# Patient Record
Sex: MALE | Race: WHITE | NOT HISPANIC OR LATINO | Employment: OTHER | URBAN - METROPOLITAN AREA
[De-identification: names, ages, dates, MRNs, and addresses within clinical notes are randomized per-mention and may not be internally consistent; named-entity substitution may affect disease eponyms.]

---

## 2017-04-18 ENCOUNTER — ALLSCRIPTS OFFICE VISIT (OUTPATIENT)
Dept: OTHER | Facility: OTHER | Age: 77
End: 2017-04-18

## 2017-04-18 DIAGNOSIS — R05.9 COUGH: ICD-10-CM

## 2018-01-10 NOTE — PROGRESS NOTES
Assessment    1  Cough (786 2) (R05)   2  COPD exacerbation (491 21) (J44 1)    Plan  Asthma    · Advair Diskus 250-50 MCG/DOSE Inhalation Aerosol Powder Breath Activated; 1  PUFF TWICE A DAY   Rx By: Cristina Soto; Dispense: 30 Days ; #:1 Aerosol Powder Breath Activated; Refill: 5; For: Asthma; BRANDON = N; Verified Transmission to [address] ([address]; Last Updated By: System, SureScripts; 4/18/2017 11:31:28 AM  COPD exacerbation    · PredniSONE 20 MG Oral Tablet; 2 tabs daily x 5 days then 1 tab daily x 5 days   Rx By: Cristina Soto; Dispense: 10 Days ; #:15 Tablet; Refill: 0; For: COPD exacerbation; BRANDON = N; Verified Transmission to [address] ([address]; Last Updated By: System, SureScripts; 4/18/2017 11:31:18 AM  Cough    · * XR CHEST PA & LATERAL; Status:Active; Requested for:18Apr2017;    Perform:[facility]; Due:18Apr2018; Ordered; For:Cough; Ordered By:Chelsy Solis;    Discussion/Summary  Discussion Summary:   Ruth Leahy has moderate COPD with exacerbation  He has course wheezing and reports improvement in cough  Apparently he went to PCP and was prescribed Z-Pratik and tapering dose of prednisone  He still has wheezing; bilaterally  I did advise him to use Advair 250/50 1 puff BID He is agreeable to use prednisone 40mg daily x 5 days and 20 mg x 5 days  He is going to think about the chest Xray I ordered for him  He will let us know if he improves  He did recently complete Z-Pratik  Cough is improved but he has wheezing and prednisone taper is advised  F/u in 3-4 months PFT will be done at that time  Counseling Documentation With Imm: The patient was counseled regarding  Goals and Barriers: The patient has the current Goals: Ruth Leahy will consider using Advair 250/50 1 puff BID as prescribed  Patient's Capacity to Self-Care: Patient is able to Self-Care  Active Problems    1  Allergic rhinitis (477 9) (J30 9)   2  Asthma (493 90) (J45 909)   3  Bronchitis (490) (J40)   4  Chronic obstructive pulmonary disease (496) (J44 9)   5  Difficulty breathing (786 09) (R06 89)    Chief Complaint  Chief Complaint Free Text Note Form: Pt presents today for cold  pt states that the cold started about a month ago  Pt states that he has cough which is dry  Pt states that he went to his PCP to get antibiotics which have helped some  History of Present Illness  HPI: Mr Carla Weeks is here today for follow up  He has moderate COPD  FEV1 1 29L or 45% of predicted  He has been on Advair 250/50 1 puff daily  He had chest Xray on 1/15/16 and no active disease was seen  He has element of asthma and uses Singular on rare occasion  Mr Carla Weeks does not have nebulizer but uses emergency inhaler rarely  He complains of cough with little sputum productive  He went to PCP Dr Tere Randle and was prescribed and completed Z-Pratik yesterday  He was given oral prednisone and he completed this  Review of Systems  Complete-Male Pulm:   The patient presents with complaints of sudden onset of intermittent episodes of mild cough, described as dry  His symptoms are caused by no known event  Symptoms are made worse by activity  Symptoms are improving  Surgical History    1  History of Cataract Surgery   2  History of Foot Surgery   3  History of Hand Excision Of Tendon Cyst   4  History of Hemorrhoidectomy  Surgical History Reviewed: The surgical history was reviewed and updated today  Family History  Father    1  Family history of Asthma (V17 5)  Sister    2  Family history of Chronic Obstructive Pulmonary Disease  Family History Reviewed: The family history was reviewed and updated today  Social History    · Chewing Nicotine-containing Substances   · 2-3 TIMES A DAY   · Former smoker (V15 82) (Z87 891)   · QUIT SMOKING 10 YRS AGO USE TO SMOKE 1 PACK A DAY FOR 20 YRS  HW NOW      CHEWS TOBACCO 2-3 TIMES A DAY  Social History Reviewed: The social history was reviewed and updated today        Current Meds   1  Advair Diskus 250-50 MCG/DOSE Inhalation Aerosol Powder Breath Activated; 1 PUFF   TWICE A DAY; Therapy: [id number] to (Evaluate:17Oct2016)  Requested for: 17Oct2016; Last   Rx:14Apr2015 Ordered   2  Advair Diskus 250-50 MCG/DOSE Inhalation Aerosol Powder Breath Activated; INHALE 1   PUFF TWICE DAILY; Therapy: 33ZHC0651 to (Evaluate:06Sot1211)  Requested for: 25TYP5064; Last   Rx:15Jun2016 Ordered   3  Aspirin 81 MG TABS; Therapy: (Recorded:38Uht4991) to Recorded   4  Breo Ellipta 100-25 MCG/INH Inhalation Aerosol Powder Breath Activated; USE 1   INHALATION ONCE DAILY; Therapy: 80ZIC3939 to (Evaluate:02Nov2016); Last Rx:15Jun2016 Ordered   5  Montelukast Sodium 10 MG Oral Tablet; TAKE 1 TABLET DAILY  Requested for:   44BKI4657; Last Rx:05Mar2014 Ordered   6  Proventil  (90 Base) MCG/ACT Inhalation Aerosol Solution; INHALE 2 PUFFS   EVERY 4-6 HOURS AS NEEDED  Requested for: 59RXT9231; Last Rx:05Mar2014   Ordered   7  Rhinocort 32 MCG/ACT AERO; Therapy: (Recorded:18Feb2014) to Recorded  Medication List Reviewed: The medication list was reviewed and updated today  Allergies    1  No Known Drug Allergies    Vitals  Vital Signs    Recorded: 18Apr2017 11:04AM   Temperature 99 F, Oral   Heart Rate 83   Pulse Quality Normal   Respiration Quality Normal   Respiration 12   Systolic 931, LUE, Sitting   Diastolic 84, LUE, Sitting   Height 5 ft 9 in   Weight 166 lb    BMI Calculated 24 51   BSA Calculated 1 91   O2 Saturation 96, RA     Physical Exam    Constitutional   General appearance: No acute distress, well appearing and well nourished  Ears, Nose, Mouth, and Throat   Nasal mucosa, septum, and turbinates: Normal without edema or erythema  Lips, teeth, and gums: Normal, good dentition  Oropharynx: Normal with no erythema, edema, exudate or lesions  Neck   Neck: Supple, symmetric, trachea midline, no masses      Jugular veins: Normal     Pulmonary   Chest: Normal     Respiratory effort: No increased work of breathing or signs of respiratory distress  Auscultation of lungs: Abnormal   wheezing over both midlung fields, wheezing over both bases and course wheezing  Cardiovascular   Auscultation of heart: Normal rate and rhythm, normal S1 and S2, no murmurs  Examination of extremities for edema and/or varicosities: Normal     Abdomen   Abdomen: Soft, non-tender  Lymphatic   Palpation of lymph nodes in neck: No lymphadenopathy  Musculoskeletal   Gait and station: Normal     Digits and nails: Normal without clubbing or cyanosis  Neurologic   Mental Status: Normal  Not confused, no evidence of dementia, good comprehension, good concentration  Skin   Skin and subcutaneous tissue: Limited exam shows no rash  Psychiatric   Orientation to person, place and time: Normal     Mood and affect: Normal        Signatures   Electronically signed by : AUGUSTO Cedeno;  Apr 18 2017 11:46AM EST                       (Author)

## 2018-01-12 VITALS
SYSTOLIC BLOOD PRESSURE: 124 MMHG | OXYGEN SATURATION: 96 % | HEIGHT: 69 IN | DIASTOLIC BLOOD PRESSURE: 84 MMHG | BODY MASS INDEX: 24.59 KG/M2 | WEIGHT: 166 LBS | RESPIRATION RATE: 12 BRPM | TEMPERATURE: 99 F | HEART RATE: 83 BPM

## 2018-01-13 NOTE — PROGRESS NOTES
Assessment    1  Chronic obstructive pulmonary disease (496) (J44 9)   2  Difficulty breathing (786 09) (R06 89)    Plan  Chronic obstructive pulmonary disease    · Advair Diskus 250-50 MCG/DOSE Inhalation Aerosol Powder Breath Activated;  INHALE 1 PUFF TWICE DAILY   Rx By: Jesse Manifold; Dispense: 30 Days ; #:1 Aerosol Powder Breath Activated; Refill: 5; For: Chronic obstructive pulmonary disease; BRANDON = N; Verified Transmission to [facility]; Last Updated By: System, SureScripts; 6/15/2016 10:38:11 AM   · Paige Barthel Ellipta 100-25 MCG/INH Inhalation Aerosol Powder Breath Activated; USE 1  INHALATION ONCE DAILY   Rx By: Jesse Manifold; Dispense: 28 Days ; #:1 Aerosol Powder Breath Activated; Refill: 4; For: Chronic obstructive pulmonary disease; BRANDON = N; Print Rx  Chronic obstructive pulmonary disease, Difficulty breathing    · * XR CHEST PA & LATERAL; Status:Active; Requested DUL:[id number];    Perform:[facility]; Order Comments:cc: Dr Lily Juarez; [facility]:[id number];CBBOISF; For:Chronic obstructive pulmonary disease, Difficulty breathing; Ordered By:Amber Solis; Results/Data  PFT Results v2:     Spirometry: Forced vital capacity: 2 73L and 68% Predicted Values  Forced expiratory volume in one second: 1 29L and 45% Predicted Value  FEV1/FVC ratio is 47  Post Bronchodilator Spirometry:   Lung Volumes:   DLCO:    PFT Interpretation:   moderate airway obstruction  Discussion/Summary  Discussion Summary:   Joy Lucero has moderate-severe COPD  He had PFT's done FEV1 is   29L or 45% of predicted  Joy Lucero is now using Advair 250/50 1 puff daily  He is going to try Breo 100 mcg 1 puff daily  Joy Lucero is agreeable to chest Xray  Last one was 2003 and was normal  Follow up in 1 year  Joy Lucero has elevated PSA (10)  He follows with Dr Kenny Del Toro  Active Problems    1  Allergic rhinitis (477 9) (J30 9)   2  Asthma (493 90) (J45 909)   3  Bronchitis (490) (J40)   4   Chronic obstructive pulmonary disease (496) (J44 9)   5  Difficulty breathing (786 09) (R06 89)    Chief Complaint  Chief Complaint Free Text Note Form: Pt presents today for routine check up  Pt states that he has some back pains which occur every morning  Pt states that he has some SOB when he exerts himself  History of Present Illness  HPI: Noreen Owen is a 67year old male with history of severe COPD and dyspnea on exertion  He is very active and walks up steps daily  He is using Advair 250/50 1 puff daily  He uses Singular about three times a year  He has never used his rescue inhaler  Noreen Owen is a former smoker; he smoked for 15-20 years and quit many years ago  Previous PFT revealed FEV1 is 0 96 or 38% of predicted  Review of Systems  Complete-Male Pulm:   Constitutional: No fever or chills, feels well, no tiredness, no recent weight gain or weight loss  Eyes: no complaints of vision problems  ENT: no rhinitis, no PND, no epistaxis  Cardiovascular: no palpitations, no chest pain  Respiratory: shortness of breath during exertion, but as noted in HPI    The patient presents with complaints of gradual onset of occasional episodes of moderate wheezing  His symptoms are caused by no known event  Symptoms are improved by resting  Symptoms are unchanged  Gastrointestinal: no complaints of esophageal reflux, no abdominal pain  Genitourinary: no urinary retention  Musculoskeletal: no arthralgias, no joint swelling, no myalgias  Integumentary: no rash, no lesions  Neurological: no headache, no fainting, no weakness  Psychiatric: no anxiety, no depression  Hematologic/Lymphatic: no complaints of swollen glands  ROS Reviewed:   ROS reviewed  Surgical History    1  History of Cataract Surgery   2  History of Foot Surgery   3  History of Hand Excision Of Tendon Cyst   4  History of Hemorrhoidectomy  Surgical History Reviewed: The surgical history was reviewed and updated today  Family History  Father    1  Family history of Asthma (V17 5)  Sister    2  Family history of Chronic Obstructive Pulmonary Disease  Family History Reviewed: The family history was reviewed and updated today  Social History    · Chewing Nicotine-containing Substances   · 2-3 TIMES A DAY   · Former smoker (V15 82) (Z87 891)   · QUIT SMOKING 10 YRS AGO USE TO SMOKE 1 PACK A DAY FOR 20 YRS  HW NOW      CHEWS TOBACCO 2-3 TIMES A DAY  Social History Reviewed: The social history was reviewed and updated today  The social history was reviewed and is unchanged  Current Meds   1  Advair Diskus 250-50 MCG/DOSE Inhalation Aerosol Powder Breath Activated; 1 PUFF   TWICE A DAY; Therapy: 99YZZ9023 to (Evaluate:11Oct2015)  Requested for: 28Diw0913; Last   Rx:14Apr2015 Ordered   2  Aspirin 81 MG TABS; Therapy: (Recorded:66Jfw2823) to Recorded   3  Montelukast Sodium 10 MG Oral Tablet; TAKE 1 TABLET DAILY  Requested for:   56SFC9593; Last Rx:05Mar2014 Ordered   4  Proventil  (90 Base) MCG/ACT Inhalation Aerosol Solution; INHALE 2 PUFFS   EVERY 4-6 HOURS AS NEEDED  Requested for: 73PQD3479; Last Rx:05Mar2014   Ordered   5  Rhinocort 32 MCG/ACT AERO; Therapy: (Recorded:81Vie0969) to Recorded  Medication List Reviewed: The medication list was reviewed and updated today  Allergies    1  No Known Drug Allergies    Vitals  Vital Signs [Data Includes: Current Encounter]    Recorded: 34HVA7931 10:17AM   Temperature 98 F, Oral   Heart Rate 76   Pulse Quality Normal   Respiration 12   Respiration Quality Normal   Systolic 764, LUE, Sitting   Diastolic 86, LUE, Sitting   Height 5 ft 9 in   Weight 171 lb    BMI Calculated 25 25   BSA Calculated 1 93   O2 Saturation 99, RA     Physical Exam    Constitutional   General appearance: No acute distress, well appearing and well nourished  Eyes   Examination of pupil and irises: Anicteric, pupils reactive     Ears, Nose, Mouth, and Throat   External inspection of ears and nose: Normal     Nasal mucosa, septum, and turbinates: Normal without edema or erythema  Lips, teeth, and gums: Normal, good dentition  Oropharynx: Normal with no erythema, edema, exudate or lesions  Mallampati Classification: 3  Neck   Neck: Supple, symmetric, trachea midline, no masses  Jugular veins: Normal     Pulmonary   Chest: Normal     Respiratory effort: No increased work of breathing or signs of respiratory distress  Auscultation of lungs: Abnormal   diminished breath sounds over both midlung fields and diminished breath sounds over both bases  Cardiovascular   Auscultation of heart: Normal rate and rhythm, normal S1 and S2, no murmurs  Examination of extremities for edema and/or varicosities: Normal     Abdomen   Abdomen: Soft, non-tender  Lymphatic   Palpation of lymph nodes in neck: No lymphadenopathy  Musculoskeletal   Gait and station: Normal     Digits and nails: Normal without clubbing or cyanosis  Neurologic   Mental Status: Normal  Not confused, no evidence of dementia, good comprehension, good concentration  Motor Exam: Gross motor function normal     Skin   Skin and subcutaneous tissue: Limited exam shows no rash      Psychiatric   Orientation to person, place and time: Normal     Mood and affect: Normal        Signatures   Electronically signed by : AUGUSTO Cedeno; Jose 15 2016 10:54AM EST                       (Author)

## 2020-04-12 ENCOUNTER — HOSPITAL ENCOUNTER (EMERGENCY)
Facility: HOSPITAL | Age: 80
Discharge: HOME | End: 2020-04-12
Attending: EMERGENCY MEDICINE | Admitting: EMERGENCY MEDICINE
Payer: MEDICARE

## 2020-04-12 ENCOUNTER — APPOINTMENT (EMERGENCY)
Dept: RADIOLOGY | Facility: HOSPITAL | Age: 80
End: 2020-04-12
Payer: MEDICARE

## 2020-04-12 VITALS
SYSTOLIC BLOOD PRESSURE: 146 MMHG | WEIGHT: 175.27 LBS | BODY MASS INDEX: 25.88 KG/M2 | RESPIRATION RATE: 20 BRPM | DIASTOLIC BLOOD PRESSURE: 86 MMHG | TEMPERATURE: 97.5 F | OXYGEN SATURATION: 97 % | HEART RATE: 92 BPM

## 2020-04-12 DIAGNOSIS — R33.9 URINARY RETENTION: Primary | ICD-10-CM

## 2020-04-12 LAB
ALBUMIN SERPL BCP-MCNC: 3.9 G/DL (ref 3.5–5)
ALP SERPL-CCNC: 72 U/L (ref 46–116)
ALT SERPL W P-5'-P-CCNC: 26 U/L (ref 12–78)
ANION GAP SERPL CALCULATED.3IONS-SCNC: 10 MMOL/L (ref 4–13)
APTT PPP: 24 SECONDS (ref 23–37)
AST SERPL W P-5'-P-CCNC: 23 U/L (ref 5–45)
BACTERIA UR QL AUTO: ABNORMAL /HPF
BASOPHILS # BLD AUTO: 0.03 THOUSANDS/ΜL (ref 0–0.1)
BASOPHILS NFR BLD AUTO: 0 % (ref 0–1)
BILIRUB SERPL-MCNC: 0.7 MG/DL (ref 0.2–1)
BILIRUB UR QL STRIP: NEGATIVE
BUN SERPL-MCNC: 19 MG/DL (ref 5–25)
CALCIUM SERPL-MCNC: 9 MG/DL (ref 8.3–10.1)
CHLORIDE SERPL-SCNC: 103 MMOL/L (ref 100–108)
CLARITY UR: CLEAR
CO2 SERPL-SCNC: 27 MMOL/L (ref 21–32)
COLOR UR: ABNORMAL
CREAT SERPL-MCNC: 1.14 MG/DL (ref 0.6–1.3)
EOSINOPHIL # BLD AUTO: 0 THOUSAND/ΜL (ref 0–0.61)
EOSINOPHIL NFR BLD AUTO: 0 % (ref 0–6)
ERYTHROCYTE [DISTWIDTH] IN BLOOD BY AUTOMATED COUNT: 13 % (ref 11.6–15.1)
GFR SERPL CREATININE-BSD FRML MDRD: 60 ML/MIN/1.73SQ M
GLUCOSE SERPL-MCNC: 139 MG/DL (ref 65–140)
GLUCOSE UR STRIP-MCNC: NEGATIVE MG/DL
HCT VFR BLD AUTO: 45.9 % (ref 36.5–49.3)
HGB BLD-MCNC: 15 G/DL (ref 12–17)
HGB UR QL STRIP.AUTO: ABNORMAL
IMM GRANULOCYTES # BLD AUTO: 0.05 THOUSAND/UL (ref 0–0.2)
IMM GRANULOCYTES NFR BLD AUTO: 0 % (ref 0–2)
INR PPP: 0.89 (ref 0.84–1.19)
KETONES UR STRIP-MCNC: NEGATIVE MG/DL
LEUKOCYTE ESTERASE UR QL STRIP: NEGATIVE
LYMPHOCYTES # BLD AUTO: 0.5 THOUSANDS/ΜL (ref 0.6–4.47)
LYMPHOCYTES NFR BLD AUTO: 5 % (ref 14–44)
MCH RBC QN AUTO: 30.2 PG (ref 26.8–34.3)
MCHC RBC AUTO-ENTMCNC: 32.7 G/DL (ref 31.4–37.4)
MCV RBC AUTO: 92 FL (ref 82–98)
MONOCYTES # BLD AUTO: 0.84 THOUSAND/ΜL (ref 0.17–1.22)
MONOCYTES NFR BLD AUTO: 8 % (ref 4–12)
NEUTROPHILS # BLD AUTO: 9.77 THOUSANDS/ΜL (ref 1.85–7.62)
NEUTS SEG NFR BLD AUTO: 87 % (ref 43–75)
NITRITE UR QL STRIP: NEGATIVE
NON-SQ EPI CELLS URNS QL MICRO: ABNORMAL /HPF
NRBC BLD AUTO-RTO: 0 /100 WBCS
PH UR STRIP.AUTO: 6 [PH]
PLATELET # BLD AUTO: 208 THOUSANDS/UL (ref 149–390)
PMV BLD AUTO: 10.1 FL (ref 8.9–12.7)
POTASSIUM SERPL-SCNC: 4 MMOL/L (ref 3.5–5.3)
PROT SERPL-MCNC: 6.9 G/DL (ref 6.4–8.2)
PROT UR STRIP-MCNC: NEGATIVE MG/DL
PROTHROMBIN TIME: 12.4 SECONDS (ref 11.6–14.5)
RBC # BLD AUTO: 4.97 MILLION/UL (ref 3.88–5.62)
RBC #/AREA URNS AUTO: ABNORMAL /HPF
SODIUM SERPL-SCNC: 140 MMOL/L (ref 136–145)
SP GR UR STRIP.AUTO: 1.01 (ref 1–1.03)
UROBILINOGEN UR QL STRIP.AUTO: 0.2 E.U./DL
WBC # BLD AUTO: 11.19 THOUSAND/UL (ref 4.31–10.16)
WBC #/AREA URNS AUTO: ABNORMAL /HPF

## 2020-04-12 PROCEDURE — 80053 COMPREHEN METABOLIC PANEL: CPT | Performed by: EMERGENCY MEDICINE

## 2020-04-12 PROCEDURE — 81001 URINALYSIS AUTO W/SCOPE: CPT | Performed by: EMERGENCY MEDICINE

## 2020-04-12 PROCEDURE — 85610 PROTHROMBIN TIME: CPT | Performed by: EMERGENCY MEDICINE

## 2020-04-12 PROCEDURE — 85730 THROMBOPLASTIN TIME PARTIAL: CPT | Performed by: EMERGENCY MEDICINE

## 2020-04-12 PROCEDURE — 74177 CT ABD & PELVIS W/CONTRAST: CPT

## 2020-04-12 PROCEDURE — 96360 HYDRATION IV INFUSION INIT: CPT

## 2020-04-12 PROCEDURE — 36415 COLL VENOUS BLD VENIPUNCTURE: CPT | Performed by: EMERGENCY MEDICINE

## 2020-04-12 PROCEDURE — 99284 EMERGENCY DEPT VISIT MOD MDM: CPT

## 2020-04-12 PROCEDURE — 99282 EMERGENCY DEPT VISIT SF MDM: CPT | Performed by: EMERGENCY MEDICINE

## 2020-04-12 PROCEDURE — 85025 COMPLETE CBC W/AUTO DIFF WBC: CPT | Performed by: EMERGENCY MEDICINE

## 2020-04-12 RX ORDER — FLUTICASONE FUROATE AND VILANTEROL 100; 25 UG/1; UG/1
POWDER RESPIRATORY (INHALATION) DAILY
COMMUNITY

## 2020-04-12 RX ORDER — FLUTICASONE PROPIONATE 50 MCG
SPRAY, SUSPENSION (ML) NASAL EVERY 12 HOURS
COMMUNITY
End: 2020-04-12

## 2020-04-12 RX ADMIN — SODIUM CHLORIDE 1000 ML: 0.9 INJECTION, SOLUTION INTRAVENOUS at 14:24

## 2020-04-12 RX ADMIN — IOHEXOL 100 ML: 350 INJECTION, SOLUTION INTRAVENOUS at 15:48

## 2020-04-13 ENCOUNTER — HOSPITAL ENCOUNTER (OUTPATIENT)
Facility: HOSPITAL | Age: 80
Setting detail: OBSERVATION
Discharge: HOME | End: 2020-04-14
Attending: EMERGENCY MEDICINE | Admitting: UROLOGY
Payer: MEDICARE

## 2020-04-13 DIAGNOSIS — R31.9 HEMATURIA, UNSPECIFIED TYPE: Primary | ICD-10-CM

## 2020-04-13 LAB
ABO GROUP BLD: NORMAL
ALBUMIN SERPL BCP-MCNC: 3.3 G/DL (ref 3.5–5)
ALP SERPL-CCNC: 60 U/L (ref 46–116)
ALT SERPL W P-5'-P-CCNC: 22 U/L (ref 12–78)
ANION GAP SERPL CALCULATED.3IONS-SCNC: 6 MMOL/L (ref 4–13)
APTT PPP: 26 SECONDS (ref 23–37)
AST SERPL W P-5'-P-CCNC: 24 U/L (ref 5–45)
BASOPHILS # BLD AUTO: 0.04 THOUSANDS/ΜL (ref 0–0.1)
BASOPHILS NFR BLD AUTO: 1 % (ref 0–1)
BILIRUB SERPL-MCNC: 0.7 MG/DL (ref 0.2–1)
BLD GP AB SCN SERPL QL: NEGATIVE
BUN SERPL-MCNC: 19 MG/DL (ref 5–25)
CALCIUM SERPL-MCNC: 8.2 MG/DL (ref 8.3–10.1)
CHLORIDE SERPL-SCNC: 106 MMOL/L (ref 100–108)
CO2 SERPL-SCNC: 30 MMOL/L (ref 21–32)
CREAT SERPL-MCNC: 1.08 MG/DL (ref 0.6–1.3)
EOSINOPHIL # BLD AUTO: 0.15 THOUSAND/ΜL (ref 0–0.61)
EOSINOPHIL NFR BLD AUTO: 2 % (ref 0–6)
ERYTHROCYTE [DISTWIDTH] IN BLOOD BY AUTOMATED COUNT: 13.2 % (ref 11.6–15.1)
GFR SERPL CREATININE-BSD FRML MDRD: 64 ML/MIN/1.73SQ M
GLUCOSE SERPL-MCNC: 123 MG/DL (ref 65–140)
HCT VFR BLD AUTO: 39.8 % (ref 36.5–49.3)
HGB BLD-MCNC: 13 G/DL (ref 12–17)
IMM GRANULOCYTES # BLD AUTO: 0.02 THOUSAND/UL (ref 0–0.2)
IMM GRANULOCYTES NFR BLD AUTO: 0 % (ref 0–2)
INR PPP: 0.98 (ref 0.84–1.19)
LYMPHOCYTES # BLD AUTO: 1.2 THOUSANDS/ΜL (ref 0.6–4.47)
LYMPHOCYTES NFR BLD AUTO: 14 % (ref 14–44)
MCH RBC QN AUTO: 30.9 PG (ref 26.8–34.3)
MCHC RBC AUTO-ENTMCNC: 32.7 G/DL (ref 31.4–37.4)
MCV RBC AUTO: 95 FL (ref 82–98)
MONOCYTES # BLD AUTO: 1.11 THOUSAND/ΜL (ref 0.17–1.22)
MONOCYTES NFR BLD AUTO: 13 % (ref 4–12)
NEUTROPHILS # BLD AUTO: 6.12 THOUSANDS/ΜL (ref 1.85–7.62)
NEUTS SEG NFR BLD AUTO: 70 % (ref 43–75)
NRBC BLD AUTO-RTO: 0 /100 WBCS
PLATELET # BLD AUTO: 193 THOUSANDS/UL (ref 149–390)
PMV BLD AUTO: 10.1 FL (ref 8.9–12.7)
POTASSIUM SERPL-SCNC: 3.9 MMOL/L (ref 3.5–5.3)
PROT SERPL-MCNC: 6.1 G/DL (ref 6.4–8.2)
PROTHROMBIN TIME: 13.3 SECONDS (ref 11.6–14.5)
RBC # BLD AUTO: 4.21 MILLION/UL (ref 3.88–5.62)
RH BLD: POSITIVE
SODIUM SERPL-SCNC: 142 MMOL/L (ref 136–145)
SPECIMEN EXPIRATION DATE: NORMAL
WBC # BLD AUTO: 8.64 THOUSAND/UL (ref 4.31–10.16)

## 2020-04-13 PROCEDURE — 85025 COMPLETE CBC W/AUTO DIFF WBC: CPT | Performed by: EMERGENCY MEDICINE

## 2020-04-13 PROCEDURE — 86901 BLOOD TYPING SEROLOGIC RH(D): CPT | Performed by: EMERGENCY MEDICINE

## 2020-04-13 PROCEDURE — 99284 EMERGENCY DEPT VISIT MOD MDM: CPT

## 2020-04-13 PROCEDURE — 85730 THROMBOPLASTIN TIME PARTIAL: CPT | Performed by: EMERGENCY MEDICINE

## 2020-04-13 PROCEDURE — 86900 BLOOD TYPING SEROLOGIC ABO: CPT | Performed by: EMERGENCY MEDICINE

## 2020-04-13 PROCEDURE — 80053 COMPREHEN METABOLIC PANEL: CPT | Performed by: EMERGENCY MEDICINE

## 2020-04-13 PROCEDURE — 36415 COLL VENOUS BLD VENIPUNCTURE: CPT | Performed by: EMERGENCY MEDICINE

## 2020-04-13 PROCEDURE — 85610 PROTHROMBIN TIME: CPT | Performed by: EMERGENCY MEDICINE

## 2020-04-13 PROCEDURE — 86850 RBC ANTIBODY SCREEN: CPT | Performed by: EMERGENCY MEDICINE

## 2020-04-13 PROCEDURE — 99285 EMERGENCY DEPT VISIT HI MDM: CPT | Performed by: EMERGENCY MEDICINE

## 2020-04-13 RX ORDER — DOCUSATE SODIUM 100 MG/1
100 CAPSULE, LIQUID FILLED ORAL 2 TIMES DAILY
Status: DISCONTINUED | OUTPATIENT
Start: 2020-04-13 | End: 2020-04-14 | Stop reason: HOSPADM

## 2020-04-13 RX ORDER — MAGNESIUM HYDROXIDE/ALUMINUM HYDROXICE/SIMETHICONE 120; 1200; 1200 MG/30ML; MG/30ML; MG/30ML
30 SUSPENSION ORAL EVERY 6 HOURS PRN
Status: DISCONTINUED | OUTPATIENT
Start: 2020-04-13 | End: 2020-04-14 | Stop reason: HOSPADM

## 2020-04-13 RX ORDER — ONDANSETRON 2 MG/ML
4 INJECTION INTRAMUSCULAR; INTRAVENOUS EVERY 6 HOURS PRN
Status: DISCONTINUED | OUTPATIENT
Start: 2020-04-13 | End: 2020-04-14 | Stop reason: HOSPADM

## 2020-04-13 RX ORDER — ACETAMINOPHEN 325 MG/1
650 TABLET ORAL EVERY 6 HOURS PRN
Status: DISCONTINUED | OUTPATIENT
Start: 2020-04-13 | End: 2020-04-14 | Stop reason: HOSPADM

## 2020-04-13 RX ORDER — DEXTROSE, SODIUM CHLORIDE, AND POTASSIUM CHLORIDE 5; .45; .15 G/100ML; G/100ML; G/100ML
125 INJECTION INTRAVENOUS CONTINUOUS
Status: DISCONTINUED | OUTPATIENT
Start: 2020-04-13 | End: 2020-04-14 | Stop reason: HOSPADM

## 2020-04-14 VITALS
BODY MASS INDEX: 25.83 KG/M2 | HEART RATE: 75 BPM | DIASTOLIC BLOOD PRESSURE: 71 MMHG | SYSTOLIC BLOOD PRESSURE: 121 MMHG | HEIGHT: 69 IN | TEMPERATURE: 97.9 F | RESPIRATION RATE: 18 BRPM | WEIGHT: 174.4 LBS | OXYGEN SATURATION: 98 %

## 2020-04-14 PROBLEM — N13.8 BPH (BENIGN PROSTATIC HYPERTROPHY) WITH URINARY OBSTRUCTION: Status: ACTIVE | Noted: 2020-04-14

## 2020-04-14 PROBLEM — J44.1 ACUTE EXACERBATION OF CHRONIC OBSTRUCTIVE AIRWAYS DISEASE (HCC): Status: ACTIVE | Noted: 2020-04-14

## 2020-04-14 PROBLEM — N40.1 BPH (BENIGN PROSTATIC HYPERTROPHY) WITH URINARY OBSTRUCTION: Status: ACTIVE | Noted: 2020-04-14

## 2020-04-14 PROBLEM — R31.0 HEMATURIA, GROSS: Status: ACTIVE | Noted: 2020-04-14

## 2020-04-14 LAB
ABO GROUP BLD: NORMAL
ANION GAP SERPL CALCULATED.3IONS-SCNC: 4 MMOL/L (ref 4–13)
BUN SERPL-MCNC: 14 MG/DL (ref 5–25)
CALCIUM SERPL-MCNC: 8.4 MG/DL (ref 8.3–10.1)
CHLORIDE SERPL-SCNC: 106 MMOL/L (ref 100–108)
CO2 SERPL-SCNC: 30 MMOL/L (ref 21–32)
CREAT SERPL-MCNC: 0.94 MG/DL (ref 0.6–1.3)
ERYTHROCYTE [DISTWIDTH] IN BLOOD BY AUTOMATED COUNT: 13.1 % (ref 11.6–15.1)
GFR SERPL CREATININE-BSD FRML MDRD: 76 ML/MIN/1.73SQ M
GLUCOSE P FAST SERPL-MCNC: 134 MG/DL (ref 65–99)
GLUCOSE SERPL-MCNC: 134 MG/DL (ref 65–140)
HCT VFR BLD AUTO: 39 % (ref 36.5–49.3)
HGB BLD-MCNC: 12.3 G/DL (ref 12–17)
MCH RBC QN AUTO: 30.2 PG (ref 26.8–34.3)
MCHC RBC AUTO-ENTMCNC: 31.5 G/DL (ref 31.4–37.4)
MCV RBC AUTO: 96 FL (ref 82–98)
PLATELET # BLD AUTO: 166 THOUSANDS/UL (ref 149–390)
PMV BLD AUTO: 9.9 FL (ref 8.9–12.7)
POTASSIUM SERPL-SCNC: 3.7 MMOL/L (ref 3.5–5.3)
RBC # BLD AUTO: 4.07 MILLION/UL (ref 3.88–5.62)
RH BLD: POSITIVE
SODIUM SERPL-SCNC: 140 MMOL/L (ref 136–145)
WBC # BLD AUTO: 7.18 THOUSAND/UL (ref 4.31–10.16)

## 2020-04-14 PROCEDURE — 80048 BASIC METABOLIC PNL TOTAL CA: CPT | Performed by: UROLOGY

## 2020-04-14 PROCEDURE — 85027 COMPLETE CBC AUTOMATED: CPT | Performed by: UROLOGY

## 2020-04-14 PROCEDURE — 93005 ELECTROCARDIOGRAM TRACING: CPT

## 2020-04-14 RX ADMIN — DOCUSATE SODIUM 100 MG: 100 CAPSULE, LIQUID FILLED ORAL at 12:24

## 2020-04-14 RX ADMIN — DOCUSATE SODIUM 100 MG: 100 CAPSULE, LIQUID FILLED ORAL at 00:31

## 2020-04-14 RX ADMIN — DEXTROSE, SODIUM CHLORIDE, AND POTASSIUM CHLORIDE 125 ML/HR: 5; .45; .15 INJECTION INTRAVENOUS at 00:29

## 2020-04-14 RX ADMIN — DEXTROSE, SODIUM CHLORIDE, AND POTASSIUM CHLORIDE 125 ML/HR: 5; .45; .15 INJECTION INTRAVENOUS at 12:24

## 2020-04-15 ENCOUNTER — ANESTHESIA EVENT (OUTPATIENT)
Dept: PERIOP | Facility: HOSPITAL | Age: 80
End: 2020-04-15
Payer: MEDICARE

## 2020-04-15 PROCEDURE — 1123F ACP DISCUSS/DSCN MKR DOCD: CPT | Performed by: PATHOLOGY

## 2020-04-16 ENCOUNTER — HOSPITAL ENCOUNTER (OUTPATIENT)
Facility: HOSPITAL | Age: 80
Setting detail: OUTPATIENT SURGERY
Discharge: HOME | End: 2020-04-17
Attending: UROLOGY | Admitting: UROLOGY
Payer: MEDICARE

## 2020-04-16 ENCOUNTER — ANESTHESIA (OUTPATIENT)
Dept: PERIOP | Facility: HOSPITAL | Age: 80
End: 2020-04-16
Payer: MEDICARE

## 2020-04-16 DIAGNOSIS — N40.1 BENIGN PROSTATIC HYPERPLASIA WITH LOWER URINARY TRACT SYMPTOMS: ICD-10-CM

## 2020-04-16 DIAGNOSIS — R31.0 GROSS HEMATURIA: ICD-10-CM

## 2020-04-16 LAB
ATRIAL RATE: 77 BPM
P AXIS: 80 DEGREES
PR INTERVAL: 160 MS
QRS AXIS: 54 DEGREES
QRSD INTERVAL: 112 MS
QT INTERVAL: 410 MS
QTC INTERVAL: 463 MS
T WAVE AXIS: 62 DEGREES
VENTRICULAR RATE: 77 BPM

## 2020-04-16 PROCEDURE — 88305 TISSUE EXAM BY PATHOLOGIST: CPT | Performed by: PATHOLOGY

## 2020-04-16 PROCEDURE — 93010 ELECTROCARDIOGRAM REPORT: CPT | Performed by: INTERNAL MEDICINE

## 2020-04-16 RX ORDER — ATROPA BELLADONNA AND OPIUM 16.2; 3 MG/1; MG/1
30 SUPPOSITORY RECTAL EVERY 8 HOURS PRN
Status: DISCONTINUED | OUTPATIENT
Start: 2020-04-16 | End: 2020-04-17 | Stop reason: HOSPADM

## 2020-04-16 RX ORDER — LIDOCAINE HYDROCHLORIDE 10 MG/ML
INJECTION, SOLUTION EPIDURAL; INFILTRATION; INTRACAUDAL; PERINEURAL AS NEEDED
Status: DISCONTINUED | OUTPATIENT
Start: 2020-04-16 | End: 2020-04-16 | Stop reason: SURG

## 2020-04-16 RX ORDER — PROPOFOL 10 MG/ML
INJECTION, EMULSION INTRAVENOUS AS NEEDED
Status: DISCONTINUED | OUTPATIENT
Start: 2020-04-16 | End: 2020-04-16 | Stop reason: SURG

## 2020-04-16 RX ORDER — TAMSULOSIN HYDROCHLORIDE 0.4 MG/1
0.4 CAPSULE ORAL
Status: DISCONTINUED | OUTPATIENT
Start: 2020-04-16 | End: 2020-04-17 | Stop reason: HOSPADM

## 2020-04-16 RX ORDER — CEFAZOLIN SODIUM 2 G/50ML
2000 SOLUTION INTRAVENOUS ONCE
Status: COMPLETED | OUTPATIENT
Start: 2020-04-16 | End: 2020-04-16

## 2020-04-16 RX ORDER — FENTANYL CITRATE/PF 50 MCG/ML
25 SYRINGE (ML) INJECTION
Status: DISCONTINUED | OUTPATIENT
Start: 2020-04-16 | End: 2020-04-16 | Stop reason: HOSPADM

## 2020-04-16 RX ORDER — SODIUM CHLORIDE 9 MG/ML
125 INJECTION, SOLUTION INTRAVENOUS CONTINUOUS
Status: DISCONTINUED | OUTPATIENT
Start: 2020-04-16 | End: 2020-04-17 | Stop reason: HOSPADM

## 2020-04-16 RX ORDER — ONDANSETRON 2 MG/ML
4 INJECTION INTRAMUSCULAR; INTRAVENOUS EVERY 6 HOURS PRN
Status: DISCONTINUED | OUTPATIENT
Start: 2020-04-16 | End: 2020-04-17 | Stop reason: HOSPADM

## 2020-04-16 RX ORDER — DOCUSATE SODIUM 100 MG/1
100 CAPSULE, LIQUID FILLED ORAL 2 TIMES DAILY
Status: DISCONTINUED | OUTPATIENT
Start: 2020-04-16 | End: 2020-04-17 | Stop reason: HOSPADM

## 2020-04-16 RX ORDER — ACETAMINOPHEN 325 MG/1
650 TABLET ORAL EVERY 6 HOURS PRN
Status: DISCONTINUED | OUTPATIENT
Start: 2020-04-16 | End: 2020-04-17 | Stop reason: HOSPADM

## 2020-04-16 RX ORDER — FENTANYL CITRATE 50 UG/ML
INJECTION, SOLUTION INTRAMUSCULAR; INTRAVENOUS AS NEEDED
Status: DISCONTINUED | OUTPATIENT
Start: 2020-04-16 | End: 2020-04-16 | Stop reason: SURG

## 2020-04-16 RX ORDER — ONDANSETRON 2 MG/ML
4 INJECTION INTRAMUSCULAR; INTRAVENOUS ONCE AS NEEDED
Status: DISCONTINUED | OUTPATIENT
Start: 2020-04-16 | End: 2020-04-16 | Stop reason: HOSPADM

## 2020-04-16 RX ORDER — DEXAMETHASONE SODIUM PHOSPHATE 4 MG/ML
INJECTION, SOLUTION INTRA-ARTICULAR; INTRALESIONAL; INTRAMUSCULAR; INTRAVENOUS; SOFT TISSUE AS NEEDED
Status: DISCONTINUED | OUTPATIENT
Start: 2020-04-16 | End: 2020-04-16 | Stop reason: SURG

## 2020-04-16 RX ORDER — GLYCINE 1.5 G/100ML
SOLUTION IRRIGATION AS NEEDED
Status: DISCONTINUED | OUTPATIENT
Start: 2020-04-16 | End: 2020-04-16 | Stop reason: HOSPADM

## 2020-04-16 RX ORDER — SODIUM CHLORIDE 9 MG/ML
INJECTION, SOLUTION INTRAVENOUS CONTINUOUS PRN
Status: DISCONTINUED | OUTPATIENT
Start: 2020-04-16 | End: 2020-04-16 | Stop reason: SURG

## 2020-04-16 RX ORDER — FLUTICASONE FUROATE AND VILANTEROL 100; 25 UG/1; UG/1
1 POWDER RESPIRATORY (INHALATION) DAILY
Status: DISCONTINUED | OUTPATIENT
Start: 2020-04-16 | End: 2020-04-17 | Stop reason: HOSPADM

## 2020-04-16 RX ORDER — SUCCINYLCHOLINE/SOD CL,ISO/PF 100 MG/5ML
SYRINGE (ML) INTRAVENOUS AS NEEDED
Status: DISCONTINUED | OUTPATIENT
Start: 2020-04-16 | End: 2020-04-16 | Stop reason: SURG

## 2020-04-16 RX ORDER — ONDANSETRON 2 MG/ML
INJECTION INTRAMUSCULAR; INTRAVENOUS AS NEEDED
Status: DISCONTINUED | OUTPATIENT
Start: 2020-04-16 | End: 2020-04-16 | Stop reason: SURG

## 2020-04-16 RX ORDER — LEVOFLOXACIN 500 MG/1
500 TABLET, FILM COATED ORAL DAILY
Status: COMPLETED | OUTPATIENT
Start: 2020-04-16 | End: 2020-04-16

## 2020-04-16 RX ORDER — EPHEDRINE SULFATE 50 MG/ML
INJECTION INTRAVENOUS AS NEEDED
Status: DISCONTINUED | OUTPATIENT
Start: 2020-04-16 | End: 2020-04-16 | Stop reason: SURG

## 2020-04-16 RX ADMIN — DOCUSATE SODIUM 100 MG: 100 CAPSULE, LIQUID FILLED ORAL at 12:37

## 2020-04-16 RX ADMIN — FENTANYL CITRATE 25 MCG: 50 INJECTION, SOLUTION INTRAMUSCULAR; INTRAVENOUS at 08:30

## 2020-04-16 RX ADMIN — FENTANYL CITRATE 50 MCG: 50 INJECTION, SOLUTION INTRAMUSCULAR; INTRAVENOUS at 08:28

## 2020-04-16 RX ADMIN — TAMSULOSIN HYDROCHLORIDE 0.4 MG: 0.4 CAPSULE ORAL at 21:29

## 2020-04-16 RX ADMIN — FLUTICASONE FUROATE AND VILANTEROL TRIFENATATE 1 PUFF: 100; 25 POWDER RESPIRATORY (INHALATION) at 21:29

## 2020-04-16 RX ADMIN — EPHEDRINE SULFATE 5 MG: 50 INJECTION, SOLUTION INTRAVENOUS at 09:35

## 2020-04-16 RX ADMIN — Medication 100 MG: at 08:12

## 2020-04-16 RX ADMIN — FENTANYL CITRATE 25 MCG: 50 INJECTION, SOLUTION INTRAMUSCULAR; INTRAVENOUS at 10:46

## 2020-04-16 RX ADMIN — PROPOFOL 150 MG: 10 INJECTION, EMULSION INTRAVENOUS at 08:11

## 2020-04-16 RX ADMIN — EPHEDRINE SULFATE 5 MG: 50 INJECTION, SOLUTION INTRAVENOUS at 08:56

## 2020-04-16 RX ADMIN — CEFAZOLIN SODIUM 2000 MG: 2 SOLUTION INTRAVENOUS at 08:07

## 2020-04-16 RX ADMIN — ONDANSETRON 4 MG: 2 INJECTION INTRAMUSCULAR; INTRAVENOUS at 08:19

## 2020-04-16 RX ADMIN — DOCUSATE SODIUM 100 MG: 100 CAPSULE, LIQUID FILLED ORAL at 17:29

## 2020-04-16 RX ADMIN — DEXAMETHASONE SODIUM PHOSPHATE 4 MG: 4 INJECTION, SOLUTION INTRA-ARTICULAR; INTRALESIONAL; INTRAMUSCULAR; INTRAVENOUS; SOFT TISSUE at 08:19

## 2020-04-16 RX ADMIN — FENTANYL CITRATE 25 MCG: 50 INJECTION, SOLUTION INTRAMUSCULAR; INTRAVENOUS at 09:21

## 2020-04-16 RX ADMIN — SODIUM CHLORIDE: 0.9 INJECTION, SOLUTION INTRAVENOUS at 09:36

## 2020-04-16 RX ADMIN — LIDOCAINE HYDROCHLORIDE 50 MG: 10 INJECTION, SOLUTION EPIDURAL; INFILTRATION; INTRACAUDAL; PERINEURAL at 08:11

## 2020-04-16 RX ADMIN — FENTANYL CITRATE 25 MCG: 50 INJECTION, SOLUTION INTRAMUSCULAR; INTRAVENOUS at 09:10

## 2020-04-16 RX ADMIN — EPHEDRINE SULFATE 10 MG: 50 INJECTION, SOLUTION INTRAVENOUS at 08:59

## 2020-04-16 RX ADMIN — PHENYLEPHRINE HYDROCHLORIDE 100 MCG: 10 INJECTION INTRAVENOUS at 09:26

## 2020-04-16 RX ADMIN — FENTANYL CITRATE 25 MCG: 50 INJECTION, SOLUTION INTRAMUSCULAR; INTRAVENOUS at 08:53

## 2020-04-16 RX ADMIN — FENTANYL CITRATE 25 MCG: 50 INJECTION, SOLUTION INTRAMUSCULAR; INTRAVENOUS at 10:30

## 2020-04-16 RX ADMIN — FENTANYL CITRATE 25 MCG: 50 INJECTION, SOLUTION INTRAMUSCULAR; INTRAVENOUS at 10:11

## 2020-04-16 RX ADMIN — LEVOFLOXACIN 500 MG: 500 TABLET, FILM COATED ORAL at 14:54

## 2020-04-16 RX ADMIN — FENTANYL CITRATE 50 MCG: 50 INJECTION, SOLUTION INTRAMUSCULAR; INTRAVENOUS at 08:11

## 2020-04-16 RX ADMIN — SODIUM CHLORIDE 125 ML/HR: 0.9 INJECTION, SOLUTION INTRAVENOUS at 12:00

## 2020-04-16 RX ADMIN — SODIUM CHLORIDE: 0.9 INJECTION, SOLUTION INTRAVENOUS at 08:07

## 2020-04-16 RX ADMIN — SODIUM CHLORIDE 125 ML/HR: 0.9 INJECTION, SOLUTION INTRAVENOUS at 19:26

## 2020-04-16 RX ADMIN — EPHEDRINE SULFATE 10 MG: 50 INJECTION, SOLUTION INTRAVENOUS at 08:23

## 2020-04-16 RX ADMIN — PHENYLEPHRINE HYDROCHLORIDE 50 MCG: 10 INJECTION INTRAVENOUS at 09:01

## 2020-04-17 VITALS
TEMPERATURE: 98.5 F | HEART RATE: 91 BPM | RESPIRATION RATE: 17 BRPM | OXYGEN SATURATION: 98 % | DIASTOLIC BLOOD PRESSURE: 61 MMHG | SYSTOLIC BLOOD PRESSURE: 102 MMHG

## 2020-04-17 LAB
ANION GAP SERPL CALCULATED.3IONS-SCNC: 8 MMOL/L (ref 4–13)
BUN SERPL-MCNC: 10 MG/DL (ref 5–25)
CALCIUM SERPL-MCNC: 6.3 MG/DL (ref 8.3–10.1)
CHLORIDE SERPL-SCNC: 111 MMOL/L (ref 100–108)
CO2 SERPL-SCNC: 23 MMOL/L (ref 21–32)
CREAT SERPL-MCNC: 0.7 MG/DL (ref 0.6–1.3)
ERYTHROCYTE [DISTWIDTH] IN BLOOD BY AUTOMATED COUNT: 12.9 % (ref 11.6–15.1)
GFR SERPL CREATININE-BSD FRML MDRD: 89 ML/MIN/1.73SQ M
GLUCOSE SERPL-MCNC: 85 MG/DL (ref 65–140)
HCT VFR BLD AUTO: 32.3 % (ref 36.5–49.3)
HGB BLD-MCNC: 10.3 G/DL (ref 12–17)
MCH RBC QN AUTO: 30.8 PG (ref 26.8–34.3)
MCHC RBC AUTO-ENTMCNC: 31.9 G/DL (ref 31.4–37.4)
MCV RBC AUTO: 97 FL (ref 82–98)
PLATELET # BLD AUTO: 169 THOUSANDS/UL (ref 149–390)
PMV BLD AUTO: 10 FL (ref 8.9–12.7)
POTASSIUM SERPL-SCNC: 2.8 MMOL/L (ref 3.5–5.3)
RBC # BLD AUTO: 3.34 MILLION/UL (ref 3.88–5.62)
SODIUM SERPL-SCNC: 142 MMOL/L (ref 136–145)
WBC # BLD AUTO: 7.72 THOUSAND/UL (ref 4.31–10.16)

## 2020-04-17 PROCEDURE — 80048 BASIC METABOLIC PNL TOTAL CA: CPT | Performed by: UROLOGY

## 2020-04-17 PROCEDURE — 85027 COMPLETE CBC AUTOMATED: CPT | Performed by: UROLOGY

## 2020-04-17 RX ORDER — LEVOFLOXACIN 500 MG/1
500 TABLET, FILM COATED ORAL EVERY 24 HOURS
Status: DISCONTINUED | OUTPATIENT
Start: 2020-04-17 | End: 2020-04-17 | Stop reason: HOSPADM

## 2020-04-17 RX ADMIN — LEVOFLOXACIN 500 MG: 500 TABLET, FILM COATED ORAL at 14:51

## 2020-04-17 RX ADMIN — DOCUSATE SODIUM 100 MG: 100 CAPSULE, LIQUID FILLED ORAL at 09:03

## 2020-04-17 RX ADMIN — SODIUM CHLORIDE 125 ML/HR: 0.9 INJECTION, SOLUTION INTRAVENOUS at 03:12

## 2020-04-17 RX ADMIN — SODIUM CHLORIDE 125 ML/HR: 0.9 INJECTION, SOLUTION INTRAVENOUS at 10:41

## 2021-04-13 DIAGNOSIS — U07.1 COVID-19: ICD-10-CM

## 2021-04-13 PROCEDURE — U0003 INFECTIOUS AGENT DETECTION BY NUCLEIC ACID (DNA OR RNA); SEVERE ACUTE RESPIRATORY SYNDROME CORONAVIRUS 2 (SARS-COV-2) (CORONAVIRUS DISEASE [COVID-19]), AMPLIFIED PROBE TECHNIQUE, MAKING USE OF HIGH THROUGHPUT TECHNOLOGIES AS DESCRIBED BY CMS-2020-01-R: HCPCS | Performed by: INTERNAL MEDICINE

## 2021-04-13 PROCEDURE — U0005 INFEC AGEN DETEC AMPLI PROBE: HCPCS | Performed by: INTERNAL MEDICINE

## 2021-04-14 LAB — SARS-COV-2 RNA RESP QL NAA+PROBE: NEGATIVE

## 2022-11-08 ENCOUNTER — OFFICE VISIT (OUTPATIENT)
Dept: INTERNAL MEDICINE CLINIC | Facility: CLINIC | Age: 82
End: 2022-11-08

## 2022-11-08 VITALS
HEIGHT: 69 IN | DIASTOLIC BLOOD PRESSURE: 78 MMHG | WEIGHT: 150 LBS | HEART RATE: 76 BPM | TEMPERATURE: 98 F | BODY MASS INDEX: 22.22 KG/M2 | OXYGEN SATURATION: 98 % | RESPIRATION RATE: 16 BRPM | SYSTOLIC BLOOD PRESSURE: 128 MMHG

## 2022-11-08 DIAGNOSIS — J44.1 ACUTE EXACERBATION OF CHRONIC OBSTRUCTIVE AIRWAYS DISEASE (HCC): Primary | ICD-10-CM

## 2022-11-08 DIAGNOSIS — J20.9 ACUTE INFECTIVE TRACHEOBRONCHITIS: ICD-10-CM

## 2022-11-08 DIAGNOSIS — R31.0 HEMATURIA, GROSS: ICD-10-CM

## 2022-11-08 DIAGNOSIS — R33.8 BENIGN PROSTATIC HYPERPLASIA WITH URINARY RETENTION: ICD-10-CM

## 2022-11-08 DIAGNOSIS — M15.9 PRIMARY OSTEOARTHRITIS INVOLVING MULTIPLE JOINTS: ICD-10-CM

## 2022-11-08 DIAGNOSIS — N40.1 BENIGN PROSTATIC HYPERPLASIA WITH URINARY RETENTION: ICD-10-CM

## 2022-11-08 PROBLEM — M15.0 PRIMARY OSTEOARTHRITIS INVOLVING MULTIPLE JOINTS: Status: ACTIVE | Noted: 2022-11-08

## 2022-11-08 PROBLEM — M54.16 LUMBAR RADICULOPATHY: Status: ACTIVE | Noted: 2022-11-08

## 2022-11-08 RX ORDER — FLUTICASONE FUROATE, UMECLIDINIUM BROMIDE AND VILANTEROL TRIFENATATE 100; 62.5; 25 UG/1; UG/1; UG/1
1 POWDER RESPIRATORY (INHALATION) DAILY
COMMUNITY

## 2022-11-08 RX ORDER — AZITHROMYCIN 250 MG/1
TABLET, FILM COATED ORAL
Qty: 6 TABLET | Refills: 0 | Status: SHIPPED | OUTPATIENT
Start: 2022-11-08 | End: 2022-11-13

## 2022-11-08 NOTE — PATIENT INSTRUCTIONS
Acute Cough   WHAT YOU NEED TO KNOW:   An acute cough can last up to 3 weeks  Common causes of an acute cough include a cold, allergies, or a lung infection  DISCHARGE INSTRUCTIONS:   Return to the emergency department if:   You have trouble breathing or feel short of breath  You cough up blood, or you see blood in your mucus  You faint or feel weak or dizzy  You have chest pain when you cough or take a deep breath  You have new wheezing  Contact your healthcare provider if:   You have a fever  Your cough lasts longer than 4 weeks  Your symptoms do not improve with treatment  You have questions or concerns about your condition or care  Medicines:   Medicines  may be needed to stop the cough, decrease swelling in your airways, or help open your airways  Medicine may also be given to help you cough up mucus  Ask your healthcare provider what over-the-counter medicines you can take  If you have an infection caused by bacteria, you may need antibiotics  Take your medicine as directed  Contact your healthcare provider if you think your medicine is not helping or if you have side effects  Tell him or her if you are allergic to any medicine  Keep a list of the medicines, vitamins, and herbs you take  Include the amounts, and when and why you take them  Bring the list or the pill bottles to follow-up visits  Carry your medicine list with you in case of an emergency  Manage your symptoms:   Do not smoke and stay away from others who smoke  Nicotine and other chemicals in cigarettes and cigars can cause lung damage and make your cough worse  Ask your healthcare provider for information if you currently smoke and need help to quit  E-cigarettes or smokeless tobacco still contain nicotine  Talk to your healthcare provider before you use these products  Drink extra liquids as directed  Liquids will help thin and loosen mucus so you can cough it up   Liquids will also help prevent dehydration  Examples of good liquids to drink include water, fruit juice, and broth  Do not drink liquids that contain caffeine  Caffeine can increase your risk for dehydration  Ask your healthcare provider how much liquid to drink each day  Rest as directed  Do not do activities that make your cough worse, such as exercise  Use a humidifier or vaporizer  Use a cool mist humidifier or a vaporizer to increase air moisture in your home  This may make it easier for you to breathe and help decrease your cough  Eat 2 to 5 mL of honey 2 times each day  Honey can help thin mucus and decrease your cough  Use cough drops or lozenges  These can help decrease throat irritation and your cough  Follow up with your healthcare provider as directed:  Write down your questions so you remember to ask them during your visits  © Copyright [employer] 2022 Information is for End User's use only and may not be sold, redistributed or otherwise used for commercial purposes  All illustrations and images included in CareNotes® are the copyrighted property of A D A M [employer] Inc  or [address] [place], [place] [address]faraz Fuentes   The above information is an  only  It is not intended as medical advice for individual conditions or treatments  Talk to your doctor, nurse or pharmacist before following any medical regimen to see if it is safe and effective for you

## 2022-11-08 NOTE — ASSESSMENT & PLAN NOTE
Coughing congestion sinus congestion yellowish sputum treated with Zithromax Robitussin expectorant inhaler and if needed we will give a Medrol Dosepak

## 2022-11-08 NOTE — ASSESSMENT & PLAN NOTE
Some symptoms of coughing with yellowish sputum using trilogy and Proventil as needed patient reluctant to take steroid pulse ox normal 94 on room air

## 2022-11-08 NOTE — PROGRESS NOTES
Dr Lazar Officer Office Visit Note  22     Gabriela Clement 79 y o  male MRN: [id number]  : 1940    Assessment:     1  Acute exacerbation of chronic obstructive airways disease (HCC)  Assessment & Plan:  Some symptoms of coughing with yellowish sputum using trilogy and Proventil as needed patient reluctant to take steroid pulse ox normal 94 on room air      2  Acute infective tracheobronchitis  Assessment & Plan:  Coughing congestion sinus congestion yellowish sputum treated with Zithromax Robitussin expectorant inhaler and if needed we will give a Medrol Dosepak    Orders:  -     azithromycin (Zithromax) 250 mg tablet; Take 2 tablets (500 mg total) by mouth daily for 1 day, THEN 1 tablet (250 mg total) daily for 4 days  3  Hematuria, gross  Assessment & Plan:  Resolved no further workup needed      4  Benign prostatic hyperplasia with urinary retention  Assessment & Plan:  Status post TURP no symptoms for now follow up with the Urology      5  Primary osteoarthritis involving multiple joints  Assessment & Plan:  Control to take ibuprofen as needed minimal pain for now          Discussion Summary and Plan: Today's care plan and medications were reviewed with patient in detail and all their questions answered to their satisfaction  Chief Complaint   Patient presents with   • Cough     Has a cold         Subjective:  Patient came in coughing a minimal difficulty breathing using Trelegy denies any fever chills complains of some sinus congestion no chest pain tested negative for COVID and no follow-up for the chronic condition COPD lumbar radiculopathy no other new symptoms or no other new medical problems     PHQ-2/9 Depression Screening    Little interest or pleasure in doing things: 0 - not at all  Feeling down, depressed, or hopeless: 0 - not at all  PHQ-2 Score: 0  PHQ-2 Interpretation: Negative depression screen          The following portions of the patient's history were reviewed and updated as appropriate: allergies, current medications, past family history, past medical history, past social history, past surgical history and problem list     Review of Systems   Constitutional: Negative for activity change, appetite change, chills, diaphoresis, fatigue, fever and unexpected weight change  HENT: Positive for congestion, rhinorrhea, sinus pressure, sinus pain and sore throat  Negative for dental problem, drooling, ear discharge, ear pain, facial swelling, hearing loss, mouth sores, nosebleeds, postnasal drip, sneezing, tinnitus, trouble swallowing and voice change  Eyes: Negative for photophobia, pain, discharge, redness, itching and visual disturbance  Respiratory: Positive for cough  Negative for apnea, choking, chest tightness, shortness of breath, wheezing and stridor  Cardiovascular: Negative for chest pain, palpitations and leg swelling  Gastrointestinal: Negative for abdominal distention, abdominal pain, anal bleeding, blood in stool, constipation, diarrhea, nausea, rectal pain and vomiting  Endocrine: Negative for cold intolerance, heat intolerance, polydipsia, polyphagia and polyuria  Genitourinary: Negative for decreased urine volume, difficulty urinating, dysuria, enuresis, flank pain, frequency, genital sores, hematuria and urgency  Musculoskeletal: Negative for arthralgias, back pain, gait problem, joint swelling, myalgias, neck pain and neck stiffness  Skin: Negative for color change, pallor, rash and wound  Allergic/Immunologic: Negative  Negative for environmental allergies, food allergies and immunocompromised state  Neurological: Negative for dizziness, tremors, seizures, syncope, facial asymmetry, speech difficulty, weakness, light-headedness, numbness and headaches  Psychiatric/Behavioral: Negative for agitation, behavioral problems, confusion, decreased concentration, dysphoric mood, hallucinations, self-injury, sleep disturbance and suicidal ideas   The patient is not nervous/anxious and is not hyperactive  Historical Information   Patient Active Problem List   Diagnosis   • Benign prostatic hyperplasia with urinary retention   • Hematuria, gross   • Asthma   • Acute exacerbation of chronic obstructive airways disease (HCC)   • Primary osteoarthritis involving multiple joints   • Lumbar radiculopathy   • Acute infective tracheobronchitis     Past Medical History:   Diagnosis Date   • Arthritis    • Asthma    • Back pain    • BPH (benign prostatic hypertrophy) with urinary obstruction    • COPD (chronic obstructive pulmonary disease) (HCC)    • Dizziness    • Sinusitis      Past Surgical History:   Procedure Laterality Date   • CATARACT EXTRACTION Left    • EAR SURGERY      to wart   • EPIDIDYMAL CYST EXCISION Right 05/04/2000   • HEMORROIDECTOMY     • CT TRANSURETHRAL ELEC-SURG PROSTATECTOM N/A 04/16/2020    Procedure: CYSTOSCOPY, TRANSURETHRAL RESECTION OF PROSTATE (TURP);   Surgeon: Paul Shaffer MD;  Location: [facility];  Service: Urology   • PROSTATE BIOPSY Bilateral 12/11/2003    BPH with mild acute and chronic inflammation   • PROSTATE BIOPSY Bilateral 06/21/2005    BPH with marked artropy and chronic inflammation     Social History     Substance and Sexual Activity   Alcohol Use Not Currently     Social History     Substance and Sexual Activity   Drug Use Never     Social History     Tobacco Use   Smoking Status Former Smoker   Smokeless Tobacco Current User   • Types: Chew     Family History   Problem Relation Age of Onset   • Asthma Father      Health Maintenance Due   Topic   • Medicare Annual Wellness Visit (AWV)    • Depression Screening    • Pneumococcal Vaccine: 65+ Years (2 - PCV)   • COVID-19 Vaccine (3 - Booster for [employer] series)   • Influenza Vaccine (1)      Meds/Allergies       Current Outpatient Medications:   •  azithromycin (Zithromax) 250 mg tablet, Take 2 tablets (500 mg total) by mouth daily for 1 day, THEN 1 tablet (250 mg total) daily for 4 days  , Disp: 6 tablet, Rfl: 0  •  fluticasone-umeclidinium-vilanterol (Trelegy Ellipta) 100-62 5-25 mcg/actuation inhaler, Inhale 1 puff daily Rinse mouth after use , Disp: , Rfl:   •  fluticasone-vilanterol (BREO ELLIPTA) 100-25 mcg/inh inhaler, Daily (Patient not taking: Reported on 11/8/2022), Disp: , Rfl:       Objective:    Vitals:   /78   Pulse 76   Temp 98 °F (36 7 °C)   Resp 16   Ht 5' 9" (1 753 m)   Wt 68 kg (150 lb)   SpO2 98%   BMI 22 15 kg/m²   Body mass index is 22 15 kg/m²  Vitals:    11/08/22 1126   Weight: 68 kg (150 lb)       Physical Exam  Constitutional:       General: He is not in acute distress  Appearance: He is well-developed  He is not ill-appearing, toxic-appearing or diaphoretic  HENT:      Head: Normocephalic and atraumatic  Right Ear: External ear normal       Left Ear: External ear normal       Nose: Nose normal       Mouth/Throat:      Pharynx: No oropharyngeal exudate  Eyes:      General: Lids are normal  Lids are everted, no foreign bodies appreciated  No scleral icterus  Right eye: No discharge  Left eye: No discharge  Conjunctiva/sclera: Conjunctivae normal       Pupils: Pupils are equal, round, and reactive to light  Neck:      Thyroid: No thyromegaly  Vascular: Normal carotid pulses  No carotid bruit, hepatojugular reflux or JVD  Trachea: No tracheal tenderness or tracheal deviation  Cardiovascular:      Rate and Rhythm: Normal rate and regular rhythm  Pulses: Normal pulses  Heart sounds: Normal heart sounds  No murmur heard  No friction rub  No gallop  Pulmonary:      Effort: Pulmonary effort is normal  No respiratory distress  Breath sounds: No stridor  Rhonchi and rales present  No wheezing  Chest:      Chest wall: No tenderness  Abdominal:      General: Bowel sounds are normal  There is no distension  Palpations: Abdomen is soft  There is no mass  Tenderness:  There is no abdominal tenderness  There is no guarding or rebound  Musculoskeletal:         General: No tenderness or deformity  Normal range of motion  Cervical back: Normal range of motion and neck supple  No edema, erythema or rigidity  No spinous process tenderness or muscular tenderness  Normal range of motion  Lymphadenopathy:      Head:      Right side of head: No submental, submandibular, tonsillar, preauricular or posterior auricular adenopathy  Left side of head: No submental, submandibular, tonsillar, preauricular, posterior auricular or occipital adenopathy  Cervical: No cervical adenopathy  Right cervical: No superficial, deep or posterior cervical adenopathy  Left cervical: No superficial, deep or posterior cervical adenopathy  Upper Body:      Right upper body: No pectoral adenopathy  Left upper body: No pectoral adenopathy  Skin:     General: Skin is warm and dry  Coloration: Skin is not pale  Findings: No erythema or rash  Neurological:      Mental Status: He is alert and oriented to person, place, and time  Cranial Nerves: No cranial nerve deficit  Sensory: No sensory deficit  Motor: No tremor, abnormal muscle tone or seizure activity  Coordination: Coordination normal       Gait: Gait normal       Deep Tendon Reflexes: Reflexes are normal and symmetric  Reflexes normal    Psychiatric:         Behavior: Behavior normal          Thought Content: Thought content normal          Judgment: Judgment normal          Lab Review   No visits with results within 2 Month(s) from this visit  Latest known visit with results is:   Orders Only on 04/13/2021   Component Date Value Ref Range Status   • SARS-CoV-2 04/13/2021 Negative  Negative Final         Patient Instructions   Acute Cough   WHAT YOU NEED TO KNOW:   An acute cough can last up to 3 weeks  Common causes of an acute cough include a cold, allergies, or a lung infection    DISCHARGE INSTRUCTIONS:   Return to the emergency department if:   · You have trouble breathing or feel short of breath  · You cough up blood, or you see blood in your mucus  · You faint or feel weak or dizzy  · You have chest pain when you cough or take a deep breath  · You have new wheezing  Contact your healthcare provider if:   · You have a fever  · Your cough lasts longer than 4 weeks  · Your symptoms do not improve with treatment  · You have questions or concerns about your condition or care  Medicines:   · Medicines  may be needed to stop the cough, decrease swelling in your airways, or help open your airways  Medicine may also be given to help you cough up mucus  Ask your healthcare provider what over-the-counter medicines you can take  If you have an infection caused by bacteria, you may need antibiotics  · Take your medicine as directed  Contact your healthcare provider if you think your medicine is not helping or if you have side effects  Tell him or her if you are allergic to any medicine  Keep a list of the medicines, vitamins, and herbs you take  Include the amounts, and when and why you take them  Bring the list or the pill bottles to follow-up visits  Carry your medicine list with you in case of an emergency  Manage your symptoms:   · Do not smoke and stay away from others who smoke  Nicotine and other chemicals in cigarettes and cigars can cause lung damage and make your cough worse  Ask your healthcare provider for information if you currently smoke and need help to quit  E-cigarettes or smokeless tobacco still contain nicotine  Talk to your healthcare provider before you use these products  · Drink extra liquids as directed  Liquids will help thin and loosen mucus so you can cough it up  Liquids will also help prevent dehydration  Examples of good liquids to drink include water, fruit juice, and broth  Do not drink liquids that contain caffeine   Caffeine can increase your risk for dehydration  Ask your healthcare provider how much liquid to drink each day  · Rest as directed  Do not do activities that make your cough worse, such as exercise  · Use a humidifier or vaporizer  Use a cool mist humidifier or a vaporizer to increase air moisture in your home  This may make it easier for you to breathe and help decrease your cough  · Eat 2 to 5 mL of honey 2 times each day  Honey can help thin mucus and decrease your cough  · Use cough drops or lozenges  These can help decrease throat irritation and your cough  Follow up with your healthcare provider as directed:  Write down your questions so you remember to ask them during your visits  © Copyright [employer] 2022 Information is for End User's use only and may not be sold, redistributed or otherwise used for commercial purposes  All illustrations and images included in CareNotes® are the copyrighted property of A D A M , Inc  or [facility] Howard Fuentes   The above information is an  only  It is not intended as medical advice for individual conditions or treatments  Talk to your doctor, nurse or pharmacist before following any medical regimen to see if it is safe and effective for you  Bette Olvera        "This note has been constructed using a voice recognition system  Therefore there may be syntax, spelling, and/or grammatical errors   Please call if you have any questions  "

## 2022-12-27 ENCOUNTER — CATARACT CONSULTATION (OUTPATIENT)
Dept: URBAN - METROPOLITAN AREA CLINIC 6 | Facility: CLINIC | Age: 82
End: 2022-12-27

## 2022-12-27 DIAGNOSIS — H40.1131: ICD-10-CM

## 2022-12-27 DIAGNOSIS — H25.812: ICD-10-CM

## 2022-12-27 DIAGNOSIS — Z96.1: ICD-10-CM

## 2022-12-27 PROCEDURE — 92004 COMPRE OPH EXAM NEW PT 1/>: CPT

## 2022-12-27 ASSESSMENT — TONOMETRY
OD_IOP_MMHG: 14
OS_IOP_MMHG: 16

## 2022-12-27 ASSESSMENT — VISUAL ACUITY
OD_CC: J1
OS_SC: 20/100
OD_SC: 20/25-1
OS_PH: 20/70
OS_CC: J1

## 2022-12-30 ENCOUNTER — RA CDI HCC (OUTPATIENT)
Dept: OTHER | Facility: HOSPITAL | Age: 82
End: 2022-12-30

## 2023-01-07 PROBLEM — J20.9 ACUTE INFECTIVE TRACHEOBRONCHITIS: Status: RESOLVED | Noted: 2022-11-08 | Resolved: 2023-01-07

## 2023-01-10 ENCOUNTER — OFFICE VISIT (OUTPATIENT)
Dept: INTERNAL MEDICINE CLINIC | Facility: CLINIC | Age: 83
End: 2023-01-10

## 2023-01-10 VITALS
SYSTOLIC BLOOD PRESSURE: 132 MMHG | RESPIRATION RATE: 17 BRPM | WEIGHT: 150 LBS | TEMPERATURE: 97.8 F | HEIGHT: 69 IN | HEART RATE: 74 BPM | DIASTOLIC BLOOD PRESSURE: 80 MMHG | OXYGEN SATURATION: 97 % | BODY MASS INDEX: 22.22 KG/M2

## 2023-01-10 DIAGNOSIS — N40.1 BENIGN PROSTATIC HYPERPLASIA WITH URINARY RETENTION: ICD-10-CM

## 2023-01-10 DIAGNOSIS — M15.9 PRIMARY OSTEOARTHRITIS INVOLVING MULTIPLE JOINTS: ICD-10-CM

## 2023-01-10 DIAGNOSIS — N43.3 HYDROCELE, LEFT: ICD-10-CM

## 2023-01-10 DIAGNOSIS — M54.16 LUMBAR RADICULOPATHY: ICD-10-CM

## 2023-01-10 DIAGNOSIS — R33.8 BENIGN PROSTATIC HYPERPLASIA WITH URINARY RETENTION: ICD-10-CM

## 2023-01-10 DIAGNOSIS — J44.1 ACUTE EXACERBATION OF CHRONIC OBSTRUCTIVE AIRWAYS DISEASE (HCC): ICD-10-CM

## 2023-01-10 DIAGNOSIS — Z01.818 PRE-OP EXAMINATION: Primary | ICD-10-CM

## 2023-01-10 NOTE — ASSESSMENT & PLAN NOTE
Patient is here for Pre Op Clearance:    Date of Surgery: February 16, 2023  Name of Surgeon:[facility]  Indication: Cataract left eye  [place] of proposed Surgery: 4 cataract left eye  Type of Anestheis;MAC/LOCALanesthesia    Recovery anticipation and care:    Pertinent Hx:  Cardiac: No history of Acute MI, CHF or arrythmia in last 6 months  Renal: Renal function stable, CKD level as per BMP  Anasethesia hx: No hx previous anaesthesia related problem  Endo: not pertinent except documented  Hemato: no pertinent blood disorder, hx of blood transfuciton  Dental: No obvious mouth infection    Pre Op Labs; not needed at this time  Patient medically cleared for surgery  Patient has no history of heart disease

## 2023-01-10 NOTE — ASSESSMENT & PLAN NOTE
Patient was evaluated by neurologist 2 years ago for an outpatient surgery the same size very minimal discomfort recommended to be urologist to be seen by urologist patient will think about let us know

## 2023-01-10 NOTE — PROGRESS NOTES
Assessment and Plan:   PATIENT MEDICALLY CLEARED FOR SURGERY  Problem List Items Addressed This Visit        Respiratory    Acute exacerbation of chronic obstructive airways disease ([address] )     Now resolved  continue Trelegy            Nervous and Auditory    Lumbar radiculopathy - Primary     Continue over-the-counter Tylenol symptoms controlled            Musculoskeletal and Integument    Primary osteoarthritis involving multiple joints     To use over-the-counter Tylenol or ibuprofen as needed            Genitourinary    Benign prostatic hyperplasia with urinary retention     Symptoms controlled for now on no meds patient patient was taking Flomax more than 3 years ago and stopped taking claims has no symptoms from a         Hydrocele, left     Patient was evaluated by neurologist 2 years ago for an outpatient surgery the same size very minimal discomfort recommended to be urologist to be seen by urologist patient will think about let us know            Other    Pre-op examination     Patient is here for Pre Op Clearance:    Date of Surgery: February 16, 2023  Name of Surgeon:[facility]  Indication: Cataract left eye  Tharon Pinks of proposed Surgery: 4 cataract left eye  Type of Anestheis;MAC/LOCALanesthesia    Recovery anticipation and care:    Pertinent Hx:  Cardiac: No history of Acute MI, CHF or arrythmia in last 6 months  Renal: Renal function stable, CKD level as per BMP  Anasethesia hx: No hx previous anaesthesia related problem  Endo: not pertinent except documented  Hemato: no pertinent blood disorder, hx of blood transfuciton  Dental: No obvious mouth infection    Pre Op Labs; Patient has no history of heart disease             Preventive health issues were discussed with patient, and age appropriate screening tests were ordered as noted in patient's After Visit Summary    Personalized health advice and appropriate referrals for health education or preventive services given if needed, as noted in patient's After Visit Summary  History of Present Illness:     Patient presents for a Medicare Wellness Visit    Patient came in for follow-up chronic medical condition especially COPD was advised to use Trelegy every day for now using every other day seen by ophthalmologist awaiting cataract surgery left came in for medical clearance has no history of any heart disease in my opinion no need for any EKG some intermittent difficulty breathing with mild to moderate exertion due to COPD denies any chest pain no other associated symptoms  Also came in for annual wellness was done for details of the counseling screening follow-up recommendations see attached     Patient Care Team:  Justina Dunn MD as PCP - General (Internal Medicine)  Stefanie Reyna DO     Review of Systems:     Review of Systems   Constitutional: Positive for fatigue  Negative for activity change, appetite change, chills, diaphoresis, fever and unexpected weight change  HENT: Negative for congestion, dental problem, drooling, ear discharge, ear pain, facial swelling, hearing loss, mouth sores, nosebleeds, postnasal drip, rhinorrhea, sinus pressure, sneezing, sore throat, tinnitus, trouble swallowing and voice change  Eyes: Negative for photophobia, pain, discharge, redness, itching and visual disturbance  Respiratory: Positive for cough and shortness of breath  Negative for apnea, choking, chest tightness, wheezing and stridor  Cardiovascular: Negative for chest pain, palpitations and leg swelling  Gastrointestinal: Negative for abdominal distention, abdominal pain, anal bleeding, blood in stool, constipation, diarrhea, nausea, rectal pain and vomiting  Endocrine: Negative for cold intolerance, heat intolerance, polydipsia, polyphagia and polyuria  Genitourinary: Negative for decreased urine volume, difficulty urinating, dysuria, enuresis, flank pain, frequency, genital sores, hematuria and urgency  Musculoskeletal: Negative for arthralgias, back pain, gait problem, joint swelling, myalgias, neck pain and neck stiffness  Skin: Negative for color change, pallor, rash and wound  Allergic/Immunologic: Negative  Negative for environmental allergies, food allergies and immunocompromised state  Neurological: Negative for dizziness, tremors, seizures, syncope, facial asymmetry, speech difficulty, weakness, light-headedness, numbness and headaches  Psychiatric/Behavioral: Negative for agitation, behavioral problems, confusion, decreased concentration, dysphoric mood, hallucinations, self-injury, sleep disturbance and suicidal ideas  The patient is not nervous/anxious and is not hyperactive  Problem List:     Patient Active Problem List   Diagnosis   • Benign prostatic hyperplasia with urinary retention   • Hematuria, gross   • Asthma   • Acute exacerbation of chronic obstructive airways disease (HCC)   • Primary osteoarthritis involving multiple joints   • Lumbar radiculopathy   • Pre-op examination   • Hydrocele, left      Past Medical and Surgical History:     Past Medical History:   Diagnosis Date   • Arthritis    • Asthma    • Back pain    • BPH (benign prostatic hypertrophy) with urinary obstruction    • COPD (chronic obstructive pulmonary disease) (HCC)    • Dizziness    • Sinusitis      Past Surgical History:   Procedure Laterality Date   • CATARACT EXTRACTION Left    • EAR SURGERY      to wart   • EPIDIDYMAL CYST EXCISION Right 05/04/2000   • HEMORROIDECTOMY     • MO TRURL ELECTROSURG RESCJ PROSTATE BLEED COMPLETE N/A 04/16/2020    Procedure: CYSTOSCOPY, TRANSURETHRAL RESECTION OF PROSTATE (TURP);   Surgeon: Horacio Calderon MD;  Location: [facility];  Service: Urology   • PROSTATE BIOPSY Bilateral 12/11/2003    BPH with mild acute and chronic inflammation   • PROSTATE BIOPSY Bilateral 06/21/2005    BPH with marked artropy and chronic inflammation      Family History:     Family History Problem Relation Age of Onset   • Asthma Father       Social History:     Social History     Socioeconomic History   • Marital status: Unknown     Spouse name: None   • Number of children: None   • Years of education: None   • Highest education level: None   Occupational History   • None   Tobacco Use   • Smoking status: Former   • Smokeless tobacco: Current     Types: Chew   Vaping Use   • Vaping Use: Never used   Substance and Sexual Activity   • Alcohol use: Not Currently   • Drug use: Never   • Sexual activity: Not Currently   Other Topics Concern   • None   Social History Narrative   • None     Social Determinants of Health     Financial Resource Strain: Unknown   • Difficulty of Paying Living Expenses: Patient refused   Food Insecurity: Not on file   Transportation Needs: No Transportation Needs   • Lack of Transportation (Medical): No   • Lack of Transportation (Non-Medical): No   Physical Activity: Not on file   Stress: Not on file   Social Connections: Not on file   Intimate Partner Violence: Not on file   Housing Stability: Not on file      Medications and Allergies:     Current Outpatient Medications   Medication Sig Dispense Refill   • fluticasone-umeclidinium-vilanterol (Trelegy Ellipta) 100-62 5-25 mcg/actuation inhaler Inhale 1 puff daily Rinse mouth after use  No current facility-administered medications for this visit  Allergies   Allergen Reactions   • Shellfish-Derived Products - Food Allergy      Listed by PCP patient denies      Immunizations:     Immunization History   Administered Date(s) Administered   • Pneumococcal Polysaccharide PPV23 01/01/2003      Health Maintenance: There are no preventive care reminders to display for this patient        Topic Date Due   • Pneumococcal Vaccine: 65+ Years (2 - PCV) 01/01/2004   • COVID-19 Vaccine (3 - Booster for Cyn series) 12/31/2021   • Influenza Vaccine (1) 09/01/2022      Medicare Screening Tests and Risk Assessments: Paola Natarajan is here for his Subsequent Wellness visit  Health Risk Assessment:   Patient rates overall health as good  Patient feels that their physical health rating is slightly worse  Patient is satisfied with their life  Eyesight was rated as slightly worse  Hearing was rated as same  Patient feels that their emotional and mental health rating is same  Patients states they are sometimes angry  Patient states they are never, rarely unusually tired/fatigued  Pain experienced in the last 7 days has been some  Patient's pain rating has been 5/10  Patient states that he has experienced no weight loss or gain in last 6 months  Fall Risk Screening: In the past year, patient has experienced: no history of falling in past year      Home Safety:  Patient does not have trouble with stairs inside or outside of their home  Patient has working smoke alarms and has working carbon monoxide detector  Home safety hazards include: none  Nutrition:   Current diet is Regular  Medications:   Patient is currently taking over-the-counter supplements  OTC medications include: see medication list  Patient is able to manage medications  Activities of Daily Living (ADLs)/Instrumental Activities of Daily Living (IADLs):   Walk and transfer into and out of bed and chair?: Yes  Dress and groom yourself?: Yes    Bathe or shower yourself?: Yes    Feed yourself? Yes  Do your laundry/housekeeping?: Yes  Manage your money, pay your bills and track your expenses?: Yes  Make your own meals?: Yes    Do your own shopping?: Yes    Previous Hospitalizations:   Any hospitalizations or ED visits within the last 12 months?: No      Advance Care Planning:   Living will: Yes    Durable POA for healthcare:  Yes    Advanced directive: Yes    Advanced directive counseling given: Yes    Five wishes given: Yes    Patient declined ACP directive: Yes    End of Life Decisions reviewed with patient: Yes    Provider agrees with end of life decisions: Yes      Cognitive Screening:   Provider or family/friend/caregiver concerned regarding cognition?: No    PREVENTIVE SCREENINGS      Cardiovascular Screening:      Due for: Lipid Panel      Diabetes Screening:       Due for: Blood Glucose      Colorectal Cancer Screening:     General: Patient Declines      Prostate Cancer Screening:    General: Screening Not Indicated      Abdominal Aortic Aneurysm (AAA) Screening:    Risk factors include: tobacco use        General: Patient Declines      Lung Cancer Screening:     General: Screening Not Indicated      Hepatitis C Screening:    General: Patient Declines    Screening, Brief Intervention, and Referral to Treatment (SBIRT)    Screening      AUDIT-C Screenin) How often did you have a drink containing alcohol in the past year? never  2) How many drinks did you have on a typical day when you were drinking in the past year? 0  3) How often did you have 6 or more drinks on one occasion in the past year? never    AUDIT-C Score: 0  Interpretation: Score 0-3 (male): Negative screen for alcohol misuse    Single Item Drug Screening:  How often have you used an illegal drug (including marijuana) or a prescription medication for non-medical reasons in the past year? never    Single Item Drug Screen Score: 0  Interpretation: Negative screen for possible drug use disorder    No results found  Physical Exam:     /80   Pulse 74   Temp 97 8 °F (36 6 °C)   Resp 17   Ht 5' 9" (1 753 m)   Wt 68 kg (150 lb)   SpO2 97%   BMI 22 15 kg/m²     Physical Exam  Vitals and nursing note reviewed  Constitutional:       General: He is not in acute distress  Appearance: He is well-developed  HENT:      Head: Normocephalic and atraumatic  Eyes:      Conjunctiva/sclera: Conjunctivae normal    Cardiovascular:      Rate and Rhythm: Normal rate and regular rhythm  Heart sounds: No murmur heard    Pulmonary:      Effort: Pulmonary effort is normal  No respiratory distress  Breath sounds: Normal breath sounds  Comments: Decreased air entry bilateral  Abdominal:      Palpations: Abdomen is soft  Tenderness: There is no abdominal tenderness  Musculoskeletal:         General: No swelling  Cervical back: Neck supple  Skin:     General: Skin is warm and dry  Capillary Refill: Capillary refill takes less than 2 seconds  Neurological:      Mental Status: He is alert        Gait: Gait abnormal    Psychiatric:         Mood and Affect: Mood normal           Sina Valles MD

## 2023-01-10 NOTE — ASSESSMENT & PLAN NOTE
Symptoms controlled for now on no meds patient patient was taking Flomax more than 3 years ago and stopped taking claims has no symptoms from a

## 2023-01-10 NOTE — PATIENT INSTRUCTIONS
Medicare Preventive Visit Patient Instructions  Thank you for completing your Welcome to Medicare Visit or Medicare Annual Wellness Visit today  Your next wellness visit will be due in one year (1/11/2024)  The screening/preventive services that you may require over the next 5-10 years are detailed below  Some tests may not apply to you based off risk factors and/or age  Screening tests ordered at today's visit but not completed yet may show as past due  Also, please note that scanned in results may not display below  Preventive Screenings:  Service Recommendations Previous Testing/Comments   Colorectal Cancer Screening  · Colonoscopy    · Fecal Occult Blood Test (FOBT)/Fecal Immunochemical Test (FIT)  · Fecal DNA/Cologuard Test  · Flexible Sigmoidoscopy Age: 39-70 years old   Colonoscopy: every 10 years (May be performed more frequently if at higher risk)  OR  FOBT/FIT: every 1 year  OR  Cologuard: every 3 years  OR  Sigmoidoscopy: every 5 years  Screening may be recommended earlier than age 39 if at higher risk for colorectal cancer  Also, an individualized decision between you and your healthcare provider will decide whether screening between the ages of 74-80 would be appropriate   Colonoscopy: Not on file  FOBT/FIT: Not on file  Cologuard: Not on file  Sigmoidoscopy: Not on file          Prostate Cancer Screening Individualized decision between patient and health care provider in men between ages of 53-78   Medicare will cover every 12 months beginning on the day after your 50th birthday PSA: No results in last 5 years           Hepatitis C Screening Once for adults born between [facility]  More frequently in patients at high risk for Hepatitis C Hep C Antibody: Not on file        Diabetes Screening 1-2 times per year if you're at risk for diabetes or have pre-diabetes Fasting glucose: 134 mg/dL (4/14/2020)  A1C: No results in last 5 years (No results in last 5 years)      Cholesterol Screening Once every 5 years if you don't have a lipid disorder  May order more often based on risk factors  Lipid panel: Not on file         Other Preventive Screenings Covered by Medicare:  1  Abdominal Aortic Aneurysm (AAA) Screening: covered once if your at risk  You're considered to be at risk if you have a family history of AAA or a male between the age of 73-68 who smoking at least 100 cigarettes in your lifetime  2  Lung Cancer Screening: covers low dose CT scan once per year if you meet all of the following conditions: (1) Age 50-69; (2) No signs or symptoms of lung cancer; (3) Current smoker or have quit smoking within the last 15 years; (4) You have a tobacco smoking history of at least 20 pack years (packs per day x number of years you smoked); (5) You get a written order from a healthcare provider  3  Glaucoma Screening: covered annually if you're considered high risk: (1) You have diabetes OR (2) Family history of glaucoma OR (3)  aged 48 and older OR (3)  American aged 72 and older  3  Osteoporosis Screening: covered every 2 years if you meet one of the following conditions: (1) Have a vertebral abnormality; (2) On glucocorticoid therapy for more than 3 months; (3) Have primary hyperparathyroidism; (4) On osteoporosis medications and need to assess response to drug therapy  5  HIV Screening: covered annually if you're between the age of 12-76  Also covered annually if you are younger than 13 and older than 72 with risk factors for HIV infection  For pregnant patients, it is covered up to 3 times per pregnancy      Immunizations:  Immunization Recommendations   Influenza Vaccine Annual influenza vaccination during flu season is recommended for all persons aged >= 6 months who do not have contraindications   Pneumococcal Vaccine   * Pneumococcal conjugate vaccine = PCV13 (Prevnar 13), PCV15 (Vaxneuvance), PCV20 (Prevnar 20)  * Pneumococcal polysaccharide vaccine = PPSV23 (Pneumovax) Adults 19-64 years old: 1-3 doses may be recommended based on certain risk factors  Adults 72 years old: 1-2 doses may be recommended based off what pneumonia vaccine you previously received   Hepatitis B Vaccine 3 dose series if at intermediate or high risk (ex: diabetes, end stage renal disease, liver disease)   Tetanus (Td) Vaccine - COST NOT COVERED BY MEDICARE PART B Following completion of primary series, a booster dose should be given every 10 years to maintain immunity against tetanus  Td may also be given as tetanus wound prophylaxis  Tdap Vaccine - COST NOT COVERED BY MEDICARE PART B Recommended at least once for all adults  For pregnant patients, recommended with each pregnancy  Shingles Vaccine (Shingrix) - COST NOT COVERED BY MEDICARE PART B  2 shot series recommended in those aged 48 and above     Health Maintenance Due:  There are no preventive care reminders to display for this patient  Immunizations Due:      Topic Date Due   • Hepatitis B Vaccine (1 of 3 - 3-dose series) Never done   • Pneumococcal Vaccine: 65+ Years (2 - PCV) 01/01/2004   • COVID-19 Vaccine (3 - Booster for Cyn series) 12/31/2021   • Influenza Vaccine (1) 09/01/2022     Advance Directives   What are advance directives? Advance directives are legal documents that state your wishes and plans for medical care  These plans are made ahead of time in case you lose your ability to make decisions for yourself  Advance directives can apply to any medical decision, such as the treatments you want, and if you want to donate organs  What are the types of advance directives? There are many types of advance directives, and each state has rules about how to use them  You may choose a combination of any of the following:  · Living will: This is a written record of the treatment you want  You can also choose which treatments you do not want, which to limit, and which to stop at a certain time   This includes surgery, medicine, IV fluid, and tube feedings  · Durable power of  for healthcare [facility]): This is a written record that states who you want to make healthcare choices for you when you are unable to make them for yourself  This person, called a proxy, is usually a family member or a friend  You may choose more than 1 proxy  · Do not resuscitate (DNR) order:  A DNR order is used in case your heart stops beating or you stop breathing  It is a request not to have certain forms of treatment, such as CPR  A DNR order may be included in other types of advance directives  · Medical directive: This covers the care that you want if you are in a coma, near death, or unable to make decisions for yourself  You can list the treatments you want for each condition  Treatment may include pain medicine, surgery, blood transfusions, dialysis, IV or tube feedings, and a ventilator (breathing machine)  · Values history: This document has questions about your views, beliefs, and how you feel and think about life  This information can help others choose the care that you would choose  Why are advance directives important? An advance directive helps you control your care  Although spoken wishes may be used, it is better to have your wishes written down  Spoken wishes can be misunderstood, or not followed  Treatments may be given even if you do not want them  An advance directive may make it easier for your family to make difficult choices about your care  How to Quit Using Smokeless Tobacco   Why it is important to stop using smokeless tobacco:  Smokeless tobacco comes in many forms  Examples include chew, snuff, dip, dissolvable tobacco, and snus  All smokeless tobacco products contain nicotine and may contain as much nicotine as 3 cigarettes  You may be physically dependent on nicotine  You may also be emotionally addicted to it   The cravings can be strong, but it is important to quit using smokeless tobacco  You will improve your health and decrease your cancer, stroke, and heart attack risk  Mouth sores and tooth problems will also improve when you quit  You can benefit from quitting no matter how long you have used smokeless tobacco    Prepare to stop using smokeless tobacco:  Nicotine is a highly addictive drug  Withdrawal symptoms can happen when you stop and make it hard to quit  The following can help keep you on track:  · Set a quit date  · Tell friends, family, and coworkers that you plan to quit  · Remove all smokeless tobacco products from your home, car, and workplace  Manage weight gain after you quit:  Nicotine can affect your metabolism  You may gain a few pounds after you quit  The following can help you control your weight:  · Eat healthy foods  · Drink water before, during, and between meals  · Exercise as directed  © Copyright [employer] 2018 Information is for End User's use only and may not be sold, redistributed or otherwise used for commercial purposes   All illustrations and images included in CareNotes® are the copyrighted property of A D A M , [employer]  or [address] [place] [address] [employer]

## 2023-01-24 ENCOUNTER — PRE-OP CATARACT MEASUREMENTS (OUTPATIENT)
Dept: URBAN - METROPOLITAN AREA CLINIC 6 | Facility: CLINIC | Age: 83
End: 2023-01-24

## 2023-01-24 DIAGNOSIS — H25.812: ICD-10-CM

## 2023-01-24 DIAGNOSIS — H40.1131: ICD-10-CM

## 2023-01-24 DIAGNOSIS — Z96.1: ICD-10-CM

## 2023-01-24 DIAGNOSIS — H25.89: ICD-10-CM

## 2023-01-24 PROCEDURE — 92133 CPTRZD OPH DX IMG PST SGM ON: CPT

## 2023-01-24 PROCEDURE — 92014 COMPRE OPH EXAM EST PT 1/>: CPT

## 2023-01-24 PROCEDURE — 92136 OPHTHALMIC BIOMETRY: CPT

## 2023-01-24 ASSESSMENT — TONOMETRY
OD_IOP_MMHG: 16
OS_IOP_MMHG: 16

## 2023-01-24 ASSESSMENT — VISUAL ACUITY
OS_SC: 20/150-2
OD_SC: 20/25-2

## 2023-01-24 ASSESSMENT — KERATOMETRY
OS_AXISANGLE_DEGREES: 009
OD_K2POWER_DIOPTERS: 41.50
OS_K1POWER_DIOPTERS: 41.75
OD_AXISANGLE2_DEGREES: 34
OD_AXISANGLE_DEGREES: 124
OS_K2POWER_DIOPTERS: 42.00
OS_AXISANGLE2_DEGREES: 99
OD_K1POWER_DIOPTERS: 41.25

## 2023-01-30 DIAGNOSIS — J44.1 ACUTE EXACERBATION OF CHRONIC OBSTRUCTIVE AIRWAYS DISEASE (HCC): Primary | ICD-10-CM

## 2023-01-30 RX ORDER — FLUTICASONE FUROATE, UMECLIDINIUM BROMIDE AND VILANTEROL TRIFENATATE 100; 62.5; 25 UG/1; UG/1; UG/1
1 POWDER RESPIRATORY (INHALATION) DAILY
Qty: 60 BLISTER | Refills: 2 | Status: SHIPPED | OUTPATIENT
Start: 2023-01-30

## 2023-02-16 ENCOUNTER — SURGERY/PROCEDURE (OUTPATIENT)
Dept: URBAN - METROPOLITAN AREA SURGICAL CENTER 6 | Facility: SURGICAL CENTER | Age: 83
End: 2023-02-16

## 2023-02-16 DIAGNOSIS — H25.812: ICD-10-CM

## 2023-02-16 DIAGNOSIS — H21.81: ICD-10-CM

## 2023-02-16 DIAGNOSIS — H40.1122: ICD-10-CM

## 2023-02-16 PROCEDURE — 66989 XCPSL CTRC RMVL CPLX INSJ 1+: CPT | Mod: 54,LT

## 2023-02-16 PROCEDURE — 68841 INSJ RX ELUT IMPLT LAC CANAL: CPT

## 2023-02-17 ENCOUNTER — 1 DAY POST-OP (OUTPATIENT)
Dept: URBAN - METROPOLITAN AREA CLINIC 6 | Facility: CLINIC | Age: 83
End: 2023-02-17

## 2023-02-17 DIAGNOSIS — Z96.1: ICD-10-CM

## 2023-02-17 PROCEDURE — 99024 POSTOP FOLLOW-UP VISIT: CPT

## 2023-02-17 ASSESSMENT — VISUAL ACUITY
OS_OTHER: 1 DAY PO
OS_SC: 20/150

## 2023-02-17 ASSESSMENT — KERATOMETRY
OD_K2POWER_DIOPTERS: 41.50
OS_AXISANGLE2_DEGREES: 99
OS_K2POWER_DIOPTERS: 42.00
OS_K1POWER_DIOPTERS: 41.75
OD_AXISANGLE2_DEGREES: 34
OD_AXISANGLE_DEGREES: 124
OD_K1POWER_DIOPTERS: 41.25
OS_AXISANGLE_DEGREES: 009

## 2023-02-17 ASSESSMENT — TONOMETRY
OS_IOP_MMHG: 13
OD_IOP_MMHG: 16

## 2023-02-20 ASSESSMENT — KERATOMETRY
OS_AXISANGLE_DEGREES: 009
OD_K2POWER_DIOPTERS: 41.50
OD_AXISANGLE_DEGREES: 124
OS_K1POWER_DIOPTERS: 41.75
OD_AXISANGLE2_DEGREES: 34
OD_K1POWER_DIOPTERS: 41.25
OS_AXISANGLE2_DEGREES: 99
OS_K2POWER_DIOPTERS: 42.00

## 2023-03-20 DIAGNOSIS — Z13.1 SCREENING FOR DIABETES MELLITUS: ICD-10-CM

## 2023-03-20 DIAGNOSIS — J45.20 MILD INTERMITTENT ASTHMA WITHOUT COMPLICATION: ICD-10-CM

## 2023-03-20 DIAGNOSIS — R73.9 HYPERGLYCEMIA: ICD-10-CM

## 2023-03-20 DIAGNOSIS — R79.89 ABNORMAL LFTS: ICD-10-CM

## 2023-03-20 DIAGNOSIS — E55.9 VITAMIN D DEFICIENCY: ICD-10-CM

## 2023-03-20 DIAGNOSIS — M54.16 LUMBAR RADICULOPATHY: ICD-10-CM

## 2023-03-20 DIAGNOSIS — N40.1 BENIGN PROSTATIC HYPERPLASIA WITH URINARY RETENTION: Primary | ICD-10-CM

## 2023-03-20 DIAGNOSIS — Z13.0 SCREENING FOR DEFICIENCY ANEMIA: ICD-10-CM

## 2023-03-20 DIAGNOSIS — R33.8 BENIGN PROSTATIC HYPERPLASIA WITH URINARY RETENTION: Primary | ICD-10-CM

## 2023-03-20 DIAGNOSIS — E78.2 MIXED HYPERLIPIDEMIA: ICD-10-CM

## 2023-03-20 DIAGNOSIS — Z13.6 SCREENING FOR CARDIOVASCULAR CONDITION: ICD-10-CM

## 2023-03-21 LAB
CREAT ?TM UR-SCNC: 144.6 UMOL/L
EXT MICROALBUMIN URINE RANDOM: 94.9
HBA1C MFR BLD HPLC: 5.4 %
MICROALBUMIN/CREAT UR: 66 MG/G{CREAT}

## 2023-03-24 ENCOUNTER — OFFICE VISIT (OUTPATIENT)
Dept: INTERNAL MEDICINE CLINIC | Facility: CLINIC | Age: 83
End: 2023-03-24

## 2023-03-24 DIAGNOSIS — R33.8 BENIGN PROSTATIC HYPERPLASIA WITH URINARY RETENTION: ICD-10-CM

## 2023-03-24 DIAGNOSIS — M15.9 PRIMARY OSTEOARTHRITIS INVOLVING MULTIPLE JOINTS: ICD-10-CM

## 2023-03-24 DIAGNOSIS — J44.1 ACUTE EXACERBATION OF CHRONIC OBSTRUCTIVE AIRWAYS DISEASE (HCC): Primary | ICD-10-CM

## 2023-03-24 DIAGNOSIS — N40.1 BENIGN PROSTATIC HYPERPLASIA WITH URINARY RETENTION: ICD-10-CM

## 2023-03-24 DIAGNOSIS — I10 PRIMARY HYPERTENSION: ICD-10-CM

## 2023-03-24 DIAGNOSIS — J20.9 ACUTE INFECTIVE TRACHEOBRONCHITIS: ICD-10-CM

## 2023-03-24 DIAGNOSIS — N43.3 HYDROCELE, LEFT: ICD-10-CM

## 2023-03-24 DIAGNOSIS — J30.1 ALLERGIC RHINITIS DUE TO POLLEN, UNSPECIFIED SEASONALITY: ICD-10-CM

## 2023-03-24 RX ORDER — PREDNISOLONE ACETATE 10 MG/ML
SUSPENSION/ DROPS OPHTHALMIC
COMMUNITY
Start: 2023-02-17

## 2023-03-24 RX ORDER — METHYLPREDNISOLONE 4 MG/1
TABLET ORAL
Qty: 21 EACH | Refills: 0 | Status: SHIPPED | OUTPATIENT
Start: 2023-03-24

## 2023-03-24 RX ORDER — AZITHROMYCIN 250 MG/1
TABLET, FILM COATED ORAL
Qty: 6 TABLET | Refills: 0 | Status: SHIPPED | OUTPATIENT
Start: 2023-03-24 | End: 2023-03-29

## 2023-03-24 RX ORDER — FLUTICASONE PROPIONATE 50 MCG
1 SPRAY, SUSPENSION (ML) NASAL 2 TIMES DAILY
Qty: 15.8 ML | Refills: 3 | Status: SHIPPED | OUTPATIENT
Start: 2023-03-24

## 2023-03-24 NOTE — PROGRESS NOTES
Name: Severo Jung      : 1940      MRN: [id number]  Encounter Provider: Destin Dowling MD  Encounter Date: 3/24/2023   Encounter department: [place] [address]  Acute exacerbation of chronic obstructive airways disease (HCC)  Assessment & Plan:  Patient came in with complaining of coughing scanty expectoration along with difficulty breathing wheezing continue using Proventil as needed cardiology and added Medrol Dosepak symptomatic treatment with cough medicine and Zithromax    Orders:  -     methylPREDNISolone 4 MG tablet therapy pack; Use as directed on package    2  Hydrocele, left  Assessment & Plan:  Again recommended to be evaluated by urologist for increasing in size for hydrocele    Orders:  -     Ambulatory Referral to Urology; Future    3  Acute infective tracheobronchitis  Assessment & Plan:  Coughing yellowish sputum with wheezing difficulty breathing sinus congestion treated with Medrol Dosepak cough medicine and Zithromax    Orders:  -     azithromycin (Zithromax) 250 mg tablet; Take 2 tablets (500 mg total) by mouth daily for 1 day, THEN 1 tablet (250 mg total) daily for 4 days  -     methylPREDNISolone 4 MG tablet therapy pack; Use as directed on package    4  Allergic rhinitis due to pollen, unspecified seasonality  -     fluticasone (FLONASE) 50 mcg/act nasal spray; 1 spray into each nostril 2 (two) times a day    5  Benign prostatic hyperplasia with urinary retention  Assessment & Plan:  Patient is frequency of urination at nighttime advised to be seen by urologist stop taking Flomax      6  Primary osteoarthritis involving multiple joints  Assessment & Plan: For now symptoms controlled with over-the-counter Tylenol or ibuprofen           Subjective     Patient is here today for a follow up visit  He is complaining of postnasal drip that has been going on for the last month    And also complaining of coughing yellowish sputum difficulty breathing wheezing frequency of urination at nighttime had a gross hematuria which has resolved increasing size of the left hydrocele patient again recommended to be evaluated by urologist all the labs reviewed unremarkable renal urinalysis positive for WBC no symptoms of UTI and for the work-up treatment for exacerbate COPD referred to plan under visit diagnosis    Review of Systems   Constitutional: Negative for chills and fever  HENT: Positive for postnasal drip  Negative for ear pain and sore throat  Eyes: Negative for pain and visual disturbance  Respiratory: Positive for shortness of breath and wheezing  Negative for cough  Cardiovascular: Negative for chest pain and palpitations  Gastrointestinal: Negative for abdominal pain, constipation, diarrhea and vomiting  Endocrine: Negative for polyuria  Genitourinary: Positive for frequency, hematuria and scrotal swelling  Negative for dysuria and testicular pain  Musculoskeletal: Negative for arthralgias and back pain  Skin: Negative for color change and rash  Neurological: Negative for seizures and syncope  Psychiatric/Behavioral: Negative for agitation, confusion and hallucinations  All other systems reviewed and are negative  Past Medical History:   Diagnosis Date   • Arthritis    • Asthma    • Back pain    • BPH (benign prostatic hypertrophy) with urinary obstruction    • COPD (chronic obstructive pulmonary disease) (HCC)    • Dizziness    • Sinusitis      Past Surgical History:   Procedure Laterality Date   • CATARACT EXTRACTION Left    • EAR SURGERY      to wart   • EPIDIDYMAL CYST EXCISION Right 05/04/2000   • HEMORROIDECTOMY     • MS TRURL ELECTROSURG RESCJ PROSTATE BLEED COMPLETE N/A 04/16/2020    Procedure: CYSTOSCOPY, TRANSURETHRAL RESECTION OF PROSTATE (TURP);   Surgeon: Elizabeth Reese MD;  Location: [facility];  Service: Urology   • PROSTATE BIOPSY Bilateral 12/11/2003    BPH with mild acute and chronic inflammation   • PROSTATE BIOPSY Bilateral 06/21/2005    BPH with marked artropy and chronic inflammation     Family History   Problem Relation Age of Onset   • Asthma Father      Social History     Socioeconomic History   • Marital status: Unknown     Spouse name: None   • Number of children: None   • Years of education: None   • Highest education level: None   Occupational History   • None   Tobacco Use   • Smoking status: Former   • Smokeless tobacco: Current     Types: Chew   Vaping Use   • Vaping Use: Never used   Substance and Sexual Activity   • Alcohol use: Not Currently   • Drug use: Never   • Sexual activity: Not Currently   Other Topics Concern   • None   Social History Narrative   • None     Social Determinants of Health     Financial Resource Strain: Unknown   • Difficulty of Paying Living Expenses: Patient refused   Food Insecurity: Not on file   Transportation Needs: No Transportation Needs   • Lack of Transportation (Medical): No   • Lack of Transportation (Non-Medical): No   Physical Activity: Not on file   Stress: Not on file   Social Connections: Not on file   Intimate Partner Violence: Not on file   Housing Stability: Not on file     Current Outpatient Medications on File Prior to Visit   Medication Sig   • fluticasone-umeclidinium-vilanterol (Trelegy Ellipta) 100-62 5-25 mcg/actuation inhaler Inhale 1 puff daily Rinse mouth after use     • prednisoLONE acetate (PRED FORTE) 1 % ophthalmic suspension INSTILL 1 DROP INTO LEFT EYE SIX TIMES A DAY     Allergies   Allergen Reactions   • Shellfish-Derived Products - Food Allergy      Listed by PCP patient denies     Immunization History   Administered Date(s) Administered   • COVID-19 J&J (Cyn) vaccine 0 5 mL 05/21/2021, 11/05/2021   • Pneumococcal Polysaccharide PPV23 01/01/2003   • influenza, injectable, quadrivalent 11/27/2018, 09/10/2019, 10/18/2021       Objective     Pulse 77   Ht 5' 9" (1 753 m)   Wt 68 kg (150 lb)   SpO2 99%   BMI 22 15 kg/m²     Physical Exam  Constitutional:       General: He is not in acute distress  Appearance: He is well-developed  He is not ill-appearing, toxic-appearing or diaphoretic  HENT:      Head: Normocephalic and atraumatic  Right Ear: External ear normal       Left Ear: External ear normal       Nose: Nose normal       Mouth/Throat:      Pharynx: No oropharyngeal exudate  Eyes:      General: Lids are normal  Lids are everted, no foreign bodies appreciated  No scleral icterus  Right eye: No discharge  Left eye: No discharge  Conjunctiva/sclera: Conjunctivae normal       Pupils: Pupils are equal, round, and reactive to light  Neck:      Thyroid: No thyromegaly  Vascular: Normal carotid pulses  No carotid bruit, hepatojugular reflux or JVD  Trachea: No tracheal tenderness or tracheal deviation  Cardiovascular:      Rate and Rhythm: Normal rate and regular rhythm  Pulses: Normal pulses  Heart sounds: Normal heart sounds  No murmur heard  No friction rub  No gallop  Pulmonary:      Effort: Pulmonary effort is normal  No respiratory distress  Breath sounds: No stridor  Wheezing and rhonchi present  No rales  Chest:      Chest wall: No tenderness  Abdominal:      General: Bowel sounds are normal  There is no distension  Palpations: Abdomen is soft  There is no mass  Tenderness: There is no abdominal tenderness  There is no guarding or rebound  Musculoskeletal:         General: No tenderness or deformity  Normal range of motion  Cervical back: Normal range of motion and neck supple  No edema, erythema or rigidity  No spinous process tenderness or muscular tenderness  Normal range of motion  Lymphadenopathy:      Head:      Right side of head: No submental, submandibular, tonsillar, preauricular or posterior auricular adenopathy        Left side of head: No submental, submandibular, tonsillar, preauricular, posterior auricular or occipital adenopathy  Cervical: No cervical adenopathy  Right cervical: No superficial, deep or posterior cervical adenopathy  Left cervical: No superficial, deep or posterior cervical adenopathy  Upper Body:      Right upper body: No pectoral adenopathy  Left upper body: No pectoral adenopathy  Skin:     General: Skin is warm and dry  Coloration: Skin is not pale  Findings: No erythema or rash  Neurological:      Mental Status: He is alert and oriented to person, place, and time  Cranial Nerves: No cranial nerve deficit  Sensory: No sensory deficit  Motor: Weakness present  No tremor, abnormal muscle tone or seizure activity  Coordination: Coordination normal       Gait: Gait abnormal       Deep Tendon Reflexes: Reflexes are normal and symmetric  Reflexes normal    Psychiatric:         Behavior: Behavior normal          Thought Content:  Thought content normal          Judgment: Judgment normal        Ishmael Diaz MD

## 2023-03-24 NOTE — PATIENT INSTRUCTIONS
COPD: Breathing Exercises   AMBULATORY CARE:   Breathing exercises  may help you manage symptoms of COPD, such as shortness of breath  The exercises strengthen the muscles you use to breathe  They may also help you get air easier when you are having trouble breathing  Your healthcare provider will tell you how often to do these exercises  Deep breathing and coughing  can decrease your risk for a lung infection:  Take a deep breath and hold it for as long as you can  Let the air out and then cough strongly  Deep breaths help open your airway  You may be given an incentive spirometer to help you take deep breaths  Put the plastic piece in your mouth and take a slow, deep breath  Then let the air out and cough  Repeat these steps 10 times every hour  Pursed-lip breathing  can be helpful before you start an activity or any time you feel short of breath:  Breathe in through your nose  Use the muscles in your abdomen to help fill your lungs with air  Slowly breathe out through your mouth with your lips slightly puckered  You should make a quiet hissing sound as you breathe out  Try to take 2 times as long to breathe out as to breathe in  This helps you get rid of as much air from your lungs as possible  Repeat this exercise several times  When you are used to doing pursed-lip breathing, you can do it any time you need more air  Diaphragmatic breathing  will help strengthen the muscles you use to breathe:  Place one hand just below your ribs  Place your other hand in the middle of your chest over your breastbone  Breathe in slowly through your nose, as deeply as you can  Breathe out slowly through pursed lips  As you breathe out, tighten the muscles just below your ribs  Use your hand to gently push in and up while you tighten the muscles  Diaphragmatic breathing takes practice  Slowly increase the amount of time you spend during each practice session      © Copyright [employer] 2022 Information is for End User's use only and may not be sold, redistributed or otherwise used for commercial purposes  The above information is an  only  It is not intended as medical advice for individual conditions or treatments  Talk to your doctor, nurse or pharmacist before following any medical regimen to see if it is safe and effective for you

## 2023-03-25 VITALS
DIASTOLIC BLOOD PRESSURE: 84 MMHG | OXYGEN SATURATION: 99 % | BODY MASS INDEX: 22.22 KG/M2 | HEART RATE: 77 BPM | WEIGHT: 150 LBS | HEIGHT: 69 IN | SYSTOLIC BLOOD PRESSURE: 140 MMHG

## 2023-03-25 PROBLEM — I10 PRIMARY HYPERTENSION: Status: ACTIVE | Noted: 2023-03-25

## 2023-03-25 NOTE — ASSESSMENT & PLAN NOTE
Patient came in with complaining of coughing scanty expectoration along with difficulty breathing wheezing continue using Proventil as needed cardiology and added Medrol Dosepak symptomatic treatment with cough medicine and Zithromax

## 2023-03-25 NOTE — ASSESSMENT & PLAN NOTE
Patient's blood pressure has been borderline for at present continue low-salt diet and monitor blood pressure at home

## 2023-03-25 NOTE — ASSESSMENT & PLAN NOTE
Coughing yellowish sputum with wheezing difficulty breathing sinus congestion treated with Medrol Dosepak cough medicine and Zithromax

## 2023-03-25 NOTE — ASSESSMENT & PLAN NOTE
Episode of hematuria which has resolved again patient recommended to be evaluated by urologist urine analysis reviewed no symptoms of UTI

## 2023-05-01 DIAGNOSIS — J45.41 MODERATE PERSISTENT ASTHMA WITH ACUTE EXACERBATION: Primary | ICD-10-CM

## 2023-05-15 DIAGNOSIS — J30.1 ALLERGIC RHINITIS DUE TO POLLEN, UNSPECIFIED SEASONALITY: ICD-10-CM

## 2023-05-15 RX ORDER — FLUTICASONE PROPIONATE 50 MCG
1 SPRAY, SUSPENSION (ML) NASAL 2 TIMES DAILY
Qty: 15.8 ML | Refills: 3 | Status: SHIPPED | OUTPATIENT
Start: 2023-05-15

## 2023-05-16 ENCOUNTER — OFFICE VISIT (OUTPATIENT)
Dept: INTERNAL MEDICINE CLINIC | Facility: CLINIC | Age: 83
End: 2023-05-16

## 2023-05-16 VITALS
WEIGHT: 148 LBS | TEMPERATURE: 98.3 F | OXYGEN SATURATION: 97 % | HEART RATE: 87 BPM | HEIGHT: 69 IN | BODY MASS INDEX: 21.92 KG/M2 | DIASTOLIC BLOOD PRESSURE: 70 MMHG | SYSTOLIC BLOOD PRESSURE: 120 MMHG

## 2023-05-16 DIAGNOSIS — N43.3 HYDROCELE, LEFT: ICD-10-CM

## 2023-05-16 DIAGNOSIS — R33.8 BENIGN PROSTATIC HYPERPLASIA WITH URINARY RETENTION: ICD-10-CM

## 2023-05-16 DIAGNOSIS — J20.9 ACUTE INFECTIVE TRACHEOBRONCHITIS: ICD-10-CM

## 2023-05-16 DIAGNOSIS — J44.1 ACUTE EXACERBATION OF CHRONIC OBSTRUCTIVE AIRWAYS DISEASE (HCC): Primary | ICD-10-CM

## 2023-05-16 DIAGNOSIS — N40.1 BENIGN PROSTATIC HYPERPLASIA WITH URINARY RETENTION: ICD-10-CM

## 2023-05-16 DIAGNOSIS — R31.0 HEMATURIA, GROSS: ICD-10-CM

## 2023-05-16 DIAGNOSIS — I10 PRIMARY HYPERTENSION: ICD-10-CM

## 2023-05-16 RX ORDER — LATANOPROST 50 UG/ML
SOLUTION/ DROPS OPHTHALMIC
COMMUNITY
Start: 2023-03-31

## 2023-05-16 RX ORDER — ALBUTEROL SULFATE 90 UG/1
2 AEROSOL, METERED RESPIRATORY (INHALATION) EVERY 6 HOURS PRN
Qty: 18 G | Refills: 5 | Status: SHIPPED | OUTPATIENT
Start: 2023-05-16

## 2023-05-16 RX ORDER — PREDNISONE 20 MG/1
TABLET ORAL
Qty: 10 TABLET | Refills: 0 | Status: SHIPPED | OUTPATIENT
Start: 2023-05-16

## 2023-05-16 RX ORDER — CEFUROXIME AXETIL 500 MG/1
500 TABLET ORAL EVERY 12 HOURS SCHEDULED
Qty: 14 TABLET | Refills: 0 | Status: SHIPPED | OUTPATIENT
Start: 2023-05-16 | End: 2023-05-23

## 2023-05-16 NOTE — PATIENT INSTRUCTIONS
Go to go for the blood test, chest x-ray as well as sputum test today at your convenience  We will start antibiotic Ceftin 500 mg 1 tablet twice a day for 1 week  Continue your Trelegy inhaler 1 puff daily     We will add prednisone 20 mg 2 tablet daily for 3 days then 1 daily for 4 days  I will give you albuterol 2 puffs  every 6 hours as needed for additional breathing or wheezing    Take saline nasal spray 3 times a day, take Claritin 10 mg daily for next 10 days    Follow with Consultants as per their and our suggestion    Follow up in approximately one week or as needed earlier    Follow all instructions as advised and discussed  Take your medications as prescribed  Call the office immediately if you experience any side effects  Ask questions if you do not understand  Keep your scheduled appointment as advised or come sooner if necessary or in doubt  Best time to call for non-urgent matter or questions on weekdays is between 9am and 12 noon  See physician for any new symptoms or worsening of current symptoms  Urgent or emergent situations call 911 and report to nearest emergency room  I spent  time taking care of this patient including clinical care, conseling, collaboration, chart, lab and consultant's follow up note,images report, documentation, pre visit  review as appropriate      Patient is to get labs 1 week(s) prior to next visit if advised

## 2023-05-16 NOTE — PROGRESS NOTES
Name: Monica Clemens      : 1940      MRN: [id number]  Encounter Provider: Rachael Delacruz MD  Encounter Date: 2023   Encounter department: [address]     1  Acute exacerbation of chronic obstructive airways disease (HCC)  Assessment & Plan:  Go to go for the blood test, chest x-ray as well as sputum test today at your convenience  We will start antibiotic Ceftin 500 mg 1 tablet twice a day for 1 week  Continue your Trelegy inhaler 1 puff daily     We will add prednisone 20 mg 2 tablet daily for 3 days then 1 daily for 4 days  I will give you albuterol 2 puffs  every 6 hours as needed for additional breathing or wheezing    Take saline nasal spray 3 times a day, take Claritin 10 mg daily for next 10 days    Orders:  -     XR chest pa & lateral; Future; Expected date: 2023  -     CBC and differential; Future; Expected date: 2023  -     Comprehensive metabolic panel; Future; Expected date: 2023  -     Sputum culture and Gram stain; Future; Expected date: 2023  -     predniSONE 20 mg tablet; Two tablet daily for first 3 days then one tablet daily for next four days with food  -     cefuroxime (CEFTIN) 500 mg tablet; Take 1 tablet (500 mg total) by mouth every 12 (twelve) hours for 7 days  -     albuterol (Ventolin HFA) 90 mcg/act inhaler; Inhale 2 puffs every 6 (six) hours as needed for wheezing    2  Acute infective tracheobronchitis  Assessment & Plan:  Go to go for the blood test, chest x-ray as well as sputum test today at your convenience  We will start antibiotic Ceftin 500 mg 1 tablet twice a day for 1 week  Continue your Trelegy inhaler 1 puff daily     We will add prednisone 20 mg 2 tablet daily for 3 days then 1 daily for 4 days      I will give you albuterol 2 puffs  every 6 hours as needed for additional breathing or wheezing    Take saline nasal spray 3 times a day, take Claritin 10 mg daily for next 10 days    Orders:  -     XR chest pa & lateral; Future; Expected date: 05/16/2023  -     CBC and differential; Future; Expected date: 05/16/2023  -     Comprehensive metabolic panel; Future; Expected date: 05/16/2023  -     Sputum culture and Gram stain; Future; Expected date: 05/16/2023  -     predniSONE 20 mg tablet; Two tablet daily for first 3 days then one tablet daily for next four days with food  -     cefuroxime (CEFTIN) 500 mg tablet; Take 1 tablet (500 mg total) by mouth every 12 (twelve) hours for 7 days  -     albuterol (Ventolin HFA) 90 mcg/act inhaler; Inhale 2 puffs every 6 (six) hours as needed for wheezing    3  Primary hypertension  Assessment & Plan:  Blood pressure is well controlled  4  Benign prostatic hyperplasia with urinary retention  Assessment & Plan:  He patient will be seeing new urologist for BPH for urinary frequency coming Friday      5  Hematuria, gross  Assessment & Plan:  No hematuria patient will be seeing new urologist this Friday for BPH and hydrocele      6  Hydrocele, left  Assessment & Plan:  No hematuria patient will be seeing new urologist this Friday for BPH and hydrocele      With 1 month history of persistent/progressive tracheobronchitis exacerbation of severe asthma upper respiratory tract symptoms nasal congestions  Patient did not seek any help  Not treated with any medications per patient's  Differential diagnoses approach reviewed  All question answered to the status         Subjective     This is a call in appointment  Chief Complain: head congestion, hard to breath    First day of symptom: One month ago    First day of contact to our office:  yesterday    Exposure History: No    Travel Hx: No    HPI: Pt started with stuffy nose, runny nose, mild cough, bringing up phlegm was yellow then clear and now yellow, No blood in the phlegm, Pt does get wheezy feeling due to chest congestion, keep him awake at night    No fever ,No abdominal pain, nausea , vomitng or diarrhea,   Poor weight  Pt denied any sinus pain, ear ache, sore throat,  Patient is a known case of asthma, COPD, hypertension, lumbar radiculopathy, BPH, hematuria, history of hydrocele  Pt c/o bodyache     Denies any headache    Current medications include Flonase, trilogy      Patient was seen by primary care physician on March 24 and notes were reviewed    Pt will be seeing new urologist this Friday- Dr Deepthi Soler:    Treatment tried:    Risk Factors:      Immunization Hx for COVID vaccine: per urologist                Review of Systems   Constitutional: Negative for chills and fever  HENT: Positive for congestion, postnasal drip, rhinorrhea, sinus pressure and sneezing  Negative for dental problem, drooling, ear discharge, ear pain, facial swelling, hearing loss, mouth sores, nosebleeds, sinus pain, sore throat, tinnitus, trouble swallowing and voice change  Eyes: Negative for pain and visual disturbance  Respiratory: Positive for cough and wheezing  Negative for shortness of breath  Cardiovascular: Negative for chest pain and palpitations  Gastrointestinal: Negative for abdominal pain and vomiting  Genitourinary: Positive for frequency and scrotal swelling  Negative for dysuria, hematuria, penile pain and urgency  Musculoskeletal: Negative for arthralgias and back pain  Skin: Negative for color change and rash  Neurological: Negative for seizures, syncope and headaches  Psychiatric/Behavioral: Positive for sleep disturbance  All other systems reviewed and are negative        Past Medical History:   Diagnosis Date   • Arthritis    • Asthma    • Back pain    • BPH (benign prostatic hypertrophy) with urinary obstruction    • COPD (chronic obstructive pulmonary disease) (HCC)    • Dizziness    • Sinusitis      Past Surgical History:   Procedure Laterality Date   • CATARACT EXTRACTION Left    • EAR SURGERY      to wart   • EPIDIDYMAL CYST EXCISION Right 05/04/2000   • HEMORROIDECTOMY     • NV TRURL ELECTROSURG RESCJ PROSTATE BLEED COMPLETE N/A 04/16/2020    Procedure: CYSTOSCOPY, TRANSURETHRAL RESECTION OF PROSTATE (TURP); Surgeon: Wilner Tavera MD;  Location: [facility];  Service: Urology   • PROSTATE BIOPSY Bilateral 12/11/2003    BPH with mild acute and chronic inflammation   • PROSTATE BIOPSY Bilateral 06/21/2005    BPH with marked artropy and chronic inflammation     Family History   Problem Relation Age of Onset   • Asthma Father      Social History     Socioeconomic History   • Marital status: Unknown     Spouse name: None   • Number of children: None   • Years of education: None   • Highest education level: None   Occupational History   • None   Tobacco Use   • Smoking status: Former   • Smokeless tobacco: Current     Types: Chew   Vaping Use   • Vaping Use: Never used   Substance and Sexual Activity   • Alcohol use: Not Currently   • Drug use: Never   • Sexual activity: Not Currently   Other Topics Concern   • None   Social History Narrative   • None     Social Determinants of Health     Financial Resource Strain: Unknown   • Difficulty of Paying Living Expenses: Patient refused   Food Insecurity: Not on file   Transportation Needs: No Transportation Needs   • Lack of Transportation (Medical): No   • Lack of Transportation (Non-Medical): No   Physical Activity: Not on file   Stress: Not on file   Social Connections: Not on file   Intimate Partner Violence: Not on file   Housing Stability: Not on file     Current Outpatient Medications on File Prior to Visit   Medication Sig   • fluticasone (FLONASE) 50 mcg/act nasal spray 1 spray into each nostril 2 (two) times a day   • fluticasone-umeclidinium-vilanterol 200-62 5-25 mcg/actuation AEPB inhaler Inhale 1 puff daily Rinse mouth after use     • latanoprost (XALATAN) 0 005 % ophthalmic solution INSTILL 1 DROP INTO BOTH EYES IN THE EVENING   • prednisoLONE acetate (PRED FORTE) 1 % ophthalmic suspension "INSTILL 1 DROP INTO LEFT EYE SIX TIMES A DAY   • [DISCONTINUED] methylPREDNISolone 4 MG tablet therapy pack Use as directed on package     Allergies   Allergen Reactions   • Shellfish-Derived Products - Food Allergy      Listed by PCP patient denies     Immunization History   Administered Date(s) Administered   • COVID-19 J&J (Wizzgo) vaccine 0 5 mL 05/21/2021, 11/05/2021   • Pneumococcal Polysaccharide PPV23 01/01/2003   • influenza, injectable, quadrivalent 11/27/2018, 09/10/2019, 10/18/2021       Objective     /70   Pulse 87   Temp 98 3 °F (36 8 °C)   Ht 5' 9\" (1 753 m)   Wt 67 1 kg (148 lb)   SpO2 97%   BMI 21 86 kg/m²     Physical Exam  Vitals and nursing note reviewed  Constitutional:       Appearance: He is well-developed and normal weight  He is not diaphoretic  HENT:      Head: Normocephalic and atraumatic  Right Ear: Hearing, tympanic membrane and external ear normal       Left Ear: Hearing, tympanic membrane, ear canal and external ear normal       Nose: Congestion and rhinorrhea present  Right Turbinates: Pale  Not swollen  Left Turbinates: Pale  Not enlarged or swollen  Right Sinus: No maxillary sinus tenderness or frontal sinus tenderness  Left Sinus: No maxillary sinus tenderness or frontal sinus tenderness  Mouth/Throat:      Lips: No lesions  Mouth: No oral lesions  Dentition: No gum lesions  Tongue: No lesions  Pharynx: No posterior oropharyngeal erythema or uvula swelling  Eyes:      General: Lids are normal  Lids are everted, no foreign bodies appreciated  Conjunctiva/sclera: Conjunctivae normal       Pupils: Pupils are equal, round, and reactive to light  Neck:      Thyroid: No thyromegaly or thyroid tenderness  Vascular: Normal carotid pulses  No hepatojugular reflux or JVD  Trachea: No tracheal tenderness or tracheal deviation  Cardiovascular:      Rate and Rhythm: Normal rate and regular rhythm        " Pulses: Normal pulses  Heart sounds: Normal heart sounds  No murmur heard  Pulmonary:      Effort: Pulmonary effort is normal       Breath sounds: Decreased breath sounds, wheezing and rhonchi present  Comments: Mild wheezing is present  Few occasional coarse breath sounds are present  No debility  Somewhat diminished breath sounds  Some kyphosis is present  Patient has a large lipoma on right lower parathoracic area approximately 3 inches movable unchanged per patient long time  Abdominal:      General: Bowel sounds are normal       Palpations: Abdomen is soft  Tenderness: There is no abdominal tenderness  Hernia: No hernia is present  Musculoskeletal:         General: Normal range of motion  Cervical back: Normal range of motion and neck supple  No edema or erythema  No spinous process tenderness or muscular tenderness  Normal range of motion  Right lower leg: No edema  Left lower leg: No edema  Lymphadenopathy:      Head:      Right side of head: No submental, submandibular, tonsillar, preauricular or posterior auricular adenopathy  Left side of head: No submental, submandibular, tonsillar, preauricular, posterior auricular or occipital adenopathy  Cervical:      Right cervical: No superficial, deep or posterior cervical adenopathy  Left cervical: No superficial, deep or posterior cervical adenopathy  Upper Body:      Right upper body: No pectoral adenopathy  Left upper body: No pectoral adenopathy  Skin:     General: Skin is warm and dry  Coloration: Skin is not jaundiced  Findings: No bruising or lesion  Neurological:      Mental Status: He is alert and oriented to person, place, and time  Sensory: No sensory deficit  Motor: Weakness present  No tremor, abnormal muscle tone or seizure activity  Coordination: Coordination normal       Deep Tendon Reflexes: Reflexes are normal and symmetric     Psychiatric: Mood and Affect: Mood normal          Thought Content:  Thought content normal        Fuad Ritchie MD

## 2023-05-16 NOTE — ASSESSMENT & PLAN NOTE
Go to go for the blood test, chest x-ray as well as sputum test today at your convenience  We will start antibiotic Ceftin 500 mg 1 tablet twice a day for 1 week  Continue your Trelegy inhaler 1 puff daily     We will add prednisone 20 mg 2 tablet daily for 3 days then 1 daily for 4 days      I will give you albuterol 2 puffs  every 6 hours as needed for additional breathing or wheezing    Take saline nasal spray 3 times a day, take Claritin 10 mg daily for next 10 days

## 2023-05-17 ENCOUNTER — HOSPITAL ENCOUNTER (OUTPATIENT)
Dept: RADIOLOGY | Facility: HOSPITAL | Age: 83
Discharge: HOME | End: 2023-05-17

## 2023-05-17 DIAGNOSIS — J44.1 ACUTE EXACERBATION OF CHRONIC OBSTRUCTIVE AIRWAYS DISEASE (HCC): ICD-10-CM

## 2023-05-17 DIAGNOSIS — J20.9 ACUTE INFECTIVE TRACHEOBRONCHITIS: ICD-10-CM

## 2023-05-18 LAB
ALBUMIN SERPL-MCNC: 3.6 G/DL (ref 3.6–4.6)
ALBUMIN/GLOB SERPL: 1.7 {RATIO} (ref 1.2–2.2)
ALP SERPL-CCNC: 85 IU/L (ref 44–121)
ALT SERPL-CCNC: 31 IU/L (ref 0–44)
AST SERPL-CCNC: 27 IU/L (ref 0–40)
BASOPHILS # BLD AUTO: 0 X10E3/UL (ref 0–0.2)
BASOPHILS NFR BLD AUTO: 0 %
BILIRUB SERPL-MCNC: 0.9 MG/DL (ref 0–1.2)
BUN SERPL-MCNC: 16 MG/DL (ref 8–27)
BUN/CREAT SERPL: 21 (ref 10–24)
CALCIUM SERPL-MCNC: 8.7 MG/DL (ref 8.6–10.2)
CHLORIDE SERPL-SCNC: 100 MMOL/L (ref 96–106)
CO2 SERPL-SCNC: 23 MMOL/L (ref 20–29)
CREAT SERPL-MCNC: 0.76 MG/DL (ref 0.76–1.27)
EGFR: 89 ML/MIN/1.73
EOSINOPHIL # BLD AUTO: 0 X10E3/UL (ref 0–0.4)
EOSINOPHIL NFR BLD AUTO: 0 %
ERYTHROCYTE [DISTWIDTH] IN BLOOD BY AUTOMATED COUNT: 13.1 % (ref 11.6–15.4)
GLOBULIN SER-MCNC: 2.1 G/DL (ref 1.5–4.5)
GLUCOSE SERPL-MCNC: 138 MG/DL (ref 70–99)
HCT VFR BLD AUTO: 37.2 % (ref 37.5–51)
HGB BLD-MCNC: 12.3 G/DL (ref 13–17.7)
IMM GRANULOCYTES # BLD: 0 X10E3/UL (ref 0–0.1)
IMM GRANULOCYTES NFR BLD: 1 %
LYMPHOCYTES # BLD AUTO: 0.5 X10E3/UL (ref 0.7–3.1)
LYMPHOCYTES NFR BLD AUTO: 7 %
MCH RBC QN AUTO: 29.2 PG (ref 26.6–33)
MCHC RBC AUTO-ENTMCNC: 33.1 G/DL (ref 31.5–35.7)
MCV RBC AUTO: 88 FL (ref 79–97)
MONOCYTES # BLD AUTO: 0.4 X10E3/UL (ref 0.1–0.9)
MONOCYTES NFR BLD AUTO: 5 %
NEUTROPHILS # BLD AUTO: 6.4 X10E3/UL (ref 1.4–7)
NEUTROPHILS NFR BLD AUTO: 87 %
PLATELET # BLD AUTO: 242 X10E3/UL (ref 150–450)
POTASSIUM SERPL-SCNC: 4.3 MMOL/L (ref 3.5–5.2)
PROT SERPL-MCNC: 5.7 G/DL (ref 6–8.5)
RBC # BLD AUTO: 4.21 X10E6/UL (ref 4.14–5.8)
SODIUM SERPL-SCNC: 137 MMOL/L (ref 134–144)
WBC # BLD AUTO: 7.4 X10E3/UL (ref 3.4–10.8)

## 2023-05-22 ENCOUNTER — HOSPITAL ENCOUNTER (OUTPATIENT)
Dept: RADIOLOGY | Facility: HOSPITAL | Age: 83
Discharge: HOME | End: 2023-05-22
Attending: SPECIALIST

## 2023-05-22 ENCOUNTER — APPOINTMENT (OUTPATIENT)
Dept: LAB | Facility: HOSPITAL | Age: 83
End: 2023-05-22
Attending: SPECIALIST

## 2023-05-22 DIAGNOSIS — N50.819 TESTICULAR PAIN: ICD-10-CM

## 2023-05-22 DIAGNOSIS — R97.20 ELEVATED PROSTATE SPECIFIC ANTIGEN (PSA): ICD-10-CM

## 2023-05-22 LAB — PSA SERPL-MCNC: 6.98 NG/ML (ref 0–4)

## 2023-05-23 PROBLEM — J20.9 ACUTE INFECTIVE TRACHEOBRONCHITIS: Status: RESOLVED | Noted: 2022-11-08 | Resolved: 2023-05-23

## 2023-05-23 LAB
BACTERIA SPT CULT: ABNORMAL
GRAM STN SPT: NORMAL
Lab: ABNORMAL
Lab: ABNORMAL
Lab: NORMAL
SQUAMOUS SPT QL MICRO: NORMAL
WBC SPT QL MICRO: NORMAL

## 2023-05-30 ENCOUNTER — OFFICE VISIT (OUTPATIENT)
Dept: INTERNAL MEDICINE CLINIC | Facility: CLINIC | Age: 83
End: 2023-05-30

## 2023-05-30 VITALS
BODY MASS INDEX: 21.3 KG/M2 | OXYGEN SATURATION: 97 % | RESPIRATION RATE: 16 BRPM | HEART RATE: 80 BPM | DIASTOLIC BLOOD PRESSURE: 74 MMHG | WEIGHT: 143.8 LBS | HEIGHT: 69 IN | SYSTOLIC BLOOD PRESSURE: 134 MMHG | TEMPERATURE: 98 F

## 2023-05-30 DIAGNOSIS — N40.1 BENIGN PROSTATIC HYPERPLASIA WITH URINARY RETENTION: ICD-10-CM

## 2023-05-30 DIAGNOSIS — M54.16 LUMBAR RADICULOPATHY: ICD-10-CM

## 2023-05-30 DIAGNOSIS — I10 PRIMARY HYPERTENSION: ICD-10-CM

## 2023-05-30 DIAGNOSIS — J44.1 ACUTE EXACERBATION OF CHRONIC OBSTRUCTIVE AIRWAYS DISEASE (HCC): Primary | ICD-10-CM

## 2023-05-30 DIAGNOSIS — R33.8 BENIGN PROSTATIC HYPERPLASIA WITH URINARY RETENTION: ICD-10-CM

## 2023-05-30 DIAGNOSIS — N43.3 HYDROCELE, LEFT: ICD-10-CM

## 2023-05-30 PROBLEM — N43.0 ENCYSTED HYDROCELE: Status: ACTIVE | Noted: 2023-05-30

## 2023-05-30 NOTE — ASSESSMENT & PLAN NOTE
Ultrasound of scrotum discussed shows complex epididymal cyst hydrocele seen by urology awaiting hydrocelectomy

## 2023-05-30 NOTE — ASSESSMENT & PLAN NOTE
Resolved treated with prednisone tapering doses chest x-ray unremarkable no pneumonia continue albuterol as needed and Trelegy or Advair encouraged to be seen by pulm and

## 2023-05-30 NOTE — PROGRESS NOTES
Dr Vargas Held Office Visit Note  23     Liz Ra 79 y o  male MRN: [id number]  : 1940    Assessment:     1  Acute exacerbation of chronic obstructive airways disease (HCC)  Assessment & Plan:  Resolved treated with prednisone tapering doses chest x-ray unremarkable no pneumonia continue albuterol as needed and Trelegy or Advair encouraged to be seen by pulm and      2  Primary hypertension  Assessment & Plan:  Continue low-salt diet blood pressure control continue monitoring      3  Lumbar radiculopathy  Assessment & Plan:  Continue over-the-counter Tylenol as needed symptoms controlled      4  Hydrocele, left  Assessment & Plan:  Ultrasound of scrotum discussed shows complex epididymal cyst hydrocele seen by urology awaiting hydrocelectomy      5  Benign prostatic hyperplasia with urinary retention  Assessment & Plan:  Seen by urology continue follow-up with the urology            Discussion Summary and Plan: Today's care plan and medications were reviewed with patient in detail and all their questions answered to their satisfaction  Chief Complaint   Patient presents with   • Follow-up     Still having sinus problems  Can't use the nasel spray  Keeps him up at night  Started drooling  Had lab work done  Subjective:  Came in for follow-up chronic medical condition especially follow-up with acute exam COPD improved and resolved treated with tapering dose of prednisone Proventil Trelegy chest x-ray reviewed unremarkable also had ultrasound scrotum done which shows hydrocele and complex epidermal cyst      The following portions of the patient's history were reviewed and updated as appropriate: allergies, current medications, past family history, past medical history, past social history, past surgical history and problem list     Review of Systems   Constitutional: Negative for chills and fever  HENT: Positive for postnasal drip  Negative for ear pain and sore throat      Eyes: Negative for pain and visual disturbance  Respiratory: Positive for shortness of breath  Negative for cough  Cardiovascular: Negative for chest pain and palpitations  Gastrointestinal: Negative for abdominal pain, constipation, diarrhea and vomiting  Endocrine: Negative for polyuria  Genitourinary: Positive for frequency, hematuria and scrotal swelling  Negative for dysuria and testicular pain  Musculoskeletal: Positive for arthralgias and back pain  Skin: Negative for color change and rash  Neurological: Negative for seizures and syncope  Psychiatric/Behavioral: Negative for agitation, confusion and hallucinations  All other systems reviewed and are negative  Historical Information   Patient Active Problem List   Diagnosis   • Benign prostatic hyperplasia with urinary retention   • Hematuria, gross   • Asthma   • Acute exacerbation of chronic obstructive airways disease (HCC)   • Primary osteoarthritis involving multiple joints   • Lumbar radiculopathy   • Pre-op examination   • Hydrocele, left   • Primary hypertension   • Encysted hydrocele     Past Medical History:   Diagnosis Date   • Arthritis    • Asthma    • Back pain    • BPH (benign prostatic hypertrophy) with urinary obstruction    • COPD (chronic obstructive pulmonary disease) (HCC)    • Dizziness    • Sinusitis      Past Surgical History:   Procedure Laterality Date   • CATARACT EXTRACTION Left    • EAR SURGERY      to wart   • EPIDIDYMAL CYST EXCISION Right 05/04/2000   • HEMORROIDECTOMY     • MS TRURL ELECTROSURG RESCJ PROSTATE BLEED COMPLETE N/A 04/16/2020    Procedure: CYSTOSCOPY, TRANSURETHRAL RESECTION OF PROSTATE (TURP);   Surgeon: Litzy Guidry MD;  Location: [facility];  Service: Urology   • PROSTATE BIOPSY Bilateral 12/11/2003    BPH with mild acute and chronic inflammation   • PROSTATE BIOPSY Bilateral 06/21/2005    BPH with marked artropy and chronic inflammation     Social History     Substance and Sexual Activity "  Alcohol Use Not Currently     Social History     Substance and Sexual Activity   Drug Use Never     Social History     Tobacco Use   Smoking Status Former   Smokeless Tobacco Current   • Types: Chew     Family History   Problem Relation Age of Onset   • Asthma Father      Health Maintenance Due   Topic   • Pneumococcal Vaccine: 65+ Years (2 - PCV)   • COVID-19 Vaccine (3 - Booster for [employer] series)      Meds/Allergies       Current Outpatient Medications:   •  albuterol (Ventolin HFA) 90 mcg/act inhaler, Inhale 2 puffs every 6 (six) hours as needed for wheezing, Disp: 18 g, Rfl: 5  •  fluticasone (FLONASE) 50 mcg/act nasal spray, 1 spray into each nostril 2 (two) times a day, Disp: 15 8 mL, Rfl: 3  •  fluticasone-umeclidinium-vilanterol 200-62 5-25 mcg/actuation AEPB inhaler, Inhale 1 puff daily Rinse mouth after use , Disp: 60 blister, Rfl: 5  •  latanoprost (XALATAN) 0 005 % ophthalmic solution, INSTILL 1 DROP INTO BOTH EYES IN THE EVENING, Disp: , Rfl:   •  prednisoLONE acetate (PRED FORTE) 1 % ophthalmic suspension, INSTILL 1 DROP INTO LEFT EYE SIX TIMES A DAY, Disp: , Rfl:   •  predniSONE 20 mg tablet, Two tablet daily for first 3 days then one tablet daily for next four days with food (Patient not taking: Reported on 5/30/2023), Disp: 10 tablet, Rfl: 0      Objective:    Vitals:   /74   Pulse 80   Temp 98 °F (36 7 °C)   Resp 16   Ht 5' 9\" (1 753 m)   Wt 65 2 kg (143 lb 12 8 oz)   SpO2 97%   BMI 21 24 kg/m²   Body mass index is 21 24 kg/m²  Vitals:    05/30/23 1358   Weight: 65 2 kg (143 lb 12 8 oz)       Physical Exam  Constitutional:       General: He is not in acute distress  Appearance: He is well-developed  He is not ill-appearing, toxic-appearing or diaphoretic  HENT:      Head: Normocephalic and atraumatic  Right Ear: External ear normal       Left Ear: External ear normal       Nose: Nose normal       Mouth/Throat:      Pharynx: No oropharyngeal exudate     Eyes:      " General: Lids are normal  Lids are everted, no foreign bodies appreciated  No scleral icterus  Right eye: No discharge  Left eye: No discharge  Conjunctiva/sclera: Conjunctivae normal       Pupils: Pupils are equal, round, and reactive to light  Neck:      Thyroid: No thyromegaly  Vascular: Normal carotid pulses  No carotid bruit, hepatojugular reflux or JVD  Trachea: No tracheal tenderness or tracheal deviation  Cardiovascular:      Rate and Rhythm: Normal rate and regular rhythm  Pulses: Normal pulses  Heart sounds: Normal heart sounds  No murmur heard  No friction rub  No gallop  Pulmonary:      Effort: Pulmonary effort is normal  No respiratory distress  Breath sounds: Normal breath sounds  No stridor  No wheezing or rales  Comments: Decreased air entry both  Chest:      Chest wall: No tenderness  Abdominal:      General: Bowel sounds are normal  There is no distension  Palpations: Abdomen is soft  There is no mass  Tenderness: There is no abdominal tenderness  There is no guarding or rebound  Musculoskeletal:         General: No tenderness or deformity  Normal range of motion  Cervical back: Normal range of motion and neck supple  No edema, erythema or rigidity  No spinous process tenderness or muscular tenderness  Normal range of motion  Lymphadenopathy:      Head:      Right side of head: No submental, submandibular, tonsillar, preauricular or posterior auricular adenopathy  Left side of head: No submental, submandibular, tonsillar, preauricular, posterior auricular or occipital adenopathy  Cervical: No cervical adenopathy  Right cervical: No superficial, deep or posterior cervical adenopathy  Left cervical: No superficial, deep or posterior cervical adenopathy  Upper Body:      Right upper body: No pectoral adenopathy  Left upper body: No pectoral adenopathy     Skin:     General: Skin is warm and dry       Coloration: Skin is not pale  Findings: No erythema or rash  Neurological:      Mental Status: He is alert and oriented to person, place, and time  Cranial Nerves: No cranial nerve deficit  Sensory: No sensory deficit  Motor: No tremor, abnormal muscle tone or seizure activity  Coordination: Coordination normal       Gait: Gait normal       Deep Tendon Reflexes: Reflexes are normal and symmetric  Reflexes normal    Psychiatric:         Behavior: Behavior normal          Thought Content: Thought content normal          Judgment: Judgment normal          Lab Review   Appointment on 05/22/2023   Component Date Value Ref Range Status   • PSA 05/22/2023 6 98 (H)  0 00 - 4 00 ng/mL Final    [employer] Access chemiluminescent immunoassay  Confirm baseline values for patients being serially monitored      Orders Only on 05/17/2023   Component Date Value Ref Range Status   • White Blood Cell Count 05/17/2023 7 4  3 4 - 10 8 x10E3/uL Final   • Red Blood Cell Count 05/17/2023 4 21  4 14 - 5 80 x10E6/uL Final   • Hemoglobin 05/17/2023 12 3 (L)  13 0 - 17 7 g/dL Final   • HCT 05/17/2023 37 2 (L)  37 5 - 51 0 % Final   • MCV 05/17/2023 88  79 - 97 fL Final   • MCH 05/17/2023 29 2  26 6 - 33 0 pg Final   • MCHC 05/17/2023 33 1  31 5 - 35 7 g/dL Final   • RDW 05/17/2023 13 1  11 6 - 15 4 % Final   • Platelet Count 30/94/9326 242  150 - 450 x10E3/uL Final   • Neutrophils 05/17/2023 87  Not Estab  % Final   • Lymphocytes 05/17/2023 7  Not Estab  % Final   • Monocytes 05/17/2023 5  Not Estab  % Final   • Eosinophils 05/17/2023 0  Not Estab  % Final   • Basophils PCT 05/17/2023 0  Not Estab  % Final   • Neutrophils (Absolute) 05/17/2023 6 4  1 4 - 7 0 x10E3/uL Final   • Lymphocytes (Absolute) 05/17/2023 0 5 (L)  0 7 - 3 1 x10E3/uL Final   • Monocytes (Absolute) 05/17/2023 0 4  0 1 - 0 9 x10E3/uL Final   • Eosinophils (Absolute) 05/17/2023 0 0  0 0 - 0 4 x10E3/uL Final   • Basophils ABS 05/17/2023 0 0  0 0 - 0 2 x10E3/uL Final   • Immature Granulocytes 05/17/2023 1  Not Estab  % Final   • Immature Granulocytes (Absolute) 05/17/2023 0 0  0 0 - 0 1 x10E3/uL Final    Comment: A hand-written panel/profile was received from your office  In  accordance with the [facility] Ambiguous Test Code Policy dated July 4806, we have assigned CBC with Differential/Platelet, Test Code  #[id number] to this request  If this is not the testing you wished to  receive on this specimen, please contact the [facility] Department to clarify the test order  We  appreciate your business  • Glucose, Random 05/17/2023 138 (H)  70 - 99 mg/dL Final   • BUN 05/17/2023 16  8 - 27 mg/dL Final   • Creatinine 05/17/2023 0 76  0 76 - 1 27 mg/dL Final   • eGFR 05/17/2023 89  >59 mL/min/1 73 Final   • SL AMB BUN/CREATININE RATIO 05/17/2023 21  10 - 24 Final   • Sodium 05/17/2023 137  134 - 144 mmol/L Final   • Potassium 05/17/2023 4 3  3 5 - 5 2 mmol/L Final   • Chloride 05/17/2023 100  96 - 106 mmol/L Final   • CO2 05/17/2023 23  20 - 29 mmol/L Final   • CALCIUM 05/17/2023 8 7  8 6 - 10 2 mg/dL Final   • Protein, Total 05/17/2023 5 7 (L)  6 0 - 8 5 g/dL Final   • Albumin 05/17/2023 3 6  3 6 - 4 6 g/dL Final   • Globulin, Total 05/17/2023 2 1  1 5 - 4 5 g/dL Final   • Albumin/Globulin Ratio 05/17/2023 1 7  1 2 - 2 2 Final   • TOTAL BILIRUBIN 05/17/2023 0 9  0 0 - 1 2 mg/dL Final   • Alk Phos Isoenzymes 05/17/2023 85  44 - 121 IU/L Final   • AST 05/17/2023 27  0 - 40 IU/L Final   • ALT 05/17/2023 31  0 - 44 IU/L Final   Orders Only on 05/16/2023   Component Date Value Ref Range Status   • White Blood Cells 05/16/2023 Few   Final   • EPITHELIAL CELLS SQUAMOUS IN SPUTUM 05/16/2023 Moderate   Final   • Result 1 05/16/2023 Comment   Final    Moderate number of gram positive rods  • Result 2 05/16/2023 Comment   Final    Small amount of yeast seen     • Result 3 05/16/2023 Comment   Final    Few gram negative diplococci  • Result 4 05/16/2023 Comment   Final    Comment: Few gram positive cocci in pairs  Moderate Gram Negative Rods     • Gram Stain Evaluation 05/16/2023 Comment   Final    Comment: This specimen is of good quality and is acceptable for routine  bacterial culture  • Lower Respiratory Culture 05/16/2023 Final report (A)   Final   • Result 1 05/16/2023 Candida albicans (A)   Final    Moderate growth   • Result 2 05/16/2023 Comment   Final    Comment: Routine respiratory rani  Heavy growth           Patient Instructions   COPD: Breathing Exercises   AMBULATORY CARE:   Breathing exercises  may help you manage symptoms of COPD, such as shortness of breath  The exercises strengthen the muscles you use to breathe  They may also help you get air easier when you are having trouble breathing  Your healthcare provider will tell you how often to do these exercises  Deep breathing and coughing  can decrease your risk for a lung infection:  Take a deep breath and hold it for as long as you can  Let the air out and then cough strongly  Deep breaths help open your airway  You may be given an incentive spirometer to help you take deep breaths  Put the plastic piece in your mouth and take a slow, deep breath  Then let the air out and cough  Repeat these steps 10 times every hour  Pursed-lip breathing  can be helpful before you start an activity or any time you feel short of breath:  Breathe in through your nose  Use the muscles in your abdomen to help fill your lungs with air  Slowly breathe out through your mouth with your lips slightly puckered  You should make a quiet hissing sound as you breathe out  Try to take 2 times as long to breathe out as to breathe in  This helps you get rid of as much air from your lungs as possible  Repeat this exercise several times  When you are used to doing pursed-lip breathing, you can do it any time you need more air         Diaphragmatic breathing  will help "strengthen the muscles you use to breathe:  Place one hand just below your ribs  Place your other hand in the middle of your chest over your breastbone  Breathe in slowly through your nose, as deeply as you can  Breathe out slowly through pursed lips  As you breathe out, tighten the muscles just below your ribs  Use your hand to gently push in and up while you tighten the muscles  Diaphragmatic breathing takes practice  Slowly increase the amount of time you spend during each practice session  © Copyright [employer] 2022 Information is for End User's use only and may not be sold, redistributed or otherwise used for commercial purposes  The above information is an  only  It is not intended as medical advice for individual conditions or treatments  Talk to your doctor, nurse or pharmacist before following any medical regimen to see if it is safe and effective for you  Sylvia Read MD        \"This note has been constructed using a voice recognition system  Therefore there may be syntax, spelling, and/or grammatical errors  Please call if you have any questions   \"  "

## 2023-06-26 ENCOUNTER — OFFICE VISIT (OUTPATIENT)
Dept: INTERNAL MEDICINE CLINIC | Facility: CLINIC | Age: 83
End: 2023-06-26
Payer: MEDICARE

## 2023-06-26 VITALS
SYSTOLIC BLOOD PRESSURE: 120 MMHG | DIASTOLIC BLOOD PRESSURE: 70 MMHG | WEIGHT: 139.4 LBS | OXYGEN SATURATION: 97 % | RESPIRATION RATE: 15 BRPM | TEMPERATURE: 98 F | HEIGHT: 69 IN | HEART RATE: 61 BPM | BODY MASS INDEX: 20.65 KG/M2

## 2023-06-26 DIAGNOSIS — E87.6 HYPOKALEMIA: ICD-10-CM

## 2023-06-26 DIAGNOSIS — D64.9 ANEMIA, UNSPECIFIED TYPE: ICD-10-CM

## 2023-06-26 DIAGNOSIS — E83.52 HYPERCALCEMIA: ICD-10-CM

## 2023-06-26 DIAGNOSIS — I10 PRIMARY HYPERTENSION: ICD-10-CM

## 2023-06-26 DIAGNOSIS — E03.8 OTHER SPECIFIED HYPOTHYROIDISM: ICD-10-CM

## 2023-06-26 DIAGNOSIS — R79.89 ABNORMAL LFTS: ICD-10-CM

## 2023-06-26 DIAGNOSIS — J41.0 SIMPLE CHRONIC BRONCHITIS (HCC): Primary | ICD-10-CM

## 2023-06-26 DIAGNOSIS — F41.9 ANXIETY AND DEPRESSION: ICD-10-CM

## 2023-06-26 DIAGNOSIS — F32.A ANXIETY AND DEPRESSION: ICD-10-CM

## 2023-06-26 PROBLEM — J42 CHRONIC BRONCHITIS (HCC): Status: ACTIVE | Noted: 2023-06-26

## 2023-06-26 PROBLEM — D64.89 OTHER SPECIFIED ANEMIAS: Status: ACTIVE | Noted: 2023-06-26

## 2023-06-26 PROCEDURE — 99213 OFFICE O/P EST LOW 20 MIN: CPT | Performed by: INTERNAL MEDICINE

## 2023-06-26 RX ORDER — MIRTAZAPINE 15 MG/1
15 TABLET, FILM COATED ORAL
Qty: 30 TABLET | Refills: 2 | Status: SHIPPED | OUTPATIENT
Start: 2023-06-26

## 2023-06-26 NOTE — ASSESSMENT & PLAN NOTE
Instructed about high potassium diet finished with supplemental KCl 20 mEq once a day for 2 weeks we will repeat potassium and magnesium

## 2023-06-26 NOTE — PROGRESS NOTES
Dr Gutierrez Home Office Visit Note  23     Sergio Fall 79 y o  male MRN: [id number]  : 1940    Assessment:     1  Simple chronic bronchitis (HCC)  Assessment & Plan:  Continue Proventil as needed and trilogy to be seen by pulmonary no wheezing overall unchanged      2  Primary hypertension  Assessment & Plan:  Continue low-salt diet blood pressure controlled    Orders:  -     T4, free; Future; Expected date: 2023  -     TSH, 3rd generation; Future    3  Hypercalcemia  Assessment & Plan:  Check phosphorus PTH level intake repeat calcium and a CMP asymptomatic last calcium was low about 3 months ago history of hypocalcemia    Orders:  -     Vitamin D 25 hydroxy; Future  -     PTH, intact; Future  -     Phosphorus; Future  -     Comprehensive metabolic panel; Future    4  Anemia, unspecified type  Assessment & Plan:  Repeat CBC complains of being tired fatigue    Orders:  -     CBC and differential; Future    5  Other specified hypothyroidism  Assessment & Plan:  Check T4 TSH depending on the results start treatment    Orders:  -     Ambulatory Referral to Pulmonology; Future    6  Abnormal LFTs  -     Comprehensive metabolic panel; Future  -     Magnesium; Future    7  Hypokalemia  Assessment & Plan:  Instructed about high potassium diet finished with supplemental KCl 20 mEq once a day for 2 weeks we will repeat potassium and magnesium    Orders:  -     Comprehensive metabolic panel; Future    8  Anxiety and depression  -     mirtazapine (REMERON) 15 mg tablet; Take 1 tablet (15 mg total) by mouth daily at bedtime          Discussion Summary and Plan: Today's care plan and medications were reviewed with patient in detail and all their questions answered to their satisfaction  Chief Complaint   Patient presents with   • Follow-up     Does not sleep good at night taking melatonin does not help  Had lab work done        Subjective:  Came in follow-up chronic medical condition was seen by urology for hydrocele awaiting surgery after 3 months the COPD overall unchanged awaiting to be seen by pulmonary patient claims losing weight lost 3 pounds since last visit and not unable to sleep at night melatonin not work was given prescription for Remeron 15 at bedtime explained the side effects at length      The following portions of the patient's history were reviewed and updated as appropriate: allergies, current medications, past family history, past medical history, past social history, past surgical history and problem list     Review of Systems   Constitutional: Positive for fatigue  Negative for chills and fever  HENT: Positive for postnasal drip  Negative for ear pain and sore throat  Eyes: Negative for pain and visual disturbance  Respiratory: Positive for shortness of breath and wheezing  Negative for cough  Cardiovascular: Negative for chest pain and palpitations  Gastrointestinal: Negative for abdominal pain, constipation, diarrhea and vomiting  Endocrine: Negative for polyuria  Genitourinary: Positive for frequency, hematuria and scrotal swelling  Negative for dysuria and testicular pain  Musculoskeletal: Negative for arthralgias and back pain  Skin: Negative for color change and rash  Neurological: Negative for seizures and syncope  Psychiatric/Behavioral: Negative for agitation, confusion and hallucinations  All other systems reviewed and are negative          Historical Information   Patient Active Problem List   Diagnosis   • Benign prostatic hyperplasia with urinary retention   • Hematuria, gross   • Asthma   • Acute exacerbation of chronic obstructive airways disease (HCC)   • Primary osteoarthritis involving multiple joints   • Lumbar radiculopathy   • Pre-op examination   • Hydrocele, left   • Primary hypertension   • Encysted hydrocele   • Hypercalcemia   • Other specified hypothyroidism   • Anemia, unspecified   • Chronic bronchitis (HCC)   • Hypokalemia     Past Medical History:   Diagnosis Date   • Arthritis    • Asthma    • Back pain    • BPH (benign prostatic hypertrophy) with urinary obstruction    • COPD (chronic obstructive pulmonary disease) (HCC)    • Dizziness    • Sinusitis      Past Surgical History:   Procedure Laterality Date   • CATARACT EXTRACTION Left    • EAR SURGERY      to wart   • EPIDIDYMAL CYST EXCISION Right 05/04/2000   • HEMORROIDECTOMY     • MA TRURL ELECTROSURG RESCJ PROSTATE BLEED COMPLETE N/A 04/16/2020    Procedure: CYSTOSCOPY, TRANSURETHRAL RESECTION OF PROSTATE (TURP);   Surgeon: Farrukh Catherine MD;  Location: [facility];  Service: Urology   • PROSTATE BIOPSY Bilateral 12/11/2003    BPH with mild acute and chronic inflammation   • PROSTATE BIOPSY Bilateral 06/21/2005    BPH with marked artropy and chronic inflammation     Social History     Substance and Sexual Activity   Alcohol Use Not Currently     Social History     Substance and Sexual Activity   Drug Use Never     Social History     Tobacco Use   Smoking Status Former   Smokeless Tobacco Current   • Types: Chew     Family History   Problem Relation Age of Onset   • Asthma Father      Health Maintenance Due   Topic   • Pneumococcal Vaccine: 65+ Years (2 - PCV)   • COVID-19 Vaccine (3 - Booster for Fanchimp series)   • Influenza Vaccine (Season Ended)      Meds/Allergies       Current Outpatient Medications:   •  fluticasone-umeclidinium-vilanterol 200-62 5-25 mcg/actuation AEPB inhaler, Inhale 1 puff daily Rinse mouth after use , Disp: 60 blister, Rfl: 5  •  latanoprost (XALATAN) 0 005 % ophthalmic solution, INSTILL 1 DROP INTO BOTH EYES IN THE EVENING, Disp: , Rfl:   •  mirtazapine (REMERON) 15 mg tablet, Take 1 tablet (15 mg total) by mouth daily at bedtime, Disp: 30 tablet, Rfl: 2  •  prednisoLONE acetate (PRED FORTE) 1 % ophthalmic suspension, INSTILL 1 DROP INTO LEFT EYE SIX TIMES A DAY, Disp: , Rfl:   •  albuterol (Ventolin HFA) 90 mcg/act inhaler, Inhale 2 puffs every 6 (six) "hours as needed for wheezing (Patient not taking: Reported on 6/26/2023), Disp: 18 g, Rfl: 5  •  fluticasone (FLONASE) 50 mcg/act nasal spray, 1 spray into each nostril 2 (two) times a day (Patient not taking: Reported on 6/26/2023), Disp: 15 8 mL, Rfl: 3  •  predniSONE 20 mg tablet, Two tablet daily for first 3 days then one tablet daily for next four days with food (Patient not taking: Reported on 5/30/2023), Disp: 10 tablet, Rfl: 0      Objective:    Vitals:   /70   Pulse 61   Temp 98 °F (36 7 °C)   Resp 15   Ht 5' 9\" (1 753 m)   Wt 63 2 kg (139 lb 6 4 oz)   SpO2 97%   BMI 20 59 kg/m²   Body mass index is 20 59 kg/m²  Vitals:    06/26/23 0921   Weight: 63 2 kg (139 lb 6 4 oz)       Physical Exam  Vitals and nursing note reviewed  Constitutional:       Appearance: He is well-developed and normal weight  He is not diaphoretic  HENT:      Head: Normocephalic and atraumatic  Right Ear: Hearing, tympanic membrane and external ear normal       Left Ear: Hearing, tympanic membrane, ear canal and external ear normal       Nose:      Right Turbinates: Pale  Not swollen  Left Turbinates: Pale  Not enlarged or swollen  Right Sinus: No maxillary sinus tenderness or frontal sinus tenderness  Left Sinus: No maxillary sinus tenderness or frontal sinus tenderness  Mouth/Throat:      Lips: No lesions  Mouth: No oral lesions  Dentition: No gum lesions  Tongue: No lesions  Pharynx: No posterior oropharyngeal erythema or uvula swelling  Eyes:      General: Lids are normal  Lids are everted, no foreign bodies appreciated  Conjunctiva/sclera: Conjunctivae normal       Pupils: Pupils are equal, round, and reactive to light  Neck:      Thyroid: No thyromegaly or thyroid tenderness  Vascular: Normal carotid pulses  No hepatojugular reflux or JVD  Trachea: No tracheal tenderness or tracheal deviation     Cardiovascular:      Rate and Rhythm: Normal rate and " regular rhythm  Pulses: Normal pulses  Heart sounds: Normal heart sounds  No murmur heard  Pulmonary:      Effort: Pulmonary effort is normal       Breath sounds: Decreased breath sounds present  Comments: Mild wheezing is present  Few occasional coarse breath sounds are present  No debility  Somewhat diminished breath sounds  Some kyphosis is present  Patient has a large lipoma on right lower parathoracic area approximately 3 inches movable unchanged per patient long time  Abdominal:      General: Bowel sounds are normal       Palpations: Abdomen is soft  Tenderness: There is no abdominal tenderness  Hernia: No hernia is present  Musculoskeletal:         General: Normal range of motion  Cervical back: Normal range of motion and neck supple  No edema or erythema  No spinous process tenderness or muscular tenderness  Normal range of motion  Right lower leg: No edema  Left lower leg: No edema  Lymphadenopathy:      Head:      Right side of head: No submental, submandibular, tonsillar, preauricular or posterior auricular adenopathy  Left side of head: No submental, submandibular, tonsillar, preauricular, posterior auricular or occipital adenopathy  Cervical:      Right cervical: No superficial, deep or posterior cervical adenopathy  Left cervical: No superficial, deep or posterior cervical adenopathy  Upper Body:      Right upper body: No pectoral adenopathy  Left upper body: No pectoral adenopathy  Skin:     General: Skin is warm and dry  Coloration: Skin is not jaundiced  Findings: No bruising or lesion  Neurological:      Mental Status: He is alert and oriented to person, place, and time  Sensory: No sensory deficit  Motor: Weakness present  No tremor, abnormal muscle tone or seizure activity  Coordination: Coordination normal       Deep Tendon Reflexes: Reflexes are normal and symmetric     Psychiatric: Mood and Affect: Mood normal          Thought Content: Thought content normal          Lab Review   Appointment on 05/22/2023   Component Date Value Ref Range Status   • PSA 05/22/2023 6 98 (H)  0 00 - 4 00 ng/mL Final    [employer] Access chemiluminescent immunoassay  Confirm baseline values for patients being serially monitored  Orders Only on 05/17/2023   Component Date Value Ref Range Status   • White Blood Cell Count 05/17/2023 7 4  3 4 - 10 8 x10E3/uL Final   • Red Blood Cell Count 05/17/2023 4 21  4 14 - 5 80 x10E6/uL Final   • Hemoglobin 05/17/2023 12 3 (L)  13 0 - 17 7 g/dL Final   • HCT 05/17/2023 37 2 (L)  37 5 - 51 0 % Final   • MCV 05/17/2023 88  79 - 97 fL Final   • MCH 05/17/2023 29 2  26 6 - 33 0 pg Final   • MCHC 05/17/2023 33 1  31 5 - 35 7 g/dL Final   • RDW 05/17/2023 13 1  11 6 - 15 4 % Final   • Platelet Count 72/21/9921 242  150 - 450 x10E3/uL Final   • Neutrophils 05/17/2023 87  Not Estab  % Final   • Lymphocytes 05/17/2023 7  Not Estab  % Final   • Monocytes 05/17/2023 5  Not Estab  % Final   • Eosinophils 05/17/2023 0  Not Estab  % Final   • Basophils PCT 05/17/2023 0  Not Estab  % Final   • Neutrophils (Absolute) 05/17/2023 6 4  1 4 - 7 0 x10E3/uL Final   • Lymphocytes (Absolute) 05/17/2023 0 5 (L)  0 7 - 3 1 x10E3/uL Final   • Monocytes (Absolute) 05/17/2023 0 4  0 1 - 0 9 x10E3/uL Final   • Eosinophils (Absolute) 05/17/2023 0 0  0 0 - 0 4 x10E3/uL Final   • Basophils ABS 05/17/2023 0 0  0 0 - 0 2 x10E3/uL Final   • Immature Granulocytes 05/17/2023 1  Not Estab  % Final   • Immature Granulocytes (Absolute) 05/17/2023 0 0  0 0 - 0 1 x10E3/uL Final    Comment: A hand-written panel/profile was received from your office   In  accordance with the [facility] Ambiguous Test Code Policy dated July 2148, we have assigned CBC with Differential/Platelet, Test Code  #635225 to this request  If this is not the testing you wished to  receive on this specimen, please contact the [facility] Client Inquiry/  Technical Services Department to clarify the test order  We  appreciate your business  • Glucose, Random 05/17/2023 138 (H)  70 - 99 mg/dL Final   • BUN 05/17/2023 16  8 - 27 mg/dL Final   • Creatinine 05/17/2023 0 76  0 76 - 1 27 mg/dL Final   • eGFR 05/17/2023 89  >59 mL/min/1 73 Final   • SL AMB BUN/CREATININE RATIO 05/17/2023 21  10 - 24 Final   • Sodium 05/17/2023 137  134 - 144 mmol/L Final   • Potassium 05/17/2023 4 3  3 5 - 5 2 mmol/L Final   • Chloride 05/17/2023 100  96 - 106 mmol/L Final   • CO2 05/17/2023 23  20 - 29 mmol/L Final   • CALCIUM 05/17/2023 8 7  8 6 - 10 2 mg/dL Final   • Protein, Total 05/17/2023 5 7 (L)  6 0 - 8 5 g/dL Final   • Albumin 05/17/2023 3 6  3 6 - 4 6 g/dL Final   • Globulin, Total 05/17/2023 2 1  1 5 - 4 5 g/dL Final   • Albumin/Globulin Ratio 05/17/2023 1 7  1 2 - 2 2 Final   • TOTAL BILIRUBIN 05/17/2023 0 9  0 0 - 1 2 mg/dL Final   • Alk Phos Isoenzymes 05/17/2023 85  44 - 121 IU/L Final   • AST 05/17/2023 27  0 - 40 IU/L Final   • ALT 05/17/2023 31  0 - 44 IU/L Final   Orders Only on 05/16/2023   Component Date Value Ref Range Status   • White Blood Cells 05/16/2023 Few   Final   • EPITHELIAL CELLS SQUAMOUS IN SPUTUM 05/16/2023 Moderate   Final   • Result 1 05/16/2023 Comment   Final    Moderate number of gram positive rods  • Result 2 05/16/2023 Comment   Final    Small amount of yeast seen  • Result 3 05/16/2023 Comment   Final    Few gram negative diplococci  • Result 4 05/16/2023 Comment   Final    Comment: Few gram positive cocci in pairs  Moderate Gram Negative Rods     • Gram Stain Evaluation 05/16/2023 Comment   Final    Comment: This specimen is of good quality and is acceptable for routine  bacterial culture       • Lower Respiratory Culture 05/16/2023 Final report (A)   Final   • Result 1 05/16/2023 Candida albicans (A)   Final    Moderate growth   • Result 2 05/16/2023 Comment   Final    Comment: Routine respiratory rani  Heavy growth Patient Instructions   COPD: Breathing Exercises   AMBULATORY CARE:   Breathing exercises  may help you manage symptoms of COPD, such as shortness of breath  The exercises strengthen the muscles you use to breathe  They may also help you get air easier when you are having trouble breathing  Your healthcare provider will tell you how often to do these exercises  Deep breathing and coughing  can decrease your risk for a lung infection:  Take a deep breath and hold it for as long as you can  Let the air out and then cough strongly  Deep breaths help open your airway  You may be given an incentive spirometer to help you take deep breaths  Put the plastic piece in your mouth and take a slow, deep breath  Then let the air out and cough  Repeat these steps 10 times every hour  Pursed-lip breathing  can be helpful before you start an activity or any time you feel short of breath:  Breathe in through your nose  Use the muscles in your abdomen to help fill your lungs with air  Slowly breathe out through your mouth with your lips slightly puckered  You should make a quiet hissing sound as you breathe out  Try to take 2 times as long to breathe out as to breathe in  This helps you get rid of as much air from your lungs as possible  Repeat this exercise several times  When you are used to doing pursed-lip breathing, you can do it any time you need more air  Diaphragmatic breathing  will help strengthen the muscles you use to breathe:  Place one hand just below your ribs  Place your other hand in the middle of your chest over your breastbone  Breathe in slowly through your nose, as deeply as you can  Breathe out slowly through pursed lips  As you breathe out, tighten the muscles just below your ribs  Use your hand to gently push in and up while you tighten the muscles  Diaphragmatic breathing takes practice  Slowly increase the amount of time you spend during each practice session      © "Copyright [employer]ative 2022 Information is for End User's use only and may not be sold, redistributed or otherwise used for commercial purposes  The above information is an  only  It is not intended as medical advice for individual conditions or treatments  Talk to your doctor, nurse or pharmacist before following any medical regimen to see if it is safe and effective for you  Albert Garza MD        \"This note has been constructed using a voice recognition system  Therefore there may be syntax, spelling, and/or grammatical errors  Please call if you have any questions   \"  "

## 2023-06-26 NOTE — ASSESSMENT & PLAN NOTE
Check phosphorus PTH level intake repeat calcium and a CMP asymptomatic last calcium was low about 3 months ago history of hypocalcemia

## 2023-06-27 LAB
25(OH)D3+25(OH)D2 SERPL-MCNC: 63 NG/ML (ref 30–100)
ALBUMIN SERPL-MCNC: 4.3 G/DL (ref 3.6–4.6)
ALBUMIN/GLOB SERPL: 2.9 {RATIO} (ref 1.2–2.2)
ALP SERPL-CCNC: 76 IU/L (ref 44–121)
ALT SERPL-CCNC: 14 IU/L (ref 0–44)
AST SERPL-CCNC: 17 IU/L (ref 0–40)
BASOPHILS # BLD AUTO: 0.1 X10E3/UL (ref 0–0.2)
BASOPHILS NFR BLD AUTO: 1 %
BILIRUB SERPL-MCNC: 0.6 MG/DL (ref 0–1.2)
BUN SERPL-MCNC: 20 MG/DL (ref 8–27)
BUN/CREAT SERPL: 24 (ref 10–24)
CALCIUM SERPL-MCNC: 9.5 MG/DL (ref 8.6–10.2)
CHLORIDE SERPL-SCNC: 101 MMOL/L (ref 96–106)
CO2 SERPL-SCNC: 26 MMOL/L (ref 20–29)
CREAT SERPL-MCNC: 0.85 MG/DL (ref 0.76–1.27)
EGFR: 86 ML/MIN/1.73
EOSINOPHIL # BLD AUTO: 0.2 X10E3/UL (ref 0–0.4)
EOSINOPHIL NFR BLD AUTO: 2 %
ERYTHROCYTE [DISTWIDTH] IN BLOOD BY AUTOMATED COUNT: 14 % (ref 11.6–15.4)
GLOBULIN SER-MCNC: 1.5 G/DL (ref 1.5–4.5)
GLUCOSE SERPL-MCNC: 93 MG/DL (ref 70–99)
HCT VFR BLD AUTO: 39.6 % (ref 37.5–51)
HGB BLD-MCNC: 13.1 G/DL (ref 13–17.7)
IMM GRANULOCYTES # BLD: 0 X10E3/UL (ref 0–0.1)
IMM GRANULOCYTES NFR BLD: 0 %
LYMPHOCYTES # BLD AUTO: 2 X10E3/UL (ref 0.7–3.1)
LYMPHOCYTES NFR BLD AUTO: 30 %
MAGNESIUM SERPL-MCNC: 2.1 MG/DL (ref 1.6–2.3)
MCH RBC QN AUTO: 29.1 PG (ref 26.6–33)
MCHC RBC AUTO-ENTMCNC: 33.1 G/DL (ref 31.5–35.7)
MCV RBC AUTO: 88 FL (ref 79–97)
MONOCYTES # BLD AUTO: 0.8 X10E3/UL (ref 0.1–0.9)
MONOCYTES NFR BLD AUTO: 12 %
NEUTROPHILS # BLD AUTO: 3.7 X10E3/UL (ref 1.4–7)
NEUTROPHILS NFR BLD AUTO: 55 %
PHOSPHATE SERPL-MCNC: 4 MG/DL (ref 2.8–4.1)
PLATELET # BLD AUTO: 205 X10E3/UL (ref 150–450)
POTASSIUM SERPL-SCNC: 4.5 MMOL/L (ref 3.5–5.2)
PROT SERPL-MCNC: 5.8 G/DL (ref 6–8.5)
PTH-INTACT SERPL-MCNC: 23 PG/ML (ref 15–65)
RBC # BLD AUTO: 4.5 X10E6/UL (ref 4.14–5.8)
SODIUM SERPL-SCNC: 140 MMOL/L (ref 134–144)
T4 FREE SERPL-MCNC: 1.43 NG/DL (ref 0.82–1.77)
TSH SERPL DL<=0.005 MIU/L-ACNC: 2.43 UIU/ML (ref 0.45–4.5)
WBC # BLD AUTO: 6.7 X10E3/UL (ref 3.4–10.8)

## 2023-07-05 ENCOUNTER — TELEPHONE (OUTPATIENT)
Dept: INTERNAL MEDICINE CLINIC | Facility: CLINIC | Age: 83
End: 2023-07-05

## 2023-07-05 NOTE — TELEPHONE ENCOUNTER
Called patient to let him know his lab work is good and that it is okay to take the remeron per Dr. Bajwa Morning. He has a voice mail box that has not been set up yet. Could not leave message.

## 2023-07-05 NOTE — TELEPHONE ENCOUNTER
Ponce Harrison came in asking about his blood test and thyroid. He also said he read that patients with cataract should not take mirtazepine, so he is afraid to take it and can not sleep without medication.

## 2023-09-06 DIAGNOSIS — Z01.818 PREOP TESTING: Primary | ICD-10-CM

## 2023-09-06 RX ORDER — LATANOPROST 50 UG/ML
1 SOLUTION/ DROPS OPHTHALMIC
COMMUNITY

## 2023-09-06 NOTE — PRE-PROCEDURE INSTRUCTIONS
Pre-Surgery Instructions:   Medication Instructions   • albuterol (Ventolin HFA) 90 mcg/act inhaler Uses PRN- OK to take day of surgery   • Artificial Tear Solution (TEARS NATURALE OP) Take day of surgery. • fluticasone (FLONASE) 50 mcg/act nasal spray Take day of surgery. • fluticasone-umeclidinium-vilanterol 294-53.2-69 mcg/actuation AEPB inhaler Take day of surgery. • latanoprost (XALATAN) 0.005 % ophthalmic solution Take night before surgery   • mirtazapine (REMERON) 15 mg tablet Hold day of surgery. Medication instructions for day surgery reviewed. Please use only a sip of water to take your instructed medications. Avoid all over the counter vitamins, supplements and NSAIDS for one week prior to surgery per anesthesia guidelines. Tylenol is ok to take as needed. You will receive a call one business day prior to surgery with an arrival time and hospital directions. If your surgery is scheduled on a Monday, the hospital will be calling you on the Friday prior to your surgery. If you have not heard from anyone by 8pm, please call the hospital supervisor through the hospital  at [phone]. Madeline Augustine [phone]). Do not eat or drink anything after midnight the night before your surgery, including candy, mints, lifesavers, or chewing gum. Do not drink alcohol 24hrs before your surgery. Try not to smoke at least 24hrs before your surgery. Follow the pre surgery showering instructions as listed in the [facility] Surgical Experience Booklet” or otherwise provided by your surgeon's office. Do not shave the surgical area 24 hours before surgery. Do not apply any lotions, creams, including makeup, cologne, deodorant, or perfumes after showering on the day of your surgery. No contact lenses, eye make-up, or artificial eyelashes. Remove nail polish, including gel polish, and any artificial, gel, or acrylic nails if possible. Remove all jewelry including rings and body piercing jewelry. Wear causal clothing that is easy to take on and off. Consider your type of surgery. Keep any valuables, jewelry, piercings at home. Please bring any specially ordered equipment (sling, braces) if indicated. Arrange for a responsible person to drive you to and from the hospital on the day of your surgery. Visitor Guidelines discussed. Call the surgeon's office with any new illnesses, exposures, or additional questions prior to surgery. Please reference your [facility] Surgical Experience Booklet” for additional information to prepare for your upcoming surgery.

## 2023-09-07 ENCOUNTER — APPOINTMENT (OUTPATIENT)
Dept: LAB | Facility: HOSPITAL | Age: 83
End: 2023-09-07
Payer: MEDICARE

## 2023-09-07 DIAGNOSIS — Z01.818 PREOP TESTING: ICD-10-CM

## 2023-09-07 DIAGNOSIS — N43.2 OTHER HYDROCELE: ICD-10-CM

## 2023-09-07 LAB
ANION GAP SERPL CALCULATED.3IONS-SCNC: 7 MMOL/L
BASOPHILS # BLD AUTO: 0.07 THOUSANDS/ÂΜL (ref 0–0.1)
BASOPHILS NFR BLD AUTO: 1 % (ref 0–1)
BUN SERPL-MCNC: 16 MG/DL (ref 5–25)
CALCIUM SERPL-MCNC: 8.9 MG/DL (ref 8.4–10.2)
CHLORIDE SERPL-SCNC: 103 MMOL/L (ref 96–108)
CO2 SERPL-SCNC: 28 MMOL/L (ref 21–32)
CREAT SERPL-MCNC: 0.67 MG/DL (ref 0.6–1.3)
EOSINOPHIL # BLD AUTO: 0.22 THOUSAND/ÂΜL (ref 0–0.61)
EOSINOPHIL NFR BLD AUTO: 3 % (ref 0–6)
ERYTHROCYTE [DISTWIDTH] IN BLOOD BY AUTOMATED COUNT: 13.5 % (ref 11.6–15.1)
GLUCOSE SERPL-MCNC: 80 MG/DL (ref 65–140)
HCT VFR BLD AUTO: 37.1 % (ref 36.5–46.1)
HGB BLD-MCNC: 11.7 G/DL (ref 12–15.4)
IMM GRANULOCYTES # BLD AUTO: 0.07 THOUSAND/UL (ref 0–0.2)
IMM GRANULOCYTES NFR BLD AUTO: 1 % (ref 0–2)
LYMPHOCYTES # BLD AUTO: 1.07 THOUSANDS/ÂΜL (ref 0.6–4.47)
LYMPHOCYTES NFR BLD AUTO: 12 % (ref 14–44)
MCH RBC QN AUTO: 28.9 PG (ref 26.8–34.3)
MCHC RBC AUTO-ENTMCNC: 31.5 G/DL (ref 31.4–37.4)
MCV RBC AUTO: 92 FL (ref 82–98)
MONOCYTES # BLD AUTO: 1.14 THOUSAND/ÂΜL (ref 0.17–1.22)
MONOCYTES NFR BLD AUTO: 13 % (ref 4–12)
NEUTROPHILS # BLD AUTO: 6.34 THOUSANDS/ÂΜL (ref 1.85–7.62)
NEUTS SEG NFR BLD AUTO: 70 % (ref 43–75)
NRBC BLD AUTO-RTO: 0 /100 WBCS
PLATELET # BLD AUTO: 309 THOUSANDS/UL (ref 149–390)
PMV BLD AUTO: 10.3 FL (ref 8.9–12.7)
POTASSIUM SERPL-SCNC: 4.4 MMOL/L (ref 3.5–5.3)
RBC # BLD AUTO: 4.05 MILLION/UL (ref 3.88–5.12)
SODIUM SERPL-SCNC: 138 MMOL/L (ref 135–147)
WBC # BLD AUTO: 8.91 THOUSAND/UL (ref 4.31–10.16)

## 2023-09-07 PROCEDURE — 36415 COLL VENOUS BLD VENIPUNCTURE: CPT

## 2023-09-07 PROCEDURE — 85025 COMPLETE CBC W/AUTO DIFF WBC: CPT

## 2023-09-07 PROCEDURE — 93005 ELECTROCARDIOGRAM TRACING: CPT

## 2023-09-07 PROCEDURE — 80048 BASIC METABOLIC PNL TOTAL CA: CPT

## 2023-09-08 LAB
ATRIAL RATE: 83 BPM
P AXIS: 69 DEGREES
PR INTERVAL: 146 MS
QRS AXIS: -37 DEGREES
QRSD INTERVAL: 126 MS
QT INTERVAL: 410 MS
QTC INTERVAL: 481 MS
T WAVE AXIS: 108 DEGREES
VENTRICULAR RATE: 83 BPM

## 2023-09-08 PROCEDURE — 93010 ELECTROCARDIOGRAM REPORT: CPT | Performed by: INTERNAL MEDICINE

## 2023-09-12 ENCOUNTER — CONSULT (OUTPATIENT)
Dept: PULMONOLOGY | Facility: MEDICAL CENTER | Age: 83
End: 2023-09-12
Payer: MEDICARE

## 2023-09-12 ENCOUNTER — ANESTHESIA EVENT (OUTPATIENT)
Dept: PERIOP | Facility: HOSPITAL | Age: 83
End: 2023-09-12
Payer: MEDICARE

## 2023-09-12 VITALS
TEMPERATURE: 98.4 F | DIASTOLIC BLOOD PRESSURE: 70 MMHG | BODY MASS INDEX: 21.18 KG/M2 | SYSTOLIC BLOOD PRESSURE: 120 MMHG | RESPIRATION RATE: 12 BRPM | HEART RATE: 75 BPM | HEIGHT: 69 IN | WEIGHT: 143 LBS | OXYGEN SATURATION: 98 %

## 2023-09-12 DIAGNOSIS — J43.2 CENTRILOBULAR EMPHYSEMA (HCC): Primary | ICD-10-CM

## 2023-09-12 DIAGNOSIS — R06.02 SHORTNESS OF BREATH: ICD-10-CM

## 2023-09-12 PROCEDURE — 94010 BREATHING CAPACITY TEST: CPT

## 2023-09-12 PROCEDURE — 99203 OFFICE O/P NEW LOW 30 MIN: CPT | Performed by: INTERNAL MEDICINE

## 2023-09-12 RX ORDER — NITROFURANTOIN 25; 75 MG/1; MG/1
100 CAPSULE ORAL 2 TIMES DAILY WITH MEALS
Status: ON HOLD | COMMUNITY
Start: 2023-07-11 | End: 2023-09-14

## 2023-09-12 RX ORDER — ALBUTEROL SULFATE 2.5 MG/3ML
2.5 SOLUTION RESPIRATORY (INHALATION) 4 TIMES DAILY PRN
Qty: 360 ML | Refills: 5 | Status: SHIPPED | OUTPATIENT
Start: 2023-09-12

## 2023-09-12 RX ORDER — CEPHALEXIN 500 MG/1
500 CAPSULE ORAL 2 TIMES DAILY WITH MEALS
Status: ON HOLD | COMMUNITY
Start: 2023-07-05 | End: 2023-09-14

## 2023-09-12 RX ORDER — SODIUM CHLORIDE, SODIUM LACTATE, POTASSIUM CHLORIDE, CALCIUM CHLORIDE 600; 310; 30; 20 MG/100ML; MG/100ML; MG/100ML; MG/100ML
100 INJECTION, SOLUTION INTRAVENOUS CONTINUOUS
Status: CANCELLED | OUTPATIENT
Start: 2023-09-12

## 2023-09-12 NOTE — PATIENT INSTRUCTIONS
Can use 1 vial of albuterol and nebulizer 4 times a day as needed for wheezing or shortness of breath    Continue Trelegy 200 mcg 1 puff daily    Backline  [phone]    I ordered nebulizer from [address] [place] [address].  They will drop this off to your home

## 2023-09-12 NOTE — PROGRESS NOTES
Assessment/Plan        Problem List Items Addressed This Visit        Respiratory    Centrilobular emphysema ([address]) - Primary     Gilberto does have COPD with asthma component. He will continue with Trelegy 200 micro-ohm 1 puff daily which she has at home. I also ordered him nebulizer and he can use 1 vial of albuterol 2.5 mg 4 times a day as needed. I ordered nebulizer through [address] today showed FEV1 to be moderately decreased at 1.62 L or 63% of predicted         Relevant Medications    albuterol (2.5 mg/3 mL) 0.083 % nebulizer solution    fluticasone-umeclidinium-vilanterol (Trelegy Ellipta) 200-62.5-25 mcg/actuation AEPB inhaler    Other Relevant Orders    POCT spirometry (Completed)    Nebulizer       Other    Shortness of breath     She also has have some intermittent shortness of breath with activity. Does have COPD with asthma. Room air O2 saturation today was 98%              Establish Care, does have COPD and asthma. Has some shortness of breath at times with exertion      HPI     Lavell Pierce is 79years old presents for evaluation of his COPD and asthma. He quit smoking 1955 and smoked 1 pack of cigarettes per day for only 15 years. Does have seasonal allergic rhinitis and does get some postnasal drainage. Does get some periodic cough and shortness of breath. Does have occasional wheeze. Chest x-ray done on May 17, 2023 shows some slight cardiomegaly perhaps some mild hyperinflation. No infiltrates. I previously seen Lavell Pierce back in June 2016. At that time he had evidence of moderate to severe airflow obstruction with FEV1 of 1.29 L or 45% of predicted obstructive ratio 47%. He did use Advair 250 mcg in the past and also has tried [employer]. Also has been on Rhinocort nasal spray and montelukast.    He is using Trelegy 200 mcg 1 puff daily. He does not have a nebulizer at home.     Past Medical History:   Diagnosis Date   • Arthritis    • Asthma    • Back pain    • BPH (benign prostatic hypertrophy) with urinary obstruction    • COPD (chronic obstructive pulmonary disease) ([facility])    • Dizziness    • Glaucoma     bilat   • Insomnia    • RLS (restless legs syndrome)    • Sinusitis        Past Surgical History:   Procedure Laterality Date   • CATARACT EXTRACTION Left    • EAR SURGERY      to wart   • EPIDIDYMAL CYST EXCISION Right 05/04/2000   • HEMORROIDECTOMY     • [place] TRURL ELECTROSURG RESCJ PROSTATE BLEED COMPLETE N/A 04/16/2020    Procedure: CYSTOSCOPY, TRANSURETHRAL RESECTION OF PROSTATE (TURP); Surgeon: Sal Rico MD;  Location: [facility];  Service: Urology   • PROSTATE BIOPSY Bilateral 12/11/2003    BPH with mild acute and chronic inflammation   • PROSTATE BIOPSY Bilateral 06/21/2005    BPH with marked artropy and chronic inflammation       No current facility-administered medications for this visit.     Current Outpatient Medications:   •  fluticasone-umeclidinium-vilanterol (Trelegy Ellipta) 200-62.5-25 mcg/actuation AEPB inhaler, Inhale 1 puff daily Rinse mouth after use., Disp: 60 blister, Rfl: 7    Facility-Administered Medications Ordered in Other Visits:   •  albuterol inhalation solution 2.5 mg, 2.5 mg, Nebulization, 4x Daily PRN, Billy Spivey MD  •  aluminum-magnesium hydroxide-simethicone (MAALOX) oral suspension 30 mL, 30 mL, Oral, Q6H PRN, Billy Spivey MD  •  fluticasone (FLONASE) 50 mcg/act nasal spray 1 spray, 1 spray, Nasal, BID, Billy Spivey MD  •  Fluticasone Furoate-Vilanterol 100-25 mcg/actuation 1 puff, 1 puff, Inhalation, Daily, Mandy Revankar, DO, 1 puff at 09/14/23 2044  •  lactated ringers bolus 1,000 mL, 1,000 mL, Intravenous, Once PRN **AND** lactated ringers bolus 1,000 mL, 1,000 mL, Intravenous, Once PRN, Billy Spivey MD  •  latanoprost (XALATAN) 0.005 % ophthalmic solution 1 drop, 1 drop, Both Eyes, Daily, Mandy Revankar, DO, 1 drop at 09/14/23 2043  •  mirtazapine (REMERON) tablet 15 mg, 15 mg, Oral, HS, Billy Spivey MD  • ondansetron (ZOFRAN) injection 4 mg, 4 mg, Intravenous, Q6H PRN, Viviana Hoffman MD  •  sodium chloride 0.9 % bolus 1,000 mL, 1,000 mL, Intravenous, Once PRN **AND** sodium chloride 0.9 % bolus 1,000 mL, 1,000 mL, Intravenous, Once PRN, Viviana Hoffman MD  •  umeclidinium 62.5 mcg/actuation inhaler AEPB 1 puff, 1 puff, Inhalation, Daily, Mandy Revankar, DO, 1 puff at 23    Allergies   Allergen Reactions   • Shellfish-Derived Products - Food Allergy Other (See Comments)     Listed by PCP patient denies   • Iodine - Food Allergy Other (See Comments)       Social History     Tobacco Use   • Smoking status: Former     Packs/day: 1.00     Years: 15.00     Total pack years: 15.00     Types: Cigarettes     Quit date: 5     Years since quittin.7     Passive exposure: Never   • Smokeless tobacco: Current     Types: Chew   • Tobacco comments:     Chews cigars   Substance Use Topics   • Alcohol use: Not Currently         Family History   Problem Relation Age of Onset   • Asthma Father        Review of Systems   Constitutional: Negative for chills, fever and unexpected weight change. HENT: Negative for congestion, rhinorrhea and sore throat. Eyes: Negative for discharge and redness. Respiratory:        Does get cough at times and sometimes will have wheezing. Does have some mild shortness of breath with activity. Cardiovascular: Negative for chest pain, palpitations and leg swelling. Gastrointestinal: Negative for abdominal distention, abdominal pain and nausea. Endocrine: Negative for polydipsia and polyphagia. Genitourinary: Negative for dysuria. Musculoskeletal: Negative for joint swelling and myalgias. Skin: Negative for rash. Neurological: Negative for light-headedness. Psychiatric/Behavioral: Negative for confusion.            Vitals:    23 1348   BP: 120/70   Pulse: 75   Resp: 12   Temp: 98.4 °F (36.9 °C)   SpO2: 98%     Height: 5' 9" (175.3 cm)  IBW (Ideal Body Weight): 70.7 kg  Body mass index is 21.12 kg/m². Weight (last 2 days)     Date/Time Weight    09/12/23 1348 64.9 (143)              Physical Exam  Vitals and nursing note reviewed. Constitutional:       General: Bin Martinez is not in acute distress. Appearance: Normal appearance. Bin Martinez is well-developed. HENT:      Head: Normocephalic and atraumatic. Right Ear: External ear normal.      Left Ear: External ear normal.      Nose: Nose normal.      Mouth/Throat:      Mouth: Mucous membranes are moist.      Pharynx: Oropharynx is clear. Eyes:      Conjunctiva/sclera: Conjunctivae normal.   Cardiovascular:      Rate and Rhythm: Normal rate and regular rhythm. Heart sounds: No murmur heard. Pulmonary:      Effort: Pulmonary effort is normal. No respiratory distress. Breath sounds: Normal breath sounds. Comments: Lung sounds are clear. No wheezes, crackles or rhonchi  Abdominal:      Palpations: Abdomen is soft. Tenderness: There is no abdominal tenderness. Musculoskeletal:         General: No swelling. Cervical back: Neck supple. Comments: No edema, cyanosis or clubbing   Skin:     General: Skin is warm and dry. Capillary Refill: Capillary refill takes less than 2 seconds. Neurological:      General: No focal deficit present. Mental Status: Bin Martinez is alert and oriented to person, place, and time. Mental status is at baseline. Psychiatric:         Mood and Affect: Mood normal.           Office Spirometry Results: done today    FVC - 2.35 L      64%  FEV1 - 1.62 L     63%  FEV1/FVC% - 69%    Moderate airflow obstruction and there is moderate restriction.   Possibly due to air trapping

## 2023-09-13 NOTE — H&P
H&P Exam - Urology       Patient: Erendira Rush   : 1940 Sex: adult   MRN: [id number]     CSN: [id number]      History of Present Illness   HPI:  Erendira Rush is a 79 y.o. adult who presents with worsening left scrotal swelling left tense hydrocele now wishing to undergo left scrotal hydrocelectomy where risk of anesthesia infection bleeding postop swelling chance of infection bleeding additionally on procedures. Patient also has voiding issues and aware the chance of postop retention may be sent home with indwelling Ledesma catheter if goes into retention        Review of Systems:   Constitutional:  Negative for activity change, fever, chills and diaphoresis. HENT: Negative for hearing loss and trouble swallowing. Eyes: Negative for itching and visual disturbance. Respiratory: Negative for chest tightness and shortness of breath. Cardiovascular: Negative for chest pain, edema. Gastrointestinal: Negative for abdominal distention, na abdominal pain, constipation, diarrhea, Nausea and vomiting. Genitourinary: Negative for decreased urine volume, difficulty urinating, dysuria, enuresis, frequency, hematuria and urgency. Musculoskeletal: Negative for gait problem and myalgias. Neurological: Negative for dizziness and headaches. Hematological: Does not bruise/bleed easily.        Historical Information   Past Medical History:   Diagnosis Date   • Arthritis    • Asthma    • Back pain    • BPH (benign prostatic hypertrophy) with urinary obstruction    • COPD (chronic obstructive pulmonary disease) ([facility])    • Dizziness    • Glaucoma     bilat   • Insomnia    • RLS (restless legs syndrome)    • Sinusitis      Past Surgical History:   Procedure Laterality Date   • CATARACT EXTRACTION Left    • EAR SURGERY      to wart   • EPIDIDYMAL CYST EXCISION Right 2000   • HEMORROIDECTOMY     • ID TRURL ELECTROSURG RESCJ PROSTATE BLEED COMPLETE N/A 2020    Procedure: CYSTOSCOPY, TRANSURETHRAL RESECTION OF PROSTATE (TURP); Surgeon: Gerardo Nolasco MD;  Location: [facility];  Service: Urology   • PROSTATE BIOPSY Bilateral 2003    BPH with mild acute and chronic inflammation   • PROSTATE BIOPSY Bilateral 2005    BPH with marked artropy and chronic inflammation     Social History   Social History     Substance and Sexual Activity   Alcohol Use Not Currently     Social History     Substance and Sexual Activity   Drug Use Never     Social History     Tobacco Use   Smoking Status Former   • Packs/day: 1.00   • Years: 15.00   • Total pack years: 15.00   • Types: Cigarettes   • Quit date:    • Years since quittin.7   • Passive exposure: Never   Smokeless Tobacco Current   • Types: Chew   Tobacco Comments    Chews cigars     Family History:   Family History   Problem Relation Age of Onset   • Asthma Father        Meds/Allergies   No medications prior to admission. Allergies   Allergen Reactions   • Shellfish-Derived Products - Food Allergy Other (See Comments)     Listed by PCP patient denies   • Iodine - Food Allergy Other (See Comments)       Objective   Vitals: Wt 65.8 kg (145 lb)   BMI 21.41 kg/m²     Physical Exam:  General Alert awake   Normocephalic atraumatic PERRLA  Lungs clear bilaterally  Cardiac normal S1 normal S2  Abdomen soft, flank pain  Left 500 cc hydrocele tense  Extremities no edema    No intake/output data recorded.     Invasive Devices     Drain  Duration           Urethral Catheter Three way 1247 days    Continuous Bladder Irrigation Three-way 1245 days    Urethral Catheter Three way 22 Fr. 1245 days                    Lab Results: CBC:   Lab Results   Component Value Date    WBC 8.91 2023    HGB 11.7 (L) 2023    HCT 37.1 2023    MCV 92 2023     2023    RBC 4.05 2023    MCH 28.9 2023    MCHC 31.5 2023    RDW 13.5 2023    MPV 10.3 2023    NRBC 0 2023     CMP:   Lab Results Component Value Date     09/07/2023     06/26/2023    CO2 28 09/07/2023    CO2 26 06/26/2023    BUN 16 09/07/2023    BUN 20 06/26/2023    CREATININE 0.67 09/07/2023    CALCIUM 8.9 09/07/2023    AST 17 06/26/2023    ALT 14 06/26/2023    ALKPHOS 60 04/13/2020    EGFR 86 06/26/2023    EGFR 89 04/17/2020     Urinalysis:   Lab Results   Component Value Date    COLORU Light Yellow 04/12/2020    CLARITYU Clear 04/12/2020    SPECGRAV 1.010 04/12/2020    PHUR 6.0 04/12/2020    LEUKOCYTESUR Negative 04/12/2020    NITRITE Negative 04/12/2020    GLUCOSEU Negative 04/12/2020    KETONESU Negative 04/12/2020    BILIRUBINUR Negative 04/12/2020    BLOODU Small (A) 04/12/2020     Urine Culture:   Lab Results   Component Value Date    URINECX >100,000 cfu/ml Escherichia coli 06/27/2016     PSA:   Lab Results   Component Value Date    PSA 6.98 (H) 05/22/2023           Assessment/ Plan:  Left hydrocelectomy      Shyam Simpson MD

## 2023-09-14 ENCOUNTER — HOSPITAL ENCOUNTER (OUTPATIENT)
Facility: HOSPITAL | Age: 83
Setting detail: OUTPATIENT SURGERY
Discharge: HOME | End: 2023-09-15
Attending: SPECIALIST | Admitting: SPECIALIST
Payer: MEDICARE

## 2023-09-14 ENCOUNTER — ANESTHESIA (OUTPATIENT)
Dept: PERIOP | Facility: HOSPITAL | Age: 83
End: 2023-09-14
Payer: MEDICARE

## 2023-09-14 DIAGNOSIS — N43.3 HYDROCELE, UNSPECIFIED HYDROCELE TYPE: ICD-10-CM

## 2023-09-14 DIAGNOSIS — I10 PRIMARY HYPERTENSION: Primary | ICD-10-CM

## 2023-09-14 DIAGNOSIS — J45.909 MILD ASTHMA WITHOUT COMPLICATION, UNSPECIFIED WHETHER PERSISTENT: ICD-10-CM

## 2023-09-14 PROBLEM — R06.02 SHORTNESS OF BREATH: Status: ACTIVE | Noted: 2023-06-26

## 2023-09-14 PROCEDURE — C1769 GUIDE WIRE: HCPCS | Performed by: SPECIALIST

## 2023-09-14 PROCEDURE — 88302 TISSUE EXAM BY PATHOLOGIST: CPT | Performed by: PATHOLOGY

## 2023-09-14 PROCEDURE — 88304 TISSUE EXAM BY PATHOLOGIST: CPT | Performed by: PATHOLOGY

## 2023-09-14 PROCEDURE — 99222 1ST HOSP IP/OBS MODERATE 55: CPT | Performed by: FAMILY MEDICINE

## 2023-09-14 RX ORDER — BUPIVACAINE HYDROCHLORIDE 2.5 MG/ML
INJECTION, SOLUTION EPIDURAL; INFILTRATION; INTRACAUDAL AS NEEDED
Status: DISCONTINUED | OUTPATIENT
Start: 2023-09-14 | End: 2023-09-14 | Stop reason: HOSPADM

## 2023-09-14 RX ORDER — MAGNESIUM HYDROXIDE 1200 MG/15ML
LIQUID ORAL AS NEEDED
Status: DISCONTINUED | OUTPATIENT
Start: 2023-09-14 | End: 2023-09-14 | Stop reason: HOSPADM

## 2023-09-14 RX ORDER — FLUTICASONE FUROATE AND VILANTEROL 100; 25 UG/1; UG/1
1 POWDER RESPIRATORY (INHALATION) DAILY
Status: DISCONTINUED | OUTPATIENT
Start: 2023-09-14 | End: 2023-09-15 | Stop reason: HOSPADM

## 2023-09-14 RX ORDER — MIRTAZAPINE 15 MG/1
15 TABLET, FILM COATED ORAL
Status: DISCONTINUED | OUTPATIENT
Start: 2023-09-14 | End: 2023-09-15 | Stop reason: HOSPADM

## 2023-09-14 RX ORDER — ALBUTEROL SULFATE 2.5 MG/3ML
2.5 SOLUTION RESPIRATORY (INHALATION) 4 TIMES DAILY PRN
Status: DISCONTINUED | OUTPATIENT
Start: 2023-09-14 | End: 2023-09-15 | Stop reason: HOSPADM

## 2023-09-14 RX ORDER — FENTANYL CITRATE 50 UG/ML
INJECTION, SOLUTION INTRAMUSCULAR; INTRAVENOUS AS NEEDED
Status: DISCONTINUED | OUTPATIENT
Start: 2023-09-14 | End: 2023-09-14

## 2023-09-14 RX ORDER — SODIUM CHLORIDE, SODIUM LACTATE, POTASSIUM CHLORIDE, CALCIUM CHLORIDE 600; 310; 30; 20 MG/100ML; MG/100ML; MG/100ML; MG/100ML
100 INJECTION, SOLUTION INTRAVENOUS CONTINUOUS
Status: CANCELLED | OUTPATIENT
Start: 2023-09-14

## 2023-09-14 RX ORDER — LATANOPROST 50 UG/ML
1 SOLUTION/ DROPS OPHTHALMIC
Status: DISCONTINUED | OUTPATIENT
Start: 2023-09-14 | End: 2023-09-14

## 2023-09-14 RX ORDER — FENTANYL CITRATE/PF 50 MCG/ML
50 SYRINGE (ML) INJECTION
Status: DISCONTINUED | OUTPATIENT
Start: 2023-09-14 | End: 2023-09-14 | Stop reason: HOSPADM

## 2023-09-14 RX ORDER — GINSENG 100 MG
CAPSULE ORAL AS NEEDED
Status: DISCONTINUED | OUTPATIENT
Start: 2023-09-14 | End: 2023-09-14 | Stop reason: HOSPADM

## 2023-09-14 RX ORDER — ONDANSETRON 2 MG/ML
INJECTION INTRAMUSCULAR; INTRAVENOUS AS NEEDED
Status: DISCONTINUED | OUTPATIENT
Start: 2023-09-14 | End: 2023-09-14

## 2023-09-14 RX ORDER — MAGNESIUM HYDROXIDE/ALUMINUM HYDROXICE/SIMETHICONE 120; 1200; 1200 MG/30ML; MG/30ML; MG/30ML
30 SUSPENSION ORAL EVERY 6 HOURS PRN
Status: DISCONTINUED | OUTPATIENT
Start: 2023-09-14 | End: 2023-09-15 | Stop reason: HOSPADM

## 2023-09-14 RX ORDER — SODIUM CHLORIDE, SODIUM LACTATE, POTASSIUM CHLORIDE, CALCIUM CHLORIDE 600; 310; 30; 20 MG/100ML; MG/100ML; MG/100ML; MG/100ML
INJECTION, SOLUTION INTRAVENOUS CONTINUOUS PRN
Status: DISCONTINUED | OUTPATIENT
Start: 2023-09-14 | End: 2023-09-14

## 2023-09-14 RX ORDER — LIDOCAINE HYDROCHLORIDE 10 MG/ML
INJECTION, SOLUTION EPIDURAL; INFILTRATION; INTRACAUDAL; PERINEURAL AS NEEDED
Status: DISCONTINUED | OUTPATIENT
Start: 2023-09-14 | End: 2023-09-14

## 2023-09-14 RX ORDER — LATANOPROST 50 UG/ML
1 SOLUTION/ DROPS OPHTHALMIC DAILY
Status: DISCONTINUED | OUTPATIENT
Start: 2023-09-14 | End: 2023-09-15 | Stop reason: HOSPADM

## 2023-09-14 RX ORDER — FLUTICASONE PROPIONATE 50 MCG
1 SPRAY, SUSPENSION (ML) NASAL 2 TIMES DAILY
Status: DISCONTINUED | OUTPATIENT
Start: 2023-09-14 | End: 2023-09-15 | Stop reason: HOSPADM

## 2023-09-14 RX ORDER — CEFAZOLIN SODIUM 1 G/50ML
2000 SOLUTION INTRAVENOUS ONCE
Status: COMPLETED | OUTPATIENT
Start: 2023-09-14 | End: 2023-09-14

## 2023-09-14 RX ORDER — ONDANSETRON 2 MG/ML
4 INJECTION INTRAMUSCULAR; INTRAVENOUS EVERY 6 HOURS PRN
Status: DISCONTINUED | OUTPATIENT
Start: 2023-09-14 | End: 2023-09-15 | Stop reason: HOSPADM

## 2023-09-14 RX ORDER — ONDANSETRON 2 MG/ML
4 INJECTION INTRAMUSCULAR; INTRAVENOUS ONCE AS NEEDED
Status: DISCONTINUED | OUTPATIENT
Start: 2023-09-14 | End: 2023-09-14 | Stop reason: HOSPADM

## 2023-09-14 RX ORDER — PROPOFOL 10 MG/ML
INJECTION, EMULSION INTRAVENOUS AS NEEDED
Status: DISCONTINUED | OUTPATIENT
Start: 2023-09-14 | End: 2023-09-14

## 2023-09-14 RX ORDER — FLUTICASONE FUROATE, UMECLIDINIUM BROMIDE AND VILANTEROL TRIFENATATE 200; 62.5; 25 UG/1; UG/1; UG/1
1 POWDER RESPIRATORY (INHALATION) DAILY
Qty: 60 BLISTER | Refills: 7
Start: 2023-09-14 | End: 2023-10-14

## 2023-09-14 RX ADMIN — SODIUM CHLORIDE, SODIUM LACTATE, POTASSIUM CHLORIDE, AND CALCIUM CHLORIDE: .6; .31; .03; .02 INJECTION, SOLUTION INTRAVENOUS at 12:56

## 2023-09-14 RX ADMIN — CEFAZOLIN SODIUM 2000 MG: 1 SOLUTION INTRAVENOUS at 11:58

## 2023-09-14 RX ADMIN — FENTANYL CITRATE 25 MCG: 50 INJECTION, SOLUTION INTRAMUSCULAR; INTRAVENOUS at 12:20

## 2023-09-14 RX ADMIN — FENTANYL CITRATE 25 MCG: 50 INJECTION, SOLUTION INTRAMUSCULAR; INTRAVENOUS at 12:03

## 2023-09-14 RX ADMIN — UMECLIDINIUM 1 PUFF: 62.5 AEROSOL, POWDER ORAL at 20:45

## 2023-09-14 RX ADMIN — SODIUM CHLORIDE, SODIUM LACTATE, POTASSIUM CHLORIDE, AND CALCIUM CHLORIDE: .6; .31; .03; .02 INJECTION, SOLUTION INTRAVENOUS at 11:19

## 2023-09-14 RX ADMIN — LIDOCAINE HYDROCHLORIDE 50 MG: 10 INJECTION, SOLUTION EPIDURAL; INFILTRATION; INTRACAUDAL at 11:53

## 2023-09-14 RX ADMIN — LATANOPROST 1 DROP: 50 SOLUTION OPHTHALMIC at 20:43

## 2023-09-14 RX ADMIN — FENTANYL CITRATE 25 MCG: 50 INJECTION, SOLUTION INTRAMUSCULAR; INTRAVENOUS at 11:53

## 2023-09-14 RX ADMIN — ONDANSETRON 4 MG: 2 INJECTION INTRAMUSCULAR; INTRAVENOUS at 12:11

## 2023-09-14 RX ADMIN — FENTANYL CITRATE 25 MCG: 50 INJECTION, SOLUTION INTRAMUSCULAR; INTRAVENOUS at 12:09

## 2023-09-14 RX ADMIN — PROPOFOL 50 MG: 10 INJECTION, EMULSION INTRAVENOUS at 12:17

## 2023-09-14 RX ADMIN — FLUTICASONE FUROATE AND VILANTEROL TRIFENATATE 1 PUFF: 100; 25 POWDER RESPIRATORY (INHALATION) at 20:44

## 2023-09-14 RX ADMIN — PROPOFOL 100 MG: 10 INJECTION, EMULSION INTRAVENOUS at 11:53

## 2023-09-14 NOTE — PERIOPERATIVE NURSING NOTE
Patient assisted  to bathroom to void. After urinating, patient begin to bleed from incision, gauze saturated with bright red blood. Patient assisted back to bed by RN , new gauze in place. Dr Chandler Cruz immediately called to bedside to evaluate patient. Gauze reinforced and ice pack in place by Dr. Dilan Johnson signs stable. Patient visibly upset , but reassured by Dr. Chandler Cruz, it is in his best interest to be admitted overnight for observation.

## 2023-09-14 NOTE — PROGRESS NOTES
Progress Note - Urology      Patient: Gato Wayne   : 1940 Sex: adult   MRN: [id number]     CSN: [id number]  Unit/Bed#: OR POOL     SUBJECTIVE:   No change to history physical  Patient continuing to note significant left orchialgia requesting left hydrocelectomy aware risk of anesthesia infection bleeding postop scrotal testicular swelling for 4 to 6 weeks home with a drain seen in the office in 1 weekp      Objective   Vitals: /62   Pulse 75   Temp 98.3 °F (36.8 °C) (Temporal)   Resp 18   Wt 64.3 kg (141 lb 12.1 oz)   SpO2 99%   BMI 20.93 kg/m²     No intake/output data recorded.       Physical Exam:   General Alert awake   Normocephalic atraumatic PERRLA  Lungs clear bilaterally  Cardiac normal S1 normal S2  Abdomen soft, flank pain  Extremities no edema      Lab Results: CBC:   Lab Results   Component Value Date    WBC 8.91 2023    HGB 11.7 (L) 2023    HCT 37.1 2023    MCV 92 2023     2023    RBC 4.05 2023    MCH 28.9 2023    MCHC 31.5 2023    RDW 13.5 2023    MPV 10.3 2023    NRBC 0 2023     CMP:   Lab Results   Component Value Date     2023     2023    CO2 28 2023    CO2 26 2023    BUN 16 2023    BUN 20 2023    CREATININE 0.67 2023    CALCIUM 8.9 2023    AST 17 2023    ALT 14 2023    ALKPHOS 60 2020    EGFR 86 2023    EGFR 89 2020     Urinalysis:   Lab Results   Component Value Date    COLORU Light Yellow 2020    CLARITYU Clear 2020    SPECGRAV 1.010 2020    PHUR 6.0 2020    LEUKOCYTESUR Negative 2020    NITRITE Negative 2020    GLUCOSEU Negative 2020    KETONESU Negative 2020    BILIRUBINUR Negative 2020    BLOODU Small (A) 2020     Urine Culture:   Lab Results   Component Value Date    URINECX >100,000 cfu/ml Escherichia coli 2016     PSA:   Lab Results Component Value Date    PSA 6.98 (H) 05/22/2023         Assessment/ Plan:  Left hydrocelectomy          Phyllis Wheeler MD

## 2023-09-14 NOTE — ANESTHESIA PREPROCEDURE EVALUATION
Procedure:  HYDROCELECTOMY (Left: Groin)    Relevant Problems   ANESTHESIA (within normal limits)      CARDIO   (+) Primary hypertension      ENDO   (+) Other specified hypothyroidism      /RENAL   (+) Benign prostatic hyperplasia with urinary retention      HEMATOLOGY   (+) Anemia, unspecified      MUSCULOSKELETAL   (+) Primary osteoarthritis involving multiple joints      PULMONARY   (+) Acute exacerbation of chronic obstructive airways disease (HCC)   (+) Asthma   (+) Centrilobular emphysema (HCC)   (+) Chronic bronchitis (HCC)        Physical Exam    Airway    Mallampati score: II  TM Distance: >3 FB  Neck ROM: full     Dental   upper dentures,     Cardiovascular  Rhythm: regular, Rate: normal,     Pulmonary  Breath sounds clear to auscultation,     Other Findings        Anesthesia Plan  ASA Score- 3     Anesthesia Type- general with ASA Monitors. Additional Monitors:   Airway Plan: LMA. Plan Factors-Exercise tolerance (METS): >4 METS. Chart reviewed. Existing labs reviewed. Patient summary reviewed. Patient is a current smoker. Patient did not smoke on day of surgery. Obstructive sleep apnea risk education given perioperatively. Induction- intravenous. Postoperative Plan- Plan for postoperative opioid use. Planned trial extubation    Informed Consent- Anesthetic plan and risks discussed with patient. I personally reviewed this patient with the CRNA. Discussed and agreed on the Anesthesia Plan with the CRNA. Nevaeh Murray

## 2023-09-14 NOTE — PERIOPERATIVE NURSING NOTE
Awaiting bed 213. Pt voided total of 1000ml clear yellow urine in urinal.  Sctotal support intact. Scrotal guaze changed.  Ice pack to scrotum

## 2023-09-14 NOTE — DISCHARGE INSTR - AVS FIRST PAGE
#1 no heavy straining or lifting above 10 pounds for 2 weeks    #2 call office fevers, chills, or worsening blood in the urine. #3 patient to ice it tonight on and off leave jockstrap on for 1 week change dressing as needed can shower on Saturday no bathtub may notice oozing and bloody drainage from incision site      Martita Blunt M.D. [address] [place] [address] office  [address]  [phone]  8:30 AM to 4:30 PM  Monday through Friday    [facility] office  [phone] [facility], [address] [facility]  [phone]  1:00 to 5:00 PM  Wednesday

## 2023-09-14 NOTE — PLAN OF CARE
Problem: PAIN - ADULT  Goal: Verbalizes/displays adequate comfort level or baseline comfort level  Description: Interventions:  - Encourage patient to monitor pain and request assistance  - Assess pain using appropriate pain scale  - Administer analgesics based on type and severity of pain and evaluate response  - Implement non-pharmacological measures as appropriate and evaluate response  - Consider cultural and social influences on pain and pain management  - Notify physician/advanced practitioner if interventions unsuccessful or patient reports new pain  Outcome: Progressing     Problem: INFECTION - ADULT  Goal: Absence or prevention of progression during hospitalization  Description: INTERVENTIONS:  - Assess and monitor for signs and symptoms of infection  - Monitor lab/diagnostic results  - Monitor all insertion sites, i.e. indwelling lines, tubes, and drains  - Monitor endotracheal if appropriate and nasal secretions for changes in amount and color  - [place] appropriate cooling/warming therapies per order  - Administer medications as ordered  - Instruct and encourage patient and family to use good hand hygiene technique  - Identify and instruct in appropriate isolation precautions for identified infection/condition  Outcome: Progressing     Problem: DISCHARGE PLANNING  Goal: Discharge to home or other facility with appropriate resources  Description: INTERVENTIONS:  - Identify barriers to discharge w/patient and caregiver  - Arrange for needed discharge resources and transportation as appropriate  - Identify discharge learning needs (meds, wound care, etc.)  - Arrange for interpretive services to assist at discharge as needed  - Refer to Case Management Department for coordinating discharge planning if the patient needs post-hospital services based on physician/advanced practitioner order or complex needs related to functional status, cognitive ability, or social support system  Outcome: Progressing Problem: GENITOURINARY - ADULT  Goal: Maintains or returns to baseline urinary function  Description: INTERVENTIONS:  - Assess urinary function  - Encourage oral fluids to ensure adequate hydration if ordered  - Administer IV fluids as ordered to ensure adequate hydration  - Administer ordered medications as needed  - Offer frequent toileting  - Follow urinary retention protocol if ordered  Outcome: Progressing  Goal: Absence of urinary retention  Description: INTERVENTIONS:  - Assess patient’s ability to void and empty bladder  - Monitor I/O  - Bladder scan as needed  - Discuss with physician/AP medications to alleviate retention as needed  - Discuss catheterization for long term situations as appropriate  Outcome: Progressing

## 2023-09-14 NOTE — PERIOPERATIVE NURSING NOTE
Received patient from PACU, awake , alert and oriented. VSS. Patient has no complaints of pain or discomfort at the time. Patient tolerating PO fluids. Patient resting , call bell within reach.

## 2023-09-14 NOTE — ANESTHESIA POSTPROCEDURE EVALUATION
Post-Op Assessment Note    CV Status:  Stable  Pain Score: 0    Pain management: adequate     Mental Status:  Sleepy and arousable   Hydration Status:  Stable   PONV Controlled:  Controlled   Airway Patency:  Patent and adequate      Post Op Vitals Reviewed: Yes      Staff: CRNA         No notable events documented.     BP      Temp      Pulse     Resp      SpO2

## 2023-09-14 NOTE — OP NOTE
OPERATIVE REPORT  PATIENT NAME: Jaci Perez    :  1940  MRN: [id number]  Pt Location: [facility] OR ROOM 04    SURGERY DATE: 2023    Surgeon(s) and Role:     * Phyllis Wheeler MD - Primary    Preop Diagnosis:  Hydrocele, unspecified hydrocele type [N43.3]    Post-Op Diagnosis Codes: * Hydrocele, unspecified hydrocele type [N43.3]  Left spermatocele  Procedure(s):  Left - HYDROCELECTOMY/left spermatocelectomy    Specimen(s):  ID Type Source Tests Collected by Time Destination   1 : Hydrocele Sac Tissue Hydrocele Sac TISSUE EXAM Phyllis Wheeler MD 2023 1219    2 : epididymal cyst Tissue Cyst TISSUE EXAM Phyllis Wheeler MD 2023 1227        Estimated Blood Loss:   Minimal    Drains:  Urethral Catheter Three way (Active)   Number of days: 3624       Urethral Catheter Three way 22 Fr. (Active)   Number of days: 1246       Continuous Bladder Irrigation Three-way (Active)   Number of days: 7811       Anesthesia Type:   General/LMA    Operative Indications:  Hydrocele, unspecified hydrocele type [N43.3]  This 79-year-old male seen in the office for worsening left scrotal swelling pain undergoing ultrasound confirming left hydrocele patient now wishes to undergo left hydrocelectomy also noted to have small spermatoceles to be removed pending anatomy evaluation    Operative Findings:  400 cc left spermatocele drained bottle capping technique hydrocele sac sent to pathology  30 cc epididymal head cyst excised removed sent to pathology    Complications:   None    Procedure and Technique: At the patient identified in the holding area left side marked consent signed placed napsylate after anesthesia was induced use of the supine position draped and prepped in sterile fashion timeout performed.   A 5 cm left scrotal incision was made down through skin and subcu identifying the blue dome cyst.  The hydrocele cyst present for through the incision site and the sac opened and drained the hydrocele sac was then sent for pathology invaginated around the epididymis. There was a 30 cc spermatocele in the epididymal head body which was then bluntly sharply dissected out drained and sent to pathology. The hydrocele sac was then sutured under the epididymis and a bottle capping technique. Using 2-0 Vicryl. The appendix testis was removed. The testicle was dropped down into its normal appropriate location quarter-inch drain placed the scrotal fascia was closed with a 2-0 Vicryl interrupted suture with drain brought out in the lateral aspect of the incision site. The skin was closed with 2-0 chromic interrupted sutures. 10 cc of Marcaine injected incision site sterile dressing scrotal support applied patient have [place] awakened taken recovery room stable addition   I was present for the entire procedure.     Patient Disposition:  PACU         SIGNATURE: Jasmine Boykin MD  DATE: September 14, 2023  TIME: 1:10 PM

## 2023-09-15 VITALS
HEART RATE: 79 BPM | OXYGEN SATURATION: 96 % | HEIGHT: 69 IN | BODY MASS INDEX: 21 KG/M2 | DIASTOLIC BLOOD PRESSURE: 61 MMHG | SYSTOLIC BLOOD PRESSURE: 105 MMHG | TEMPERATURE: 98.3 F | RESPIRATION RATE: 18 BRPM | WEIGHT: 141.76 LBS

## 2023-09-15 LAB
ANION GAP SERPL CALCULATED.3IONS-SCNC: 5 MMOL/L
BUN SERPL-MCNC: 11 MG/DL (ref 5–25)
CALCIUM SERPL-MCNC: 8.4 MG/DL (ref 8.4–10.2)
CHLORIDE SERPL-SCNC: 104 MMOL/L (ref 96–108)
CO2 SERPL-SCNC: 30 MMOL/L (ref 21–32)
CREAT SERPL-MCNC: 0.71 MG/DL (ref 0.6–1.3)
ERYTHROCYTE [DISTWIDTH] IN BLOOD BY AUTOMATED COUNT: 13.2 % (ref 11.6–15.1)
GLUCOSE P FAST SERPL-MCNC: 103 MG/DL (ref 65–99)
GLUCOSE SERPL-MCNC: 103 MG/DL (ref 65–140)
HCT VFR BLD AUTO: 35 % (ref 36.5–46.1)
HGB BLD-MCNC: 11 G/DL (ref 12–15.4)
MCH RBC QN AUTO: 28.5 PG (ref 26.8–34.3)
MCHC RBC AUTO-ENTMCNC: 31.4 G/DL (ref 31.4–37.4)
MCV RBC AUTO: 91 FL (ref 82–98)
PLATELET # BLD AUTO: 350 THOUSANDS/UL (ref 149–390)
PMV BLD AUTO: 9.4 FL (ref 8.9–12.7)
POTASSIUM SERPL-SCNC: 4.1 MMOL/L (ref 3.5–5.3)
RBC # BLD AUTO: 3.86 MILLION/UL (ref 3.88–5.12)
SODIUM SERPL-SCNC: 139 MMOL/L (ref 135–147)
WBC # BLD AUTO: 10.11 THOUSAND/UL (ref 4.31–10.16)

## 2023-09-15 PROCEDURE — 80048 BASIC METABOLIC PNL TOTAL CA: CPT | Performed by: FAMILY MEDICINE

## 2023-09-15 PROCEDURE — 85027 COMPLETE CBC AUTOMATED: CPT | Performed by: FAMILY MEDICINE

## 2023-09-15 RX ADMIN — FLUTICASONE PROPIONATE 1 SPRAY: 50 SPRAY, METERED NASAL at 08:48

## 2023-09-15 NOTE — CONSULTS
[id number] [facility]  Consult  Name: Rajinder Waters 79 y.o. adult I MRN: [id number]  Unit/Bed#: 2 [address] Date of Admission: 9/14/2023   Date of Service: 9/14/2023 I Hospital Day: 0    Inpatient consult to Internal Medicine  Consult performed by: Kj Ann DO  Consult ordered by: Ashlyn Munoz MD          Assessment/Plan   * Centrilobular emphysema [facility])  Assessment & Plan  Appears compensated. Reports using Trelegy at home  Substitute with fluticasone-vilanterol and umeclidinium while here  Albuterol as needed    Hydrocele  Assessment & Plan  Patient is status post hydrocelectomy/left spermatocelectomy by urology today  Further management per primary team  Check lab work in a.m. Primary hypertension  Assessment & Plan  As noted on PCPs notes but patient denies any prior history of hypertension  Currently blood pressures are controlled  Continue to monitor BPs    Anemia, unspecified  Assessment & Plan  Noted history. Hemoglobin on lab work from 9/7 is stable  Repeat lab work in a.m. VTE Prophylaxis:   none per primary team    Recommendations for Discharge:  · Continue home meds    Total Time Spent on Date of Encounter in care of patient: This time was spent on one or more of the following: performing physical exam; counseling and coordination of care; obtaining or reviewing history; documenting in the medical record; reviewing/ordering tests, medications or procedures; communicating with other healthcare professionals and discussing with patient's family/caregivers. Collaboration of Care: Were Recommendations Directly Discussed with Primary Treatment Team? Will do in AM    History of Present Illness:  Rajinder Waters is a 79 y.o. adult who is originally admitted to the urology service status post left hydrocelectomy/spermatocelectomy for hydrocele and spermatocele. we are consulted for medical management.   Patient states he had some bleeding postprocedure but none since then. Denies any chest pain, shortness of breath, abdominal pain. Denies any dysuria, hematuria    Review of Systems:  Review of Systems   Constitutional: Negative for appetite change, chills and fever. HENT: Negative for congestion and trouble swallowing. Eyes: Positive for visual disturbance (from recent cataract surgery and glaucoma). Negative for photophobia. Respiratory: Negative for chest tightness and shortness of breath. Cardiovascular: Negative for chest pain and leg swelling. Gastrointestinal: Negative for abdominal pain, diarrhea, nausea and vomiting. Genitourinary: Negative for dysuria, frequency and hematuria. Musculoskeletal: Positive for back pain. Gait problem: chronic. Skin: Negative for wound. Neurological: Negative for dizziness, light-headedness and headaches. Hematological: Does not bruise/bleed easily. Psychiatric/Behavioral: Negative for agitation. Past Medical and Surgical History:   Past Medical History:   Diagnosis Date   • Arthritis    • Asthma    • Back pain    • BPH (benign prostatic hypertrophy) with urinary obstruction    • COPD (chronic obstructive pulmonary disease) (HCC)    • Dizziness    • Glaucoma     bilat   • Insomnia    • RLS (restless legs syndrome)    • Sinusitis        Past Surgical History:   Procedure Laterality Date   • CATARACT EXTRACTION Left    • EAR SURGERY      to wart   • EPIDIDYMAL CYST EXCISION Right 05/04/2000   • HEMORROIDECTOMY     • AK TRURL ELECTROSURG RESCJ PROSTATE BLEED COMPLETE N/A 04/16/2020    Procedure: CYSTOSCOPY, TRANSURETHRAL RESECTION OF PROSTATE (TURP);   Surgeon: Steve Lopez MD;  Location: [facility];  Service: Urology   • PROSTATE BIOPSY Bilateral 12/11/2003    BPH with mild acute and chronic inflammation   • PROSTATE BIOPSY Bilateral 06/21/2005    BPH with marked artropy and chronic inflammation       Meds/Allergies:  PTA meds:   Prior to Admission Medications   Prescriptions Last Dose Informant Patient Reported? Taking? albuterol (2.5 mg/3 mL) 0.083 % nebulizer solution 2023  No Yes   Sig: Take 3 mL (2.5 mg total) by nebulization 4 (four) times a day as needed for wheezing or shortness of breath   fluticasone (FLONASE) 50 mcg/act nasal spray 2023 Self No Yes   Si spray into each nostril 2 (two) times a day   latanoprost (XALATAN) 0.005 % ophthalmic solution 2023 Self Yes Yes   Si drop daily at bedtime   mirtazapine (REMERON) 15 mg tablet 2023 at 2100 Self No Yes   Sig: Take 1 tablet (15 mg total) by mouth daily at bedtime      Facility-Administered Medications: None       Allergies: Allergies   Allergen Reactions   • Shellfish-Derived Products - Food Allergy Other (See Comments)     Listed by PCP patient denies   • Iodine - Food Allergy Other (See Comments)       Social History:  Marital Status: Unknown  Substance Use History:   Social History     Substance and Sexual Activity   Alcohol Use Not Currently     Social History     Tobacco Use   Smoking Status Former   • Packs/day: 1.00   • Years: 15.00   • Total pack years: 15.00   • Types: Cigarettes   • Quit date: 5   • Years since quittin.7   • Passive exposure: Never   Smokeless Tobacco Current   • Types: Chew   Tobacco Comments    Chews cigars     Social History     Substance and Sexual Activity   Drug Use Never       Family History:  Family History   Problem Relation Age of Onset   • Asthma Father        Physical Exam:   Vitals:   Blood Pressure: 92/62 (23)  Pulse: 79 (23)  Temperature: 98 °F (36.7 °C) (23)  Temp Source: Oral (23)  Respirations: 18 (23)  Height: 5' 9" (175.3 cm) (23)  Weight - Scale: 64.3 kg (141 lb 12.1 oz) (23)  SpO2: 96 % (23)    Physical Exam  Vitals reviewed. Constitutional:       General: Rajinder Waters is not in acute distress. Appearance: Rajinder Waters is not toxic-appearing.    HENT: Head: Normocephalic and atraumatic. Eyes:      General:         Right eye: No discharge. Left eye: No discharge. Cardiovascular:      Rate and Rhythm: Normal rate and regular rhythm. Pulmonary:      Effort: Pulmonary effort is normal. No respiratory distress. Breath sounds: Normal breath sounds. No wheezing or rales. Abdominal:      General: Bowel sounds are normal. There is no distension. Palpations: Abdomen is soft. Tenderness: There is no abdominal tenderness. Musculoskeletal:      Right lower leg: No edema. Left lower leg: No edema. Neurological:      Mental Status: Tessa Weston is alert and oriented to person, place, and time. Additional Data:   Lab Results:                    Lab Results   Component Value Date/Time    HGBA1C 5.4 03/21/2023 12:00 AM               Imaging: No pertinent imaging reviewed. No orders to display       EKG, Pathology, and Other Studies Reviewed on Admission:   · EKG: No EKG obtained. ** Please Note: This note may have been constructed using a voice recognition system.  **

## 2023-09-15 NOTE — PLAN OF CARE
Problem: PAIN - ADULT  Goal: Verbalizes/displays adequate comfort level or baseline comfort level  Description: Interventions:  - Encourage patient to monitor pain and request assistance  - Assess pain using appropriate pain scale  - Administer analgesics based on type and severity of pain and evaluate response  - Implement non-pharmacological measures as appropriate and evaluate response  - Consider cultural and social influences on pain and pain management  - Notify physician/advanced practitioner if interventions unsuccessful or patient reports new pain  Outcome: Progressing     Problem: INFECTION - ADULT  Goal: Absence or prevention of progression during hospitalization  Description: INTERVENTIONS:  - Assess and monitor for signs and symptoms of infection  - Monitor lab/diagnostic results  - Monitor all insertion sites, i.e. indwelling lines, tubes, and drains  - Monitor endotracheal if appropriate and nasal secretions for changes in amount and color  - [place] appropriate cooling/warming therapies per order  - Administer medications as ordered  - Instruct and encourage patient and family to use good hand hygiene technique  - Identify and instruct in appropriate isolation precautions for identified infection/condition  Outcome: Progressing     Problem: DISCHARGE PLANNING  Goal: Discharge to home or other facility with appropriate resources  Description: INTERVENTIONS:  - Identify barriers to discharge w/patient and caregiver  - Arrange for needed discharge resources and transportation as appropriate  - Identify discharge learning needs (meds, wound care, etc.)  - Arrange for interpretive services to assist at discharge as needed  - Refer to Case Management Department for coordinating discharge planning if the patient needs post-hospital services based on physician/advanced practitioner order or complex needs related to functional status, cognitive ability, or social support system  Outcome: Progressing Problem: GENITOURINARY - ADULT  Goal: Maintains or returns to baseline urinary function  Description: INTERVENTIONS:  - Assess urinary function  - Encourage oral fluids to ensure adequate hydration if ordered  - Administer IV fluids as ordered to ensure adequate hydration  - Administer ordered medications as needed  - Offer frequent toileting  - Follow urinary retention protocol if ordered  Outcome: Progressing  Goal: Absence of urinary retention  Description: INTERVENTIONS:  - Assess patient’s ability to void and empty bladder  - Monitor I/O  - Bladder scan as needed  - Discuss with physician/AP medications to alleviate retention as needed  - Discuss catheterization for long term situations as appropriate  Outcome: Progressing     Problem: RESPIRATORY - ADULT  Goal: Achieves optimal ventilation and oxygenation  Description: INTERVENTIONS:  - Assess for changes in respiratory status  - Assess for changes in mentation and behavior  - Position to facilitate oxygenation and minimize respiratory effort  - Oxygen administered by appropriate delivery if ordered  - Initiate smoking cessation education as indicated  - Encourage broncho-pulmonary hygiene including cough, deep breathe, Incentive Spirometry  - Assess the need for suctioning and aspirate as needed  - Assess and instruct to report SOB or any respiratory difficulty  - Respiratory Therapy support as indicated  Outcome: Progressing

## 2023-09-15 NOTE — ASSESSMENT & PLAN NOTE
She also has have some intermittent shortness of breath with activity. Does have COPD with asthma.   Room air O2 saturation today was 98%

## 2023-09-15 NOTE — ASSESSMENT & PLAN NOTE
As noted on PCPs notes but patient denies any prior history of hypertension  Currently blood pressures are controlled  Continue to monitor BPs

## 2023-09-15 NOTE — ASSESSMENT & PLAN NOTE
Appears compensated.   Reports using Trelegy at home  Substitute with fluticasone-vilanterol and umeclidinium while here  Albuterol as needed

## 2023-09-15 NOTE — ASSESSMENT & PLAN NOTE
Patient is status post hydrocelectomy/left spermatocelectomy by urology today  Further management per primary team  Check lab work in a.m.

## 2023-09-15 NOTE — PROGRESS NOTES
Progress Note - Urology      Patient: Stanford John   : 1940 Sex: adult   MRN: [id number]     CSN: [id number]  Unit/Bed#: [address] [place] [address]     SUBJECTIVE:   Patient seen on morning rounds  No issues doing well  To go home  Friend here to take home now      Objective   Vitals: /61 (BP Location: Right arm)   Pulse 79   Temp 98.3 °F (36.8 °C) (Oral)   Resp 18   Ht 5' 9" (1.753 m)   Wt 64.3 kg (141 lb 12.1 oz)   SpO2 96%   BMI 20.93 kg/m²     I/O last 24 hours: In: 1500 [P.O.:500;  I.V.:900; IV Piggyback:100]  Out: 1300 [Urine:1300]      Physical Exam:   General Alert awake   Normocephalic atraumatic PERRLA  Lungs clear bilaterally  Cardiac normal S1 normal S2  Abdomen soft, flank pain  Increased swelling left testicle scrotum jockstrap not on minimal oozing left drain no sign of infection  Extremities no edema      Lab Results: CBC:   Lab Results   Component Value Date    WBC 10.11 09/15/2023    HGB 11.0 (L) 09/15/2023    HCT 35.0 (L) 09/15/2023    MCV 91 09/15/2023     09/15/2023    RBC 3.86 (L) 09/15/2023    MCH 28.5 09/15/2023    MCHC 31.4 09/15/2023    RDW 13.2 09/15/2023    MPV 9.4 09/15/2023    NRBC 0 2023     CMP:   Lab Results   Component Value Date     09/15/2023     2023    CO2 30 09/15/2023    CO2 26 2023    BUN 11 09/15/2023    BUN 20 2023    CREATININE 0.71 09/15/2023    CALCIUM 8.4 09/15/2023    AST 17 2023    ALT 14 2023    ALKPHOS 60 2020    EGFR 86 2023    EGFR 89 2020     Urinalysis:   Lab Results   Component Value Date    COLORU Light Yellow 2020    CLARITYU Clear 2020    SPECGRAV 1.010 2020    PHUR 6.0 2020    LEUKOCYTESUR Negative 2020    NITRITE Negative 2020    GLUCOSEU Negative 2020    KETONESU Negative 2020    BILIRUBINUR Negative 2020    BLOODU Small (A) 2020     Urine Culture:   Lab Results   Component Value Date    URINECX >100,000 cfu/ml Escherichia coli 06/27/2016     PSA:   Lab Results   Component Value Date    PSA 6.98 (H) 05/22/2023         Assessment/ Plan:  Status post left hydrocelectomy/spermatocelectomy day #1  Minimal scant drainage left drain expected reassuring patient is fine to be discharged today  Recovered from anesthesia doing well ready for discharge to see in the office in 1 week for drain removal  Discharge home          Uriel Bates MD

## 2023-09-15 NOTE — ASSESSMENT & PLAN NOTE
Melyssa Medina does have COPD with asthma component. He will continue with Trelegy 200 micro-ohm 1 puff daily which she has at home. I also ordered him nebulizer and he can use 1 vial of albuterol 2.5 mg 4 times a day as needed.   I ordered nebulizer through [address] [place] [address]    Spirometry today showed FEV1 to be moderately decreased at 1.62 L or 63% of predicted

## 2023-09-15 NOTE — NURSING NOTE
Pt discharged to home. Discharge paperwork reviewed with patient and family. Verbalized understanding. No c/o pain or discomfort. Incision care discussed with patient, teachback method used. Patient understands. VSS/afebrile. Pt stable at time of discharge.

## 2023-09-18 PROCEDURE — 88304 TISSUE EXAM BY PATHOLOGIST: CPT | Performed by: PATHOLOGY

## 2023-09-18 PROCEDURE — 88302 TISSUE EXAM BY PATHOLOGIST: CPT | Performed by: PATHOLOGY

## 2023-09-20 LAB
DME PARACHUTE DELIVERY DATE EXPECTED: NORMAL
DME PARACHUTE DELIVERY DATE REQUESTED: NORMAL
DME PARACHUTE ITEM DESCRIPTION: NORMAL
DME PARACHUTE ORDER STATUS: NORMAL
DME PARACHUTE SUPPLIER NAME: NORMAL
DME PARACHUTE SUPPLIER PHONE: NORMAL

## 2023-09-25 ENCOUNTER — OFFICE VISIT (OUTPATIENT)
Dept: INTERNAL MEDICINE CLINIC | Facility: CLINIC | Age: 83
End: 2023-09-25
Payer: MEDICARE

## 2023-09-25 VITALS
HEIGHT: 69 IN | DIASTOLIC BLOOD PRESSURE: 74 MMHG | WEIGHT: 141 LBS | OXYGEN SATURATION: 99 % | HEART RATE: 74 BPM | BODY MASS INDEX: 20.88 KG/M2 | SYSTOLIC BLOOD PRESSURE: 130 MMHG

## 2023-09-25 DIAGNOSIS — M15.9 PRIMARY OSTEOARTHRITIS INVOLVING MULTIPLE JOINTS: ICD-10-CM

## 2023-09-25 DIAGNOSIS — J43.2 CENTRILOBULAR EMPHYSEMA (HCC): Primary | ICD-10-CM

## 2023-09-25 DIAGNOSIS — Z23 NEED FOR VACCINATION: ICD-10-CM

## 2023-09-25 DIAGNOSIS — I10 PRIMARY HYPERTENSION: ICD-10-CM

## 2023-09-25 DIAGNOSIS — F41.1 GENERALIZED ANXIETY DISORDER: ICD-10-CM

## 2023-09-25 PROCEDURE — 99213 OFFICE O/P EST LOW 20 MIN: CPT | Performed by: INTERNAL MEDICINE

## 2023-09-25 PROCEDURE — G0008 ADMIN INFLUENZA VIRUS VAC: HCPCS | Performed by: INTERNAL MEDICINE

## 2023-09-25 PROCEDURE — 90662 IIV NO PRSV INCREASED AG IM: CPT | Performed by: INTERNAL MEDICINE

## 2023-09-25 RX ORDER — ALPRAZOLAM 0.25 MG/1
0.25 TABLET ORAL
Qty: 30 TABLET | Refills: 1 | Status: SHIPPED | OUTPATIENT
Start: 2023-09-25

## 2023-09-25 NOTE — ASSESSMENT & PLAN NOTE
Continue Proventil as needed and trilogy to be seen by pulmonary no wheezing overall unchanged COPD with asthma concurrent spirometry done September 14 FEV1 to be moderately decreased at 63% of the predicted by pulmonary

## 2023-09-25 NOTE — PROGRESS NOTES
Dr. Galaviz Snowball Office Visit Note  23     Jackelin Gupta 79 y.o. adult MRN: [id number]  : 1940    Assessment:     1. Centrilobular emphysema (HCC)  Assessment & Plan:  Continue Proventil as needed and trilogy to be seen by pulmonary no wheezing overall unchanged COPD with asthma concurrent spirometry done  FEV1 to be moderately decreased at 63% of the predicted by pulmonary      2. Need for vaccination  -     FLUZONE HIGH-DOSE: influenza vaccine, high-dose, preservative-free 0.7 mL    3. Generalized anxiety disorder  -     ALPRAZolam (XANAX) 0.25 mg tablet; Take 1 tablet (0.25 mg total) by mouth daily at bedtime as needed for anxiety    4. Primary hypertension  Assessment & Plan:  Continue low-salt diet blood pressure controlled      5. Primary osteoarthritis involving multiple joints  Assessment & Plan: For now symptoms controlled with over-the-counter Tylenol continue current treatment            Discussion Summary and Plan: Today's care plan and medications were reviewed with patient in detail and all their questions answered to their satisfaction. No chief complaint on file. Subjective:  TCM Call     None      TCM Call     None   Came in for follow-up after hydrocelectomy complaining of pain in the groin followed by urology difficulty breathing seen by pulmonary on trilogy and Proventil as needed and also came in for the flu shot denies any chest pain and cannot sleep at nighttime and due to the anxiety so was prescribed Xanax 0.25 at bedtime as needed explained the side effects at length      The following portions of the patient's history were reviewed and updated as appropriate: allergies, current medications, past family history, past medical history, past social history, past surgical history and problem list.    Review of Systems   Constitutional: Positive for activity change. Negative for appetite change, chills, diaphoresis, fatigue, fever and unexpected weight change. HENT: Negative for congestion, dental problem, drooling, ear discharge, ear pain, facial swelling, hearing loss, mouth sores, nosebleeds, postnasal drip, rhinorrhea, sinus pressure, sneezing, sore throat, tinnitus, trouble swallowing and voice change. Eyes: Negative for photophobia, pain, discharge, redness, itching and visual disturbance. Respiratory: Positive for shortness of breath. Negative for apnea, cough, choking, chest tightness, wheezing and stridor. Cardiovascular: Negative for chest pain, palpitations and leg swelling. Gastrointestinal: Negative for abdominal distention, abdominal pain, anal bleeding, blood in stool, constipation, diarrhea, nausea, rectal pain and vomiting. Endocrine: Negative for cold intolerance, heat intolerance, polydipsia, polyphagia and polyuria. Genitourinary: Negative for decreased urine volume, difficulty urinating, dysuria, enuresis, flank pain, frequency, genital sores, hematuria and urgency. Musculoskeletal: Positive for arthralgias. Negative for back pain, gait problem, joint swelling, myalgias, neck pain and neck stiffness. Skin: Negative for color change, pallor, rash and wound. Allergic/Immunologic: Negative. Negative for environmental allergies, food allergies and immunocompromised state. Neurological: Negative for dizziness, tremors, seizures, syncope, facial asymmetry, speech difficulty, weakness, light-headedness, numbness and headaches. Psychiatric/Behavioral: Negative for agitation, behavioral problems, confusion, decreased concentration, dysphoric mood, hallucinations, self-injury, sleep disturbance and suicidal ideas. The patient is not nervous/anxious and is not hyperactive.           Historical Information   Patient Active Problem List   Diagnosis   • Benign prostatic hyperplasia with urinary retention   • Hematuria, gross   • Asthma   • Acute exacerbation of chronic obstructive airways disease (HCC)   • Primary osteoarthritis involving multiple joints   • Lumbar radiculopathy   • Pre-op examination   • Hydrocele   • Primary hypertension   • Encysted hydrocele   • Hypercalcemia   • Shortness of breath   • Anemia, unspecified   • Chronic bronchitis (HCC)   • Hypokalemia   • Centrilobular emphysema (HCC)     Past Medical History:   Diagnosis Date   • Arthritis    • Asthma    • Back pain    • BPH (benign prostatic hypertrophy) with urinary obstruction    • COPD (chronic obstructive pulmonary disease) (HCC)    • Dizziness    • Glaucoma     bilat   • Insomnia    • RLS (restless legs syndrome)    • Sinusitis      Past Surgical History:   Procedure Laterality Date   • CATARACT EXTRACTION Left    • EAR SURGERY      to wart   • EPIDIDYMAL CYST EXCISION Right 2000   • HEMORROIDECTOMY     • LA EXCISION HYDROCELE UNILATERAL Left 2023    Procedure: Lucy Randall;  Surgeon: Phyllis Wheeler MD;  Location: [facility];  Service: Urology   • LA TRURL ELECTROSURG RESCJ PROSTATE BLEED COMPLETE N/A 2020    Procedure: CYSTOSCOPY, TRANSURETHRAL RESECTION OF PROSTATE (TURP);   Surgeon: Price Mcdaniels MD;  Location: [facility] OR;  Service: Urology   • PROSTATE BIOPSY Bilateral 2003    BPH with mild acute and chronic inflammation   • PROSTATE BIOPSY Bilateral 2005    BPH with marked artropy and chronic inflammation     Social History     Substance and Sexual Activity   Alcohol Use Not Currently     Social History     Substance and Sexual Activity   Drug Use Never     Social History     Tobacco Use   Smoking Status Former   • Packs/day: 1.00   • Years: 15.00   • Total pack years: 15.00   • Types: Cigarettes   • Quit date:    • Years since quittin.7   • Passive exposure: Never   Smokeless Tobacco Current   • Types: Chew   Tobacco Comments    Chews cigars     Family History   Problem Relation Age of Onset   • Asthma Father      Health Maintenance Due   Topic   • Osteoporosis Screening    • Pneumococcal Vaccine: 65+ Years (2 - PCV)   • Urinary Incontinence Screening    • COVID-19 Vaccine (3 - Booster for [employer] series)   • Depression Screening       Meds/Allergies       Current Outpatient Medications:   •  albuterol (2.5 mg/3 mL) 0.083 % nebulizer solution, Take 3 mL (2.5 mg total) by nebulization 4 (four) times a day as needed for wheezing or shortness of breath, Disp: 360 mL, Rfl: 5  •  ALPRAZolam (XANAX) 0.25 mg tablet, Take 1 tablet (0.25 mg total) by mouth daily at bedtime as needed for anxiety, Disp: 30 tablet, Rfl: 1  •  fluticasone (FLONASE) 50 mcg/act nasal spray, 1 spray into each nostril 2 (two) times a day, Disp: 15.8 mL, Rfl: 3  •  fluticasone-umeclidinium-vilanterol (Trelegy Ellipta) 200-62.5-25 mcg/actuation AEPB inhaler, Inhale 1 puff daily Rinse mouth after use., Disp: 60 blister, Rfl: 7  •  latanoprost (XALATAN) 0.005 % ophthalmic solution, 1 drop daily at bedtime, Disp: , Rfl:       Objective:    Vitals:   /74   Pulse 74   Ht 5' 9" (1.753 m)   Wt 64 kg (141 lb)   SpO2 99%   BMI 20.82 kg/m²   Body mass index is 20.82 kg/m². Vitals:    09/25/23 1206   Weight: 64 kg (141 lb)       Physical Exam  Vitals and nursing note reviewed. Constitutional:       General: [place] is not in acute distress. Appearance: [place] is well-developed. [place] is not ill-appearing, toxic-appearing or diaphoretic. HENT:      Head: Normocephalic and atraumatic. Right Ear: External ear normal.      Left Ear: External ear normal.      Nose: Nose normal.      Mouth/Throat:      Pharynx: No oropharyngeal exudate. Eyes:      General: Lids are normal. Lids are everted, no foreign bodies appreciated. No scleral icterus. Right eye: No discharge. Left eye: No discharge. Conjunctiva/sclera: Conjunctivae normal.      Pupils: Pupils are equal, round, and reactive to light. Neck:      Thyroid: No thyromegaly. Vascular: Normal carotid pulses.  No carotid bruit, hepatojugular reflux or JVD. Trachea: No tracheal tenderness or tracheal deviation. Cardiovascular:      Rate and Rhythm: Normal rate and regular rhythm. Pulses: Normal pulses. Heart sounds: Normal heart sounds. No murmur heard. No friction rub. No gallop. Pulmonary:      Effort: Pulmonary effort is normal. No respiratory distress. Breath sounds: Normal breath sounds. No stridor. No wheezing or rales. Chest:      Chest wall: No tenderness. Abdominal:      General: Bowel sounds are normal. There is no distension. Palpations: Abdomen is soft. There is no mass. Tenderness: There is no abdominal tenderness. There is no guarding or rebound. Musculoskeletal:         General: No tenderness or deformity. Normal range of motion. Cervical back: Normal range of motion and neck supple. No edema, erythema or rigidity. No spinous process tenderness or muscular tenderness. Normal range of motion. Lymphadenopathy:      Head:      Right side of head: No submental, submandibular, tonsillar, preauricular or posterior auricular adenopathy. Left side of head: No submental, submandibular, tonsillar, preauricular, posterior auricular or occipital adenopathy. Cervical: No cervical adenopathy. Right cervical: No superficial, deep or posterior cervical adenopathy. Left cervical: No superficial, deep or posterior cervical adenopathy. Upper Body:      Right upper body: No pectoral adenopathy. Left upper body: No pectoral adenopathy. Skin:     General: Skin is warm and dry. Coloration: Skin is not pale. Findings: No erythema or rash. Neurological:      General: No focal deficit present. Mental Status: Yesenia Waldrop is alert and oriented to person, place, and time. Cranial Nerves: No cranial nerve deficit. Sensory: No sensory deficit. Motor: No tremor, abnormal muscle tone or seizure activity.       Coordination: Coordination normal.      Gait: Gait normal.      Deep Tendon Reflexes: Reflexes are normal and symmetric. Reflexes normal.   Psychiatric:         Behavior: Behavior normal.         Thought Content: Thought content normal.         Judgment: Judgment normal.         Lab Review   Orders Only on 09/18/2023   Component Date Value Ref Range Status   • Supplier Name 09/18/2023 Leilani Whipple   In process   • Supplier Phone Number 09/18/2023 [phone]   In process   • Order Status 09/18/2023 Ready For Delivery   In process   • Delivery Request Date 09/18/2023 09/18/2023   In process   • Supplier Name 09/18/2023 09/26/2023   In process   • Item Description 09/18/2023 Nebulizer Compressor with Mouthpiece Only   In process    Qty: 1   • Item Description 09/18/2023 Nebulizer Set, Disposable, 2 per 1 month - Use as directed and discard 2 weeks after first use   In process    Qty: 1   • Item Description 09/18/2023 Mouthpiece Only, N/A   In process    Qty: 1   Admission on 09/14/2023, Discharged on 09/15/2023   Component Date Value Ref Range Status   • Case Report 09/14/2023    Final                    Value:Surgical Pathology Report                         Case: [id number]                                   Authorizing Provider:  Billy Spivey MD         Collected:           09/14/2023 1219              Ordering Location:     [address] Received:            09/14/2023 1327                                     Operating Room                                                               Pathologist:           Iron Lr,                                                             Specimens:   A) - Hydrocele Sac                                                                                  B) - Cyst, epididymal head cyst                                                           • Final Diagnosis 09/14/2023    Final                    Value: This result contains rich text formatting which cannot be displayed here.    • Additional Information 09/14/2023    Final                    Value: This result contains rich text formatting which cannot be displayed here. • Gross Description 09/14/2023    Final                    Value: This result contains rich text formatting which cannot be displayed here. • WBC 09/15/2023 10.11  4.31 - 10.16 Thousand/uL Final   • RBC 09/15/2023 3.86 (L)  3.88 - 5.12 Million/uL Final   • Hemoglobin 09/15/2023 11.0 (L)  12.0 - 15.4 g/dL Final   • Hematocrit 09/15/2023 35.0 (L)  36.5 - 46.1 % Final   • MCV 09/15/2023 91  82 - 98 fL Final   • MCH 09/15/2023 28.5  26.8 - 34.3 pg Final   • MCHC 09/15/2023 31.4  31.4 - 37.4 g/dL Final   • RDW 09/15/2023 13.2  11.6 - 15.1 % Final   • Platelets 38/80/8199 350  149 - 390 Thousands/uL Final   • MPV 09/15/2023 9.4  8.9 - 12.7 fL Final   • Sodium 09/15/2023 139  135 - 147 mmol/L Final   • Potassium 09/15/2023 4.1  3.5 - 5.3 mmol/L Final   • Chloride 09/15/2023 104  96 - 108 mmol/L Final   • CO2 09/15/2023 30  21 - 32 mmol/L Final   • ANION GAP 09/15/2023 5  mmol/L Final   • BUN 09/15/2023 11  5 - 25 mg/dL Final   • Creatinine 09/15/2023 0.71  0.60 - 1.30 mg/dL Final    Standardized to IDMS reference method   • Glucose 09/15/2023 103  65 - 140 mg/dL Final    If the patient is fasting, the ADA then defines impaired fasting glucose as > 100 mg/dL and diabetes as > or equal to 123 mg/dL.    • Glucose, Fasting 09/15/2023 103 (H)  65 - 99 mg/dL Final   • Calcium 09/15/2023 8.4  8.4 - 10.2 mg/dL Final   Appointment on 09/07/2023   Component Date Value Ref Range Status   • Ventricular Rate 09/07/2023 83  BPM Final   • Atrial Rate 09/07/2023 83  BPM Final   • OK Interval 09/07/2023 146  ms Final   • QRSD Interval 09/07/2023 126  ms Final   • QT Interval 09/07/2023 410  ms Final   • QTC Interval 09/07/2023 481  ms Final   • P Axis 09/07/2023 69  degrees Final   • QRS Axis 09/07/2023 -37  degrees Final   • T Wave Edison 09/07/2023 108  degrees Final   Appointment on 09/07/2023 Component Date Value Ref Range Status   • WBC 09/07/2023 8.91  4.31 - 10.16 Thousand/uL Final   • RBC 09/07/2023 4.05  3.88 - 5.12 Million/uL Final   • Hemoglobin 09/07/2023 11.7 (L)  12.0 - 15.4 g/dL Final   • Hematocrit 09/07/2023 37.1  36.5 - 46.1 % Final   • MCV 09/07/2023 92  82 - 98 fL Final   • MCH 09/07/2023 28.9  26.8 - 34.3 pg Final   • MCHC 09/07/2023 31.5  31.4 - 37.4 g/dL Final   • RDW 09/07/2023 13.5  11.6 - 15.1 % Final   • MPV 09/07/2023 10.3  8.9 - 12.7 fL Final   • Platelets 93/40/2667 309  149 - 390 Thousands/uL Final   • nRBC 09/07/2023 0  /100 WBCs Final   • Neutrophils Relative 09/07/2023 70  43 - 75 % Final   • Immat GRANS % 09/07/2023 1  0 - 2 % Final   • Lymphocytes Relative 09/07/2023 12 (L)  14 - 44 % Final   • Monocytes Relative 09/07/2023 13 (H)  4 - 12 % Final   • Eosinophils Relative 09/07/2023 3  0 - 6 % Final   • Basophils Relative 09/07/2023 1  0 - 1 % Final   • Neutrophils Absolute 09/07/2023 6.34  1.85 - 7.62 Thousands/µL Final   • Immature Grans Absolute 09/07/2023 0.07  0.00 - 0.20 Thousand/uL Final   • Lymphocytes Absolute 09/07/2023 1.07  0.60 - 4.47 Thousands/µL Final   • Monocytes Absolute 09/07/2023 1.14  0.17 - 1.22 Thousand/µL Final   • Eosinophils Absolute 09/07/2023 0.22  0.00 - 0.61 Thousand/µL Final   • Basophils Absolute 09/07/2023 0.07  0.00 - 0.10 Thousands/µL Final   • Sodium 09/07/2023 138  135 - 147 mmol/L Final   • Potassium 09/07/2023 4.4  3.5 - 5.3 mmol/L Final   • Chloride 09/07/2023 103  96 - 108 mmol/L Final   • CO2 09/07/2023 28  21 - 32 mmol/L Final   • ANION GAP 09/07/2023 7  mmol/L Final   • BUN 09/07/2023 16  5 - 25 mg/dL Final   • Creatinine 09/07/2023 0.67  0.60 - 1.30 mg/dL Final    Standardized to IDMS reference method   • Glucose 09/07/2023 80  65 - 140 mg/dL Final    If the patient is fasting, the ADA then defines impaired fasting glucose as > 100 mg/dL and diabetes as > or equal to 123 mg/dL.    • Calcium 09/07/2023 8.9  8.4 - 10.2 mg/dL Final Patient Instructions   COPD: Breathing Exercises   AMBULATORY CARE:   Breathing exercises  may help you manage symptoms of COPD, such as shortness of breath. The exercises strengthen the muscles you use to breathe. They may also help you get air easier when you are having trouble breathing. Your healthcare provider will tell you how often to do these exercises. Deep breathing and coughing  can decrease your risk for a lung infection:  Take a deep breath and hold it for as long as you can. Let the air out and then cough strongly. Deep breaths help open your airway. You may be given an incentive spirometer to help you take deep breaths. Put the plastic piece in your mouth and take a slow, deep breath. Then let the air out and cough. Repeat these steps 10 times every hour. Pursed-lip breathing  can be helpful before you start an activity or any time you feel short of breath:  Breathe in through your nose. Use the muscles in your abdomen to help fill your lungs with air. Slowly breathe out through your mouth with your lips slightly puckered. You should make a quiet hissing sound as you breathe out. Try to take 2 times as long to breathe out as to breathe in. This helps you get rid of as much air from your lungs as possible. Repeat this exercise several times. When you are used to doing pursed-lip breathing, you can do it any time you need more air. Diaphragmatic breathing  will help strengthen the muscles you use to breathe:  Place one hand just below your ribs. Place your other hand in the middle of your chest over your breastbone. Breathe in slowly through your nose, as deeply as you can. Breathe out slowly through pursed lips. As you breathe out, tighten the muscles just below your ribs. Use your hand to gently push in and up while you tighten the muscles. Diaphragmatic breathing takes practice. Slowly increase the amount of time you spend during each practice session.     © Copyright [employer] 2023 Information is for End User's use only and may not be sold, redistributed or otherwise used for commercial purposes. The above information is an  only. It is not intended as medical advice for individual conditions or treatments. Talk to your doctor, nurse or pharmacist before following any medical regimen to see if it is safe and effective for you. Troy Oneill MD        "This note has been constructed using a voice recognition system. Therefore there may be syntax, spelling, and/or grammatical errors.  Please call if you have any questions. "

## 2023-09-25 NOTE — PATIENT INSTRUCTIONS
COPD: Breathing Exercises   AMBULATORY CARE:   Breathing exercises  may help you manage symptoms of COPD, such as shortness of breath. The exercises strengthen the muscles you use to breathe. They may also help you get air easier when you are having trouble breathing. Your healthcare provider will tell you how often to do these exercises. Deep breathing and coughing  can decrease your risk for a lung infection:  Take a deep breath and hold it for as long as you can. Let the air out and then cough strongly. Deep breaths help open your airway. You may be given an incentive spirometer to help you take deep breaths. Put the plastic piece in your mouth and take a slow, deep breath. Then let the air out and cough. Repeat these steps 10 times every hour. Pursed-lip breathing  can be helpful before you start an activity or any time you feel short of breath:  Breathe in through your nose. Use the muscles in your abdomen to help fill your lungs with air. Slowly breathe out through your mouth with your lips slightly puckered. You should make a quiet hissing sound as you breathe out. Try to take 2 times as long to breathe out as to breathe in. This helps you get rid of as much air from your lungs as possible. Repeat this exercise several times. When you are used to doing pursed-lip breathing, you can do it any time you need more air. Diaphragmatic breathing  will help strengthen the muscles you use to breathe:  Place one hand just below your ribs. Place your other hand in the middle of your chest over your breastbone. Breathe in slowly through your nose, as deeply as you can. Breathe out slowly through pursed lips. As you breathe out, tighten the muscles just below your ribs. Use your hand to gently push in and up while you tighten the muscles. Diaphragmatic breathing takes practice. Slowly increase the amount of time you spend during each practice session.     © Copyright [employer] 2023 Information is for End User's use only and may not be sold, redistributed or otherwise used for commercial purposes. The above information is an  only. It is not intended as medical advice for individual conditions or treatments. Talk to your doctor, nurse or pharmacist before following any medical regimen to see if it is safe and effective for you.

## 2023-10-03 LAB
DME PARACHUTE DELIVERY DATE ACTUAL: NORMAL
DME PARACHUTE DELIVERY DATE EXPECTED: NORMAL
DME PARACHUTE DELIVERY DATE REQUESTED: NORMAL
DME PARACHUTE ITEM DESCRIPTION: NORMAL
DME PARACHUTE ORDER STATUS: NORMAL
DME PARACHUTE SUPPLIER NAME: NORMAL
DME PARACHUTE SUPPLIER PHONE: NORMAL

## 2023-11-09 DIAGNOSIS — J43.2 CENTRILOBULAR EMPHYSEMA (HCC): ICD-10-CM

## 2023-11-09 RX ORDER — FLUTICASONE FUROATE, UMECLIDINIUM BROMIDE AND VILANTEROL TRIFENATATE 200; 62.5; 25 UG/1; UG/1; UG/1
1 POWDER RESPIRATORY (INHALATION) DAILY
Qty: 60 BLISTER | Refills: 8 | Status: SHIPPED | OUTPATIENT
Start: 2023-11-09 | End: 2023-12-09

## 2023-11-09 NOTE — PROGRESS NOTES
Gilberto needed prescription for his Trelegy 200 mcg inhaler.   We did give him 1 sample and sent prescription to his pharmacy [facility]

## 2023-11-28 ENCOUNTER — OFFICE VISIT (OUTPATIENT)
Dept: INTERNAL MEDICINE CLINIC | Facility: CLINIC | Age: 83
End: 2023-11-28
Payer: MEDICARE

## 2023-11-28 VITALS
HEIGHT: 69 IN | OXYGEN SATURATION: 99 % | WEIGHT: 145 LBS | SYSTOLIC BLOOD PRESSURE: 130 MMHG | BODY MASS INDEX: 21.48 KG/M2 | DIASTOLIC BLOOD PRESSURE: 78 MMHG | HEART RATE: 74 BPM

## 2023-11-28 DIAGNOSIS — I10 PRIMARY HYPERTENSION: ICD-10-CM

## 2023-11-28 DIAGNOSIS — F41.1 GENERALIZED ANXIETY DISORDER: ICD-10-CM

## 2023-11-28 DIAGNOSIS — M54.16 LUMBAR RADICULOPATHY: ICD-10-CM

## 2023-11-28 DIAGNOSIS — E83.52 HYPERCALCEMIA: ICD-10-CM

## 2023-11-28 DIAGNOSIS — J43.2 CENTRILOBULAR EMPHYSEMA (HCC): Primary | ICD-10-CM

## 2023-11-28 DIAGNOSIS — M15.9 PRIMARY OSTEOARTHRITIS INVOLVING MULTIPLE JOINTS: ICD-10-CM

## 2023-11-28 PROCEDURE — 99213 OFFICE O/P EST LOW 20 MIN: CPT | Performed by: INTERNAL MEDICINE

## 2023-11-28 RX ORDER — ALPRAZOLAM 0.25 MG/1
TABLET ORAL
Qty: 30 TABLET | Refills: 2 | Status: SHIPPED | OUTPATIENT
Start: 2023-11-28

## 2023-11-28 RX ORDER — CHLORHEXIDINE GLUCONATE 4 %
LIQUID (ML) TOPICAL
COMMUNITY

## 2023-11-28 NOTE — ASSESSMENT & PLAN NOTE
Repeat calcium normal PTH level intact phosphorus normal asymptomatic no further intervention needed

## 2023-11-28 NOTE — ASSESSMENT & PLAN NOTE
Continue Proventil as needed and trilogy to be seen by pulmonary no wheezing overall unchanged COPD with asthma concurrent spirometry done September 14 FEV1 to be moderately decreased at 63% of the predicted for now stable some dyspnea on exertion unchanged continue current regimen

## 2023-11-28 NOTE — PROGRESS NOTES
Dr. Tammy Arana Office Visit Note  23     Juliane Samuel 79 y.o. adult MRN: [id number]  : 1940    Assessment:     1. Centrilobular emphysema ([address])  Assessment & Plan:  Continue Proventil as needed and trilogy to be seen by pulmonary no wheezing overall unchanged COPD with asthma concurrent spirometry done  FEV1 to be moderately decreased at 63% of the predicted for now stable some dyspnea on exertion unchanged continue current regimen      2. Primary hypertension  Assessment & Plan:  Continue low-salt diet blood pressure controlled no need for any intervention continue monitoring      3. Primary osteoarthritis involving multiple joints    4. Lumbar radiculopathy  Assessment & Plan:  Continue over-the-counter Tylenol or ibuprofen as needed symptoms controlled      5. Hypercalcemia  Assessment & Plan:  Repeat calcium normal PTH level intact phosphorus normal asymptomatic no further intervention needed      6. Generalized anxiety disorder  -     ALPRAZolam (XANAX) 0.25 mg tablet; Take 1 pill at bedtime          Discussion Summary and Plan: Today's care plan and medications were reviewed with patient in detail and all their questions answered to their satisfaction. No chief complaint on file. Subjective:  Patient came in follow-up chronic medical condition listed under visit diagnosis followed by pulmonary urologist and ophthalmologist patient dyspnea on exertion overall unchanged denies any chest pain        The following portions of the patient's history were reviewed and updated as appropriate: allergies, current medications, past family history, past medical history, past social history, past surgical history and problem list.    Review of Systems   Constitutional:  Positive for activity change. Negative for appetite change, chills, diaphoresis, fatigue, fever and unexpected weight change.    HENT:  Negative for congestion, dental problem, drooling, ear discharge, ear pain, facial swelling, hearing loss, mouth sores, nosebleeds, postnasal drip, rhinorrhea, sinus pressure, sneezing, sore throat, tinnitus, trouble swallowing and voice change. Eyes:  Negative for photophobia, pain, discharge, redness, itching and visual disturbance. Respiratory:  Positive for shortness of breath. Negative for apnea, cough, choking, chest tightness, wheezing and stridor. Cardiovascular:  Negative for chest pain, palpitations and leg swelling. Gastrointestinal:  Negative for abdominal distention, abdominal pain, anal bleeding, blood in stool, constipation, diarrhea, nausea, rectal pain and vomiting. Endocrine: Negative for cold intolerance, heat intolerance, polydipsia, polyphagia and polyuria. Genitourinary:  Negative for decreased urine volume, difficulty urinating, dysuria, enuresis, flank pain, frequency, genital sores, hematuria and urgency. Musculoskeletal:  Negative for arthralgias, back pain, gait problem, joint swelling, myalgias, neck pain and neck stiffness. Skin:  Negative for color change, pallor, rash and wound. Allergic/Immunologic: Negative. Negative for environmental allergies, food allergies and immunocompromised state. Neurological:  Negative for dizziness, tremors, seizures, syncope, facial asymmetry, speech difficulty, weakness, light-headedness, numbness and headaches. Psychiatric/Behavioral:  Negative for agitation, behavioral problems, confusion, decreased concentration, dysphoric mood, hallucinations, self-injury, sleep disturbance and suicidal ideas. The patient is not nervous/anxious and is not hyperactive.           Historical Information   Patient Active Problem List   Diagnosis   • Benign prostatic hyperplasia with urinary retention   • Hematuria, gross   • Asthma   • Acute exacerbation of chronic obstructive airways disease (HCC)   • Primary osteoarthritis involving multiple joints   • Lumbar radiculopathy   • Pre-op examination   • Hydrocele   • Primary hypertension   • Encysted hydrocele   • Hypercalcemia   • Shortness of breath   • Anemia, unspecified   • Chronic bronchitis (HCC)   • Hypokalemia   • Centrilobular emphysema (HCC)     Past Medical History:   Diagnosis Date   • Arthritis    • Asthma    • Back pain    • BPH (benign prostatic hypertrophy) with urinary obstruction    • COPD (chronic obstructive pulmonary disease) (HCC)    • Dizziness    • Glaucoma     bilat   • Insomnia    • RLS (restless legs syndrome)    • Sinusitis      Past Surgical History:   Procedure Laterality Date   • CATARACT EXTRACTION Left    • EAR SURGERY      to wart   • EPIDIDYMAL CYST EXCISION Right 2000   • HEMORROIDECTOMY     • NH EXCISION HYDROCELE UNILATERAL Left 2023    Procedure: HYDROCELECTOMY, sPERMATOCELECTOMY;  Surgeon: Ijeoma Diaz MD;  Location: [facility];  Service: Urology   • NH TRURL ELECTROSURG RESCJ PROSTATE BLEED COMPLETE N/A 2020    Procedure: CYSTOSCOPY, TRANSURETHRAL RESECTION OF PROSTATE (TURP);   Surgeon: Rabia Solis MD;  Location: [facility];  Service: Urology   • PROSTATE BIOPSY Bilateral 2003    BPH with mild acute and chronic inflammation   • PROSTATE BIOPSY Bilateral 2005    BPH with marked artropy and chronic inflammation     Social History     Substance and Sexual Activity   Alcohol Use Not Currently     Social History     Substance and Sexual Activity   Drug Use Never     Social History     Tobacco Use   Smoking Status Former   • Packs/day: 1.00   • Years: 15.00   • Total pack years: 15.00   • Types: Cigarettes   • Quit date:    • Years since quittin.9   • Passive exposure: Never   Smokeless Tobacco Current   • Types: Chew   Tobacco Comments    Chews cigars     Family History   Problem Relation Age of Onset   • Asthma Father      Health Maintenance Due   Topic   • Osteoporosis Screening    • Pneumococcal Vaccine: 65+ Years (2 - PCV)   • Urinary Incontinence Screening    • COVID-19 Vaccine (3 - Booster for Cyn series)   • Fall Risk    • Medicare Annual Wellness Visit (AWV)       Meds/Allergies       Current Outpatient Medications:   •  albuterol (2.5 mg/3 mL) 0.083 % nebulizer solution, Take 3 mL (2.5 mg total) by nebulization 4 (four) times a day as needed for wheezing or shortness of breath, Disp: 360 mL, Rfl: 5  •  ALPRAZolam (XANAX) 0.25 mg tablet, Take 1 pill at bedtime, Disp: 30 tablet, Rfl: 2  •  fluticasone (FLONASE) 50 mcg/act nasal spray, 1 spray into each nostril 2 (two) times a day, Disp: 15.8 mL, Rfl: 3  •  fluticasone-umeclidinium-vilanterol (Trelegy Ellipta) 200-62.5-25 mcg/actuation AEPB inhaler, Inhale 1 puff daily Rinse mouth after use., Disp: 60 blister, Rfl: 8  •  Gluc-Chonn-MSM-Boswellia-Vit D (Glucosamine Chondroitin + D3) TABS, Take by mouth, Disp: , Rfl:   •  latanoprost (XALATAN) 0.005 % ophthalmic solution, 1 drop daily at bedtime, Disp: , Rfl:       Objective:    Vitals:   /78   Pulse 74   Ht 5' 9" (1.753 m)   Wt 65.8 kg (145 lb)   SpO2 99%   BMI 21.41 kg/m²   Body mass index is 21.41 kg/m². Vitals:    11/28/23 1204   Weight: 65.8 kg (145 lb)       Physical Exam  Vitals and nursing note reviewed. Constitutional:       General: Margarita Castle is not in acute distress. Appearance: Margarita Castle is well-developed. Margarita Castle is not ill-appearing, toxic-appearing or diaphoretic. HENT:      Head: Normocephalic and atraumatic. Right Ear: External ear normal.      Left Ear: External ear normal.      Nose: Nose normal.      Mouth/Throat:      Pharynx: No oropharyngeal exudate. Eyes:      General: Lids are normal. Lids are everted, no foreign bodies appreciated. No scleral icterus. Right eye: No discharge. Left eye: No discharge. Conjunctiva/sclera: Conjunctivae normal.      Pupils: Pupils are equal, round, and reactive to light. Neck:      Thyroid: No thyromegaly. Vascular: Normal carotid pulses.  No carotid bruit, hepatojugular reflux or JVD. Trachea: No tracheal tenderness or tracheal deviation. Cardiovascular:      Rate and Rhythm: Normal rate and regular rhythm. Pulses: Normal pulses. Heart sounds: Normal heart sounds. No murmur heard. No friction rub. No gallop. Pulmonary:      Effort: Pulmonary effort is normal. No respiratory distress. Breath sounds: Normal breath sounds. No stridor. No wheezing or rales. Comments: Decreased air entry both side  Chest:      Chest wall: No tenderness. Abdominal:      General: Bowel sounds are normal. There is no distension. Palpations: Abdomen is soft. There is no mass. Tenderness: There is no abdominal tenderness. There is no guarding or rebound. Musculoskeletal:         General: No tenderness or deformity. Normal range of motion. Cervical back: Normal range of motion and neck supple. No edema, erythema or rigidity. No spinous process tenderness or muscular tenderness. Normal range of motion. Lymphadenopathy:      Head:      Right side of head: No submental, submandibular, tonsillar, preauricular or posterior auricular adenopathy. Left side of head: No submental, submandibular, tonsillar, preauricular, posterior auricular or occipital adenopathy. Cervical: No cervical adenopathy. Right cervical: No superficial, deep or posterior cervical adenopathy. Left cervical: No superficial, deep or posterior cervical adenopathy. Upper Body:      Right upper body: No pectoral adenopathy. Left upper body: No pectoral adenopathy. Skin:     General: Skin is warm and dry. Coloration: Skin is not pale. Findings: No erythema or rash. Neurological:      General: No focal deficit present. Mental Status: Mele Hamper is alert and oriented to person, place, and time. Cranial Nerves: No cranial nerve deficit. Sensory: No sensory deficit.       Motor: No tremor, abnormal muscle tone or seizure activity. Coordination: Coordination normal.      Gait: Gait normal.      Deep Tendon Reflexes: Reflexes are normal and symmetric. Reflexes normal.   Psychiatric:         Behavior: Behavior normal.         Thought Content: Thought content normal.         Judgment: Judgment normal.         Lab Review   No visits with results within 2 Month(s) from this visit. Latest known visit with results is:   Orders Only on 09/18/2023   Component Date Value Ref Range Status   • Supplier Name 09/18/2023 [facility]   Final   • Supplier Phone Number 09/18/2023 [phone]   Final   • Order Status 09/18/2023 Completed   Final   • Delivery Request Date 09/18/2023 09/18/2023   Final   • Date Delivered  09/18/2023 09/26/2023   Final   • Supplier Name 09/18/2023 09/26/2023   Final   • Item Description 09/18/2023 Nebulizer Compressor with Mouthpiece Only   Final    Qty: 1   • Item Description 09/18/2023 Nebulizer Set, Disposable, 2 per 1 month - Use as directed and discard 2 weeks after first use   Final    Qty: 1   • Item Description 09/18/2023 Mouthpiece Only, N/A   Final    Qty: 1         Patient Instructions   COPD: Breathing Exercises   AMBULATORY CARE:   Breathing exercises  may help you manage symptoms of COPD, such as shortness of breath. The exercises strengthen the muscles you use to breathe. They may also help you get air easier when you are having trouble breathing. Your healthcare provider will tell you how often to do these exercises. Deep breathing and coughing  can decrease your risk for a lung infection:  Take a deep breath and hold it for as long as you can. Let the air out and then cough strongly. Deep breaths help open your airway. You may be given an incentive spirometer to help you take deep breaths. Put the plastic piece in your mouth and take a slow, deep breath. Then let the air out and cough. Repeat these steps 10 times every hour.        Pursed-lip breathing  can be helpful before you start an activity or any time you feel short of breath:  Breathe in through your nose. Use the muscles in your abdomen to help fill your lungs with air. Slowly breathe out through your mouth with your lips slightly puckered. You should make a quiet hissing sound as you breathe out. Try to take 2 times as long to breathe out as to breathe in. This helps you get rid of as much air from your lungs as possible. Repeat this exercise several times. When you are used to doing pursed-lip breathing, you can do it any time you need more air. Diaphragmatic breathing  will help strengthen the muscles you use to breathe:  Place one hand just below your ribs. Place your other hand in the middle of your chest over your breastbone. Breathe in slowly through your nose, as deeply as you can. Breathe out slowly through pursed lips. As you breathe out, tighten the muscles just below your ribs. Use your hand to gently push in and up while you tighten the muscles. Diaphragmatic breathing takes practice. Slowly increase the amount of time you spend during each practice session. © Copyright [facility] 2023 Information is for End User's use only and may not be sold, redistributed or otherwise used for commercial purposes. The above information is an  only. It is not intended as medical advice for individual conditions or treatments. Talk to your doctor, nurse or pharmacist before following any medical regimen to see if it is safe and effective for you. Byron Shi MD        "This note has been constructed using a voice recognition system. Therefore there may be syntax, spelling, and/or grammatical errors.  Please call if you have any questions. "

## 2023-11-28 NOTE — ASSESSMENT & PLAN NOTE
Continue low-salt diet blood pressure controlled no need for any intervention continue monitoring Pt discharged to home. Discharge paperwork and instructions given to pt. Iv heplock removed. Safety protocol maintained.

## 2023-12-26 ENCOUNTER — OFFICE VISIT (OUTPATIENT)
Dept: INTERNAL MEDICINE CLINIC | Facility: CLINIC | Age: 83
End: 2023-12-26
Payer: MEDICARE

## 2023-12-26 VITALS
WEIGHT: 148 LBS | BODY MASS INDEX: 21.92 KG/M2 | SYSTOLIC BLOOD PRESSURE: 130 MMHG | HEART RATE: 78 BPM | TEMPERATURE: 98.7 F | HEIGHT: 69 IN | DIASTOLIC BLOOD PRESSURE: 78 MMHG | OXYGEN SATURATION: 98 %

## 2023-12-26 DIAGNOSIS — J44.1 ACUTE EXACERBATION OF CHRONIC OBSTRUCTIVE AIRWAYS DISEASE (HCC): ICD-10-CM

## 2023-12-26 DIAGNOSIS — R33.8 BENIGN PROSTATIC HYPERPLASIA WITH URINARY RETENTION: ICD-10-CM

## 2023-12-26 DIAGNOSIS — J20.9 ACUTE INFECTIVE TRACHEOBRONCHITIS: Primary | ICD-10-CM

## 2023-12-26 DIAGNOSIS — N40.1 BENIGN PROSTATIC HYPERPLASIA WITH URINARY RETENTION: ICD-10-CM

## 2023-12-26 DIAGNOSIS — I10 PRIMARY HYPERTENSION: ICD-10-CM

## 2023-12-26 PROCEDURE — 99213 OFFICE O/P EST LOW 20 MIN: CPT | Performed by: INTERNAL MEDICINE

## 2023-12-26 RX ORDER — AZITHROMYCIN 250 MG/1
TABLET, FILM COATED ORAL DAILY
Qty: 6 TABLET | Refills: 0 | Status: SHIPPED | OUTPATIENT
Start: 2023-12-26 | End: 2023-12-31

## 2023-12-26 RX ORDER — METHYLPREDNISOLONE 4 MG/1
TABLET ORAL
Qty: 21 EACH | Refills: 0 | Status: SHIPPED | OUTPATIENT
Start: 2023-12-26

## 2023-12-26 NOTE — ASSESSMENT & PLAN NOTE
Came in complaining of coughing yellowish expectoration nasal congestion with runny nose head congestion generalized aches and pains patient claims started about a week ago with a sore throat followed by coughing about last 5 days getting worse intermittent difficulty breathing wheezing which seems to be improving with the current treatment for COPD emphysema as recommended by the pulmonary    Z-Pratik to use as directed  Medrol Dosepak to use as directed  Flonase  Trilogy  Proventil as needed and Proventil neb treatment if necessary

## 2023-12-26 NOTE — PROGRESS NOTES
Dr. Lloyd's Office Visit Note  23     Gilberto Cuelloall 83 y.o. adult MRN: [id number]  : 1940    Assessment:     1. Acute infective tracheobronchitis  Assessment & Plan:  Came in complaining of coughing yellowish expectoration nasal congestion with runny nose head congestion generalized aches and pains patient claims started about a week ago with a sore throat followed by coughing about last 5 days getting worse intermittent difficulty breathing wheezing which seems to be improving with the current treatment for COPD emphysema as recommended by the pulmonary    Z-Pratik to use as directed  Medrol Dosepak to use as directed  Flonase  Trilogy  Proventil as needed and Proventil neb treatment if necessary    Orders:  -     azithromycin (Zithromax) 250 mg tablet; Take 2 tablets (500 mg total) by mouth daily for 1 day, THEN 1 tablet (250 mg total) daily for 4 days.  -     methylPREDNISolone 4 MG tablet therapy pack; Use as directed on package    2. Acute exacerbation of chronic obstructive airways disease (HCC)  Assessment & Plan:  Came in complaining of coughing yellowish expectoration nasal congestion with runny nose head congestion generalized aches and pains patient claims started about a week ago with a sore throat followed by coughing about last 5 days getting worse intermittent difficulty breathing wheezing which seems to be improving with the current treatment for COPD emphysema as recommended by the pulmonary remittent wheezing difficulty breathing     Z-Pratik to use as directed  Medrol Dosepak to use as directed  Flonase  Trilogy  Proventil as needed and Proventil neb treatment if necessary  Awaiting to be seen by pulmonary      3. Benign prostatic hyperplasia with urinary retention  Assessment & Plan:  Patient urinary frequency at nighttime followed by urology      4. Primary hypertension  Assessment & Plan:  Blood pressure controlled continue low-salt diet blood pressure  monitoring            Discussion Summary and Plan:  Today's care plan and medications were reviewed with patient in detail and all their questions answered to their satisfaction.    No chief complaint on file.     Subjective:  Came in complaining of coughing yellowish expectoration nasal congestion with runny nose head congestion generalized aches and pains patient claims started about a week ago with a sore throat followed by coughing about last 5 days getting worse intermittent difficulty breathing wheezing which seems to be improving with the current treatment for COPD emphysema as recommended by the pulmonary        The following portions of the patient's history were reviewed and updated as appropriate: allergies, current medications, past family history, past medical history, past social history, past surgical history and problem list.    Review of Systems   Constitutional:  Positive for activity change. Negative for appetite change, chills, diaphoresis, fatigue, fever and unexpected weight change.   HENT:  Positive for congestion and rhinorrhea. Negative for dental problem, drooling, ear discharge, ear pain, facial swelling, hearing loss, mouth sores, nosebleeds, postnasal drip, sinus pressure, sneezing, sore throat, tinnitus, trouble swallowing and voice change.    Eyes:  Negative for photophobia, pain, discharge, redness, itching and visual disturbance.   Respiratory:  Positive for cough, shortness of breath and wheezing. Negative for apnea, choking, chest tightness and stridor.    Cardiovascular:  Negative for chest pain, palpitations and leg swelling.   Gastrointestinal:  Negative for abdominal distention, abdominal pain, anal bleeding, blood in stool, constipation, diarrhea, nausea, rectal pain and vomiting.   Endocrine: Negative for cold intolerance, heat intolerance, polydipsia, polyphagia and polyuria.   Genitourinary:  Negative for decreased urine volume, difficulty urinating, dysuria, enuresis,  flank pain, frequency, genital sores, hematuria and urgency.   Musculoskeletal:  Negative for arthralgias, back pain, gait problem, joint swelling, myalgias, neck pain and neck stiffness.   Skin:  Negative for color change, pallor, rash and wound.   Allergic/Immunologic: Negative.  Negative for environmental allergies, food allergies and immunocompromised state.   Neurological:  Negative for dizziness, tremors, seizures, syncope, facial asymmetry, speech difficulty, weakness, light-headedness, numbness and headaches.   Psychiatric/Behavioral:  Negative for agitation, behavioral problems, confusion, decreased concentration, dysphoric mood, hallucinations, self-injury, sleep disturbance and suicidal ideas. The patient is not nervous/anxious and is not hyperactive.          Historical Information   Patient Active Problem List   Diagnosis   • Benign prostatic hyperplasia with urinary retention   • Hematuria, gross   • Asthma   • Acute exacerbation of chronic obstructive airways disease (HCC)   • Primary osteoarthritis involving multiple joints   • Lumbar radiculopathy   • Acute infective tracheobronchitis   • Pre-op examination   • Hydrocele   • Primary hypertension   • Encysted hydrocele   • Hypercalcemia   • Shortness of breath   • Anemia, unspecified   • Chronic bronchitis (HCC)   • Hypokalemia   • Centrilobular emphysema (HCC)     Past Medical History:   Diagnosis Date   • Arthritis    • Asthma    • Back pain    • BPH (benign prostatic hypertrophy) with urinary obstruction    • COPD (chronic obstructive pulmonary disease) (HCC)    • Dizziness    • Glaucoma     bilat   • Insomnia    • RLS (restless legs syndrome)    • Sinusitis      Past Surgical History:   Procedure Laterality Date   • CATARACT EXTRACTION Left    • EAR SURGERY      to wart   • EPIDIDYMAL CYST EXCISION Right 05/04/2000   • HEMORROIDECTOMY     • KS EXCISION HYDROCELE UNILATERAL Left 9/14/2023    Procedure: HYDROCELECTOMY, sPERMATOCELECTOMY;  Surgeon:  James Blunt MD;  Location: [facility];  Service: Urology   • ME TRURL ELECTROSURG RESCJ PROSTATE BLEED COMPLETE N/A 2020    Procedure: CYSTOSCOPY, TRANSURETHRAL RESECTION OF PROSTATE (TURP);  Surgeon: Reji Meneses MD;  Location: [facility];  Service: Urology   • PROSTATE BIOPSY Bilateral 2003    BPH with mild acute and chronic inflammation   • PROSTATE BIOPSY Bilateral 2005    BPH with marked artropy and chronic inflammation     Social History     Substance and Sexual Activity   Alcohol Use Not Currently     Social History     Substance and Sexual Activity   Drug Use Never     Social History     Tobacco Use   Smoking Status Former   • Current packs/day: 0.00   • Average packs/day: 1 pack/day for 15.0 years (15.0 ttl pk-yrs)   • Types: Cigarettes   • Start date:    • Quit date:    • Years since quittin.0   • Passive exposure: Never   Smokeless Tobacco Current   • Types: Chew   Tobacco Comments    Chews cigars     Family History   Problem Relation Age of Onset   • Asthma Father      Health Maintenance Due   Topic   • Osteoporosis Screening    • Pneumococcal Vaccine: 65+ Years (2 - PCV)   • COVID-19 Vaccine (3 - 2023-24 season)   • Fall Risk    • Medicare Annual Wellness Visit (AWV)       Meds/Allergies       Current Outpatient Medications:   •  azithromycin (Zithromax) 250 mg tablet, Take 2 tablets (500 mg total) by mouth daily for 1 day, THEN 1 tablet (250 mg total) daily for 4 days., Disp: 6 tablet, Rfl: 0  •  fluticasone (FLONASE) 50 mcg/act nasal spray, 1 spray into each nostril 2 (two) times a day, Disp: 15.8 mL, Rfl: 3  •  fluticasone-umeclidinium-vilanterol (Trelegy Ellipta) 200-62.5-25 mcg/actuation AEPB inhaler, Inhale 1 puff daily Rinse mouth after use., Disp: 60 blister, Rfl: 8  •  Gluc-Chonn-MSM-Boswellia-Vit D (Glucosamine Chondroitin + D3) TABS, Take by mouth, Disp: , Rfl:   •  latanoprost (XALATAN) 0.005 % ophthalmic solution, 1 drop daily at bedtime, Disp: ,  "Rfl:   •  methylPREDNISolone 4 MG tablet therapy pack, Use as directed on package, Disp: 21 each, Rfl: 0  •  albuterol (2.5 mg/3 mL) 0.083 % nebulizer solution, Take 3 mL (2.5 mg total) by nebulization 4 (four) times a day as needed for wheezing or shortness of breath (Patient not taking: Reported on 12/26/2023), Disp: 360 mL, Rfl: 5  •  ALPRAZolam (XANAX) 0.25 mg tablet, Take 1 pill at bedtime (Patient not taking: Reported on 12/26/2023), Disp: 30 tablet, Rfl: 2      Objective:    Vitals:   /78   Pulse 78   Temp 98.7 °F (37.1 °C)   Ht 5' 9\" (1.753 m)   Wt 67.1 kg (148 lb)   SpO2 98%   BMI 21.86 kg/m²   Body mass index is 21.86 kg/m².  Vitals:    12/26/23 1540   Weight: 67.1 kg (148 lb)       Physical Exam  Vitals reviewed.   Constitutional:       General: Gilberto is not in acute distress.     Appearance: Gilberto is well-developed. Gilberto is not ill-appearing, toxic-appearing or diaphoretic.   HENT:      Head: Normocephalic and atraumatic.      Right Ear: External ear normal.      Left Ear: External ear normal.      Nose: Nose normal.      Mouth/Throat:      Pharynx: No oropharyngeal exudate.   Eyes:      General: Lids are normal. Lids are everted, no foreign bodies appreciated. No scleral icterus.        Right eye: No discharge.         Left eye: No discharge.      Conjunctiva/sclera: Conjunctivae normal.      Pupils: Pupils are equal, round, and reactive to light.   Neck:      Thyroid: No thyromegaly.      Vascular: Normal carotid pulses. No carotid bruit, hepatojugular reflux or JVD.      Trachea: No tracheal tenderness or tracheal deviation.   Cardiovascular:      Rate and Rhythm: Normal rate and regular rhythm.      Pulses: Normal pulses.      Heart sounds: Murmur heard.      No friction rub. No gallop.   Pulmonary:      Effort: Pulmonary effort is normal. No respiratory distress.      Breath sounds: No stridor. Wheezing and rhonchi present. No rales.   Chest:      Chest wall: No tenderness. "   Abdominal:      General: Bowel sounds are normal. There is no distension.      Palpations: Abdomen is soft. There is no mass.      Tenderness: There is no abdominal tenderness. There is no guarding or rebound.   Musculoskeletal:         General: No tenderness or deformity. Normal range of motion.      Cervical back: Normal range of motion and neck supple. No edema, erythema or rigidity. No spinous process tenderness or muscular tenderness. Normal range of motion.   Lymphadenopathy:      Head:      Right side of head: No submental, submandibular, tonsillar, preauricular or posterior auricular adenopathy.      Left side of head: No submental, submandibular, tonsillar, preauricular, posterior auricular or occipital adenopathy.      Cervical: No cervical adenopathy.      Right cervical: No superficial, deep or posterior cervical adenopathy.     Left cervical: No superficial, deep or posterior cervical adenopathy.      Upper Body:      Right upper body: No pectoral adenopathy.      Left upper body: No pectoral adenopathy.   Skin:     General: Skin is warm and dry.      Coloration: Skin is not pale.      Findings: No erythema or rash.   Neurological:      General: No focal deficit present.      Mental Status: Gilberto is alert and oriented to person, place, and time.      Cranial Nerves: No cranial nerve deficit.      Sensory: No sensory deficit.      Motor: No tremor, abnormal muscle tone or seizure activity.      Coordination: Coordination normal.      Gait: Gait normal.      Deep Tendon Reflexes: Reflexes are normal and symmetric. Reflexes normal.   Psychiatric:         Behavior: Behavior normal.         Thought Content: Thought content normal.         Judgment: Judgment normal.         Lab Review   No visits with results within 2 Month(s) from this visit.   Latest known visit with results is:   Orders Only on 09/18/2023   Component Date Value Ref Range Status   • Supplier Name 09/18/2023 [facility]   Final   • Supplier  Phone Number 09/18/2023 [phone]   Final   • Order Status 09/18/2023 Completed   Final   • Delivery Request Date 09/18/2023 09/18/2023   Final   • Date Delivered  09/18/2023 09/26/2023   Final   • Supplier Name 09/18/2023 09/26/2023   Final   • Item Description 09/18/2023 Nebulizer Compressor with Mouthpiece Only   Final    Qty: 1   • Item Description 09/18/2023 Nebulizer Set, Disposable, 2 per 1 month - Use as directed and discard 2 weeks after first use   Final    Qty: 1   • Item Description 09/18/2023 Mouthpiece Only, N/A   Final    Qty: 1         Patient Instructions   Acute Bronchitis   WHAT YOU NEED TO KNOW:   Acute bronchitis is swelling and irritation in your lungs. It is usually caused by a virus and most often happens in the winter. Bronchitis may also be caused by bacteria or by a chemical irritant, such as smoke.  DISCHARGE INSTRUCTIONS:   Return to the emergency department if:   You cough up blood.    Your lips or fingernails turn blue.    You feel like you are not getting enough air when you breathe.    Call your doctor if:   Your symptoms do not go away or get worse, even after treatment.    Your cough does not get better within 4 weeks.    You have questions or concerns about your condition or care.    Medicines:  You may need any of the following:  Cough suppressants  decrease your urge to cough.    Decongestants  help loosen mucus in your lungs and make it easier to cough up. This can help you breathe easier.    Inhalers  may be given. Your healthcare provider may give you one or more inhalers to help you breathe easier and cough less. An inhaler gives you medicine to open your airways. Ask your healthcare provider to show you how to use your inhaler correctly.         Antiviral medicine  treats infections caused by a virus.    Antibiotics  may be given if your bronchitis is caused by bacteria or if you have lung condition.    Acetaminophen  decreases pain and fever. It is available without a  doctor's order. Ask how much to take and how often to take it. Follow directions. Read the labels of all other medicines you are using to see if they also contain acetaminophen, or ask your doctor or pharmacist. Acetaminophen can cause liver damage if not taken correctly.    NSAIDs  help decrease swelling and pain or fever. This medicine is available with or without a doctor's order. NSAIDs can cause stomach bleeding or kidney problems in certain people. If you take blood thinner medicine, always ask your healthcare provider if NSAIDs are safe for you. Always read the medicine label and follow directions.    Self-care:   Drink liquids as directed.  You may need to drink more liquids than usual to stay hydrated. Ask how much liquid to drink each day and which liquids are best for you.    Use a cool mist humidifier.  This increases air moisture in your home. This may make it easier for you to breathe and help decrease your cough.     Get more rest.  Rest helps your body to heal. Slowly start to do more each day. Rest when you feel it is needed.    Prevent acute bronchitis:       Ask about vaccines you may need.  Get a flu vaccine each year as soon as recommended, usually in September or October. Ask your healthcare provider if you should also get a pneumonia or COVID-19 vaccine. Your healthcare provider can tell you if you should also get other vaccines, and when to get them.    Prevent the spread of germs.  You can decrease your risk for acute bronchitis and other illnesses by doing the following:     Wash your hands often with soap and water. Carry germ-killing hand lotion or gel with you. You can use the lotion or gel to clean your hands when soap and water are not available.         Do not touch your eyes, nose, or mouth unless you have washed your hands first.    Always cover your mouth when you cough to prevent the spread of germs. It is best to cough into a tissue or your shirt sleeve instead of into your hand.  "Ask those around you to cover their mouths when they cough.    Try to avoid people who have a cold or the flu. If you are sick, stay away from others as much as possible.    Avoid irritants in the air.  Avoid chemicals, fumes, and dust. Wear a face mask if you must work around dust or fumes. Stay inside on days when air pollution levels are high. If you have allergies, stay inside when pollen counts are high. Do not use aerosol products, such as spray-on deodorant, bug spray, and hair spray.    Do not smoke or be around others who are smoking.  Nicotine and other chemicals in cigarettes and cigars can cause lung damage. Ask your healthcare provider for information if you currently smoke and need help to quit. E-cigarettes or smokeless tobacco still contain nicotine. Talk to your healthcare provider before you use these products.  Follow up with your doctor as directed:  Write down questions you have so you will remember to ask them during your follow-up visits.  © Copyright [employer] 2023 Information is for End User's use only and may not be sold, redistributed or otherwise used for commercial purposes.  The above information is an  only. It is not intended as medical advice for individual conditions or treatments. Talk to your doctor, nurse or pharmacist before following any medical regimen to see if it is safe and effective for you.       Dima Lloyd MD        \"This note has been constructed using a voice recognition system.Therefore there may be syntax, spelling, and/or grammatical errors. Please call if you have any questions. \"  "

## 2023-12-26 NOTE — ASSESSMENT & PLAN NOTE
Came in complaining of coughing yellowish expectoration nasal congestion with runny nose head congestion generalized aches and pains patient claims started about a week ago with a sore throat followed by coughing about last 5 days getting worse intermittent difficulty breathing wheezing which seems to be improving with the current treatment for COPD emphysema as recommended by the pulmonary remittent wheezing difficulty breathing     Z-Pratik to use as directed  Medrol Dosepak to use as directed  Flonase  Trilogy  Proventil as needed and Proventil neb treatment if necessary  Awaiting to be seen by pulmonary

## 2023-12-26 NOTE — PATIENT INSTRUCTIONS
Acute Bronchitis   WHAT YOU NEED TO KNOW:   Acute bronchitis is swelling and irritation in your lungs. It is usually caused by a virus and most often happens in the winter. Bronchitis may also be caused by bacteria or by a chemical irritant, such as smoke.  DISCHARGE INSTRUCTIONS:   Return to the emergency department if:   You cough up blood.    Your lips or fingernails turn blue.    You feel like you are not getting enough air when you breathe.    Call your doctor if:   Your symptoms do not go away or get worse, even after treatment.    Your cough does not get better within 4 weeks.    You have questions or concerns about your condition or care.    Medicines:  You may need any of the following:  Cough suppressants  decrease your urge to cough.    Decongestants  help loosen mucus in your lungs and make it easier to cough up. This can help you breathe easier.    Inhalers  may be given. Your healthcare provider may give you one or more inhalers to help you breathe easier and cough less. An inhaler gives you medicine to open your airways. Ask your healthcare provider to show you how to use your inhaler correctly.         Antiviral medicine  treats infections caused by a virus.    Antibiotics  may be given if your bronchitis is caused by bacteria or if you have lung condition.    Acetaminophen  decreases pain and fever. It is available without a doctor's order. Ask how much to take and how often to take it. Follow directions. Read the labels of all other medicines you are using to see if they also contain acetaminophen, or ask your doctor or pharmacist. Acetaminophen can cause liver damage if not taken correctly.    NSAIDs  help decrease swelling and pain or fever. This medicine is available with or without a doctor's order. NSAIDs can cause stomach bleeding or kidney problems in certain people. If you take blood thinner medicine, always ask your healthcare provider if NSAIDs are safe for you. Always read the medicine  label and follow directions.    Self-care:   Drink liquids as directed.  You may need to drink more liquids than usual to stay hydrated. Ask how much liquid to drink each day and which liquids are best for you.    Use a cool mist humidifier.  This increases air moisture in your home. This may make it easier for you to breathe and help decrease your cough.     Get more rest.  Rest helps your body to heal. Slowly start to do more each day. Rest when you feel it is needed.    Prevent acute bronchitis:       Ask about vaccines you may need.  Get a flu vaccine each year as soon as recommended, usually in September or October. Ask your healthcare provider if you should also get a pneumonia or COVID-19 vaccine. Your healthcare provider can tell you if you should also get other vaccines, and when to get them.    Prevent the spread of germs.  You can decrease your risk for acute bronchitis and other illnesses by doing the following:     Wash your hands often with soap and water. Carry germ-killing hand lotion or gel with you. You can use the lotion or gel to clean your hands when soap and water are not available.         Do not touch your eyes, nose, or mouth unless you have washed your hands first.    Always cover your mouth when you cough to prevent the spread of germs. It is best to cough into a tissue or your shirt sleeve instead of into your hand. Ask those around you to cover their mouths when they cough.    Try to avoid people who have a cold or the flu. If you are sick, stay away from others as much as possible.    Avoid irritants in the air.  Avoid chemicals, fumes, and dust. Wear a face mask if you must work around dust or fumes. Stay inside on days when air pollution levels are high. If you have allergies, stay inside when pollen counts are high. Do not use aerosol products, such as spray-on deodorant, bug spray, and hair spray.    Do not smoke or be around others who are smoking.  Nicotine and other chemicals in  cigarettes and cigars can cause lung damage. Ask your healthcare provider for information if you currently smoke and need help to quit. E-cigarettes or smokeless tobacco still contain nicotine. Talk to your healthcare provider before you use these products.  Follow up with your doctor as directed:  Write down questions you have so you will remember to ask them during your follow-up visits.  © Copyright [employer] 2023 Information is for End User's use only and may not be sold, redistributed or otherwise used for commercial purposes.  The above information is an  only. It is not intended as medical advice for individual conditions or treatments. Talk to your doctor, nurse or pharmacist before following any medical regimen to see if it is safe and effective for you.

## 2023-12-27 ENCOUNTER — APPOINTMENT (OUTPATIENT)
Dept: LAB | Facility: HOSPITAL | Age: 83
End: 2023-12-27
Attending: SPECIALIST
Payer: MEDICARE

## 2023-12-27 DIAGNOSIS — R97.20 ELEVATED PROSTATE SPECIFIC ANTIGEN (PSA): ICD-10-CM

## 2023-12-27 PROCEDURE — 36415 COLL VENOUS BLD VENIPUNCTURE: CPT

## 2023-12-29 LAB
Lab: NORMAL
MISCELLANEOUS LAB TEST RESULT: NORMAL

## 2024-01-22 DIAGNOSIS — J43.2 CENTRILOBULAR EMPHYSEMA (HCC): Primary | ICD-10-CM

## 2024-01-22 DIAGNOSIS — J41.0 SIMPLE CHRONIC BRONCHITIS (HCC): ICD-10-CM

## 2024-01-22 RX ORDER — BUDESONIDE AND FORMOTEROL FUMARATE DIHYDRATE 160; 4.5 UG/1; UG/1
2 AEROSOL RESPIRATORY (INHALATION) 2 TIMES DAILY
Qty: 10.2 G | Refills: 5 | Status: SHIPPED | OUTPATIENT
Start: 2024-01-22

## 2024-01-23 ENCOUNTER — TELEPHONE (OUTPATIENT)
Age: 84
End: 2024-01-23

## 2024-01-23 NOTE — TELEPHONE ENCOUNTER
Patient's sister is calling because their brother Gilberto has just lost his medical coverage wit his job and was wondering if we have any samples for the patient until they get new insurance. Please call Lucy [phone]

## 2024-02-16 ENCOUNTER — HOSPITAL ENCOUNTER (INPATIENT)
Facility: HOSPITAL | Age: 84
LOS: 6 days | Discharge: NURSING, REHAB OR LONG-TERM CARE | DRG: 286 | End: 2024-02-22
Attending: EMERGENCY MEDICINE | Admitting: INTERNAL MEDICINE
Payer: MEDICARE

## 2024-02-16 ENCOUNTER — APPOINTMENT (EMERGENCY)
Dept: RADIOLOGY | Facility: HOSPITAL | Age: 84
DRG: 286 | End: 2024-02-16
Payer: MEDICARE

## 2024-02-16 DIAGNOSIS — J41.0 SIMPLE CHRONIC BRONCHITIS (HCC): ICD-10-CM

## 2024-02-16 DIAGNOSIS — F17.200 NICOTINE DEPENDENCE: ICD-10-CM

## 2024-02-16 DIAGNOSIS — R55 SYNCOPE AND COLLAPSE: ICD-10-CM

## 2024-02-16 DIAGNOSIS — R55 SYNCOPE: Primary | ICD-10-CM

## 2024-02-16 DIAGNOSIS — J43.2 CENTRILOBULAR EMPHYSEMA (HCC): ICD-10-CM

## 2024-02-16 DIAGNOSIS — S22.20XA STERNAL FRACTURE: ICD-10-CM

## 2024-02-16 DIAGNOSIS — K59.00 CONSTIPATION: ICD-10-CM

## 2024-02-16 DIAGNOSIS — J44.9 COPD (CHRONIC OBSTRUCTIVE PULMONARY DISEASE) (HCC): ICD-10-CM

## 2024-02-16 DIAGNOSIS — S22.49XA MULTIPLE RIB FRACTURES: ICD-10-CM

## 2024-02-16 DIAGNOSIS — K21.9 GASTROESOPHAGEAL REFLUX DISEASE, UNSPECIFIED WHETHER ESOPHAGITIS PRESENT: ICD-10-CM

## 2024-02-16 DIAGNOSIS — N40.1 BENIGN PROSTATIC HYPERPLASIA WITH URINARY RETENTION: ICD-10-CM

## 2024-02-16 DIAGNOSIS — N40.0 BPH (BENIGN PROSTATIC HYPERPLASIA): ICD-10-CM

## 2024-02-16 DIAGNOSIS — I50.41 ACUTE COMBINED SYSTOLIC (CONGESTIVE) AND DIASTOLIC (CONGESTIVE) HEART FAILURE (HCC): ICD-10-CM

## 2024-02-16 DIAGNOSIS — S22.43XA CLOSED FRACTURE OF MULTIPLE RIBS OF BOTH SIDES, INITIAL ENCOUNTER: ICD-10-CM

## 2024-02-16 DIAGNOSIS — G47.00 INSOMNIA: ICD-10-CM

## 2024-02-16 DIAGNOSIS — I25.10 CORONARY ARTERY DISEASE INVOLVING NATIVE CORONARY ARTERY OF NATIVE HEART WITHOUT ANGINA PECTORIS: ICD-10-CM

## 2024-02-16 DIAGNOSIS — I21.4 NSTEMI (NON-ST ELEVATED MYOCARDIAL INFARCTION) (HCC): ICD-10-CM

## 2024-02-16 DIAGNOSIS — I35.0 CRITICAL AORTIC VALVE STENOSIS: ICD-10-CM

## 2024-02-16 DIAGNOSIS — R79.89 ELEVATED TROPONIN: ICD-10-CM

## 2024-02-16 DIAGNOSIS — R33.8 BENIGN PROSTATIC HYPERPLASIA WITH URINARY RETENTION: ICD-10-CM

## 2024-02-16 DIAGNOSIS — I42.9 CARDIOMYOPATHY, UNSPECIFIED TYPE (HCC): ICD-10-CM

## 2024-02-16 LAB
2HR DELTA HS TROPONIN: 140 NG/L
ALBUMIN SERPL BCP-MCNC: 3.6 G/DL (ref 3.5–5)
ALP SERPL-CCNC: 59 U/L (ref 34–104)
ALT SERPL W P-5'-P-CCNC: 78 U/L (ref 7–52)
ANION GAP SERPL CALCULATED.3IONS-SCNC: 11 MMOL/L
AST SERPL W P-5'-P-CCNC: 77 U/L (ref 5–45)
BASOPHILS # BLD AUTO: 0.06 THOUSANDS/ÂΜL (ref 0–0.1)
BASOPHILS NFR BLD AUTO: 1 % (ref 0–1)
BILIRUB SERPL-MCNC: 0.76 MG/DL (ref 0.2–1)
BUN SERPL-MCNC: 23 MG/DL (ref 5–25)
CALCIUM SERPL-MCNC: 8.7 MG/DL (ref 8.4–10.2)
CARDIAC TROPONIN I PNL SERPL HS: 112 NG/L
CARDIAC TROPONIN I PNL SERPL HS: 252 NG/L
CHLORIDE SERPL-SCNC: 105 MMOL/L (ref 96–108)
CO2 SERPL-SCNC: 23 MMOL/L (ref 21–32)
CREAT SERPL-MCNC: 1.19 MG/DL (ref 0.6–1.3)
EOSINOPHIL # BLD AUTO: 0.03 THOUSAND/ÂΜL (ref 0–0.61)
EOSINOPHIL NFR BLD AUTO: 0 % (ref 0–6)
ERYTHROCYTE [DISTWIDTH] IN BLOOD BY AUTOMATED COUNT: 14.9 % (ref 11.6–15.1)
FLUAV RNA RESP QL NAA+PROBE: NEGATIVE
FLUBV RNA RESP QL NAA+PROBE: NEGATIVE
GLUCOSE SERPL-MCNC: 204 MG/DL (ref 65–140)
GLUCOSE SERPL-MCNC: 242 MG/DL (ref 65–140)
HCT VFR BLD AUTO: 37.6 % (ref 36.5–46.1)
HGB BLD-MCNC: 12.2 G/DL (ref 12–15.4)
IMM GRANULOCYTES # BLD AUTO: 0.11 THOUSAND/UL (ref 0–0.2)
IMM GRANULOCYTES NFR BLD AUTO: 1 % (ref 0–2)
LYMPHOCYTES # BLD AUTO: 0.86 THOUSANDS/ÂΜL (ref 0.6–4.47)
LYMPHOCYTES NFR BLD AUTO: 9 % (ref 14–44)
MCH RBC QN AUTO: 29.8 PG (ref 26.8–34.3)
MCHC RBC AUTO-ENTMCNC: 32.4 G/DL (ref 31.4–37.4)
MCV RBC AUTO: 92 FL (ref 82–98)
MONOCYTES # BLD AUTO: 0.58 THOUSAND/ÂΜL (ref 0.17–1.22)
MONOCYTES NFR BLD AUTO: 6 % (ref 4–12)
NEUTROPHILS # BLD AUTO: 7.73 THOUSANDS/ÂΜL (ref 1.85–7.62)
NEUTS SEG NFR BLD AUTO: 83 % (ref 43–75)
NRBC BLD AUTO-RTO: 0 /100 WBCS
PLATELET # BLD AUTO: 208 THOUSANDS/UL (ref 149–390)
PMV BLD AUTO: 10.7 FL (ref 8.9–12.7)
POTASSIUM SERPL-SCNC: 3.9 MMOL/L (ref 3.5–5.3)
PROT SERPL-MCNC: 5.8 G/DL (ref 6.4–8.4)
RBC # BLD AUTO: 4.1 MILLION/UL (ref 3.88–5.12)
RSV RNA RESP QL NAA+PROBE: NEGATIVE
SARS-COV-2 RNA RESP QL NAA+PROBE: NEGATIVE
SODIUM SERPL-SCNC: 139 MMOL/L (ref 135–147)
WBC # BLD AUTO: 9.37 THOUSAND/UL (ref 4.31–10.16)

## 2024-02-16 PROCEDURE — 71250 CT THORAX DX C-: CPT

## 2024-02-16 PROCEDURE — 93005 ELECTROCARDIOGRAM TRACING: CPT

## 2024-02-16 PROCEDURE — 96374 THER/PROPH/DIAG INJ IV PUSH: CPT

## 2024-02-16 PROCEDURE — 80053 COMPREHEN METABOLIC PANEL: CPT | Performed by: EMERGENCY MEDICINE

## 2024-02-16 PROCEDURE — 85025 COMPLETE CBC W/AUTO DIFF WBC: CPT | Performed by: EMERGENCY MEDICINE

## 2024-02-16 PROCEDURE — 0241U HB NFCT DS VIR RESP RNA 4 TRGT: CPT | Performed by: EMERGENCY MEDICINE

## 2024-02-16 PROCEDURE — 36415 COLL VENOUS BLD VENIPUNCTURE: CPT | Performed by: EMERGENCY MEDICINE

## 2024-02-16 PROCEDURE — 84484 ASSAY OF TROPONIN QUANT: CPT | Performed by: EMERGENCY MEDICINE

## 2024-02-16 PROCEDURE — 82948 REAGENT STRIP/BLOOD GLUCOSE: CPT

## 2024-02-16 PROCEDURE — 96361 HYDRATE IV INFUSION ADD-ON: CPT

## 2024-02-16 PROCEDURE — 96376 TX/PRO/DX INJ SAME DRUG ADON: CPT

## 2024-02-16 PROCEDURE — 70450 CT HEAD/BRAIN W/O DYE: CPT

## 2024-02-16 PROCEDURE — 99285 EMERGENCY DEPT VISIT HI MDM: CPT

## 2024-02-16 PROCEDURE — 83735 ASSAY OF MAGNESIUM: CPT | Performed by: NURSE PRACTITIONER

## 2024-02-16 PROCEDURE — 99285 EMERGENCY DEPT VISIT HI MDM: CPT | Performed by: EMERGENCY MEDICINE

## 2024-02-16 PROCEDURE — 83036 HEMOGLOBIN GLYCOSYLATED A1C: CPT | Performed by: NURSE PRACTITIONER

## 2024-02-16 RX ORDER — HYDROMORPHONE HCL/PF 1 MG/ML
0.5 SYRINGE (ML) INJECTION ONCE
Status: COMPLETED | OUTPATIENT
Start: 2024-02-16 | End: 2024-02-16

## 2024-02-16 RX ADMIN — SODIUM CHLORIDE 1000 ML: 0.9 INJECTION, SOLUTION INTRAVENOUS at 21:44

## 2024-02-16 RX ADMIN — HYDROMORPHONE HYDROCHLORIDE 0.5 MG: 1 INJECTION, SOLUTION INTRAMUSCULAR; INTRAVENOUS; SUBCUTANEOUS at 19:41

## 2024-02-16 RX ADMIN — HYDROMORPHONE HYDROCHLORIDE 0.5 MG: 1 INJECTION, SOLUTION INTRAMUSCULAR; INTRAVENOUS; SUBCUTANEOUS at 21:44

## 2024-02-16 NOTE — Clinical Note
The site was marked. Prepped: groin and right radial. Prepped with: ChloraPrep. The site was clipped. The patient was draped.

## 2024-02-16 NOTE — Clinical Note
Procedure completed by Dr Bergman. Pt tolerated without issues, VSS. Education provided to pt prior to and throughout procedure, questions answered as offered. TR band to R radial access site, 11cc in at 1255. Transported back to [facility] by cath lab staff, bedside report given.

## 2024-02-17 ENCOUNTER — APPOINTMENT (INPATIENT)
Dept: NON INVASIVE DIAGNOSTICS | Facility: HOSPITAL | Age: 84
DRG: 286 | End: 2024-02-17
Payer: MEDICARE

## 2024-02-17 PROBLEM — D64.9 ANEMIA: Status: ACTIVE | Noted: 2024-02-17

## 2024-02-17 PROBLEM — J44.9 COPD (CHRONIC OBSTRUCTIVE PULMONARY DISEASE) (HCC): Status: ACTIVE | Noted: 2020-04-14

## 2024-02-17 PROBLEM — R79.89 ELEVATED LFTS: Status: ACTIVE | Noted: 2024-02-17

## 2024-02-17 PROBLEM — R55 SYNCOPE: Status: ACTIVE | Noted: 2024-02-17

## 2024-02-17 PROBLEM — R91.8 PULMONARY NODULES: Status: ACTIVE | Noted: 2024-02-17

## 2024-02-17 PROBLEM — S22.22XA CLOSED FRACTURE OF BODY OF STERNUM: Status: ACTIVE | Noted: 2024-02-17

## 2024-02-17 PROBLEM — H40.9 GLAUCOMA: Status: ACTIVE | Noted: 2024-02-17

## 2024-02-17 PROBLEM — F41.9 ANXIETY: Status: ACTIVE | Noted: 2024-02-17

## 2024-02-17 PROBLEM — S22.43XA CLOSED FRACTURE OF MULTIPLE RIBS OF BOTH SIDES: Status: ACTIVE | Noted: 2024-02-17

## 2024-02-17 PROBLEM — R73.9 HYPERGLYCEMIA: Status: ACTIVE | Noted: 2024-02-17

## 2024-02-17 PROBLEM — J44.89 ASTHMA-COPD OVERLAP SYNDROME: Status: ACTIVE | Noted: 2024-02-17

## 2024-02-17 LAB
4HR DELTA HS TROPONIN: 880 NG/L
ALBUMIN SERPL BCP-MCNC: 3.3 G/DL (ref 3.5–5)
ALP SERPL-CCNC: 52 U/L (ref 34–104)
ALT SERPL W P-5'-P-CCNC: 86 U/L (ref 7–52)
ANION GAP SERPL CALCULATED.3IONS-SCNC: 7 MMOL/L
AORTIC ROOT: 3.7 CM
AORTIC VALVE MEAN VELOCITY: 37.7 M/S
APICAL FOUR CHAMBER EJECTION FRACTION: 32 %
AST SERPL W P-5'-P-CCNC: 80 U/L (ref 5–45)
ATRIAL RATE: 81 BPM
ATRIAL RATE: 97 BPM
AV AREA BY CONTINUOUS VTI: 0.4 CM2
AV AREA PEAK VELOCITY: 0.4 CM2
AV LVOT MEAN GRADIENT: 1 MMHG
AV LVOT PEAK GRADIENT: 1 MMHG
AV MEAN GRADIENT: 62 MMHG
AV PEAK GRADIENT: 103 MMHG
AV REGURGITATION PRESSURE HALF TIME: 312 MS
AV VALVE AREA: 0.43 CM2
AV VELOCITY RATIO: 0.11
BILIRUB SERPL-MCNC: 0.77 MG/DL (ref 0.2–1)
BNP SERPL-MCNC: 1270 PG/ML (ref 0–100)
BSA FOR ECHO PROCEDURE: 1.81 M2
BUN SERPL-MCNC: 23 MG/DL (ref 5–25)
CALCIUM ALBUM COR SERPL-MCNC: 8.6 MG/DL (ref 8.3–10.1)
CALCIUM SERPL-MCNC: 8 MG/DL (ref 8.4–10.2)
CARDIAC TROPONIN I PNL SERPL HS: 3327 NG/L (ref 8–18)
CARDIAC TROPONIN I PNL SERPL HS: 992 NG/L
CHLORIDE SERPL-SCNC: 108 MMOL/L (ref 96–108)
CHOLEST SERPL-MCNC: 144 MG/DL
CO2 SERPL-SCNC: 24 MMOL/L (ref 21–32)
CREAT SERPL-MCNC: 0.71 MG/DL (ref 0.6–1.3)
DOP CALC AO PEAK VEL: 5.08 M/S
DOP CALC AO VTI: 124.68 CM
DOP CALC LVOT AREA: 3.8 CM2
DOP CALC LVOT CARDIAC INDEX: 2.13 L/MIN/M2
DOP CALC LVOT CARDIAC OUTPUT: 3.85 L/MIN
DOP CALC LVOT DIAMETER: 2.2 CM
DOP CALC LVOT PEAK VEL VTI: 14.17 CM
DOP CALC LVOT PEAK VEL: 0.58 M/S
DOP CALC LVOT STROKE INDEX: 29.8 ML/M2
DOP CALC LVOT STROKE VOLUME: 53.84
E WAVE DECELERATION TIME: 231 MS
E/A RATIO: 3.34
ERYTHROCYTE [DISTWIDTH] IN BLOOD BY AUTOMATED COUNT: 15 % (ref 11.6–15.1)
EST. AVERAGE GLUCOSE BLD GHB EST-MCNC: 117 MG/DL
FRACTIONAL SHORTENING: 9 (ref 28–44)
GLUCOSE SERPL-MCNC: 118 MG/DL (ref 65–140)
HBA1C MFR BLD: 5.7 %
HCT VFR BLD AUTO: 33.7 % (ref 36.5–46.1)
HDLC SERPL-MCNC: 53 MG/DL
HGB BLD-MCNC: 11.1 G/DL (ref 12–15.4)
INTERVENTRICULAR SEPTUM IN DIASTOLE (PARASTERNAL SHORT AXIS VIEW): 1.1 CM
INTERVENTRICULAR SEPTUM: 1.1 CM (ref 0.6–1.1)
LAAS-AP2: 32.6 CM2
LAAS-AP4: 26.8 CM2
LDLC SERPL CALC-MCNC: 82 MG/DL (ref 0–100)
LEFT ATRIUM SIZE: 4.8 CM
LEFT ATRIUM VOLUME (MOD BIPLANE): 111 ML
LEFT ATRIUM VOLUME INDEX (MOD BIPLANE): 61.3 ML/M2
LEFT INTERNAL DIMENSION IN SYSTOLE: 5 CM (ref 2.1–4)
LEFT VENTRICULAR INTERNAL DIMENSION IN DIASTOLE: 5.5 CM (ref 3.5–6)
LEFT VENTRICULAR POSTERIOR WALL IN END DIASTOLE: 1.3 CM
LEFT VENTRICULAR STROKE VOLUME: 31 ML
LVSV (TEICH): 31 ML
MAGNESIUM SERPL-MCNC: 1.8 MG/DL (ref 1.9–2.7)
MAGNESIUM SERPL-MCNC: 2 MG/DL (ref 1.9–2.7)
MCH RBC QN AUTO: 29.8 PG (ref 26.8–34.3)
MCHC RBC AUTO-ENTMCNC: 32.9 G/DL (ref 31.4–37.4)
MCV RBC AUTO: 91 FL (ref 82–98)
MV E'TISSUE VEL-LAT: 9 CM/S
MV E'TISSUE VEL-SEP: 4 CM/S
MV PEAK A VEL: 0.32 M/S
MV PEAK E VEL: 107 CM/S
MV STENOSIS PRESSURE HALF TIME: 67 MS
MV VALVE AREA P 1/2 METHOD: 3.28
P AXIS: 64 DEGREES
P AXIS: 83 DEGREES
PLATELET # BLD AUTO: 156 THOUSANDS/UL (ref 149–390)
PMV BLD AUTO: 10.5 FL (ref 8.9–12.7)
POTASSIUM SERPL-SCNC: 4.4 MMOL/L (ref 3.5–5.3)
PR INTERVAL: 176 MS
PR INTERVAL: 190 MS
PROT SERPL-MCNC: 5.3 G/DL (ref 6.4–8.4)
QRS AXIS: -48 DEGREES
QRS AXIS: -74 DEGREES
QRSD INTERVAL: 138 MS
QRSD INTERVAL: 152 MS
QT INTERVAL: 428 MS
QT INTERVAL: 430 MS
QTC INTERVAL: 497 MS
QTC INTERVAL: 546 MS
RBC # BLD AUTO: 3.72 MILLION/UL (ref 3.88–5.12)
RIGHT ATRIUM AREA SYSTOLE A4C: 26.6 CM2
RIGHT VENTRICLE ID DIMENSION: 4.4 CM
SL CV AV DECELERATION TIME RETROGRADE: 1075 MS
SL CV AV PEAK GRADIENT RETROGRADE: 45 MMHG
SL CV LEFT ATRIUM LENGTH A2C: 6.1 CM
SL CV LV EF: 25
SL CV PED ECHO LEFT VENTRICLE DIASTOLIC VOLUME (MOD BIPLANE) 2D: 147 ML
SL CV PED ECHO LEFT VENTRICLE SYSTOLIC VOLUME (MOD BIPLANE) 2D: 116 ML
SODIUM SERPL-SCNC: 139 MMOL/L (ref 135–147)
T WAVE AXIS: 102 DEGREES
T WAVE AXIS: 82 DEGREES
TR MAX PG: 41 MMHG
TR PEAK VELOCITY: 3.2 M/S
TRICUSPID ANNULAR PLANE SYSTOLIC EXCURSION: 1.6 CM
TRICUSPID VALVE PEAK REGURGITATION VELOCITY: 3.19 M/S
TRIGL SERPL-MCNC: 44 MG/DL
TSH SERPL DL<=0.05 MIU/L-ACNC: 2.69 UIU/ML (ref 0.45–4.5)
VENTRICULAR RATE: 81 BPM
VENTRICULAR RATE: 97 BPM
WBC # BLD AUTO: 8.13 THOUSAND/UL (ref 4.31–10.16)

## 2024-02-17 PROCEDURE — 85027 COMPLETE CBC AUTOMATED: CPT | Performed by: NURSE PRACTITIONER

## 2024-02-17 PROCEDURE — 36415 COLL VENOUS BLD VENIPUNCTURE: CPT | Performed by: EMERGENCY MEDICINE

## 2024-02-17 PROCEDURE — 97167 OT EVAL HIGH COMPLEX 60 MIN: CPT

## 2024-02-17 PROCEDURE — 80053 COMPREHEN METABOLIC PANEL: CPT | Performed by: NURSE PRACTITIONER

## 2024-02-17 PROCEDURE — 97163 PT EVAL HIGH COMPLEX 45 MIN: CPT

## 2024-02-17 PROCEDURE — 93306 TTE W/DOPPLER COMPLETE: CPT

## 2024-02-17 PROCEDURE — 83735 ASSAY OF MAGNESIUM: CPT | Performed by: NURSE PRACTITIONER

## 2024-02-17 PROCEDURE — 83880 ASSAY OF NATRIURETIC PEPTIDE: CPT | Performed by: NURSE PRACTITIONER

## 2024-02-17 PROCEDURE — 84484 ASSAY OF TROPONIN QUANT: CPT | Performed by: NURSE PRACTITIONER

## 2024-02-17 PROCEDURE — 93306 TTE W/DOPPLER COMPLETE: CPT | Performed by: INTERNAL MEDICINE

## 2024-02-17 PROCEDURE — 93010 ELECTROCARDIOGRAM REPORT: CPT | Performed by: INTERNAL MEDICINE

## 2024-02-17 PROCEDURE — 80061 LIPID PANEL: CPT | Performed by: NURSE PRACTITIONER

## 2024-02-17 PROCEDURE — 97530 THERAPEUTIC ACTIVITIES: CPT

## 2024-02-17 PROCEDURE — 87081 CULTURE SCREEN ONLY: CPT | Performed by: INTERNAL MEDICINE

## 2024-02-17 PROCEDURE — 99223 1ST HOSP IP/OBS HIGH 75: CPT | Performed by: INTERNAL MEDICINE

## 2024-02-17 PROCEDURE — 84443 ASSAY THYROID STIM HORMONE: CPT | Performed by: NURSE PRACTITIONER

## 2024-02-17 PROCEDURE — 84484 ASSAY OF TROPONIN QUANT: CPT | Performed by: EMERGENCY MEDICINE

## 2024-02-17 PROCEDURE — 94640 AIRWAY INHALATION TREATMENT: CPT

## 2024-02-17 PROCEDURE — 97535 SELF CARE MNGMENT TRAINING: CPT

## 2024-02-17 PROCEDURE — 94760 N-INVAS EAR/PLS OXIMETRY 1: CPT

## 2024-02-17 RX ORDER — CYCLOBENZAPRINE HCL 10 MG
10 TABLET ORAL ONCE
Status: COMPLETED | OUTPATIENT
Start: 2024-02-17 | End: 2024-02-17

## 2024-02-17 RX ORDER — HYDROXYZINE HYDROCHLORIDE 25 MG/1
25 TABLET, FILM COATED ORAL EVERY 6 HOURS PRN
Status: DISCONTINUED | OUTPATIENT
Start: 2024-02-17 | End: 2024-02-20

## 2024-02-17 RX ORDER — ONDANSETRON 2 MG/ML
4 INJECTION INTRAMUSCULAR; INTRAVENOUS EVERY 6 HOURS PRN
Status: DISCONTINUED | OUTPATIENT
Start: 2024-02-17 | End: 2024-02-22 | Stop reason: HOSPADM

## 2024-02-17 RX ORDER — LIDOCAINE 50 MG/G
2 PATCH TOPICAL ONCE
Status: COMPLETED | OUTPATIENT
Start: 2024-02-17 | End: 2024-02-17

## 2024-02-17 RX ORDER — MIDODRINE HYDROCHLORIDE 5 MG/1
2.5 TABLET ORAL
Status: DISCONTINUED | OUTPATIENT
Start: 2024-02-17 | End: 2024-02-18

## 2024-02-17 RX ORDER — GABAPENTIN 300 MG/1
300 CAPSULE ORAL ONCE
Status: COMPLETED | OUTPATIENT
Start: 2024-02-17 | End: 2024-02-17

## 2024-02-17 RX ORDER — FLUTICASONE PROPIONATE 50 MCG
1 SPRAY, SUSPENSION (ML) NASAL 2 TIMES DAILY
Status: DISCONTINUED | OUTPATIENT
Start: 2024-02-17 | End: 2024-02-22 | Stop reason: HOSPADM

## 2024-02-17 RX ORDER — BUDESONIDE AND FORMOTEROL FUMARATE DIHYDRATE 160; 4.5 UG/1; UG/1
2 AEROSOL RESPIRATORY (INHALATION) 2 TIMES DAILY
Status: DISCONTINUED | OUTPATIENT
Start: 2024-02-17 | End: 2024-02-22 | Stop reason: HOSPADM

## 2024-02-17 RX ORDER — TRAMADOL HYDROCHLORIDE 50 MG/1
50 TABLET ORAL EVERY 6 HOURS PRN
Status: DISCONTINUED | OUTPATIENT
Start: 2024-02-17 | End: 2024-02-20

## 2024-02-17 RX ORDER — FUROSEMIDE 10 MG/ML
20 INJECTION INTRAMUSCULAR; INTRAVENOUS ONCE
Status: COMPLETED | OUTPATIENT
Start: 2024-02-17 | End: 2024-02-17

## 2024-02-17 RX ORDER — MAGNESIUM SULFATE HEPTAHYDRATE 40 MG/ML
2 INJECTION, SOLUTION INTRAVENOUS ONCE
Status: COMPLETED | OUTPATIENT
Start: 2024-02-17 | End: 2024-02-17

## 2024-02-17 RX ORDER — SPIRONOLACTONE 25 MG/1
12.5 TABLET ORAL DAILY
Status: DISCONTINUED | OUTPATIENT
Start: 2024-02-17 | End: 2024-02-22 | Stop reason: HOSPADM

## 2024-02-17 RX ORDER — ENOXAPARIN SODIUM 100 MG/ML
1 INJECTION SUBCUTANEOUS EVERY 12 HOURS SCHEDULED
Status: DISCONTINUED | OUTPATIENT
Start: 2024-02-17 | End: 2024-02-19

## 2024-02-17 RX ORDER — HYDROMORPHONE HCL/PF 1 MG/ML
1 SYRINGE (ML) INJECTION ONCE
Status: COMPLETED | OUTPATIENT
Start: 2024-02-17 | End: 2024-02-17

## 2024-02-17 RX ORDER — LATANOPROST 50 UG/ML
1 SOLUTION/ DROPS OPHTHALMIC
Status: DISCONTINUED | OUTPATIENT
Start: 2024-02-17 | End: 2024-02-22 | Stop reason: HOSPADM

## 2024-02-17 RX ORDER — DOXAZOSIN MESYLATE 4 MG/1
4 TABLET ORAL
COMMUNITY
End: 2024-02-22

## 2024-02-17 RX ORDER — ACETAMINOPHEN 325 MG/1
975 TABLET ORAL EVERY 8 HOURS SCHEDULED
Status: DISCONTINUED | OUTPATIENT
Start: 2024-02-17 | End: 2024-02-20

## 2024-02-17 RX ORDER — METOPROLOL SUCCINATE 25 MG/1
25 TABLET, EXTENDED RELEASE ORAL DAILY
Status: DISCONTINUED | OUTPATIENT
Start: 2024-02-17 | End: 2024-02-22 | Stop reason: HOSPADM

## 2024-02-17 RX ORDER — ASPIRIN 325 MG
325 TABLET ORAL ONCE
Status: COMPLETED | OUTPATIENT
Start: 2024-02-17 | End: 2024-02-17

## 2024-02-17 RX ORDER — LEVALBUTEROL INHALATION SOLUTION 0.63 MG/3ML
0.63 SOLUTION RESPIRATORY (INHALATION) EVERY 6 HOURS PRN
Status: DISCONTINUED | OUTPATIENT
Start: 2024-02-17 | End: 2024-02-22 | Stop reason: HOSPADM

## 2024-02-17 RX ORDER — FAMOTIDINE 20 MG/1
20 TABLET, FILM COATED ORAL DAILY
Status: DISCONTINUED | OUTPATIENT
Start: 2024-02-17 | End: 2024-02-22 | Stop reason: HOSPADM

## 2024-02-17 RX ORDER — LATANOPROST 50 UG/ML
1 SOLUTION/ DROPS OPHTHALMIC
Status: DISCONTINUED | OUTPATIENT
Start: 2024-02-17 | End: 2024-02-17

## 2024-02-17 RX ORDER — ASPIRIN 325 MG
325 TABLET ORAL DAILY
Status: DISCONTINUED | OUTPATIENT
Start: 2024-02-17 | End: 2024-02-22 | Stop reason: HOSPADM

## 2024-02-17 RX ORDER — ENOXAPARIN SODIUM 100 MG/ML
1 INJECTION SUBCUTANEOUS EVERY 12 HOURS SCHEDULED
Status: DISCONTINUED | OUTPATIENT
Start: 2024-02-17 | End: 2024-02-17

## 2024-02-17 RX ADMIN — ACETAMINOPHEN 975 MG: 325 TABLET ORAL at 22:04

## 2024-02-17 RX ADMIN — LEVALBUTEROL HYDROCHLORIDE 0.63 MG: 0.63 SOLUTION RESPIRATORY (INHALATION) at 22:21

## 2024-02-17 RX ADMIN — ACETAMINOPHEN 975 MG: 325 TABLET ORAL at 13:16

## 2024-02-17 RX ADMIN — LIDOCAINE 2 PATCH: 700 PATCH TOPICAL at 00:32

## 2024-02-17 RX ADMIN — BUDESONIDE AND FORMOTEROL FUMARATE DIHYDRATE 2 PUFF: 160; 4.5 AEROSOL RESPIRATORY (INHALATION) at 08:11

## 2024-02-17 RX ADMIN — MIDODRINE HYDROCHLORIDE 2.5 MG: 5 TABLET ORAL at 16:32

## 2024-02-17 RX ADMIN — MAGNESIUM SULFATE HEPTAHYDRATE 2 G: 40 INJECTION, SOLUTION INTRAVENOUS at 11:26

## 2024-02-17 RX ADMIN — ENOXAPARIN SODIUM 70 MG: 80 INJECTION SUBCUTANEOUS at 13:16

## 2024-02-17 RX ADMIN — LATANOPROST 1 DROP: 50 SOLUTION OPHTHALMIC at 22:01

## 2024-02-17 RX ADMIN — GABAPENTIN 300 MG: 300 CAPSULE ORAL at 00:32

## 2024-02-17 RX ADMIN — ENOXAPARIN SODIUM 70 MG: 80 INJECTION SUBCUTANEOUS at 01:40

## 2024-02-17 RX ADMIN — FLUTICASONE PROPIONATE 1 SPRAY: 50 SPRAY, METERED NASAL at 08:11

## 2024-02-17 RX ADMIN — ACETAMINOPHEN 975 MG: 325 TABLET ORAL at 01:39

## 2024-02-17 RX ADMIN — TRAMADOL HYDROCHLORIDE 50 MG: 50 TABLET, COATED ORAL at 22:04

## 2024-02-17 RX ADMIN — FAMOTIDINE 20 MG: 20 TABLET, FILM COATED ORAL at 08:11

## 2024-02-17 RX ADMIN — HYDROXYZINE HYDROCHLORIDE 25 MG: 25 TABLET ORAL at 05:18

## 2024-02-17 RX ADMIN — CYCLOBENZAPRINE HYDROCHLORIDE 10 MG: 10 TABLET, FILM COATED ORAL at 00:32

## 2024-02-17 RX ADMIN — ASPIRIN 325 MG: 325 TABLET ORAL at 01:39

## 2024-02-17 RX ADMIN — FUROSEMIDE 20 MG: 10 INJECTION, SOLUTION INTRAMUSCULAR; INTRAVENOUS at 05:26

## 2024-02-17 RX ADMIN — ASPIRIN 325 MG: 325 TABLET ORAL at 11:26

## 2024-02-17 RX ADMIN — HYDROMORPHONE HYDROCHLORIDE 1 MG: 1 INJECTION, SOLUTION INTRAMUSCULAR; INTRAVENOUS; SUBCUTANEOUS at 00:32

## 2024-02-17 NOTE — H&P
[facility]  H&P  Name: Gilberto Vann 83 y.o. adult I MRN: [id number]  Unit/Bed#: ICU 04 I Date of Admission: 2024   Date of Service: 2024 I Hospital Day: 1      Assessment/Plan   * Syncope  Assessment & Plan  Patient presents with syncope versus cardiac arrest at neighbor's .  Patient fell on the floor, sustained abrasion to left scalp.  Bystanders initiated CPR.  EMS arrived 4 minutes later found patient to have palpable pulses and patient had regained consciousness.  Patient remembers he was standing by the door,unable to recall anything else.  Patient had complained of feeling foggy and dizzy, not feeling well ever since he took first dose of doxazosin for prostate yesterday evening per friend at bedside.  Patient reports only ate around 1 PM during the day.  Does not eat breakfast regularly.  Patient did not actually want to go to  due to not feeling well and anxiety per friend at bedside.  Denies history of syncope.  CT head no acute findings  CT chest without contrast showed - Moderate septal thickening due to interstitial edema with small pleural effusions. Acute minimally displaced fractures of multiple bilateral anterior ribs due to CPR. Nondisplaced fracture of the anterior cortex of the superior body of the sternum due to CPR.  Initial troponin 112.  Repeat trending up.  Initial EKG showed NSR, bifascicular block, rate 97,TWI V1-V4,STD V2-V4.  Repeat EKG showed NSR, LVH,STD V5 V6,mild JESSE V1 to V3 likely due to LVH.   Trend troponin  Telemetry  Start therapeutic Lovenox, full dose aspirin x 1  Check lipid panel, A1c  Check 2D echo  Check orthostatic vital signs  Consult cardiology    Elevated troponin  Assessment & Plan  Troponin [phone]  Suspect NSTEMI  Started  therapeutic Lovenox as above  Given full dose aspirin x 1  Lipid panel, A1c  2D echo  Hold off on starting Lipitor due to elevated LFTs   Consult cardiology    Elevated brain natriuretic  peptide (BNP) level  Assessment & Plan  BNP 1270  CT showed - Moderate septal thickening due to interstitial edema with small pleural effusions.   No edema to lower extremities on exam  Will order Lasix 20 mg IV x 1  Check 2D echo  Telemetry  Cardiac diet with fluid restriction 1800 cc per day  Daily weight, intake output    Closed fracture of body of sternum  Assessment & Plan  Pain control  Incentive spirometer    Closed fracture of multiple ribs of both sides  Assessment & Plan  Due to CPR  Incentive spirometer  Pain control    Hyperglycemia  Assessment & Plan  Glucose 242  Check A1c    Anemia  Assessment & Plan  Baseline hemoglobin around 11  Hemoglobin stable  Monitor    Anxiety  Assessment & Plan  Patient was on low-dose Xanax as needed before.  Not currently.  Will order Atarax as needed.  Declined psych consult.    Pulmonary nodules  Assessment & Plan  CT showed -  A few 5 mm and smaller nodules. Stability is unknown given the absence of comparison studies. Follow-up could be obtained with a chest CT with no contrast in 6 months to assure stability.     Glaucoma  Assessment & Plan  Continue home eyedrop    COPD (chronic obstructive pulmonary disease) (HCC)  Assessment & Plan  Continue Symbicort  No wheezing on auscultation  Satting low 90s on room air.  Satting high 90s on O2 1 L    Benign prostatic hyperplasia with urinary retention  Assessment & Plan  Started on doxazosin yesterday by urology.  Reported felt foggy and dizzy, not feeling well after taking first dose.  Will hold doxazosin.  Consult urology               VTE Prophylaxis: Enoxaparin (Lovenox)  / reason for no mechanical VTE prophylaxis on Lovenox    Code Status: Full code  POLST: POLST form is not discussed and not completed at this time.    Anticipated Length of Stay:  Patient will be admitted on an Inpatient basis with an anticipated length of stay of  > 2 midnights.   Justification for Hospital Stay: Syncope versus cardiac arrest, elevated  troponin, elevated BNP    Total Time for Visit, including Counseling / Coordination of Care: 1 hour.  Greater than 50% of this total time spent on direct patient counseling and coordination of care.    Chief Complaint:   Syncope versus cardiac arrest    History of Present Illness:    Gilberto Vann is a 83 y.o. adult with PMH of BPH, COPD, asthma, glaucoma who presents with syncope versus cardiac arrest at neighbor's .  Patient fell on the floor, sustained abrasion to left scalp.  Bystanders initiated CPR.  EMS arrived 4 minutes later found patient to have palpable pulses and patient had regained consciousness.  Patient remembers he was standing by the door,unable to recall anything else.  Patient had complained of feeling foggy and dizzy, not feeling well ever since he took first dose of doxazosin for prostate yesterday evening per friend at bedside.  Patient reports only ate around 1 PM during the day.  Does not eat breakfast regularly.  Patient did not actually want to go to  due to not feeling well and anxiety per friend at bedside.  Friend reports patient has anxiety issue, does not sleep well at night.  Patient denies history of syncope, denies history of heart attack/cardiac arrhythmia/CHF.  Patient reports diffuse anterior chest pain currently from rib fractures.  Patient denies headache dizziness nausea vomiting abdominal pain SOB cough fever chills.  Patient denies trouble urinating.  Information obtained from patient, friend at bedside.  Reviewed ED note.            Review of Systems:    Review of Systems   Reason unable to perform ROS: Spoke to friend at bedside.  Reviewed ED note.   Cardiovascular:         ?  Cardiac arrest   Neurological:  Positive for syncope.   All other systems reviewed and are negative.      Past Medical and Surgical History:     Past Medical History:   Diagnosis Date    Arthritis     Asthma     Back pain     BPH (benign prostatic hypertrophy) with urinary  obstruction     COPD (chronic obstructive pulmonary disease) (HCC)     Dizziness     Glaucoma     bilat    Insomnia     RLS (restless legs syndrome)     Sinusitis        Past Surgical History:   Procedure Laterality Date    CATARACT EXTRACTION Left     EAR SURGERY      to wart    EPIDIDYMAL CYST EXCISION Right 05/04/2000    HEMORROIDECTOMY      MD EXCISION HYDROCELE UNILATERAL Left 9/14/2023    Procedure: HYDROCELECTOMY, sPERMATOCELECTOMY;  Surgeon: James Blunt MD;  Location: [facility];  Service: Urology    MD TRURL ELECTROSURG RESCJ PROSTATE BLEED COMPLETE N/A 04/16/2020    Procedure: CYSTOSCOPY, TRANSURETHRAL RESECTION OF PROSTATE (TURP);  Surgeon: Reji Meneses MD;  Location: [facility];  Service: Urology    PROSTATE BIOPSY Bilateral 12/11/2003    BPH with mild acute and chronic inflammation    PROSTATE BIOPSY Bilateral 06/21/2005    BPH with marked artropy and chronic inflammation       Meds/Allergies:    Prior to Admission medications    Medication Sig Start Date End Date Taking? Authorizing Provider   budesonide-formoterol (SYMBICORT) 160-4.5 mcg/act inhaler Inhale 2 puffs 2 (two) times a day Rinse mouth after use. 1/22/24  Yes Dima Lloyd MD   doxazosin (CARDURA) 4 mg tablet Take 4 mg by mouth daily at bedtime Patient states he took only 1 single dose of medication which was prescribed yesterday   Yes Historical Provider, MD   fluticasone (FLONASE) 50 mcg/act nasal spray 1 spray into each nostril 2 (two) times a day 5/15/23  Yes Dima Lloyd MD   Gluc-Chonn-MSM-Boswellia-Vit D (Glucosamine Chondroitin + D3) TABS Take by mouth   Yes Historical Provider, MD   latanoprost (XALATAN) 0.005 % ophthalmic solution Administer 1 drop to both eyes daily at bedtime   Yes Historical Provider, MD   albuterol (2.5 mg/3 mL) 0.083 % nebulizer solution Take 3 mL (2.5 mg total) by nebulization 4 (four) times a day as needed for wheezing or shortness of breath  Patient not taking: Reported on 12/26/2023  "23   Shaun Hernandez DO   ALPRAZolam (XANAX) 0.25 mg tablet Take 1 pill at bedtime  Patient not taking: Reported on 2023   Dima Lloyd MD   fluticasone-umeclidinium-vilanterol (Trelegy Ellipta) 200-62.5-25 mcg/actuation AEPB inhaler Inhale 1 puff daily Rinse mouth after use.  Patient not taking: Reported on 23  Shaun Hernandez DO   methylPREDNISolone 4 MG tablet therapy pack Use as directed on package 23   Dima Lloyd MD     I have reviewed home medications with patient personally.    Allergies:   Allergies   Allergen Reactions    Shellfish-Derived Products - Food Allergy Other (See Comments)     Listed by PCP patient denies    Iodine - Food Allergy Other (See Comments)       Social History:     Marital Status: Unknown   Occupation: Retired  Patient Pre-hospital Living Situation: Lives alone  Patient Pre-hospital Level of Mobility: Independent  Patient Pre-hospital Diet Restrictions: Cardiac  Substance Use History:   Social History     Substance and Sexual Activity   Alcohol Use Not Currently     Social History     Tobacco Use   Smoking Status Former    Current packs/day: 0.00    Average packs/day: 1 pack/day for 15.0 years (15.0 ttl pk-yrs)    Types: Cigarettes    Start date:     Quit date:     Years since quittin.1    Passive exposure: Never   Smokeless Tobacco Current    Types: Chew   Tobacco Comments    Chews cigars     Social History     Substance and Sexual Activity   Drug Use Never       Family History:    non-contributory    Physical Exam:     Vitals:   Blood Pressure: 114/59 (24 0500)  Pulse: 77 (24 0500)  Temperature: 98.1 °F (36.7 °C) (24 040)  Temp Source: Temporal (24)  Respirations: 19 (24)  Height: 5' 9\" (175.3 cm) (24)  Weight - Scale: 66.2 kg (145 lb 15.1 oz) (24)  SpO2: 98 % (24 0500)    Physical Exam  Vitals and nursing note reviewed. "   Constitutional:       Appearance: Gilberto is well-developed.      Comments: Patient appears weak.   HENT:      Head: Normocephalic and atraumatic.      Comments: Left scalp small abrasion.  Neck:      Thyroid: No thyromegaly.      Vascular: No JVD.      Trachea: No tracheal deviation.   Cardiovascular:      Rate and Rhythm: Normal rate and regular rhythm.      Heart sounds: Murmur heard.   Pulmonary:      Effort: Pulmonary effort is normal. No respiratory distress.      Breath sounds: No wheezing or rales.      Comments: Breath sounds clear diminished bilateral, on O2 1L, satting high 90s  Abdominal:      General: Bowel sounds are normal. There is no distension.      Palpations: Abdomen is soft.      Tenderness: There is no abdominal tenderness. There is no guarding.   Musculoskeletal:      Cervical back: Neck supple.      Right lower leg: No edema.      Left lower leg: No edema.   Skin:     General: Skin is warm and dry.   Neurological:      General: No focal deficit present.      Mental Status: Gilberto is alert and oriented to person, place, and time.   Psychiatric:         Mood and Affect: Mood normal.         Judgment: Judgment normal.         Additional Data:     Lab Results: I have personally reviewed pertinent reports.      Results from last 7 days   Lab Units 02/17/24  0505 02/16/24  1908   WBC Thousand/uL 8.13 9.37   HEMOGLOBIN g/dL 11.1* 12.2   HEMATOCRIT % 33.7* 37.6   PLATELETS Thousands/uL 156 208   NEUTROS PCT %  --  83*   LYMPHS PCT %  --  9*   MONOS PCT %  --  6   EOS PCT %  --  0     Results from last 7 days   Lab Units 02/16/24  1908   POTASSIUM mmol/L 3.9   CHLORIDE mmol/L 105   CO2 mmol/L 23   BUN mg/dL 23   CREATININE mg/dL 1.19   CALCIUM mg/dL 8.7   ALK PHOS U/L 59   ALT U/L 78*   AST U/L 77*           Imaging: I have personally reviewed pertinent reports.      CT chest without contrast    Result Date: 2/16/2024  Narrative: CT CHEST WITHOUT IV CONTRAST INDICATION:   Central chest pain after  chest compressions. Per my review of the medical record, history of COPD with apparent syncope. CPR administered. Recent rhinorrhea and minimally productive cough. COMPARISON: Chest radiograph 5/17/2023, abdomen CT 4/12/2020. TECHNIQUE: Chest CT without intravenous contrast.  Axial, sagittal, coronal 2D reformats and coronal MIPS from source data.  3D reconstructions of the bony thorax were performed in order to improved sensitivity of evaluation for rib fractures. Radiation dose length product (DLP):  319 mGy-cm . Radiation dose exposure minimized using iterative reconstruction and automated exposure control. FINDINGS: LUNGS: Moderate septal thickening. 5 mm solid nodule lateral segment right middle lobe (3/145). Two 3 mm juxtapleural lateral segment right lower lobe nodules (3/210). 3 mm nodules in the left upper lobe (3/62, 73, 100). 5 mm solid superior segment left lower lobe nodule (3/102). Mild dependent atelectasis in the lower lobes. A few benign thin-walled cysts. AIRWAYS: No significant filling defects. Mild diffuse bronchial wall thickening. PLEURA: Small effusions. HEART/GREAT VESSELS: Moderate cardiomegaly. Moderate coronary artery calcification indicating atherosclerotic heart disease. Moderate calcification of the aortic valve leaflets which can contribute to aortic stenosis. Pulmonary artery enlargement. MEDIASTINUM AND LJ:  Unremarkable. CHEST WALL AND LOWER NECK: Unremarkable. UPPER ABDOMEN: Benign calcification in the right hepatic lobe. OSSEOUS STRUCTURES: Acute mildly displaced fractures of multiple bilateral anterior ribs due to CPR. Nondisplaced fracture of the anterior cortex of the body of the sternum. Mild degenerative disease in the spine.     Impression: Moderate septal thickening due to interstitial edema with small pleural effusions. Acute minimally displaced fractures of multiple bilateral anterior ribs due to CPR. Nondisplaced fracture of the anterior cortex of the superior body of  the sternum due to CPR. A few 5 mm and smaller nodules. Stability is unknown given the absence of comparison studies. Follow-up could be obtained with a chest CT with no contrast in 6 months to assure stability. This study was marked in Epic for immediate notification and follow-up. Workstation performed: [id number]     CT head without contrast    Result Date: 2/16/2024  Narrative: CT BRAIN - WITHOUT CONTRAST INDICATION:   Fall, head strike on aspirin. COMPARISON:  None. TECHNIQUE:  CT examination of the brain was performed.  Multiplanar 2D reformatted images were created from the source data. Radiation dose length product (DLP) for this visit:  1024 mGy-cm .  This examination, like all CT scans performed in the [facility], was performed utilizing techniques to minimize radiation dose exposure, including the use of iterative reconstruction and automated exposure control. IMAGE QUALITY:  Diagnostic. FINDINGS: PARENCHYMA:  No intracranial mass, mass effect or midline shift. No CT signs of acute infarction.  No acute parenchymal hemorrhage. VENTRICLES AND EXTRA-AXIAL SPACES:  Ventricles and extra-axial CSF spaces are prominent commensurate with the degree of volume loss.  No hydrocephalus.  No acute extra-axial hemorrhage. VISUALIZED ORBITS: Normal visualized orbits. PARANASAL SINUSES: Mucoperiosteal thickening the bilateral ethmoid, frontal, and inferior maxillary sinuses. The mastoid air cells appear adequately aerated and clear without air-fluid levels. CALVARIUM AND EXTRACRANIAL SOFT TISSUES: Bony calvarium and temporomandibular joints are intact. Superficial lipoma in the posterior occipital region. Scalp soft tissues otherwise unremarkable.     Impression: No acute intracranial abnormality. Paranasal sinus disease. Workstation performed: [id number]       EKG, Pathology, and Other Studies Reviewed on Admission:   EKG: as above    [employer] Records Reviewed: Yes     ** Please Note: Dragon 360  Dictation voice to text software may have been used in the creation of this document. **

## 2024-02-17 NOTE — QUICK NOTE
Progress Note - Triage Assessment   Gilberto Vann 83 y.o. adult MRN: [id number]    Time Called: 2245  Date Called: 02/17/24  Room#: ED CT1  Time Evaluated: 2320  Person requesting evaluation: Dr. Fan    Called to ED to evaluate patient for possible admission to Critical Care Service, chart reviewed and patient evaluated.     Case discussed with Critical Care attending Dr. Martinez via TT and after review it was felt the patient is appropriate for admission to a general medical floor.    Discussed case with Hospitalist service and they have agreed to accept patient to their service.    Recommendations discussed with ED attending Dr. Cortez          Triage Assessment:     Patient appropriate to be admitted to Stepdown Level 2 level of care.    If any questions or concerns please call the critical care team at ext [phone].

## 2024-02-17 NOTE — ED NOTES
Patient's O2 saturation noted to be 88% on RA. Patient does appear to be taking shallow breaths d/t pain. Patient encouraged to take deep breaths. Placed patient on 2L NC.      Mera Durbin RN  02/16/24 [id number]

## 2024-02-17 NOTE — ASSESSMENT & PLAN NOTE
Continue Symbicort  No wheezing on auscultation  Patient refusing to take Symbicort as patient has not been using the inhaler for the past few weeks

## 2024-02-17 NOTE — ED NOTES
Patient's sister, Lucy, updated by this RN at the patient's request.      Mera Durbin RN  02/16/24 2316

## 2024-02-17 NOTE — ED PROVIDER NOTES
"History  Chief Complaint   Patient presents with    Syncope     Witnessed syncope or Cardiac arrest at . Patient fell on the floor, abrasion to left scalp. Bystanders initiated  cpr . On EMT arrival  4 min later patient has palpable pulses, regains conscientiousness. Presents to ED awake, oriented, on RA       HPI  Patient is an 83-year-old male history of COPD on no home oxygen requirement, chronic back pain presenting for evaluation after apparent syncopal event.  Patient was at a  home, lost consciousness.  Patient was agonally breathing police who estimate that they performed 4 minutes of CPR.  At time of EMS arrival, patient with palpable pulses, awake, fully oriented.  Patient complains of pain in the area of an abrasion on the side of his head, complains of some central chest pain, denies significant shortness of breath.  Patient states that he has felt \"off\" for the past week, states he has had slight rhinorrhea and a cough which has been minimally productive, denies fevers, chills, recent travel or sick contacts.  Patient denies prior cardiac history or history of syncopal events.  Prior to Admission Medications   Prescriptions Last Dose Informant Patient Reported? Taking?   ALPRAZolam (XANAX) 0.25 mg tablet   No No   Sig: Take 1 pill at bedtime   Patient not taking: Reported on 2023   Gluc-Chonn-MSM-Boswellia-Vit D (Glucosamine Chondroitin + D3) TABS 2/15/2024  Yes Yes   Sig: Take by mouth   albuterol (2.5 mg/3 mL) 0.083 % nebulizer solution   No No   Sig: Take 3 mL (2.5 mg total) by nebulization 4 (four) times a day as needed for wheezing or shortness of breath   Patient not taking: Reported on 2023   budesonide-formoterol (SYMBICORT) 160-4.5 mcg/act inhaler Past Week  No Yes   Sig: Inhale 2 puffs 2 (two) times a day Rinse mouth after use.   fluticasone (FLONASE) 50 mcg/act nasal spray 2/15/2024 Self No Yes   Si spray into each nostril 2 (two) times a day "   fluticasone-umeclidinium-vilanterol (Trelegy Ellipta) 200-62.5-25 mcg/actuation AEPB inhaler   No No   Sig: Inhale 1 puff daily Rinse mouth after use.   latanoprost (XALATAN) 0.005 % ophthalmic solution 2/15/2024 Self Yes Yes   Si drop daily at bedtime   methylPREDNISolone 4 MG tablet therapy pack   No No   Sig: Use as directed on package      Facility-Administered Medications: None       Past Medical History:   Diagnosis Date    Arthritis     Asthma     Back pain     BPH (benign prostatic hypertrophy) with urinary obstruction     COPD (chronic obstructive pulmonary disease) ([facility])     Dizziness     Glaucoma     bilat    Insomnia     RLS (restless legs syndrome)     Sinusitis        Past Surgical History:   Procedure Laterality Date    CATARACT EXTRACTION Left     EAR SURGERY      to wart    EPIDIDYMAL CYST EXCISION Right 2000    HEMORROIDECTOMY      MA EXCISION HYDROCELE UNILATERAL Left 2023    Procedure: HYDROCELECTOMY, sPERMATOCELECTOMY;  Surgeon: James Blunt MD;  Location: [facility] OR;  Service: Urology    MA TRURL ELECTROSURG RESCJ PROSTATE BLEED COMPLETE N/A 2020    Procedure: CYSTOSCOPY, TRANSURETHRAL RESECTION OF PROSTATE (TURP);  Surgeon: Reji Meneses MD;  Location: [facility];  Service: Urology    PROSTATE BIOPSY Bilateral 2003    BPH with mild acute and chronic inflammation    PROSTATE BIOPSY Bilateral 2005    BPH with marked artropy and chronic inflammation       Family History   Problem Relation Age of Onset    Asthma Father      I have reviewed and agree with the history as documented.    E-Cigarette/Vaping    E-Cigarette Use Never User      E-Cigarette/Vaping Substances    Nicotine No     THC No     CBD No     Flavoring No     Other No     Unknown No      Social History     Tobacco Use    Smoking status: Former     Current packs/day: 0.00     Average packs/day: 1 pack/day for 15.0 years (15.0 ttl pk-yrs)     Types: Cigarettes     Start date:      Quit  date:      Years since quittin.1     Passive exposure: Never    Smokeless tobacco: Current     Types: Chew    Tobacco comments:     Chews cigars   Vaping Use    Vaping status: Never Used   Substance Use Topics    Alcohol use: Not Currently    Drug use: Never       Review of Systems   Constitutional:  Positive for fatigue. Negative for chills and fever.   Respiratory:  Negative for cough and shortness of breath.    Cardiovascular:  Positive for chest pain.   Gastrointestinal:  Negative for diarrhea, nausea and vomiting.   Genitourinary:  Negative for difficulty urinating and frequency.   Musculoskeletal:  Negative for arthralgias and myalgias.   Skin:  Negative for color change, pallor, rash and wound.   Neurological:  Positive for headaches.   Psychiatric/Behavioral:  Negative for confusion.    All other systems reviewed and are negative.      Physical Exam  Physical Exam  Vitals and nursing note reviewed.   Constitutional:       General: Gilberto is not in acute distress.     Appearance: Gilberto is well-developed. Gilberto is cachectic. Gilberto is not diaphoretic.      Comments: Chronically ill-appearing, cachectic but nontoxic nondistressed   HENT:      Head: Normocephalic and atraumatic.      Comments: Small abrasion on the right side of the parietal scalp, not actively bleeding.  Pupils 3 mm bilaterally, reactive to light, normal EOM.  Moist mucous membranes.     Right Ear: External ear normal.      Left Ear: External ear normal.      Nose: Nose normal.      Mouth/Throat:      Pharynx: No oropharyngeal exudate.   Eyes:      Conjunctiva/sclera: Conjunctivae normal.      Pupils: Pupils are equal, round, and reactive to light.   Cardiovascular:      Rate and Rhythm: Normal rate and regular rhythm.      Heart sounds: Normal heart sounds. No murmur heard.     No friction rub. No gallop.      Comments: Regular rate and rhythm, no murmurs rubs or gallops.  Extremities warm and well-perfused without  mottling.  Pulmonary:      Effort: Pulmonary effort is normal. No respiratory distress.      Breath sounds: Normal breath sounds. No wheezing.      Comments: No increased work of breathing.  Speaking in complete sentences.  Lungs clear to auscultation bilaterally without wheezes, rales, rhonchi.  Satting 97% on room air indicating adequate oxygenation.  Chest:      Chest wall: Tenderness present.      Comments: Generalized anterior chest wall tenderness.  No overlying skin changes.  No palpable crepitus.  Abdominal:      General: Bowel sounds are normal. There is no distension.      Palpations: Abdomen is soft. There is no mass.      Tenderness: There is no abdominal tenderness. There is no guarding or rebound.   Musculoskeletal:         General: No deformity.   Skin:     General: Skin is warm and dry.      Capillary Refill: Capillary refill takes less than 2 seconds.   Neurological:      Mental Status: Gilberto is alert and oriented to person, place, and time.   Psychiatric:         Behavior: Behavior normal.         Vital Signs  ED Triage Vitals   Temperature Pulse Respirations Blood Pressure SpO2   02/16/24 1859 02/16/24 1859 02/16/24 1859 02/16/24 1859 02/16/24 1859   97.7 °F (36.5 °C) 97 (!) 28 130/77 98 %      Temp Source Heart Rate Source Patient Position - Orthostatic VS BP Location FiO2 (%)   02/16/24 1859 02/16/24 1859 02/16/24 1859 02/16/24 1859 --   Oral Monitor Lying Right arm       Pain Score       02/16/24 1941       8           Vitals:    02/16/24 2130 02/16/24 2200 02/16/24 2230 02/16/24 2300   BP: 99/57 110/61 126/69 117/62   Pulse: 86 86 85 84   Patient Position - Orthostatic VS: Lying Lying Sitting Sitting         Visual Acuity      ED Medications  Medications   sodium chloride 0.9 % bolus 1,000 mL (1,000 mL Intravenous New Bag 2/16/24 2144)   HYDROmorphone (DILAUDID) injection 0.5 mg (0.5 mg Intravenous Given 2/16/24 1941)   HYDROmorphone (DILAUDID) injection 0.5 mg (0.5 mg Intravenous Given  2/16/24 2144)       Diagnostic Studies  Results Reviewed       Procedure Component Value Units Date/Time    HS Troponin I 2hr [[id number]]  (Abnormal) Collected: 02/16/24 2217    Lab Status: Final result Specimen: Blood from Arm, Right Updated: 02/16/24 2254     hs TnI 2hr 252 ng/L      Delta 2hr hsTnI 140 ng/L     HS Troponin I 4hr [[id number]]     Lab Status: No result Specimen: Blood     HS Troponin 0hr (reflex protocol) [[id number]]  (Abnormal) Collected: 02/16/24 2027    Lab Status: Final result Specimen: Blood from Arm, Right Updated: 02/16/24 2057     hs TnI 0hr 112 ng/L     COVID/FLU/RSV [[id number]]  (Normal) Collected: 02/16/24 1908    Lab Status: Final result Specimen: Nares from Nose Updated: 02/16/24 1955     SARS-CoV-2 Negative     INFLUENZA A PCR Negative     INFLUENZA B PCR Negative     RSV PCR Negative    Narrative:      FOR PEDIATRIC PATIENTS - copy/paste COVID Guidelines URL to browser: [email or web address]    SARS-CoV-2 assay is a Nucleic Acid Amplification assay intended for the  qualitative detection of nucleic acid from SARS-CoV-2 in nasopharyngeal  swabs. Results are for the presumptive identification of SARS-CoV-2 RNA.    Positive results are indicative of infection with SARS-CoV-2, the virus  causing COVID-19, but do not rule out bacterial infection or co-infection  with other viruses. Laboratories within the [place] and its  territories are required to report all positive results to the appropriate  public health authorities. Negative results do not preclude SARS-CoV-2  infection and should not be used as the sole basis for treatment or other  patient management decisions. Negative results must be combined with  clinical observations, patient history, and epidemiological information.  This test has not been FDA cleared or approved.    This test has been authorized by FDA under an Emergency Use Authorization  (EUA). This test is only  authorized for the duration of time the  declaration that circumstances exist justifying the authorization of the  emergency use of an in vitro diagnostic tests for detection of SARS-CoV-2  virus and/or diagnosis of COVID-19 infection under section 564(b)(1) of  the Act, 21 U.S.C. 360bbb-3(b)(1), unless the authorization is terminated  or revoked sooner. The test has been validated but independent review by FDA  and CLIA is pending.    Test performed using [employer]: This RT-PCR assay targets N2,  a region unique to SARS-CoV-2. A conserved region in the E-gene was chosen  for pan-Sarbecovirus detection which includes SARS-CoV-2.    According to CMS-2020-01-R, this platform meets the definition of high-throughput technology.    Fingerstick Glucose (POCT) [[id number]]  (Abnormal) Collected: 02/16/24 1855    Lab Status: Final result Updated: 02/16/24 1945     POC Glucose 204 mg/dl     Comprehensive metabolic panel [[id number]]  (Abnormal) Collected: 02/16/24 1908    Lab Status: Final result Specimen: Blood from Arm, Right Updated: 02/16/24 1935     Sodium 139 mmol/L      Potassium 3.9 mmol/L      Chloride 105 mmol/L      CO2 23 mmol/L      ANION GAP 11 mmol/L      BUN 23 mg/dL      Creatinine 1.19 mg/dL      Glucose 242 mg/dL      Calcium 8.7 mg/dL      AST 77 U/L      ALT 78 U/L      Alkaline Phosphatase 59 U/L      Total Protein 5.8 g/dL      Albumin 3.6 g/dL      Total Bilirubin 0.76 mg/dL      eGFR --    Narrative:      Notes:     1. eGFR calculation is only valid for adults 18 years and older.  2. EGFR calculation cannot be performed for patients who are transgender, non-binary, or whose legal sex, sex at birth, and gender identity differ.    CBC and differential [[id number]]  (Abnormal) Collected: 02/16/24 1908    Lab Status: Final result Specimen: Blood from Arm, Right Updated: 02/16/24 1917     WBC 9.37 Thousand/uL      RBC 4.10 Million/uL      Hemoglobin 12.2 g/dL      Hematocrit 37.6 %      MCV 92 fL       MCH 29.8 pg      MCHC 32.4 g/dL      RDW 14.9 %      MPV 10.7 fL      Platelets 208 Thousands/uL      nRBC 0 /100 WBCs      Neutrophils Relative 83 %      Immat GRANS % 1 %      Lymphocytes Relative 9 %      Monocytes Relative 6 %      Eosinophils Relative 0 %      Basophils Relative 1 %      Neutrophils Absolute 7.73 Thousands/µL      Immature Grans Absolute 0.11 Thousand/uL      Lymphocytes Absolute 0.86 Thousands/µL      Monocytes Absolute 0.58 Thousand/µL      Eosinophils Absolute 0.03 Thousand/µL      Basophils Absolute 0.06 Thousands/µL                    CT chest without contrast   Final Result by Maddie Edge MD (02/16 2105)      Moderate septal thickening due to interstitial edema with small pleural effusions.      Acute minimally displaced fractures of multiple bilateral anterior ribs due to CPR.      Nondisplaced fracture of the anterior cortex of the superior body of the sternum due to CPR.      A few 5 mm and smaller nodules. Stability is unknown given the absence of comparison studies. Follow-up could be obtained with a chest CT with no contrast in 6 months to assure stability.      This study was marked in Epic for immediate notification and follow-up.         Workstation performed: [id number]         CT head without contrast   Final Result by Jesu Varela MD (02/16 2048)      No acute intracranial abnormality. Paranasal sinus disease.                  Workstation performed: [id number]                    Procedures  Procedures         ED Course                               SBIRT 22yo+      Flowsheet Row Most Recent Value   Initial Alcohol Screen: US AUDIT-C     1. How often do you have a drink containing alcohol? 0 Filed at: 02/16/2024 2012   2. How many drinks containing alcohol do you have on a typical day you are drinking?  0 Filed at: 02/16/2024 2012   3a. Male UNDER 65: How often do you have five or more drinks on one occasion? 0 Filed at: 02/16/2024 2012   3b. FEMALE Any Age, or MALE  65+: How often do you have 4 or more drinks on one occassion? 0 Filed at: 02/16/2024 2012   Audit-C Score 0 Filed at: 02/16/2024 2012   BUSTER: How many times in the past year have you...    Used an illegal drug or used a prescription medication for non-medical reasons? Never Filed at: 02/16/2024 2012                      Medical Decision Making  I obtained history from the patient. I reviewed external medical documentation.  Patient was evaluated by primary care provider on 12/26/2023 for bronchitis as well as additional chronic conditions such as primary hypertension.  Based on description of the event, patient most likely had a syncopal event and not a true cardiac arrest.  I ordered and reviewed lab work including CBC, CMP, troponin.  I ordered and independently interpreted an EKG which demonstrates normal sinus rhythm with a right bundle branch block rate of 97.  Given patient's head strike on aspirin, I ordered and reviewed a CT head.  Given patient's chest compressions, chest pain, I ordered and reviewed a CT chest primarily to evaluate for broken ribs or pneumothorax.  Patient with multiple broken ribs and a broken sternum likely all sustained during chest compressions.  Patient with a mildly elevated troponin.  I discussed patient with the critical care team will admit patient at the bedside with plan to admit to either the ICU or medical floor if critical care team deems appropriate.  Patient signed out to Dr. Cortez pending critical care evaluation.  Patient remaining stable in the emergency department.    Amount and/or Complexity of Data Reviewed  Labs: ordered.  Radiology: ordered.    Risk  Prescription drug management.             Disposition  Final diagnoses:   None     ED Disposition       None          Follow-up Information    None         Patient's Medications   Discharge Prescriptions    No medications on file       No discharge procedures on file.    PDMP Review         Value Time User    PDMP  Reviewed  Yes 11/28/2023 12:20 PM Dima Lloyd MD            ED Provider  Electronically Signed by             Jorge Fan MD  02/16/24 6286

## 2024-02-17 NOTE — ASSESSMENT & PLAN NOTE
Continue Symbicort  No wheezing on auscultation  Satting low 90s on room air.  Satting high 90s on O2 1 L

## 2024-02-17 NOTE — ASSESSMENT & PLAN NOTE
BNP 1270  CT showed - Moderate septal thickening due to interstitial edema with small pleural effusions.   No edema to lower extremities on exam  Will order Lasix 20 mg IV x 1  Check 2D echo  Telemetry  Cardiac diet with fluid restriction 1800 cc per day  Daily weight, intake output

## 2024-02-17 NOTE — ASSESSMENT & PLAN NOTE
Started on doxazosin yesterday by urology.  Reported felt foggy and dizzy, not feeling well after taking first dose.  Will hold doxazosin.  Consult urology

## 2024-02-17 NOTE — ASSESSMENT & PLAN NOTE
Patient was on low-dose Xanax as needed before.  Not currently.  Will order Atarax as needed.  Declined psych consult.

## 2024-02-17 NOTE — ASSESSMENT & PLAN NOTE
BNP 1270  CT showed - Moderate septal thickening due to interstitial edema with small pleural effusions.   No edema to lower extremities on exam  Patient received a dose of Lasix 20 mg IV  Blood pressure continues to remain soft and could not get further diuretics  Cardiac diet with fluid restriction 1800 cc per day  Echo showed EF of 25% as stated above  Patient started on Toprol-XL 20 mg p.o. daily and Aldactone 12.5 mg p.o. daily  Patient was started midodrine 2.5 mg p.o. twice daily which was increased to 5 mg p.o. 3 times daily

## 2024-02-17 NOTE — ASSESSMENT & PLAN NOTE
CT showed -  A few 5 mm and smaller nodules. Stability is unknown given the absence of comparison studies. Follow-up could be obtained with a chest CT with no contrast in 6 months to assure stability.

## 2024-02-17 NOTE — ASSESSMENT & PLAN NOTE
Peak troponin was 3327  Suspected nonischemic myocardial injury in the setting of CPR, critical aortic stenosis and heart failure  Patient started on therapeutic Lovenox  Added on aspirin  Lipid panel showed LDL level of 82  2D echo-EF of 25% with aortic valve area of 0.5 cm squire  Hold off on starting Lipitor due to elevated LFTs   Cardiology input appreciated  Patient will need cardiac catheterization at some point

## 2024-02-17 NOTE — ASSESSMENT & PLAN NOTE
Patient presents with syncope versus cardiac arrest at neighbor's .  Patient fell on the floor, sustained abrasion to left scalp.  Bystanders initiated CPR.  EMS arrived 4 minutes later found patient to have palpable pulses and patient had regained consciousness.  Patient remembers he was standing by the door,unable to recall anything else.  Patient had complained of feeling foggy and dizzy, not feeling well ever since he took first dose of doxazosin for prostate yesterday evening per friend at bedside.  Patient reports only ate around 1 PM during the day.  Does not eat breakfast regularly.  Patient did not actually want to go to  due to not feeling well and anxiety per friend at bedside.  Denies history of syncope.  CT head no acute findings  CT chest without contrast showed - Moderate septal thickening due to interstitial edema with small pleural effusions. Acute minimally displaced fractures of multiple bilateral anterior ribs due to CPR. Nondisplaced fracture of the anterior cortex of the superior body of the sternum due to CPR.  Initial troponin 112.  Repeat trending up.  Initial EKG showed NSR, bifascicular block, rate 97,TWI V1-V4,STD V2-V4.  Repeat EKG showed NSR, LVH,STD V5 V6,mild JESSE V1 to V3 likely due to LVH.   Peak troponin was 3327  Patient has been on Lovenox 1 mg/g subcutaneously twice daily.  Continued on Lovenox  Lipid panel showed LDL level of 82.  Hemoglobin A1c was 5.7  2D echo showed EF of 25% with severe global hypokinesis with grade 2 diastolic dysfunction with severely dilated left atrium with an aortic valve area of 1.5 cm per  Orthostatic vital signs could not be obtained as patient is not very cooperative  Cardiology input appreciated  Suspected syncope in the setting of possible critical aortic stenosis versus ventricular arrhythmias due to cardiomyopathy

## 2024-02-17 NOTE — ASSESSMENT & PLAN NOTE
Troponin [phone]  Suspect NSTEMI  Started  therapeutic Lovenox as above  Given full dose aspirin x 1  Lipid panel, A1c  2D echo  Hold off on starting Lipitor due to elevated LFTs   Consult cardiology

## 2024-02-17 NOTE — OCCUPATIONAL THERAPY NOTE
"Occupational Therapy Evaluation/Treatment       02/17/24 1449   OT Last Visit   OT Visit Date 02/17/24   Note Type   Note type Evaluation   Pain Assessment   Pain Assessment Tool 0-10   Pain Score 5   Pain Location/Orientation Location: Rib Cage   Restrictions/Precautions   Weight Bearing Precautions Per Order No   Other Precautions Chair Alarm;Bed Alarm;Fall Risk;O2;Multiple lines;Telemetry;Pain  (seen in ICU)   Home Living   Type of Home House   Home Layout Two level;Bed/bath upstairs  (full bath on firstr floor)   Bathroom Shower/Tub Walk-in shower   Bathroom Toilet Standard   Prior Function   Level of [place] Independent with ADLs;Independent with functional mobility;Independent with IADLS   Lives With Alone   Receives Help From Friend(s)   IADLs Independent with driving;Independent with meal prep;Independent with medication management   General   Additional Pertinent History Pt admitted with syncope vs cardiac arrest   Family/Caregiver Present No   Subjective   Subjective \"My chest hurts.\"   ADL   Eating Assistance 7  Independent   Grooming Assistance 5  Supervision/Setup   UB Bathing Assistance 4  Minimal Assistance   LB Bathing Assistance 4  Minimal Assistance   UB Dressing Assistance 4  Minimal Assistance   LB Dressing Assistance 4  Minimal Assistance   Toileting Assistance  4  Minimal Assistance   Bed Mobility   Supine to Sit 4  Minimal assistance   Additional items Verbal cues;Increased time required   Sit to Supine 5  Supervision   Additional items Verbal cues   Transfers   Sit to Stand 4  Minimal assistance   Additional items Assist x 1;Verbal cues;Increased time required   Stand to Sit 4  Minimal assistance   Additional items Assist x 1;Verbal cues   Toilet transfer 4  Minimal assistance   Additional items Assist x 1;Verbal cues;Standard toilet   Functional Mobility   Functional Mobility 4  Minimal assistance   Additional Comments engaged in fxl mobility short household distances using RW, min A "   Additional items Rolling walker   Balance   Static Sitting Good   Dynamic Sitting Fair +   Static Standing Fair   Dynamic Standing Fair -   Activity Tolerance   Activity Tolerance Patient limited by fatigue;Patient limited by pain   Nurse Made Aware yes RACHELE Encarnacion   RUE Assessment   RUE Assessment   (formal assessment deferred 2* rib fx/pain, at least 3/5 as seen during fxl observation)   LUE Assessment   LUE Assessment   (formal assessment deferred 2* rib fx/pain, at least 3/5 as seen during fxl observation)   Cognition   Overall Cognitive Status Impaired   Arousal/Participation Alert;Cooperative   Attention Attends with cues to redirect   Orientation Level Oriented X4   Memory Decreased recall of biographical information   Following Commands Follows one step commands with increased time or repetition   Comments pt alert and oriented but inconsistently answering social hx and home set-up questions, at times replying with nonsensical and irrelevant answers   Assessment   Limitation Decreased ADL status;Decreased UE ROM;Decreased UE strength;Decreased endurance;Decreased fine motor control;Decreased self-care trans  (decreased balance and mobility)   Prognosis Good   Assessment Patient evaluated by Occupational Therapy.  Patient admitted with Syncope.  The patients occupational profile, medical and therapy history includes a extensive additional review of physical, cognitive, or psychosocial history related to current functional performance.  Comorbidities affecting functional mobility and ADLS include: .  Prior to admission, patient was independent with functional mobility without assistive device, independent with ADLS, and independent with IADLS.  The evaluation identifies the following performance deficits: weakness, decreased ROM, impaired balance, decreased endurance, increased fall risk, new onset of impairment of functional mobility, decreased ADLS, decreased IADLS, pain, decreased activity tolerance,  decreased safety awareness, and decreased strength, that result in activity limitations and/or participation restrictions. This evaluation requires clinical decision making of high complexity, because the patient presents with comorbidites that affect occupational performance and required significant modification of tasks or assistance with consideration of multiple treatment options.  The Barthel Index was used as a functional outcome tool presenting with a score of Barthel Index Score: 55, indicating marked limitations of functional mobility and ADLS.  The patient's raw score on the AM-PAC Daily Activity Inpatient Short Form is 19. A raw score of greater than or equal to 19 suggests the patient may benefit from discharge to home. Please refer to the recommendation of the Occupational Therapist for safe discharge planning.  Patient will benefit from skilled Occupational Therapy services to address above deficits and facilitate a safe return to prior level of function.   Goals   Patient Goals to get stronger and go home   STG Time Frame   (1-7 days)   Short Term Goal  Goals established to promote Patient Goals: to get stronger and go home: Grooming: independent standing at sink; Bathing: supervision; Upper Body Dressing supervision; Lower Body Dressing: supervision; Toileting: supervision; Patient will increase ambulatory standard toilet transfer to supervision with rolling walker to increase performance and safety with ADLS and functional mobility; Patient will increase standing tolerance to 10 minutes during ADL task to decrease assistance level and decrease fall risk; Patient will increase bed mobility to supervision in preparation for ADLS and transfers; Patient will increase functional mobility to and from bathroom with rolling walker with supervision to increase performance with ADLS and to use a toilet; Patient will tolerate 10 minutes of UE ROM/strengthening to increase general activity tolerance and  performance in ADLS/IADLS; Patient will improve functional activity tolerance to 10 minutes of sustained functional tasks to increase participation in basic self-care and decrease assistance level; Patient will increase dynamic sitting balance to good to improve the ability to sit at edge of bed or on a chair for ADLS;  Patient will increase dynamic standing balance to fair to improve postural stability and decrease fall risk during standing ADLS and transfers.   LTG Time Frame   (8-14 days)   Long Term Goal Bathing: independent; Upper Body Dressing independent; Lower Body Dressing: independent; Toileting: independent; Patient will increase ambulatory standard toilet transfer to independent with rolling walker to increase performance and safety with ADLS and functional mobility; Patient will increase standing tolerance to 20 minutes during ADL task to decrease assistance level and decrease fall risk; Patient will increase bed mobility to independent in preparation for ADLS and transfers; Patient will increase functional mobility to and from bathroom with rolling walker independently to increase performance with ADLS and to use a toilet; Patient will tolerate 20 minutes of UE ROM/strengthening to increase general activity tolerance and performance in ADLS/IADLS; Patient will improve functional activity tolerance to 20 minutes of sustained functional tasks to increase participation in basic self-care and decrease assistance level; Patient will increase dynamic standing balance to fair+ to improve postural stability and decrease fall risk during standing ADLS and transfers.  Pt will score >/= 23/24 on AM-PAC Daily Activity Inpatient scale to promote safe independence with ADLs and functional mobility; Pt will score >/= 85/100 on Barthel Index in order to decrease caregiver assistance needed and increase ability to perform ADLs and functional mobility.   Plan   Treatment Interventions ADL retraining;Functional transfer  training;UE strengthening/ROM;Endurance training;Patient/family training;Equipment evaluation/education;Activityengagement;Continued evaluation;Energy conservation;Compensatory technique education   Goal Expiration Date 03/02/24   OT Frequency 3-5x/wk   Discharge Recommendation   Rehab Resource Intensity Level, OT III (Minimum Resource Intensity)   AM-PAC Daily Activity Inpatient   Lower Body Dressing 3   Bathing 3   Toileting 3   Upper Body Dressing 3   Grooming 3   Eating 4   Daily Activity Raw Score 19   Daily Activity Standardized Score (Calc for Raw Score >=11) 40.22   AM-PAC Applied Cognition Inpatient   Following a Speech/Presentation 3   Understanding Ordinary Conversation 4   Taking Medications 4   Remembering Where Things Are Placed or Put Away 4   Remembering List of 4-5 Errands 3   Taking Care of Complicated Tasks 3   Applied Cognition Raw Score 21   Applied Cognition Standardized Score 44.3   Barthel Index   Feeding 10   Bathing 0   Grooming Score 0   Dressing Score 5   Bladder Score 10   Bowels Score 10   Toilet Use Score 5   Transfers (Bed/Chair) Score 10   Mobility (Level Surface) Score 0   Stairs Score 5   Barthel Index Score 55   Additional Treatment Session   Start Time 1439   End Time 1449   Treatment Assessment S: 5/10 pain ribs. O: Standard toilet transfer using RW with min A. Voided bladder and attempted bowel movement but unsuccessful. Min A for clothing mgmt and steadying in stance while pt performing perineal hygiene. Standing at sink to wash hands with supervision. Fxl mobility short distance back to bed with RW and min A. Pt impulsive with mobility at times requiring verbal cues for safety, pacing, and RW mgmt. A: Pt tolerated treatment fairly well, limited by fatigue. P: Pt would benefit from cont OT services to maximize safety and fxl performance of ADLs.   Licensure   [place] License Number  Halie Lara OTR/L [id number]

## 2024-02-17 NOTE — ASSESSMENT & PLAN NOTE
Patient presents with syncope versus cardiac arrest at neighbor's .  Patient fell on the floor, sustained abrasion to left scalp.  Bystanders initiated CPR.  EMS arrived 4 minutes later found patient to have palpable pulses and patient had regained consciousness.  Patient remembers he was standing by the door,unable to recall anything else.  Patient had complained of feeling foggy and dizzy, not feeling well ever since he took first dose of doxazosin for prostate yesterday evening per friend at bedside.  Patient reports only ate around 1 PM during the day.  Does not eat breakfast regularly.  Patient did not actually want to go to  due to not feeling well and anxiety per friend at bedside.  Denies history of syncope.  CT head no acute findings  CT chest without contrast showed - Moderate septal thickening due to interstitial edema with small pleural effusions. Acute minimally displaced fractures of multiple bilateral anterior ribs due to CPR. Nondisplaced fracture of the anterior cortex of the superior body of the sternum due to CPR.  Initial troponin 112.  Repeat trending up.  Initial EKG showed NSR, bifascicular block, rate 97,TWI V1-V4,STD V2-V4.  Repeat EKG showed NSR, LVH,STD V5 V6,mild JESSE V1 to V3 likely due to LVH.   Trend troponin  Telemetry  Start therapeutic Lovenox, full dose aspirin x 1  Check lipid panel, A1c  Check 2D echo  Check orthostatic vital signs  Consult cardiology

## 2024-02-17 NOTE — CONSULTS
Consultation - Cardiology   [facility]     Gliberto Vann 83 y.o. adult MRN: [id number]  : 1940  Unit/Bed#: ICU 04 Encounter: [id number]      Assessment & Plan   Syncope.  - Presented on 24 for evaluation after what is believed to be thought of syncopal events.    Patient states that he was on his way to attend a , attempted to walk through the front doors of the  home and then remembers waking up in the hospital ED.  Review of chart indicates that patient is consciousness at  home.  When police arrived patient was agonal he breathing prompting them to start CPR.  When EMS arrived patient had pulses, was awake, and fully oriented.  Patient denies any symptoms of lightheadedness or dizziness prior to syncopal episode.  - Syncope likely secondary to critical aortic valve stenosis.  - Recommend orthostatic vital signs.    Critical aortic valve stenosis.  - Patient states that he has noticed worsening shortness of breath with exertion over the past several months.  He denies any lightheadedness or dizziness or any other syncopal events prior to events on 24.   - 24 TTE:   Aortic Valve The aortic valve is trileaflet. The leaflets are moderately thickened. The leaflets are moderately calcified. There is severely reduced mobility. There is mild to moderate regurgitation. There is critical stenosis.  Peak gradient 108 mmHg.  Mean gradient 63.  LVOT velocity is measured to be 0.58.  LVOT diameter 2.2 and aortic valve area 0.5 cm 2 and dimensionless index 0.11.  There may be some component of low cardiac output contributing to valve stenosis.   - No prior TTE for comparison.  - Avoid aggressive IV diuretics or IV fluid use.  - Will need outpatient TAVR evaluation by CTS.    Elevated troponin.  - 24 hs troponin: 112 (0hrs), 252 (2hrs), 992 (4hrs).   - 24 hs troponin random: 3327.   - Trend troponin to peak.  - Continue therapeutic Lovenox.  -  2/17/24 EKG: Normal sinus rhythm, 81 bpm.  Left axis deviation.  Left ventricle hypertrophy with QRS widening and repolarization abnormality ( Sokolow-Self , Manny product ).   - Non-ischemic myocardial injury secondary to CPR, critical aortic valve stenosis, acute combined systolic and diastolic heart failure versus type II MI.  - Patient will require ischemic workup, cardiac catheterization.  Will discuss timing of cardiac catheterization with attending.    Acute combined systolic and diastolic heart failure.  - 2/17/24 BNP: 1270.   - 2/16/23 CT chest without contrast: Moderate septal thickening due to interstitial edema with small pleural effusions.   - 2/17/24 TTE: LVEF 25%. There is severe global hypokinesis with regional variation. Diastolic function is moderately abnormal, consistent with grade II (pseudonormal) relaxation.  Left atrial filling pressure is elevated. Right Ventricle: Systolic function is low normal. Left Atrium: The atrium is severely dilated ( 60 mL/m2). Right Atrium: The atrium is moderately dilated. Aortic Valve: The aortic valve is trileaflet. The leaflets are moderately thickened. The leaflets are moderately calcified. There is severely reduced mobility. There is mild to moderate regurgitation. There is critical stenosis.  Peak gradient 108 mmHg.  Mean gradient 63.  LVOT velocity is measured to be 0.58.  LVOT diameter 2.2 and aortic valve area 0.5 cm 2 and dimensionless index 0.11.  There may be some component of low cardiac output contributing to valve stenosis. Mitral Valve: There is mild to moderate regurgitation. Tricuspid Valve: There is mild to moderate regurgitation.  Pulmonary artery pressure around 50 mmHg. IVC/SVC: The inferior vena cava is mildly dilated.  It has less than 50% collapse with inspiration consistent with elevated right atrial pressure about 10 to 15 mmHg. Pericardium: There is a trivial pericardial effusion.  - No prior TTE for comparison.  - Patient will  require ischemic workup, cardiac catheterization.  Will discuss timing of cardiac catheterization with attending.  - Patient with soft BP, will continue to monitor BP and start heart failure medications as tolerated.  - Received one-time dose of Lasix 20 mg IV on 2/17/24.   - Strict I/Os.   - Daily weight, standing scale.   - CHF education.   - Continue low-sodium diet with 1800 mL fluid restriction.     Cardiomyopathy.  - 2/17/24 TTE: LVEF 25%. There is severe global hypokinesis with regional variation. Diastolic function is moderately abnormal, consistent with grade II (pseudonormal) relaxation.  Left atrial filling pressure is elevated. Right Ventricle: Systolic function is low normal. Left Atrium: The atrium is severely dilated ( 60 mL/m2). Right Atrium: The atrium is moderately dilated.Aortic Valve: The aortic valve is trileaflet. The leaflets are moderately thickened. The leaflets are moderately calcified. There is severely reduced mobility. There is mild to moderate regurgitation. There is critical stenosis.  Peak gradient 108 mmHg.  Mean gradient 63.  LVOT velocity is measured to be 0.58.  LVOT diameter 2.2 and aortic valve area 0.5 cm 2 and dimensionless index 0.11.  There may be some component of low cardiac output contributing to valve stenosis.Mitral Valve: There is mild to moderate regurgitation. Tricuspid Valve: There is mild to moderate regurgitation.  Pulmonary artery pressure around 50 mmHg. IVC/SVC: The inferior vena cava is mildly dilated.  It has less than 50% collapse with inspiration consistent with elevated right atrial pressure about 10 to 15 mmHg. Pericardium: There is a trivial pericardial effusion.  - No prior TTE for comparison.  - Will require ischemic workup, cardiac catheterization for cardiomyopathy, elevated troponin, and TAVR workup.    Valvular heart disease.  - Critical aortic valve stenosis.  Mild to moderate mitral valve regurgitation.  Mild to moderate tricuspid valve  regurgitation.  Trace pulmonic valve regurgitation.  - 2/17/24 TTE:   Aortic Valve The aortic valve is trileaflet. The leaflets are moderately thickened. The leaflets are moderately calcified. There is severely reduced mobility. There is mild to moderate regurgitation. There is critical stenosis.  Peak gradient 108 mmHg.  Mean gradient 63.  LVOT velocity is measured to be 0.58.  LVOT diameter 2.2 and aortic valve area 0.5 cm 2 and dimensionless index 0.11.  There may be some component of low cardiac output contributing to valve stenosis.   Mitral Valve There is mild to moderate regurgitation. There is no evidence of stenosis.   Tricuspid Valve There is mild to moderate regurgitation.  Pulmonary artery pressure around 50 mmHg. There is no evidence of stenosis.   Pulmonic Valve There is trace regurgitation. There is no evidence of stenosis.     Essential hypertension.  - SBP since admission 87 to 130.  - Not currently on antihypertensives.  - Review of home medications notes that patient is on Cardura 4 mg daily.  - Continue to monitor BP.    Sternum/rib fractures.  - Secondary to CPR.  - 2/16/24 CT chest without contrast: Acute minimally displaced fractures of multiple bilateral anterior ribs due to CPR. Nondisplaced fracture of the anterior cortex of the superior body of the sternum due to CPR.    COPD.  - Does not appear in acute exacerbation.    - Follows outpatient with [facility].  - Care per primary team.    Pre-diabetes.   - 2/16/24 HgbA1c: 5.7.   - Care per primary team.     Summary of Recommendations:        Thank you for your consultation.    Physician Requesting Consult: Saman Motley MD    Reason for Consult / Principal Problem: syncope.     Inpatient consult to Cardiology  Consult performed by: Lis Card PA-C  Consult ordered by: MICHELLE Nance          HPI: Gilberto Vann is a 83 y.o. male with PMHx HTN and COPD who presented on 2/16/24 for evaluation after what is believed  to be thought of syncopal events.       Patient states that he was on his way to attend a , attempted to walk through the front doors of the  home and then remembers waking up in the hospital ED.  Review of chart indicates that patient is consciousness at  home.  When police arrived patient was agonal he breathing prompting them to start CPR.  When EMS arrived patient had pulses, was awake, and fully oriented.  Patient denies any symptoms of lightheadedness or dizziness prior to syncopal episode.     Upon further questioning patient does state that he has had noticed worsening shortness of breath with exertion over the past several months.  He denies experiencing chest pain, palpitations, nausea, vomiting or diaphoresis.          Review of Systems   Constitutional:  Negative for activity change, appetite change, chills, diaphoresis, fatigue, fever and unexpected weight change.   Respiratory:  Positive for shortness of breath (on exertion). Negative for cough and chest tightness.    Cardiovascular:  Positive for chest pain (s/p CPR). Negative for palpitations and leg swelling.   Gastrointestinal:  Negative for abdominal distention, abdominal pain, diarrhea, nausea and vomiting.   Neurological:  Positive for syncope. Negative for dizziness, weakness, light-headedness, numbness and headaches.       Historical Information   Past Medical History:   Diagnosis Date    Arthritis     Asthma     Back pain     BPH (benign prostatic hypertrophy) with urinary obstruction     COPD (chronic obstructive pulmonary disease) (HCC)     Dizziness     Glaucoma     bilat    Insomnia     RLS (restless legs syndrome)     Sinusitis      Past Surgical History:   Procedure Laterality Date    CATARACT EXTRACTION Left     EAR SURGERY      to wart    EPIDIDYMAL CYST EXCISION Right 2000    HEMORROIDECTOMY      KS EXCISION HYDROCELE UNILATERAL Left 2023    Procedure: HYDROCELECTOMY, sPERMATOCELECTOMY;  Surgeon:  James Blunt MD;  Location: [facility];  Service: Urology    NE TRURL ELECTROSURG RESCJ PROSTATE BLEED COMPLETE N/A 2020    Procedure: CYSTOSCOPY, TRANSURETHRAL RESECTION OF PROSTATE (TURP);  Surgeon: Reji Meneses MD;  Location: [facility];  Service: Urology    PROSTATE BIOPSY Bilateral 2003    BPH with mild acute and chronic inflammation    PROSTATE BIOPSY Bilateral 2005    BPH with marked artropy and chronic inflammation     Social History     Substance and Sexual Activity   Alcohol Use Not Currently     Social History     Substance and Sexual Activity   Drug Use Never     Social History     Tobacco Use   Smoking Status Former    Current packs/day: 0.00    Average packs/day: 1 pack/day for 15.0 years (15.0 ttl pk-yrs)    Types: Cigarettes    Start date:     Quit date:     Years since quittin.1    Passive exposure: Never   Smokeless Tobacco Current    Types: Chew   Tobacco Comments    Chews cigars     Family History:   Family History   Problem Relation Age of Onset    Asthma Father        Meds/Allergies    PTA meds:    Medications Prior to Admission   Medication    budesonide-formoterol (SYMBICORT) 160-4.5 mcg/act inhaler    doxazosin (CARDURA) 4 mg tablet    fluticasone (FLONASE) 50 mcg/act nasal spray    Gluc-Chonn-MSM-Boswellia-Vit D (Glucosamine Chondroitin + D3) TABS    latanoprost (XALATAN) 0.005 % ophthalmic solution    albuterol (2.5 mg/3 mL) 0.083 % nebulizer solution    ALPRAZolam (XANAX) 0.25 mg tablet    fluticasone-umeclidinium-vilanterol (Trelegy Ellipta) 200-62.5-25 mcg/actuation AEPB inhaler    methylPREDNISolone 4 MG tablet therapy pack      Allergies   Allergen Reactions    Shellfish-Derived Products - Food Allergy Other (See Comments)     Listed by PCP patient denies    Iodine - Food Allergy Other (See Comments)       Current Facility-Administered Medications:     acetaminophen (TYLENOL) tablet 975 mg, 975 mg, Oral, Q8H Rodger ALBA CRNP, 975 mg at  "02/17/24 1316    aspirin tablet 325 mg, 325 mg, Oral, Daily, Saman Motley MD, 325 mg at 02/17/24 1126    budesonide-formoterol (SYMBICORT) 160-4.5 mcg/act inhaler 2 puff, 2 puff, Inhalation, BID, MICHELLE Nance, 2 puff at 02/17/24 0811    enoxaparin (LOVENOX) subcutaneous injection 70 mg, 1 mg/kg, Subcutaneous, Q12H ANJALI, MICHELLE Nance, 70 mg at 02/17/24 1316    famotidine (PEPCID) tablet 20 mg, 20 mg, Oral, Daily, MICHELLE Nance, 20 mg at 02/17/24 0811    fluticasone (FLONASE) 50 mcg/act nasal spray 1 spray, 1 spray, Nasal, BID, MICHELLE Nance, 1 spray at 02/17/24 0811    hydrOXYzine HCL (ATARAX) tablet 25 mg, 25 mg, Oral, Q6H PRN, MICHELLE Nance, 25 mg at 02/17/24 0518    latanoprost (XALATAN) 0.005 % ophthalmic solution 1 drop, 1 drop, Both Eyes, HS, MICHELLE Nance    levalbuterol (XOPENEX) inhalation solution 0.63 mg, 0.63 mg, Nebulization, Q6H PRN, MICHELLE Nance    magnesium sulfate 2 g/50 mL IVPB (premix) 2 g, 2 g, Intravenous, Once, Saman Motley MD, Last Rate: 12.5 mL/hr at 02/17/24 1126, 2 g at 02/17/24 1126    ondansetron (ZOFRAN) injection 4 mg, 4 mg, Intravenous, Q6H PRN, MICHELLE Nance    pneumococcal 20-radha conj vacc (PREVNAR 20) IM Injection 0.5 mL, 0.5 mL, Intramuscular, Once, Saman Motley MD    traMADol (ULTRAM) tablet 50 mg, 50 mg, Oral, Q6H PRN, MICHELLE Nance    VTE Pharmacologic Prophylaxis:   Enoxaparin (Lovenox)    Objective:   Vitals: Blood pressure 91/53, pulse 77, temperature 98.1 °F (36.7 °C), temperature source Temporal, resp. rate 16, height 5' 9\" (1.753 m), weight 66.2 kg (145 lb 15.1 oz), SpO2 99%.  Body mass index is 21.55 kg/m².  Wt Readings from Last 3 Encounters:   02/17/24 66.2 kg (145 lb 15.1 oz)   12/26/23 67.1 kg (148 lb)   11/28/23 65.8 kg (145 lb)     BP Readings from Last 3 Encounters:   02/17/24 91/53   12/26/23 130/78   11/28/23 130/78     Orthostatic Blood Pressures      Flowsheet Row Most Recent Value   Blood Pressure " 91/53 filed at 02/17/2024 1200   Patient Position - Orthostatic VS Sitting filed at 02/17/2024 1200            Intake/Output Summary (Last 24 hours) at 2/17/2024 1342  Last data filed at 2/17/2024 1122  Gross per 24 hour   Intake 1000 ml   Output 1150 ml   Net -150 ml       Invasive Devices       Peripheral Intravenous Line  Duration             Peripheral IV 02/16/24 Left Antecubital <1 day    Peripheral IV 02/16/24 Right Antecubital <1 day    Peripheral IV 02/17/24 Left Forearm <1 day                      Physical Exam:   Physical Exam  Vitals reviewed.   Constitutional:       General: Gilberto is not in acute distress.  Cardiovascular:      Rate and Rhythm: Normal rate and regular rhythm.      Pulses: Normal pulses.      Heart sounds: Murmur heard.   Pulmonary:      Effort: Pulmonary effort is normal. No respiratory distress.      Breath sounds: Normal breath sounds.   Chest:      Chest wall: Tenderness present.   Abdominal:      General: Abdomen is flat. There is no distension.      Palpations: Abdomen is soft.      Tenderness: There is no abdominal tenderness.   Musculoskeletal:      Right lower leg: No edema.      Left lower leg: No edema.   Skin:     General: Skin is warm and dry.   Neurological:      Mental Status: Gilberto is alert and oriented to person, place, and time.       Labs:   Troponins:  Results from last 7 days   Lab Units 02/17/24  0505 02/17/24  0027 02/16/24  2217   HS TNI RAND ng/L 3,327*  --   --    HSTNI D2 ng/L  --   --  140*   HSTNI D4 ng/L  --  880*  --        CBC with diff:   Results from last 7 days   Lab Units 02/17/24  0505 02/16/24  1908   WBC Thousand/uL 8.13 9.37   HEMOGLOBIN g/dL 11.1* 12.2   HEMATOCRIT % 33.7* 37.6   MCV fL 91 92   PLATELETS Thousands/uL 156 208   RBC Million/uL 3.72* 4.10   MCH pg 29.8 29.8   MCHC g/dL 32.9 32.4   RDW % 15.0 14.9   MPV fL 10.5 10.7   NRBC AUTO /100 WBCs  --  0       CMP:   Results from last 7 days   Lab Units 02/17/24  0505 02/16/24 1908  "  SODIUM mmol/L 139 139   POTASSIUM mmol/L 4.4 3.9   CHLORIDE mmol/L 108 105   CO2 mmol/L 24 23   ANION GAP mmol/L 7 11   BUN mg/dL 23 23   CREATININE mg/dL 0.71 1.19   CALCIUM mg/dL 8.0* 8.7   AST U/L 80* 77*   ALT U/L 86* 78*   ALK PHOS U/L 52 59   TOTAL PROTEIN g/dL 5.3* 5.8*   ALBUMIN g/dL 3.3* 3.6   TOTAL BILIRUBIN mg/dL 0.77 0.76   GLUCOSE RANDOM mg/dL 118 242*       Magnesium:   Results from last 7 days   Lab Units 02/17/24  0505 02/16/24  1908   MAGNESIUM mg/dL 1.8* 2.0     Coags:     TSH:    Results from last 7 days   Lab Units 02/17/24  0505   TSH 3RD GENERATON uIU/mL 2.691     No components found for: \"TSH3\"  Lipid Profile:   Results from last 7 days   Lab Units 02/17/24  0505   CHOLESTEROL mg/dL 144   TRIGLYCERIDES mg/dL 44   HDL mg/dL 53   LDL CALC mg/dL 82     Lipid Profile:   Lab Results   Component Value Date    CHOLESTEROL 144 02/17/2024    HDL 53 02/17/2024    LDLCALC 82 02/17/2024    TRIG 44 02/17/2024     Hgb A1c:   Results from last 7 days   Lab Units 02/16/24  1908   HEMOGLOBIN A1C % 5.7*     NT-proBNP: No results for input(s): \"NTBNP\" in the last 72 hours.     Imaging & Testing     Cardiac testing:   Echo complete w/ contrast if indicated    Result Date: 2/17/2024  Narrative:   Left Ventricle: Left ventricular cavity size is mildly dilated. Wall thickness is mildly increased. There is mild concentric hypertrophy. The left ventricular ejection fraction is around 25% by visual estimation. Systolic function is severely reduced. There is severe global hypokinesis with regional variation. Diastolic function is moderately abnormal, consistent with grade II (pseudonormal) relaxation.  Left atrial filling pressure is elevated.   Right Ventricle: Systolic function is low normal.   Left Atrium: The atrium is severely dilated ( 60 mL/m2).   Right Atrium: The atrium is moderately dilated.   Aortic Valve: The aortic valve is trileaflet. The leaflets are moderately thickened. The leaflets are moderately " calcified. There is severely reduced mobility. There is mild to moderate regurgitation. There is critical stenosis.  Peak gradient 108 mmHg.  Mean gradient 63.  LVOT velocity is measured to be 0.58.  LVOT diameter 2.2 and aortic valve area 0.5 cm 2 and dimensionless index 0.11.  There may be some component of low cardiac output contributing to valve stenosis.   Mitral Valve: There is mild to moderate regurgitation.   Tricuspid Valve: There is mild to moderate regurgitation.  Pulmonary artery pressure around 50 mmHg.   IVC/SVC: The inferior vena cava is mildly dilated.  It has less than 50% collapse with inspiration consistent with elevated right atrial pressure about 10 to 15 mmHg.   Pericardium: There is a trivial pericardial effusion.   No old echo available for comparison.       Imaging: I have personally reviewed pertinent reports.      CT chest without contrast    Result Date: 2/16/2024    Impression: Moderate septal thickening due to interstitial edema with small pleural effusions. Acute minimally displaced fractures of multiple bilateral anterior ribs due to CPR. Nondisplaced fracture of the anterior cortex of the superior body of the sternum due to CPR. A few 5 mm and smaller nodules. Stability is unknown given the absence of comparison studies. Follow-up could be obtained with a chest CT with no contrast in 6 months to assure stability. This study was marked in Epic for immediate notification and follow-up.     CT head without contrast    Result Date: 2/16/2024    Impression: No acute intracranial abnormality. Paranasal sinus disease.     EKG/ Monitor: Personally reviewed.     2/17/24 EKG: Normal sinus rhythm, 81 bpm.  Left axis deviation.  Left ventricle hypertrophy with QRS widening and repolarization abnormality ( Sokolow-Self , Manny product ).        Code Status: Level 1 - Full Code  Advance Directive and Living Will:      POLST:        Lis Card PA-C

## 2024-02-17 NOTE — PHYSICAL THERAPY NOTE
PHYSICAL THERAPY EVALUATION/TREATMENT     02/17/24 1420   PT Last Visit   PT Visit Date 02/17/24   Note Type   Note type Evaluation   Pain Assessment   Pain Assessment Tool 0-10   Pain Score 5   Pain Location/Orientation Orientation: Mid;Location: Rib Cage  (2/2 rib fractures from CPR per chart)   Hospital Pain Intervention(s) Repositioned;Ambulation/increased activity   Restrictions/Precautions   Weight Bearing Precautions Per Order No   Other Precautions Chair Alarm;Bed Alarm;Multiple lines;Telemetry;O2;Fall Risk;Pain   Home Living   Type of Home House   Home Layout Two level;Bed/bath upstairs  (full bath on first)   Bathroom Shower/Tub Walk-in shower   Home Equipment Walker;Cane  (Walker is at a friend's house, has cane but does not use)   Prior Function   Level of [place] Independent with ADLs;Independent with functional mobility;Independent with IADLS  (pt does drive)   Lives With (S)  Alone   Receives Help From Friend(s)   IADLs Independent with driving;Independent with meal prep;Independent with medication management   General   Additional Pertinent History Pt admitted due to syncopal episode vs MI; Multiple rib fractures   Family/Caregiver Present No   Cognition   Overall Cognitive Status Impaired   Arousal/Participation Cooperative   Attention Attends with cues to redirect   Orientation Level Oriented X4  (however was confused with some questions: answers were random at times.)   Following Commands Follows one step commands inconsistently   Subjective   Subjective Pt requesting to go to the bathroom   RLE Assessment   RLE Assessment   (ROM WFLs, strength: grossly at least 3+/5)   LLE Assessment   LLE Assessment   (ROM WFLs, strength: grossly at least 3+/5)   Bed Mobility   Supine to Sit 4  Minimal assistance   Additional items Assist x 1;Verbal cues;Increased time required   Sit to Supine 5  Supervision   Additional items Verbal cues   Transfers   Sit to Stand 4  Minimal assistance   Additional  items Assist x 1;Verbal cues   Stand to Sit 4  Minimal assistance   Additional items Assist x 1;Verbal cues   Ambulation/Elevation   Gait pattern Foward flexed;Knees flexed;Narrow LAKSHMI  (unsteady, slow christopher)   Gait Assistance 3  Moderate assist   Additional items Assist x 2;Verbal cues  (frequent verbal cues for safety occasionally impulsive)   Assistive Device Rolling walker   Distance 10 feet   Stair Management Assistance Not tested   Ambulation/Elevation Additional Comments Assist with IV pole   Balance   Static Sitting Good   Dynamic Sitting Fair +   Static Standing Fair   Dynamic Standing Fair -   Ambulatory Poor +  (with RW)   Activity Tolerance   Activity Tolerance Patient limited by fatigue;Patient limited by pain  (rib pain)   Nurse Made Aware yes; Shashank   Assessment   Prognosis Good   Problem List Decreased strength;Decreased endurance;Impaired balance;Decreased mobility;Impaired judgement;Decreased safety awareness;Pain   Assessment Patient seen for Physical Therapy evaluation. Patient admitted with Syncope.  Comorbidities affecting patient's physical performance include: COPD, BPH, asthma, glacoma Personal factors affecting patient at time of initial evaluation include: lives in two story house, stairs to enter home, inability to ambulate household distances, inability to navigate community distances, inability to navigate level surfaces without external assistance, limited home support, limited insight into impairments, inability to perform ADLS, and inability to perform IADLS . Prior to admission, patient was independent with functional mobility without assistive device, independent with ADLS, independent with IADLS, living alone in two story home with a few steps to enter, ambulating household distance, and ambulating community distances.  Please find objective findings from Physical Therapy assessment regarding body systems outlined above with impairments and limitations including weakness,  impaired balance, decreased endurance, gait deviations, pain, decreased activity tolerance, decreased functional mobility tolerance, decreased safety awareness, fall risk, and decreased skin integrity.  The Barthel Index was used as a functional outcome tool presenting with a score of Barthel Index Score: 55 today indicating marked limitations of functional mobility and ADLS.  Patient's clinical presentation is currently unstable/unpredictable as seen in patient's presentation of vital sign response, changing level of pain, increased fall risk, new onset of impairment of functional mobility, decreased endurance, and new onset of weakness. Pt would benefit from continued Physical Therapy treatment to address deficits as defined above and maximize level of functional mobility. As demonstrated by objective findings, the assigned level of complexity for this evaluation is high.The patient's AM-[facility] Basic Mobility Inpatient Short Form Raw Score is 16. A Raw score of less than or equal to 16 suggests the patient may benefit from discharge to post-acute rehabilitation services. Please also refer to the recommendation of the Physical Therapist for safe discharge planning.   Goals   Patient Goals to have less pain in his ribs   STG Expiration Date 02/24/24   Short Term Goal #1 Indep transfers supine <.> short sit ; indep sit <> stand with use of RW and good balalnce   Short Term Goal #2 Min A for amb with RW for functional household distances with fair + balance   LTG Expiration Date 03/02/24   Long Term Goal #1 Indep amb. with LRAD for community distances to allow pt to return to his baseline of function   Long Term Goal #2 AMPAC to at least 22/24 to allow pt to return home with improved independence .   Plan   Treatment/Interventions ADL retraining;Functional transfer training;LE strengthening/ROM;Therapeutic exercise;Endurance training;Patient/family training;Equipment eval/education;Bed mobility;Gait training;Spoke to  "nursing;OT   PT Frequency Other (Comment)  (5/wk)   Discharge Recommendation   Rehab Resource Intensity Level, PT II (Moderate Resource Intensity)   AM-PAC Basic Mobility Inpatient   Turning in Flat Bed Without Bedrails 3   Lying on Back to Sitting on Edge of Flat Bed Without Bedrails 3   Moving Bed to Chair 3   Standing Up From Chair Using Arms 3   Walk in Room 2   Climb 3-5 Stairs With Railing 2   Basic Mobility Inpatient Raw Score 16   Basic Mobility Standardized Score 38.32   Highest Level Of Mobility   -HL Goal 5: Stand one or more mins   -HLM Achieved 7: Walk 25 feet or more   Barthel Index   Feeding 10   Bathing 0   Grooming Score 0   Dressing Score 5   Bladder Score 10   Bowels Score 10   Toilet Use Score 5   Transfers (Bed/Chair) Score 10   Mobility (Level Surface) Score 0   Stairs Score 5   Barthel Index Score 55   Additional Treatment Session   Start Time 1410   End Time 1420   Treatment Assessment Pt cleared by RN for PT.  Pt agreeable to PT., seen for evaluation as above.  Pt able to requires mod A for ambulation with RW due to increased pain in ribs.  As pt was up and moving around with RW in room, pt was able to progress his walking endurance to 35 feet with changes in direction using RW and assist to manage IV Pole.  Pt returned to supine with min A/supervision.  Pt wanted to \"try it by himself\"  Pt did lay onto bed with only supervision, however , verbal cues to move his legs to be more straight in the bed.  A: Pt tolerated session fairly well.  Expect pt to progress during hospital  course with PT as rib fractures feel better.  P: cont. as per plan.,   Equipment Use RW   End of Consult   Patient Position at End of Consult Supine;Bed/Chair alarm activated;All needs within reach   End of Consult Comments RN made aware, pt back in bed, all lines intact, bed alarm on.   Licensure   [place] License Number  Teetee Soria PT  [id number]     "

## 2024-02-17 NOTE — ASSESSMENT & PLAN NOTE
Started on doxazosin  the day before admission by urology.  Reported felt foggy and dizzy, not feeling well after taking first dose.  Will hold doxazosin.   suspected BPH.-Avoid alpha blockers.  Patient will need outpatient follow-up with urology for cystoscopy and positive consideration for TURP

## 2024-02-17 NOTE — CONSULTS
H&P Exam - Urology       Patient: Gilberto Vann   : 1940 Sex: adult   MRN: [id number]     CSN: [id number]      History of Present Illness   HPI:  Gilberto Vann is a 83 y.o. 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   HENT: Negative for hearing loss and trouble swallowing.   Eyes: Negative for itching and visual disturbance.   Respiratory: Negative for chest tightness and shortness of breath.   Cardiovascular: Negative for chest pain, edema.   Gastrointestinal: Negative for abdominal distention, na abdominal pain, constipation, diarrhea, Nausea and vomiting.   Genitourinary: Negative for decreased urine volume, difficulty urinating, dysuria, enuresis, frequency, hematuria and urgency.   Musculoskeletal: Negative for gait problem and myalgias.   Neurological: Negative for dizziness and headaches.   Hematological: Does not bruise/bleed easily.       Historical Information   Past Medical History:   Diagnosis Date    Arthritis     Asthma     Back pain     BPH (benign prostatic hypertrophy) with urinary obstruction     COPD (chronic obstructive pulmonary disease) (HCC)     Dizziness     Glaucoma     bilat    Insomnia     RLS (restless legs syndrome)     Sinusitis      Past Surgical History:   Procedure Laterality Date    CATARACT EXTRACTION Left     EAR SURGERY      to wart    EPIDIDYMAL CYST EXCISION Right 2000    HEMORROIDECTOMY      MA EXCISION HYDROCELE UNILATERAL Left 2023    Procedure: HYDROCELECTOMY, sPERMATOCELECTOMY;  Surgeon: James Blunt,  "MD;  Location: [facility];  Service: Urology    MO TRURL ELECTROSURG RESCJ PROSTATE BLEED COMPLETE N/A 2020    Procedure: CYSTOSCOPY, TRANSURETHRAL RESECTION OF PROSTATE (TURP);  Surgeon: Reji Meneses MD;  Location: [facility];  Service: Urology    PROSTATE BIOPSY Bilateral 2003    BPH with mild acute and chronic inflammation    PROSTATE BIOPSY Bilateral 2005    BPH with marked artropy and chronic inflammation     Social History   Social History     Substance and Sexual Activity   Alcohol Use Not Currently     Social History     Substance and Sexual Activity   Drug Use Never     Social History     Tobacco Use   Smoking Status Former    Current packs/day: 0.00    Average packs/day: 1 pack/day for 15.0 years (15.0 ttl pk-yrs)    Types: Cigarettes    Start date:     Quit date:     Years since quittin.1    Passive exposure: Never   Smokeless Tobacco Current    Types: Chew   Tobacco Comments    Chews cigars     Family History:   Family History   Problem Relation Age of Onset    Asthma Father        Meds/Allergies   Medications Prior to Admission   Medication    budesonide-formoterol (SYMBICORT) 160-4.5 mcg/act inhaler    doxazosin (CARDURA) 4 mg tablet    fluticasone (FLONASE) 50 mcg/act nasal spray    Gluc-Chonn-MSM-Boswellia-Vit D (Glucosamine Chondroitin + D3) TABS    latanoprost (XALATAN) 0.005 % ophthalmic solution    albuterol (2.5 mg/3 mL) 0.083 % nebulizer solution    ALPRAZolam (XANAX) 0.25 mg tablet    fluticasone-umeclidinium-vilanterol (Trelegy Ellipta) 200-62.5-25 mcg/actuation AEPB inhaler    methylPREDNISolone 4 MG tablet therapy pack     Allergies   Allergen Reactions    Shellfish-Derived Products - Food Allergy Other (See Comments)     Listed by PCP patient denies    Iodine - Food Allergy Other (See Comments)       Objective   Vitals: BP 95/54   Pulse 71   Temp 98.1 °F (36.7 °C) (Temporal)   Resp 15   Ht 5' 9\" (1.753 m)   Wt 66.2 kg (145 lb 15.1 oz)   SpO2 97%  "  BMI 21.55 kg/m²     Physical Exam:  General Alert awake   Normocephalic atraumatic PERRLA  Lungs clear bilaterally  Cardiac normal S1 normal S2  Abdomen soft, flank pain  Extremities no edema    I/O last 24 hours:  In: 1000 [IV Piggyback:1000]  Out: 850 [Urine:850]    Invasive Devices       Peripheral Intravenous Line  Duration             Peripheral IV 02/16/24 Left Antecubital <1 day    Peripheral IV 02/16/24 Right Antecubital <1 day    Peripheral IV 02/17/24 Left Forearm <1 day                        Lab Results: CBC:   Lab Results   Component Value Date    WBC 8.13 02/17/2024    HGB 11.1 (L) 02/17/2024    HCT 33.7 (L) 02/17/2024    MCV 91 02/17/2024     02/17/2024    RBC 3.72 (L) 02/17/2024    MCH 29.8 02/17/2024    MCHC 32.9 02/17/2024    RDW 15.0 02/17/2024    MPV 10.5 02/17/2024    NRBC 0 02/16/2024     CMP:   Lab Results   Component Value Date     02/17/2024     06/26/2023    CO2 24 02/17/2024    CO2 26 06/26/2023    BUN 23 02/17/2024    BUN 20 06/26/2023    CREATININE 0.71 02/17/2024    CALCIUM 8.0 (L) 02/17/2024    AST 80 (H) 02/17/2024    AST 17 06/26/2023    ALT 86 (H) 02/17/2024    ALT 14 06/26/2023    ALKPHOS 52 02/17/2024    EGFR 86 06/26/2023    EGFR 89 04/17/2020     Urinalysis:   Lab Results   Component Value Date    COLORU Light Yellow 04/12/2020    CLARITYU Clear 04/12/2020    SPECGRAV 1.010 04/12/2020    PHUR 6.0 04/12/2020    LEUKOCYTESUR Negative 04/12/2020    NITRITE Negative 04/12/2020    GLUCOSEU Negative 04/12/2020    KETONESU Negative 04/12/2020    BILIRUBINUR Negative 04/12/2020    BLOODU Small (A) 04/12/2020     Urine Culture:   Lab Results   Component Value Date    URINECX >100,000 cfu/ml Escherichia coli 06/27/2016     PSA:   Lab Results   Component Value Date    PSA 6.98 (H) 05/22/2023           Assessment/ Plan:  Syncopal episode  Possible hypotension on doxazosin starting just 2 days ago hold doxazosin  Patient now no longer candidate for any alpha-blocker if  the reason we will discuss possible procedure TURP/office-based microwave therapy once discharged    James Blunt MD

## 2024-02-18 PROBLEM — I35.0 CRITICAL AORTIC VALVE STENOSIS: Status: ACTIVE | Noted: 2024-02-18

## 2024-02-18 PROBLEM — I42.9 CARDIOMYOPATHY (HCC): Status: ACTIVE | Noted: 2024-02-18

## 2024-02-18 PROBLEM — I50.41 ACUTE COMBINED SYSTOLIC (CONGESTIVE) AND DIASTOLIC (CONGESTIVE) HEART FAILURE (HCC): Status: ACTIVE | Noted: 2024-02-17

## 2024-02-18 LAB
ALBUMIN SERPL BCP-MCNC: 3.4 G/DL (ref 3.5–5)
ALP SERPL-CCNC: 58 U/L (ref 34–104)
ALT SERPL W P-5'-P-CCNC: 66 U/L (ref 7–52)
ANION GAP SERPL CALCULATED.3IONS-SCNC: 6 MMOL/L
AST SERPL W P-5'-P-CCNC: 53 U/L (ref 5–45)
BILIRUB SERPL-MCNC: 1.09 MG/DL (ref 0.2–1)
BUN SERPL-MCNC: 19 MG/DL (ref 5–25)
CALCIUM ALBUM COR SERPL-MCNC: 8.6 MG/DL (ref 8.3–10.1)
CALCIUM SERPL-MCNC: 8.1 MG/DL (ref 8.4–10.2)
CARDIAC TROPONIN I PNL SERPL HS: 3080 NG/L (ref 8–18)
CHLORIDE SERPL-SCNC: 103 MMOL/L (ref 96–108)
CO2 SERPL-SCNC: 30 MMOL/L (ref 21–32)
CREAT SERPL-MCNC: 0.81 MG/DL (ref 0.6–1.3)
ERYTHROCYTE [DISTWIDTH] IN BLOOD BY AUTOMATED COUNT: 15.2 % (ref 11.6–15.1)
GLUCOSE SERPL-MCNC: 99 MG/DL (ref 65–140)
HCT VFR BLD AUTO: 36.3 % (ref 36.5–46.1)
HGB BLD-MCNC: 11.8 G/DL (ref 12–15.4)
MAGNESIUM SERPL-MCNC: 2.1 MG/DL (ref 1.9–2.7)
MCH RBC QN AUTO: 29.8 PG (ref 26.8–34.3)
MCHC RBC AUTO-ENTMCNC: 32.5 G/DL (ref 31.4–37.4)
MCV RBC AUTO: 92 FL (ref 82–98)
MRSA NOSE QL CULT: NORMAL
PLATELET # BLD AUTO: 154 THOUSANDS/UL (ref 149–390)
PMV BLD AUTO: 10.7 FL (ref 8.9–12.7)
POTASSIUM SERPL-SCNC: 4.4 MMOL/L (ref 3.5–5.3)
PROT SERPL-MCNC: 5.5 G/DL (ref 6.4–8.4)
RBC # BLD AUTO: 3.96 MILLION/UL (ref 3.88–5.12)
SODIUM SERPL-SCNC: 139 MMOL/L (ref 135–147)
WBC # BLD AUTO: 7.13 THOUSAND/UL (ref 4.31–10.16)

## 2024-02-18 PROCEDURE — 85027 COMPLETE CBC AUTOMATED: CPT | Performed by: NURSE PRACTITIONER

## 2024-02-18 PROCEDURE — 84484 ASSAY OF TROPONIN QUANT: CPT | Performed by: NURSE PRACTITIONER

## 2024-02-18 PROCEDURE — 99233 SBSQ HOSP IP/OBS HIGH 50: CPT | Performed by: INTERNAL MEDICINE

## 2024-02-18 PROCEDURE — 94664 DEMO&/EVAL PT USE INHALER: CPT

## 2024-02-18 PROCEDURE — 80053 COMPREHEN METABOLIC PANEL: CPT | Performed by: NURSE PRACTITIONER

## 2024-02-18 PROCEDURE — 83735 ASSAY OF MAGNESIUM: CPT | Performed by: NURSE PRACTITIONER

## 2024-02-18 PROCEDURE — 99232 SBSQ HOSP IP/OBS MODERATE 35: CPT | Performed by: INTERNAL MEDICINE

## 2024-02-18 RX ORDER — MIDODRINE HYDROCHLORIDE 5 MG/1
2.5 TABLET ORAL
Status: DISCONTINUED | OUTPATIENT
Start: 2024-02-18 | End: 2024-02-18

## 2024-02-18 RX ORDER — MIDODRINE HYDROCHLORIDE 5 MG/1
5 TABLET ORAL
Status: DISCONTINUED | OUTPATIENT
Start: 2024-02-18 | End: 2024-02-22 | Stop reason: HOSPADM

## 2024-02-18 RX ORDER — DOCUSATE SODIUM 100 MG/1
100 CAPSULE, LIQUID FILLED ORAL 2 TIMES DAILY
Status: DISCONTINUED | OUTPATIENT
Start: 2024-02-18 | End: 2024-02-22 | Stop reason: HOSPADM

## 2024-02-18 RX ORDER — POLYETHYLENE GLYCOL 3350 17 G/17G
17 POWDER, FOR SOLUTION ORAL DAILY
Status: DISCONTINUED | OUTPATIENT
Start: 2024-02-18 | End: 2024-02-22 | Stop reason: HOSPADM

## 2024-02-18 RX ADMIN — BUDESONIDE AND FORMOTEROL FUMARATE DIHYDRATE 2 PUFF: 160; 4.5 AEROSOL RESPIRATORY (INHALATION) at 08:53

## 2024-02-18 RX ADMIN — NICOTINE POLACRILEX 2 MG: 2 GUM, CHEWING BUCCAL at 08:54

## 2024-02-18 RX ADMIN — TRAMADOL HYDROCHLORIDE 50 MG: 50 TABLET, COATED ORAL at 19:37

## 2024-02-18 RX ADMIN — FAMOTIDINE 20 MG: 20 TABLET, FILM COATED ORAL at 08:53

## 2024-02-18 RX ADMIN — POLYETHYLENE GLYCOL 3350 17 G: 17 POWDER, FOR SOLUTION ORAL at 21:06

## 2024-02-18 RX ADMIN — ACETAMINOPHEN 975 MG: 325 TABLET ORAL at 06:24

## 2024-02-18 RX ADMIN — ACETAMINOPHEN 975 MG: 325 TABLET ORAL at 14:30

## 2024-02-18 RX ADMIN — DOCUSATE SODIUM 100 MG: 100 CAPSULE, LIQUID FILLED ORAL at 21:17

## 2024-02-18 RX ADMIN — FLUTICASONE PROPIONATE 1 SPRAY: 50 SPRAY, METERED NASAL at 08:53

## 2024-02-18 RX ADMIN — ENOXAPARIN SODIUM 70 MG: 80 INJECTION SUBCUTANEOUS at 08:52

## 2024-02-18 RX ADMIN — TRAMADOL HYDROCHLORIDE 50 MG: 50 TABLET, COATED ORAL at 06:23

## 2024-02-18 RX ADMIN — LATANOPROST 1 DROP: 50 SOLUTION OPHTHALMIC at 21:06

## 2024-02-18 RX ADMIN — ENOXAPARIN SODIUM 70 MG: 80 INJECTION SUBCUTANEOUS at 21:06

## 2024-02-18 RX ADMIN — MIDODRINE HYDROCHLORIDE 2.5 MG: 5 TABLET ORAL at 06:21

## 2024-02-18 RX ADMIN — ACETAMINOPHEN 975 MG: 325 TABLET ORAL at 21:06

## 2024-02-18 RX ADMIN — HYDROXYZINE HYDROCHLORIDE 25 MG: 25 TABLET ORAL at 19:37

## 2024-02-18 RX ADMIN — MIDODRINE HYDROCHLORIDE 5 MG: 5 TABLET ORAL at 17:06

## 2024-02-18 RX ADMIN — ASPIRIN 325 MG: 325 TABLET ORAL at 08:53

## 2024-02-18 RX ADMIN — MIDODRINE HYDROCHLORIDE 5 MG: 5 TABLET ORAL at 12:01

## 2024-02-18 NOTE — ASSESSMENT & PLAN NOTE
Echo showed EF of 25% with severe global hypokinesis with regional variations, grade 2 diastolic dysfunction with severely dilated left atrium with aortic valve area of 0.5 cm 2  Etiology not clear at the current time  Patient will need cardiac catheterization at some point to rule out coronary artery disease  Patient was started on Toprol-XL 25 mg p.o. daily but blood pressure continues to remain soft and patient was also added midodrine which was increased to 5 mg p.o. 3 times daily

## 2024-02-18 NOTE — PROGRESS NOTES
[facility]  Progress Note  Name: Gilberto Vann I  MRN: [id number]  Unit/Bed#: 3 [place] 326-01 I Date of Admission: 2024   Date of Service: 2024 I Hospital Day: 2    Assessment/Plan   * Syncope  Assessment & Plan  Patient presents with syncope versus cardiac arrest at neighbor's .  Patient fell on the floor, sustained abrasion to left scalp.  Bystanders initiated CPR.  EMS arrived 4 minutes later found patient to have palpable pulses and patient had regained consciousness.  Patient remembers he was standing by the door,unable to recall anything else.  Patient had complained of feeling foggy and dizzy, not feeling well ever since he took first dose of doxazosin for prostate yesterday evening per friend at bedside.  Patient reports only ate around 1 PM during the day.  Does not eat breakfast regularly.  Patient did not actually want to go to  due to not feeling well and anxiety per friend at bedside.  Denies history of syncope.  CT head no acute findings  CT chest without contrast showed - Moderate septal thickening due to interstitial edema with small pleural effusions. Acute minimally displaced fractures of multiple bilateral anterior ribs due to CPR. Nondisplaced fracture of the anterior cortex of the superior body of the sternum due to CPR.  Initial troponin 112.  Repeat trending up.  Initial EKG showed NSR, bifascicular block, rate 97,TWI V1-V4,STD V2-V4.  Repeat EKG showed NSR, LVH,STD V5 V6,mild JESSE V1 to V3 likely due to LVH.   Peak troponin was 3327  Patient has been on Lovenox 1 mg/g subcutaneously twice daily.  Continued on Lovenox  Lipid panel showed LDL level of 82.  Hemoglobin A1c was 5.7  2D echo showed EF of 25% with severe global hypokinesis with grade 2 diastolic dysfunction with severely dilated left atrium with an aortic valve area of 1.5 cm per  Orthostatic vital signs could not be obtained as patient is not very cooperative  Cardiology input  appreciated  Suspected syncope in the setting of possible critical aortic stenosis versus ventricular arrhythmias due to cardiomyopathy    Cardiomyopathy (HCC)  Assessment & Plan  Echo showed EF of 25% with severe global hypokinesis with regional variations, grade 2 diastolic dysfunction with severely dilated left atrium with aortic valve area of 0.5 cm 2  Etiology not clear at the current time  Patient will need cardiac catheterization at some point to rule out coronary artery disease  Patient was started on Toprol-XL 25 mg p.o. daily but blood pressure continues to remain soft and patient was also added midodrine which was increased to 5 mg p.o. 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   No edema to lower extremities on exam  Patient received a dose of Lasix 20 mg IV  Blood pressure continues to remain soft and could not get further diuretics  Cardiac diet with fluid restriction 1800 cc per day  Echo showed EF of 25% as stated above  Patient started on Toprol-XL 20 mg p.o. daily and Aldactone 12.5 mg p.o. daily  Patient was started midodrine 2.5 mg p.o. twice daily which was increased to 5 mg p.o. 3 times daily      Elevated troponin  Assessment & Plan  Peak troponin was 3327  Suspected nonischemic myocardial injury in the setting of CPR, critical aortic stenosis and heart failure  Patient started on therapeutic Lovenox  Added on aspirin  Lipid panel showed LDL level of 82  2D echo-EF of 25% with aortic valve area of 0.5 cm squire  Hold off on starting Lipitor due to elevated LFTs   Cardiology input appreciated  Patient will need cardiac catheterization at some point    Anemia  Assessment & Plan  Baseline hemoglobin around 11  Hemoglobin stable  Monitor    Anxiety  Assessment & Plan  Patient was on low-dose Xanax as needed before.   Not currently.  Will order Atarax as needed.  Declined psych consult.    Pulmonary nodules  Assessment & Plan  CT showed -  A few 5 mm and smaller nodules. Stability is unknown given the absence of comparison studies. Follow-up could be obtained with a chest CT with no contrast in 6 months to assure stability.     Closed fracture of body of sternum  Assessment & Plan  Pain control  Incentive spirometer    Closed fracture of multiple ribs of both sides  Assessment & Plan  Due to CPR  Incentive spirometer  Pain control    Hyperglycemia  Assessment & Plan  Glucose 242  Hemoglobin A1c was 5.7    Glaucoma  Assessment & Plan  Continue home eyedrop    COPD (chronic obstructive pulmonary disease) (HCC)  Assessment & Plan  Continue Symbicort  No wheezing on auscultation  Patient refusing to take Symbicort as patient has not been using the inhaler for the past few weeks    Benign prostatic hyperplasia with urinary retention  Assessment & Plan  Started on doxazosin  the day before admission by urology.  Reported felt foggy and dizzy, not feeling well after taking first dose.  Will hold doxazosin.   suspected BPH.-Avoid alpha blockers.  Patient will need outpatient follow-up with urology for cystoscopy and positive consideration for TURP               VTE Pharmacologic Prophylaxis:   Moderate Risk (Score 3-4) - Pharmacological DVT Prophylaxis Ordered: enoxaparin (Lovenox).    Mobility:   Basic Mobility Inpatient Raw Score: 15  JH-HLM Goal: 4: Move to chair/commode  JH-HLM Achieved: 2: Bed activities/Dependent transfer  HLM Goal NOT achieved. Continue with multidisciplinary rounding and encourage appropriate mobility to improve upon HLM goals.    Patient Centered Rounds: I performed bedside rounds with nursing staff today.   Discussions with Specialists or Other Care Team Provider: cardiology    Education and Discussions with Family / Patient: Discussed in detail with patient and friends at bedside.  I also had an extensive  discussion with patient's sister yesterday    Total Time Spent on Date of Encounter in care of patient: 40 mins. This time was spent on one or more of the following: performing physical exam; counseling and coordination of care; obtaining or reviewing history; documenting in the medical record; reviewing/ordering tests, medications or procedures; communicating with other healthcare professionals and discussing with patient's family/caregivers.    Current Length of Stay: 2 day(s)  Current Patient Status: Inpatient   Certification Statement: The patient will continue to require additional inpatient hospital stay due to syncope, acute congestive heart failure, cardiomyopathy, critical aortic stenosis  Discharge Plan: Anticipate discharge in >72 hrs to pending course    Code Status: Level 1 - Full Code    Subjective:   Patient continues to complain of some chest pain.  Denies any shortness of breath or cough.  Patient was upset about his low-salt diet.    Objective:     Vitals:   Temp (24hrs), Av.7 °F (36.5 °C), Min:97.4 °F (36.3 °C), Max:98.1 °F (36.7 °C)    Temp:  [97.4 °F (36.3 °C)-98.1 °F (36.7 °C)] 98 °F (36.7 °C)  HR:  [67-82] 82  Resp:  [11-39] 25  BP: ()/(50-75) 100/59  SpO2:  [92 %-100 %] 92 %  Body mass index is 21.32 kg/m².     Input and Output Summary (last 24 hours):     Intake/Output Summary (Last 24 hours) at 2024 1548  Last data filed at 2024 1400  Gross per 24 hour   Intake 990 ml   Output 2100 ml   Net -1110 ml       Physical Exam:   Physical Exam  Constitutional:       Appearance: Normal appearance.   HENT:      Head: Normocephalic and atraumatic.   Eyes:      Extraocular Movements: Extraocular movements intact.      Pupils: Pupils are equal, round, and reactive to light.   Cardiovascular:      Rate and Rhythm: Normal rate and regular rhythm.      Heart sounds: Murmur heard.      No gallop.   Pulmonary:      Effort: Pulmonary effort is normal.      Breath sounds: Normal breath  sounds.   Abdominal:      General: Bowel sounds are normal.      Palpations: Abdomen is soft.      Tenderness: There is no abdominal tenderness.   Musculoskeletal:         General: No swelling or deformity. Normal range of motion.      Cervical back: Normal range of motion and neck supple.   Skin:     General: Skin is warm and dry.   Neurological:      General: No focal deficit present.      Mental Status: Gilberto is alert.          Additional Data:     Labs:  Results from last 7 days   Lab Units 24  0455 24  0505 24  1908   WBC Thousand/uL 7.13   < > 9.37   HEMOGLOBIN g/dL 11.8*   < > 12.2   HEMATOCRIT % 36.3*   < > 37.6   PLATELETS Thousands/uL 154   < > 208   NEUTROS PCT %  --   --  83*   LYMPHS PCT %  --   --  9*   MONOS PCT %  --   --  6   EOS PCT %  --   --  0    < > = values in this interval not displayed.     Results from last 7 days   Lab Units 24  0455   SODIUM mmol/L 139   POTASSIUM mmol/L 4.4   CHLORIDE mmol/L 103   CO2 mmol/L 30   BUN mg/dL 19   CREATININE mg/dL 0.81   ANION GAP mmol/L 6   CALCIUM mg/dL 8.1*   ALBUMIN g/dL 3.4*   TOTAL BILIRUBIN mg/dL 1.09*   ALK PHOS U/L 58   ALT U/L 66*   AST U/L 53*   GLUCOSE RANDOM mg/dL 99         Results from last 7 days   Lab Units 24  1855   POC GLUCOSE mg/dl 204*     Results from last 7 days   Lab Units 24  1908   HEMOGLOBIN A1C % 5.7*           Lines/Drains:  Invasive Devices       Peripheral Intravenous Line  Duration             Peripheral IV 24 Left Forearm 1 day    Peripheral IV 24 Left;Upper;Ventral (anterior) Arm <1 day                      Telemetry:  Telemetry Orders (From admission, onward)               24 Hour Telemetry Monitoring  Continuous x 24 Hours (Telem)           Question:  Reason for 24 Hour Telemetry  Answer:  Syncope suspected to be cardiac in origin                     Telemetry Reviewed: Normal Sinus Rhythm  Indication for Continued Telemetry Use: Acute CHF on >200 mg lasix/day or  equivalent dose or with new reduced EF.              Imaging: Reviewed radiology reports from this admission including: chest CT scan and CT head    Recent Cultures (last 7 days):         Last 24 Hours Medication List:   Current Facility-Administered Medications   Medication Dose Route Frequency Provider Last Rate    [Transfer Hold] acetaminophen  975 mg Oral Q8H [facility] MICHELLE Nance      [Transfer Hold] aspirin  325 mg Oral Daily Saman Motley MD      budesonide-formoterol  2 puff Inhalation BID MICHELLE Nance      [Transfer Hold] enoxaparin  1 mg/kg Subcutaneous Q12H [facility] MICHELLE Nance      [Transfer Hold] famotidine  20 mg Oral Daily MICHELLE Nance      fluticasone  1 spray Nasal BID MICHELLE Nance      [Transfer Hold] hydrOXYzine HCL  25 mg Oral Q6H PRN MICHELLE Nance      [Transfer Hold] latanoprost  1 drop Both Eyes  MICHELLE Nance      [Transfer Hold] levalbuterol  0.63 mg Nebulization Q6H PRN MICHELLE Nance      [Transfer Hold] metoprolol succinate  25 mg Oral Daily Kaushal Mcclure MD      [Transfer Hold] midodrine  5 mg Oral TID AC Lis Card PA-C      [Transfer Hold] nicotine polacrilex  2 mg Oral Q2H PRN MICHELLE Nance      ondansetron  4 mg Intravenous Q6H PRN MICHELLE Nance      [Transfer Hold] pneumococcal 20-radha conj vacc  0.5 mL Intramuscular Once Saman Motley MD      [Transfer Hold] spironolactone  12.5 mg Oral Daily Kaushal Mcclure MD      [Transfer Hold] traMADol  50 mg Oral Q6H PRN MICHELLE Nance          Today, Patient Was Seen By: Saman Motley MD    **Please Note: This note may have been constructed using a voice recognition system.**

## 2024-02-18 NOTE — PROGRESS NOTES
"Progress Note - Urology      Patient: Gilberto Vann   : 1940 Sex: adult   MRN: [id number]     CSN: [id number]  Unit/Bed#: ICU 04     SUBJECTIVE:   Patient seen on rounds earlier today  Now off doxazosin  Voiding with a slow stream  And emptying his bladder      Objective   Vitals: /59   Pulse 82   Temp 98 °F (36.7 °C)   Resp (!) 25   Ht 5' 9\" (1.753 m)   Wt 65.5 kg (144 lb 6.4 oz)   SpO2 92%   BMI 21.32 kg/m²     I/O last 24 hours:  In: 990 [P.O.:980; I.V.:10]  Out: 2700 [Urine:2700]      Physical Exam:   General Alert awake   Normocephalic atraumatic PERRLA  Lungs clear bilaterally  Cardiac normal S1 normal S2  Abdomen soft, flank pain  Extremities no edema      Lab Results: CBC:   Lab Results   Component Value Date    WBC 7.13 2024    HGB 11.8 (L) 2024    HCT 36.3 (L) 2024    MCV 92 2024     2024    RBC 3.96 2024    MCH 29.8 2024    MCHC 32.5 2024    RDW 15.2 (H) 2024    MPV 10.7 2024    NRBC 0 2024     CMP:   Lab Results   Component Value Date     2024     2023    CO2 30 2024    CO2 26 2023    BUN 19 2024    BUN 20 2023    CREATININE 0.81 2024    CALCIUM 8.1 (L) 2024    AST 53 (H) 2024    AST 17 2023    ALT 66 (H) 2024    ALT 14 2023    ALKPHOS 58 2024    EGFR 86 2023    EGFR 89 2020     Urinalysis:   Lab Results   Component Value Date    COLORU Light Yellow 2020    CLARITYU Clear 2020    SPECGRAV 1.010 2020    PHUR 6.0 2020    LEUKOCYTESUR Negative 2020    NITRITE Negative 2020    GLUCOSEU Negative 2020    KETONESU Negative 2020    BILIRUBINUR Negative 2020    BLOODU Small (A) 2020     Urine Culture:   Lab Results   Component Value Date    URINECX >100,000 cfu/ml Escherichia coli 2016     PSA:   Lab Results   Component Value Date    PSA 6.98 (H) " 05/22/2023         Assessment/ Plan:  Prostate obstruction  Voiding issues  Syncopal episode  Patient once again warned cannot be on alpha blockers in case related to medication  Need to consider procedure          James Blunt MD

## 2024-02-18 NOTE — PROGRESS NOTES
Progress Note - Cardiology   [facility]     Gilberto Vann 83 y.o. adult MRN: [id number]  : 1940  Unit/Bed#: ICU 04 Encounter: [id number]    Assessment and Plan:   Syncope.  - Presented on 24 for evaluation after what is believed to be thought of syncopal events.    Patient states that he was on his way to attend a , attempted to walk through the front doors of the  home and then remembers waking up in the hospital ED.  Review of chart indicates that patient is consciousness at  home.  When police arrived patient was agonal he breathing prompting them to start CPR. When EMS arrived patient had pulses, was awake, and fully oriented.  Patient denies any symptoms of lightheadedness or dizziness prior to syncopal episode.  - Syncope likely secondary to critical aortic valve stenosis.  - Recommend orthostatic vital signs.     Critical aortic valve stenosis.  - Patient states that he has noticed worsening shortness of breath with exertion over the past several months.  He denies any lightheadedness or dizziness or any other syncopal events prior to events on 24.   - 24 TTE:   Aortic Valve The aortic valve is trileaflet. The leaflets are moderately thickened. The leaflets are moderately calcified. There is severely reduced mobility. There is mild to moderate regurgitation. There is critical stenosis.  Peak gradient 108 mmHg.  Mean gradient 63.  LVOT velocity is measured to be 0.58.  LVOT diameter 2.2 and aortic valve area 0.5 cm 2 and dimensionless index 0.11.  There may be some component of low cardiac output contributing to valve stenosis.   - No prior TTE for comparison.  - Avoid aggressive IV diuretics or IV fluid use.  - Will discuss with attending timing of low-dose dobutamine therapy and repeat TTE.  - Will need outpatient TAVR evaluation by CTS.     Elevated troponin.  - 24 hs troponin: 112 (0hrs), 252 (2hrs), 992 (4hrs).   - 24 hs  troponin random: 3327.   - 2/18/24 hs troponin random: 3080.   - Continue therapeutic Lovenox.  - 2/17/24 EKG: Normal sinus rhythm, 81 bpm.  Left axis deviation.  Left ventricle hypertrophy with QRS widening and repolarization abnormality ( Sokolow-Self , Manny product ).   - Non-ischemic myocardial injury secondary to CPR, critical aortic valve stenosis, acute combined systolic and diastolic heart failure versus type II MI.  - Patient will require ischemic workup, cardiac catheterization.  Will discuss timing of cardiac catheterization with attending.     Acute combined systolic and diastolic heart failure.  - 2/17/24 BNP: 1270.   - 2/16/23 CT chest without contrast: Moderate septal thickening due to interstitial edema with small pleural effusions.   - 2/17/24 TTE: LVEF 25%. There is severe global hypokinesis with regional variation. Diastolic function is moderately abnormal, consistent with grade II (pseudonormal) relaxation.  Left atrial filling pressure is elevated. Right Ventricle: Systolic function is low normal. Left Atrium: The atrium is severely dilated ( 60 mL/m2). Right Atrium: The atrium is moderately dilated. Aortic Valve: The aortic valve is trileaflet. The leaflets are moderately thickened. The leaflets are moderately calcified. There is severely reduced mobility. There is mild to moderate regurgitation. There is critical stenosis.  Peak gradient 108 mmHg.  Mean gradient 63.  LVOT velocity is measured to be 0.58.  LVOT diameter 2.2 and aortic valve area 0.5 cm 2 and dimensionless index 0.11.  There may be some component of low cardiac output contributing to valve stenosis. Mitral Valve: There is mild to moderate regurgitation. Tricuspid Valve: There is mild to moderate regurgitation.  Pulmonary artery pressure around 50 mmHg. IVC/SVC: The inferior vena cava is mildly dilated.  It has less than 50% collapse with inspiration consistent with elevated right atrial pressure about 10 to 15 mmHg.  Pericardium: There is a trivial pericardial effusion.  - No prior TTE for comparison.  - Patient will require ischemic workup, cardiac catheterization.  Will discuss timing of cardiac catheterization with attending.  - Continue Toprol-XL 25 mg daily and Aldactone 12.5 mg daily.  - Currently on midodrine 2.5 mg twice daily for BP support given episodes of low BP. Increase to midodrine 5 mg three times daily.   - Strict I/Os.   - Daily weight, standing scale.   - CHF education.   - Continue low-sodium diet with 1800 mL fluid restriction.      Cardiomyopathy.  - 2/17/24 TTE: LVEF 25%. There is severe global hypokinesis with regional variation. Diastolic function is moderately abnormal, consistent with grade II (pseudonormal) relaxation.  Left atrial filling pressure is elevated. Right Ventricle: Systolic function is low normal. Left Atrium: The atrium is severely dilated ( 60 mL/m2). Right Atrium: The atrium is moderately dilated.Aortic Valve: The aortic valve is trileaflet. The leaflets are moderately thickened. The leaflets are moderately calcified. There is severely reduced mobility. There is mild to moderate regurgitation. There is critical stenosis.  Peak gradient 108 mmHg.  Mean gradient 63.  LVOT velocity is measured to be 0.58.  LVOT diameter 2.2 and aortic valve area 0.5 cm 2 and dimensionless index 0.11.  There may be some component of low cardiac output contributing to valve stenosis.Mitral Valve: There is mild to moderate regurgitation. Tricuspid Valve: There is mild to moderate regurgitation.  Pulmonary artery pressure around 50 mmHg. IVC/SVC: The inferior vena cava is mildly dilated.  It has less than 50% collapse with inspiration consistent with elevated right atrial pressure about 10 to 15 mmHg. Pericardium: There is a trivial pericardial effusion.  - No prior TTE for comparison.  - Continue Toprol-XL 25 mg daily and Aldactone 12.5 mg daily.  - Currently on midodrine 2.5 mg twice daily for BP support  given episodes of low BP. Increase to midodrine 5 mg three times daily.   - Will require ischemic workup, cardiac catheterization for cardiomyopathy, elevated troponin, and TAVR workup.     Valvular heart disease.  - Critical aortic valve stenosis.  Mild to moderate mitral valve regurgitation.  Mild to moderate tricuspid valve regurgitation.  Trace pulmonic valve regurgitation.  - 2/17/24 TTE:   Aortic Valve The aortic valve is trileaflet. The leaflets are moderately thickened. The leaflets are moderately calcified. There is severely reduced mobility. There is mild to moderate regurgitation. There is critical stenosis.  Peak gradient 108 mmHg.  Mean gradient 63.  LVOT velocity is measured to be 0.58.  LVOT diameter 2.2 and aortic valve area 0.5 cm 2 and dimensionless index 0.11.  There may be some component of low cardiac output contributing to valve stenosis.   Mitral Valve There is mild to moderate regurgitation. There is no evidence of stenosis.   Tricuspid Valve There is mild to moderate regurgitation.  Pulmonary artery pressure around 50 mmHg. There is no evidence of stenosis.   Pulmonic Valve There is trace regurgitation. There is no evidence of stenosis.      Essential hypertension.  - SBP since admission .  - Not currently on antihypertensives.  - Continue Toprol-XL 25 mg daily and Aldactone 12.5 mg daily.  - Currently on midodrine 2.5 mg twice daily for BP support given episodes of low BP. Increase to midodrine 5 mg three times daily.   - Continue to monitor BP.     Sternum/rib fractures.  - Secondary to CPR.  - 2/16/24 CT chest without contrast: Acute minimally displaced fractures of multiple bilateral anterior ribs due to CPR. Nondisplaced fracture of the anterior cortex of the superior body of the sternum due to CPR.     COPD.  - Does not appear in acute exacerbation.    - Follows outpatient with [facility].  - Care per primary team.     Pre-diabetes.   - 2/16/24 HgbA1c: 5.7.   - Care  "per primary team.   Subjective / Objective:        Nursing reports patient found to have cigars in bed overnight.  Patient reports chest discomfort localized to the center of his chest, patient with known sternal fracture secondary to CPR.  Patient currently denies palpitations, shortness of breath, lightheadedness, dizziness, headache, nausea, or vomiting.    Vitals: Blood pressure 109/55, pulse 79, temperature 97.5 °F (36.4 °C), temperature source Temporal, resp. rate (!) 39, height 5' 9\" (1.753 m), weight 65.5 kg (144 lb 6.4 oz), SpO2 97%.  Vitals:    02/17/24 1000 02/18/24 0548   Weight: 66.2 kg (145 lb 15.1 oz) 65.5 kg (144 lb 6.4 oz)     Body mass index is 21.32 kg/m².  BP Readings from Last 3 Encounters:   02/18/24 109/55   12/26/23 130/78   11/28/23 130/78     Orthostatic Blood Pressures      Flowsheet Row Most Recent Value   Blood Pressure 109/55 filed at 02/18/2024 0900   Patient Position - Orthostatic VS Lying filed at 02/18/2024 0800          I/O         02/16 0701  02/17 0700 02/17 0701  02/18 0700 02/18 0701  02/19 0700    P.O.  380 360    I.V. (mL/kg)   10 (0.2)    IV Piggyback 1000      Total Intake(mL/kg) 1000 (15.1) 380 (5.8) 370 (5.6)    Urine (mL/kg/hr) 550 1950 (1.2) 350 (2.2)    Total Output 550 1950 350    Net +450 -1570 +20                 Invasive Devices       Peripheral Intravenous Line  Duration             Peripheral IV 02/17/24 Left Forearm 1 day    Peripheral IV 02/18/24 Left;Upper;Ventral (anterior) Arm <1 day                      Intake/Output Summary (Last 24 hours) at 2/18/2024 0929  Last data filed at 2/18/2024 0901  Gross per 24 hour   Intake 750 ml   Output 2000 ml   Net -1250 ml         Physical Exam:   Physical Exam  Vitals reviewed.   Constitutional:       General: Gilberto is not in acute distress.  Cardiovascular:      Rate and Rhythm: Normal rate and regular rhythm.      Pulses: Normal pulses.      Heart sounds: Murmur heard.   Pulmonary:      Effort: Pulmonary effort is " normal. No respiratory distress.      Breath sounds: Normal breath sounds.   Abdominal:      General: Abdomen is flat. There is no distension.      Palpations: Abdomen is soft.      Tenderness: There is no abdominal tenderness.   Musculoskeletal:      Right lower leg: No edema.      Left lower leg: No edema.   Skin:     General: Skin is warm and dry.   Neurological:      Mental Status: Gilberto is alert and oriented to person, place, and time.       Medications/ Allergies:     Current Facility-Administered Medications   Medication Dose Route Frequency Provider Last Rate    acetaminophen  975 mg Oral Q8H ANJALI Rodger Tovarrik, CRNP      aspirin  325 mg Oral Daily Saman Motley MD      budesonide-formoterol  2 puff Inhalation BID Cuiluis an Yurik, CRNP      enoxaparin  1 mg/kg Subcutaneous Q12H ANJALI Rodger Tovarrik, CRNP      famotidine  20 mg Oral Daily Cuiluis an Yurik, CRNP      fluticasone  1 spray Nasal BID Cuiyin Yurik, CRNP      hydrOXYzine HCL  25 mg Oral Q6H PRN Cuiyin Yurik, CRNP      latanoprost  1 drop Both Eyes HS Cuiluis an Yurik, CRNP      levalbuterol  0.63 mg Nebulization Q6H PRN Cuiyin Yurik, CRNP      metoprolol succinate  25 mg Oral Daily Kaushal Mcclure MD      midodrine  2.5 mg Oral BID AC Kaushal Mcclure MD      nicotine polacrilex  2 mg Oral Q2H PRN Cuiyin Yurik, CRNP      ondansetron  4 mg Intravenous Q6H PRN Cuiyikristyn Yurik, CRNP      pneumococcal 20-radha conj vacc  0.5 mL Intramuscular Once Saman Motley MD      spironolactone  12.5 mg Oral Daily Kaushal Mcclure MD      traMADol  50 mg Oral Q6H PRN Cuiyin Yurik, CRNP       hydrOXYzine HCL, 25 mg, Q6H PRN  levalbuterol, 0.63 mg, Q6H PRN  nicotine polacrilex, 2 mg, Q2H PRN  ondansetron, 4 mg, Q6H PRN  traMADol, 50 mg, Q6H PRN      Allergies   Allergen Reactions    Shellfish-Derived Products - Food Allergy Other (See Comments)     Listed by PCP patient denies    Iodine - Food Allergy Other (See Comments)       VTE Pharmacologic Prophylaxis:   Enoxaparin  "(Lovenox)    Labs:   Troponins:  Results from last 7 days   Lab Units 02/18/24  0455 02/17/24  0505 02/17/24  0027 02/16/24  2217   HS TNI RAND ng/L 3,080* 3,327*  --   --    HSTNI D2 ng/L  --   --   --  140*   HSTNI D4 ng/L  --   --  880*  --          CBC with diff:  Results from last 7 days   Lab Units 02/18/24  0455 02/17/24  0505 02/16/24  1908   WBC Thousand/uL 7.13 8.13 9.37   HEMOGLOBIN g/dL 11.8* 11.1* 12.2   HEMATOCRIT % 36.3* 33.7* 37.6   MCV fL 92 91 92   PLATELETS Thousands/uL 154 156 208   RBC Million/uL 3.96 3.72* 4.10   MCH pg 29.8 29.8 29.8   MCHC g/dL 32.5 32.9 32.4   RDW % 15.2* 15.0 14.9   MPV fL 10.7 10.5 10.7   NRBC AUTO /100 WBCs  --   --  0       CMP:  Results from last 7 days   Lab Units 02/18/24  0455 02/17/24  0505 02/16/24  1908   SODIUM mmol/L 139 139 139   POTASSIUM mmol/L 4.4 4.4 3.9   CHLORIDE mmol/L 103 108 105   CO2 mmol/L 30 24 23   ANION GAP mmol/L 6 7 11   BUN mg/dL 19 23 23   CREATININE mg/dL 0.81 0.71 1.19   CALCIUM mg/dL 8.1* 8.0* 8.7   AST U/L 53* 80* 77*   ALT U/L 66* 86* 78*   ALK PHOS U/L 58 52 59   TOTAL PROTEIN g/dL 5.5* 5.3* 5.8*   ALBUMIN g/dL 3.4* 3.3* 3.6   TOTAL BILIRUBIN mg/dL 1.09* 0.77 0.76       Magnesium:  Results from last 7 days   Lab Units 02/18/24  0455 02/17/24  0505 02/16/24  1908   MAGNESIUM mg/dL 2.1 1.8* 2.0     Coags:    TSH:  Results from last 7 days   Lab Units 02/17/24  0505   TSH 3RD GENERATON uIU/mL 2.691     No components found for: \"TSH3\"  Lipid Profile:  Results from last 7 days   Lab Units 02/17/24  0505   CHOLESTEROL mg/dL 144   TRIGLYCERIDES mg/dL 44   HDL mg/dL 53   LDL CALC mg/dL 82     Hgb A1c:  Results from last 7 days   Lab Units 02/16/24  1908   HEMOGLOBIN A1C % 5.7*     NT-proBNP: No results for input(s): \"NTBNP\" in the last 72 hours.     Imaging & Testing   I have personally reviewed pertinent reports.      CT chest without contrast    Result Date: 2/16/2024  Impression: Moderate septal thickening due to interstitial edema with small " pleural effusions. Acute minimally displaced fractures of multiple bilateral anterior ribs due to CPR. Nondisplaced fracture of the anterior cortex of the superior body of the sternum due to CPR. A few 5 mm and smaller nodules. Stability is unknown given the absence of comparison studies. Follow-up could be obtained with a chest CT with no contrast in 6 months to assure stability.     CT head without contrast    Result Date: 2/16/2024    Impression: No acute intracranial abnormality. Paranasal sinus disease.        EKG / Monitor: Personally reviewed.      Telemetry reviewed: Sinus rhythm, 74 bpm.    Cardiac testing:   Echo complete w/ contrast if indicated    Result Date: 2/17/2024  Narrative:   Left Ventricle: Left ventricular cavity size is mildly dilated. Wall thickness is mildly increased. There is mild concentric hypertrophy. The left ventricular ejection fraction is around 25% by visual estimation. Systolic function is severely reduced. There is severe global hypokinesis with regional variation. Diastolic function is moderately abnormal, consistent with grade II (pseudonormal) relaxation.  Left atrial filling pressure is elevated.   Right Ventricle: Systolic function is low normal.   Left Atrium: The atrium is severely dilated ( 60 mL/m2).   Right Atrium: The atrium is moderately dilated.   Aortic Valve: The aortic valve is trileaflet. The leaflets are moderately thickened. The leaflets are moderately calcified. There is severely reduced mobility. There is mild to moderate regurgitation. There is critical stenosis.  Peak gradient 108 mmHg.  Mean gradient 63.  LVOT velocity is measured to be 0.58.  LVOT diameter 2.2 and aortic valve area 0.5 cm 2 and dimensionless index 0.11.  There may be some component of low cardiac output contributing to valve stenosis.   Mitral Valve: There is mild to moderate regurgitation.   Tricuspid Valve: There is mild to moderate regurgitation.  Pulmonary artery pressure around 50  mmHg.   IVC/SVC: The inferior vena cava is mildly dilated.  It has less than 50% collapse with inspiration consistent with elevated right atrial pressure about 10 to 15 mmHg.   Pericardium: There is a trivial pericardial effusion.   No old echo available for comparison.             Lis Card PA-C

## 2024-02-18 NOTE — NURSING NOTE
"Two cigars found in bed with patient, inside a cup with tissues being used as a \"spit cup\". Patient states he \"chews\" them to help with his nicotine cravings. Cigars removed and locked in patient belonging cabinet. SLIM NP notified, PRN Nicorette gum ordered.  "

## 2024-02-19 ENCOUNTER — PREP FOR PROCEDURE (OUTPATIENT)
Dept: CARDIOLOGY CLINIC | Facility: CLINIC | Age: 84
End: 2024-02-19

## 2024-02-19 DIAGNOSIS — I35.0 CRITICAL AORTIC VALVE STENOSIS: Primary | ICD-10-CM

## 2024-02-19 PROBLEM — I25.10 CAD (CORONARY ARTERY DISEASE): Status: ACTIVE | Noted: 2024-02-19

## 2024-02-19 LAB
ANION GAP SERPL CALCULATED.3IONS-SCNC: 5 MMOL/L
BUN SERPL-MCNC: 18 MG/DL (ref 5–25)
CALCIUM SERPL-MCNC: 8.1 MG/DL (ref 8.4–10.2)
CHLORIDE SERPL-SCNC: 103 MMOL/L (ref 96–108)
CO2 SERPL-SCNC: 29 MMOL/L (ref 21–32)
CREAT SERPL-MCNC: 0.71 MG/DL (ref 0.6–1.3)
GLUCOSE SERPL-MCNC: 93 MG/DL (ref 65–140)
POTASSIUM SERPL-SCNC: 3.7 MMOL/L (ref 3.5–5.3)
SODIUM SERPL-SCNC: 137 MMOL/L (ref 135–147)

## 2024-02-19 PROCEDURE — 93454 CORONARY ARTERY ANGIO S&I: CPT | Performed by: INTERNAL MEDICINE

## 2024-02-19 PROCEDURE — 99152 MOD SED SAME PHYS/QHP 5/>YRS: CPT | Performed by: INTERNAL MEDICINE

## 2024-02-19 PROCEDURE — 90677 PCV20 VACCINE IM: CPT | Performed by: INTERNAL MEDICINE

## 2024-02-19 PROCEDURE — G0009 ADMIN PNEUMOCOCCAL VACCINE: HCPCS | Performed by: INTERNAL MEDICINE

## 2024-02-19 PROCEDURE — NC001 PR NO CHARGE: Performed by: INTERNAL MEDICINE

## 2024-02-19 PROCEDURE — C1769 GUIDE WIRE: HCPCS | Performed by: INTERNAL MEDICINE

## 2024-02-19 PROCEDURE — 99232 SBSQ HOSP IP/OBS MODERATE 35: CPT | Performed by: INTERNAL MEDICINE

## 2024-02-19 PROCEDURE — 99153 MOD SED SAME PHYS/QHP EA: CPT | Performed by: INTERNAL MEDICINE

## 2024-02-19 PROCEDURE — B2111ZZ FLUOROSCOPY OF MULTIPLE CORONARY ARTERIES USING LOW OSMOLAR CONTRAST: ICD-10-PCS | Performed by: INTERNAL MEDICINE

## 2024-02-19 PROCEDURE — C1894 INTRO/SHEATH, NON-LASER: HCPCS | Performed by: INTERNAL MEDICINE

## 2024-02-19 PROCEDURE — 80048 BASIC METABOLIC PNL TOTAL CA: CPT | Performed by: INTERNAL MEDICINE

## 2024-02-19 PROCEDURE — 99233 SBSQ HOSP IP/OBS HIGH 50: CPT | Performed by: INTERNAL MEDICINE

## 2024-02-19 RX ORDER — ENOXAPARIN SODIUM 100 MG/ML
40 INJECTION SUBCUTANEOUS
Status: DISCONTINUED | OUTPATIENT
Start: 2024-02-20 | End: 2024-02-22 | Stop reason: HOSPADM

## 2024-02-19 RX ORDER — LIDOCAINE WITH 8.4% SOD BICARB 0.9%(10ML)
SYRINGE (ML) INJECTION CODE/TRAUMA/SEDATION MEDICATION
Status: DISCONTINUED | OUTPATIENT
Start: 2024-02-19 | End: 2024-02-19 | Stop reason: HOSPADM

## 2024-02-19 RX ORDER — FUROSEMIDE 10 MG/ML
20 INJECTION INTRAMUSCULAR; INTRAVENOUS ONCE
Status: COMPLETED | OUTPATIENT
Start: 2024-02-19 | End: 2024-02-19

## 2024-02-19 RX ORDER — HEPARIN SODIUM 1000 [USP'U]/ML
INJECTION, SOLUTION INTRAVENOUS; SUBCUTANEOUS CODE/TRAUMA/SEDATION MEDICATION
Status: DISCONTINUED | OUTPATIENT
Start: 2024-02-19 | End: 2024-02-19 | Stop reason: HOSPADM

## 2024-02-19 RX ORDER — SODIUM CHLORIDE 9 MG/ML
40 INJECTION, SOLUTION INTRAVENOUS CONTINUOUS
Status: DISCONTINUED | OUTPATIENT
Start: 2024-02-19 | End: 2024-02-19

## 2024-02-19 RX ORDER — NITROGLYCERIN 20 MG/100ML
INJECTION INTRAVENOUS CODE/TRAUMA/SEDATION MEDICATION
Status: DISCONTINUED | OUTPATIENT
Start: 2024-02-19 | End: 2024-02-19 | Stop reason: HOSPADM

## 2024-02-19 RX ORDER — VERAPAMIL HCL 2.5 MG/ML
AMPUL (ML) INTRAVENOUS CODE/TRAUMA/SEDATION MEDICATION
Status: DISCONTINUED | OUTPATIENT
Start: 2024-02-19 | End: 2024-02-19 | Stop reason: HOSPADM

## 2024-02-19 RX ORDER — FENTANYL CITRATE 50 UG/ML
INJECTION, SOLUTION INTRAMUSCULAR; INTRAVENOUS CODE/TRAUMA/SEDATION MEDICATION
Status: DISCONTINUED | OUTPATIENT
Start: 2024-02-19 | End: 2024-02-19 | Stop reason: HOSPADM

## 2024-02-19 RX ORDER — DIPHENHYDRAMINE HYDROCHLORIDE 50 MG/ML
INJECTION INTRAMUSCULAR; INTRAVENOUS CODE/TRAUMA/SEDATION MEDICATION
Status: DISCONTINUED | OUTPATIENT
Start: 2024-02-19 | End: 2024-02-19 | Stop reason: HOSPADM

## 2024-02-19 RX ORDER — MIDAZOLAM HYDROCHLORIDE 2 MG/2ML
INJECTION, SOLUTION INTRAMUSCULAR; INTRAVENOUS CODE/TRAUMA/SEDATION MEDICATION
Status: DISCONTINUED | OUTPATIENT
Start: 2024-02-19 | End: 2024-02-19 | Stop reason: HOSPADM

## 2024-02-19 RX ADMIN — PNEUMOCOCCAL 20-VALENT CONJUGATE VACCINE 0.5 ML
2.2; 2.2; 2.2; 2.2; 2.2; 2.2; 2.2; 2.2; 2.2; 2.2; 2.2; 2.2; 2.2; 2.2; 2.2; 2.2; 4.4; 2.2; 2.2; 2.2 INJECTION, SUSPENSION INTRAMUSCULAR at 17:06

## 2024-02-19 RX ADMIN — ASPIRIN 325 MG: 325 TABLET ORAL at 09:30

## 2024-02-19 RX ADMIN — FLUTICASONE PROPIONATE 1 SPRAY: 50 SPRAY, METERED NASAL at 17:06

## 2024-02-19 RX ADMIN — MIDODRINE HYDROCHLORIDE 5 MG: 5 TABLET ORAL at 15:33

## 2024-02-19 RX ADMIN — FUROSEMIDE 20 MG: 10 INJECTION, SOLUTION INTRAMUSCULAR; INTRAVENOUS at 13:32

## 2024-02-19 RX ADMIN — NICOTINE POLACRILEX 2 MG: 2 GUM, CHEWING BUCCAL at 17:11

## 2024-02-19 RX ADMIN — POLYETHYLENE GLYCOL 3350 17 G: 17 POWDER, FOR SOLUTION ORAL at 09:32

## 2024-02-19 RX ADMIN — MIDODRINE HYDROCHLORIDE 5 MG: 5 TABLET ORAL at 11:03

## 2024-02-19 RX ADMIN — FAMOTIDINE 20 MG: 20 TABLET, FILM COATED ORAL at 09:31

## 2024-02-19 RX ADMIN — ENOXAPARIN SODIUM 70 MG: 80 INJECTION SUBCUTANEOUS at 09:32

## 2024-02-19 RX ADMIN — MIDODRINE HYDROCHLORIDE 5 MG: 5 TABLET ORAL at 07:56

## 2024-02-19 RX ADMIN — SPIRONOLACTONE 12.5 MG: 25 TABLET ORAL at 09:30

## 2024-02-19 RX ADMIN — DOCUSATE SODIUM 100 MG: 100 CAPSULE, LIQUID FILLED ORAL at 09:30

## 2024-02-19 RX ADMIN — LATANOPROST 1 DROP: 50 SOLUTION OPHTHALMIC at 21:30

## 2024-02-19 RX ADMIN — BUDESONIDE AND FORMOTEROL FUMARATE DIHYDRATE 2 PUFF: 160; 4.5 AEROSOL RESPIRATORY (INHALATION) at 17:06

## 2024-02-19 RX ADMIN — ACETAMINOPHEN 975 MG: 325 TABLET ORAL at 05:11

## 2024-02-19 RX ADMIN — HYDROXYZINE HYDROCHLORIDE 25 MG: 25 TABLET ORAL at 17:03

## 2024-02-19 RX ADMIN — METOPROLOL SUCCINATE 25 MG: 25 TABLET, EXTENDED RELEASE ORAL at 09:31

## 2024-02-19 RX ADMIN — FLUTICASONE PROPIONATE 1 SPRAY: 50 SPRAY, METERED NASAL at 09:32

## 2024-02-19 RX ADMIN — ACETAMINOPHEN 975 MG: 325 TABLET ORAL at 15:33

## 2024-02-19 RX ADMIN — SACUBITRIL AND VALSARTAN 1 TABLET: 24; 26 TABLET, FILM COATED ORAL at 17:06

## 2024-02-19 RX ADMIN — SODIUM CHLORIDE 40 ML/HR: 0.9 INJECTION, SOLUTION INTRAVENOUS at 13:32

## 2024-02-19 NOTE — ASSESSMENT & PLAN NOTE
Peak troponin was 3327  Suspected nonischemic myocardial injury in the setting of CPR, critical aortic stenosis and heart failure  Patient started on therapeutic Lovenox which will be discontinued today  Added on aspirin  Lipid panel showed LDL level of 82  2D echo-EF of 25% with aortic valve area of 0.5 cm squire  Hold off on starting Lipitor due to elevated LFTs   Cardiology input appreciated  Patient underwent cardiac catheterization which showed disease involving circumflex and LAD

## 2024-02-19 NOTE — PLAN OF CARE
Problem: Potential for Falls  Goal: Patient will remain free of falls  Description: INTERVENTIONS:  - Educate patient/family on patient safety including physical limitations  - Instruct patient to call for assistance with activity   - Consult OT/PT to assist with strengthening/mobility   - Keep Call bell within reach  - Keep bed low and locked with side rails adjusted as appropriate  - Keep care items and personal belongings within reach  - Initiate and maintain comfort rounds  - Make Fall Risk Sign visible to staff  - Offer Toileting every 2 Hours, in advance of need  - Initiate/Maintain bed alarm  - Obtain necessary fall risk management equipment: bed alarm  - Apply yellow socks and bracelet for high fall risk patients  - Consider moving patient to room near nurses station  Outcome: Progressing     Problem: PAIN - ADULT  Goal: Verbalizes/displays adequate comfort level or baseline comfort level  Description: Interventions:  - Encourage patient to monitor pain and request assistance  - Assess pain using appropriate pain scale  - Administer analgesics based on type and severity of pain and evaluate response  - Implement non-pharmacological measures as appropriate and evaluate response  - Consider cultural and social influences on pain and pain management  - Notify physician/advanced practitioner if interventions unsuccessful or patient reports new pain  Outcome: Progressing     Problem: SAFETY ADULT  Goal: Patient will remain free of falls  Description: INTERVENTIONS:  - Educate patient/family on patient safety including physical limitations  - Instruct patient to call for assistance with activity   - Consult OT/PT to assist with strengthening/mobility   - Keep Call bell within reach  - Keep bed low and locked with side rails adjusted as appropriate  - Keep care items and personal belongings within reach  - Initiate and maintain comfort rounds  - Make Fall Risk Sign visible to staff  - Offer Toileting every 2  Hours, in advance of need  - Initiate/Maintain bed alarm  - Apply yellow socks and bracelet for high fall risk patients  - Consider moving patient to room near nurses station  Outcome: Progressing  Goal: Maintain or return to baseline ADL function  Description: INTERVENTIONS:  -  Assess patient's ability to carry out ADLs; assess patient's baseline for ADL function and identify physical deficits which impact ability to perform ADLs (bathing, care of mouth/teeth, toileting, grooming, dressing, etc.)  - Assess/evaluate cause of self-care deficits   - Assess range of motion  - Assess patient's mobility; develop plan if impaired  - Assess patient's need for assistive devices and provide as appropriate  - Encourage maximum independence but intervene and supervise when necessary  - Involve family in performance of ADLs  - Assess for home care needs following discharge   - Consider OT consult to assist with ADL evaluation and planning for discharge  - Provide patient education as appropriate  Outcome: Progressing  Goal: Maintains/Returns to pre admission functional level  Description: INTERVENTIONS:  - Perform AM-PAC 6 Click Basic Mobility/ Daily Activity assessment daily.  - Set and communicate daily mobility goal to care team and patient/family/caregiver.   - Collaborate with rehabilitation services on mobility goals if consulted  - Ambulate patient 2 times a day  - Out of bed for meals 3 times a day  - Out of bed for toileting  - Record patient progress and toleration of activity level   Outcome: Progressing     Problem: DISCHARGE PLANNING  Goal: Discharge to home or other facility with appropriate resources  Description: INTERVENTIONS:  - Identify barriers to discharge w/patient and caregiver  - Arrange for needed discharge resources and transportation as appropriate  - Identify discharge learning needs (meds, wound care, etc.)  - Arrange for interpretive services to assist at discharge as needed  - Refer to Case  Management Department for coordinating discharge planning if the patient needs post-hospital services based on physician/advanced practitioner order or complex needs related to functional status, cognitive ability, or social support system  Outcome: Progressing     Problem: Knowledge Deficit  Goal: Patient/family/caregiver demonstrates understanding of disease process, treatment plan, medications, and discharge instructions  Description: Complete learning assessment and assess knowledge base.  Interventions:  - Provide teaching at level of understanding  - Provide teaching via preferred learning methods  Outcome: Progressing     Problem: NEUROSENSORY - ADULT  Goal: Achieves stable or improved neurological status  Description: INTERVENTIONS  - Monitor and report changes in neurological status  - Monitor vital signs such as temperature, blood pressure, glucose, and any other labs ordered   - Initiate measures to prevent increased intracranial pressure  - Monitor for seizure activity and implement precautions if appropriate      Outcome: Progressing  Goal: Achieves maximal functionality and self care  Description: INTERVENTIONS  - Monitor swallowing and airway patency with patient fatigue and changes in neurological status  - Encourage and assist patient to increase activity and self care.   - Encourage visually impaired, hearing impaired and aphasic patients to use assistive/communication devices  Outcome: Progressing     Problem: CARDIOVASCULAR - ADULT  Goal: Maintains optimal cardiac output and hemodynamic stability  Description: INTERVENTIONS:  - Monitor I/O, vital signs and rhythm  - Monitor for S/S and trends of decreased cardiac output  - Administer and titrate ordered vasoactive medications to optimize hemodynamic stability  - Assess quality of pulses, skin color and temperature  - Assess for signs of decreased coronary artery perfusion  - Instruct patient to report change in severity of symptoms  Outcome:  Progressing  Goal: Absence of cardiac dysrhythmias or at baseline rhythm  Description: INTERVENTIONS:  - Continuous cardiac monitoring, vital signs, obtain 12 lead EKG if ordered  - Administer antiarrhythmic and heart rate control medications as ordered  - Monitor electrolytes and administer replacement therapy as ordered  Outcome: Progressing     Problem: MUSCULOSKELETAL - ADULT  Goal: Maintain or return mobility to safest level of function  Description: INTERVENTIONS:  - Assess patient's ability to carry out ADLs; assess patient's baseline for ADL function and identify physical deficits which impact ability to perform ADLs (bathing, care of mouth/teeth, toileting, grooming, dressing, etc.)  - Assess/evaluate cause of self-care deficits   - Assess range of motion  - Assess patient's mobility  - Assess patient's need for assistive devices and provide as appropriate  - Encourage maximum independence but intervene and supervise when necessary  - Involve family in performance of ADLs  - Assess for home care needs following discharge   - Consider OT consult to assist with ADL evaluation and planning for discharge  - Provide patient education as appropriate  Outcome: Progressing  Goal: Maintain proper alignment of affected body part  Description: INTERVENTIONS:  - Support, maintain and protect limb and body alignment  - Provide patient/ family with appropriate education  Outcome: Progressing     Problem: RESPIRATORY - ADULT  Goal: Achieves optimal ventilation and oxygenation  Description: INTERVENTIONS:  - Assess for changes in respiratory status  - Assess for changes in mentation and behavior  - Position to facilitate oxygenation and minimize respiratory effort  - Oxygen administered by appropriate delivery if ordered  - Initiate smoking cessation education as indicated  - Encourage broncho-pulmonary hygiene including cough, deep breathe, Incentive Spirometry  - Assess the need for suctioning and aspirate as needed  -  Assess and instruct to report SOB or any respiratory difficulty  - Respiratory Therapy support as indicated  Outcome: Progressing     Problem: Prexisting or High Potential for Compromised Skin Integrity  Goal: Skin integrity is maintained or improved  Description: INTERVENTIONS:  - Identify patients at risk for skin breakdown  - Assess and monitor skin integrity  - Assess and monitor nutrition and hydration status  - Monitor labs   - Assess for incontinence   - Turn and reposition patient  - Assist with mobility/ambulation  - Relieve pressure over bony prominences  - Avoid friction and shearing  - Provide appropriate hygiene as needed including keeping skin clean and dry  - Evaluate need for skin moisturizer/barrier cream  - Collaborate with interdisciplinary team   - Patient/family teaching  - Consider wound care consult   Outcome: Progressing

## 2024-02-19 NOTE — PROGRESS NOTES
[facility]  Progress Note  Name: Gilberto Vann I  MRN: [id number]  Unit/Bed#: 3 [address] I Date of Admission: 2024   Date of Service: 2024 I Hospital Day: 3    Assessment/Plan   * Syncope  Assessment & Plan  Patient presents with syncope versus cardiac arrest at neighbor's .  Patient fell on the floor, sustained abrasion to left scalp.  Bystanders initiated CPR.  EMS arrived 4 minutes later found patient to have palpable pulses and patient had regained consciousness.  Patient remembers he was standing by the door,unable to recall anything else.  Patient had complained of feeling foggy and dizzy, not feeling well ever since he took first dose of doxazosin for prostate yesterday evening per friend at bedside.  Patient reports only ate around 1 PM during the day.  Does not eat breakfast regularly.  Patient did not actually want to go to  due to not feeling well and anxiety per friend at bedside.  Denies history of syncope.  CT head no acute findings  CT chest without contrast showed - Moderate septal thickening due to interstitial edema with small pleural effusions. Acute minimally displaced fractures of multiple bilateral anterior ribs due to CPR. Nondisplaced fracture of the anterior cortex of the superior body of the sternum due to CPR.  Initial troponin 112.  Repeat trending up.  Initial EKG showed NSR, bifascicular block, rate 97,TWI V1-V4,STD V2-V4.  Repeat EKG showed NSR, LVH,STD V5 V6,mild JESSE V1 to V3 likely due to LVH.   Peak troponin was 3327  Patient has been on Lovenox 1 mg/g subcutaneously twice daily.  Continued on Lovenox  Lipid panel showed LDL level of 82.  Hemoglobin A1c was 5.7  2D echo showed EF of 25% with severe global hypokinesis with grade 2 diastolic dysfunction with severely dilated left atrium with an aortic valve area of 1.5 cm2  Orthostatic vital signs could not be obtained as patient is not very cooperative  Cardiology input  appreciated  Suspected syncope in the setting of possible critical aortic stenosis versus ventricular arrhythmias due to cardiomyopathy    Cardiomyopathy (HCC)  Assessment & Plan  Echo showed EF of 25% with severe global hypokinesis with regional variations, grade 2 diastolic dysfunction with severely dilated left atrium with aortic valve area of 0.5 cm 2  Etiology not clear at the current time  Patient will need cardiac catheterization at some point to rule out coronary artery disease  Patient was started on Toprol-XL 25 mg p.o. daily but blood pressure continues to remain soft and patient was also added midodrine which was increased to 5 mg p.o. 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   No edema to lower extremities on exam  Patient received a dose of Lasix 20 mg IV  Blood pressure continues to remain soft and could not get further diuretics  Cardiac diet with fluid restriction 1800 cc per day  Echo showed EF of 25% as stated above  Patient started on Toprol-XL 25 mg p.o. daily and Aldactone 12.5 mg p.o. daily  Patient was started midodrine 2.5 mg p.o. twice daily which was increased to 5 mg p.o. 3 times daily  Patient also started on Entresto today      Elevated troponin  Assessment & Plan  Peak troponin was 3327  Suspected nonischemic myocardial injury in the setting of CPR, critical aortic stenosis and heart failure  Patient started on therapeutic Lovenox which will be discontinued today  Added on aspirin  Lipid panel showed LDL level of 82  2D echo-EF of 25% with aortic valve area of 0.5 cm squire  Hold off on starting Lipitor due to elevated LFTs   Cardiology input appreciated  Patient underwent cardiac catheterization which showed disease involving circumflex and LAD    CAD (coronary artery  disease)  Assessment & Plan  Patient underwent cardiac catheterization which showed disease of the circumflex and LAD  Cardiology coordinating care with CT surgery at [facility] for outpatient evaluation for TAVR along with myocardial revascularization    Critical aortic valve stenosis  Assessment & Plan  2D echo showed EF of 25% with arctic valve area of 0.5 cm 2  Cardiology discussed the coordination of care with CT surgery at [facility] for evaluation for outpatient TAVR    Anemia  Assessment & Plan  Baseline hemoglobin around 11  Hemoglobin stable      Anxiety  Assessment & Plan  Patient was on low-dose Xanax as needed before.  Not currently.  Continue Atarax as needed  Declined psych consult.    Pulmonary nodules  Assessment & Plan  CT showed -  A few 5 mm and smaller nodules. Stability is unknown given the absence of comparison studies. Follow-up could be obtained with a chest CT with no contrast in 6 months to assure stability.     Closed fracture of body of sternum  Assessment & Plan  Pain control  Incentive spirometer    Closed fracture of multiple ribs of both sides  Assessment & Plan  Due to CPR  Incentive spirometer  Pain control    Hyperglycemia  Assessment & Plan  Glucose 242  Hemoglobin A1c was 5.7    Glaucoma  Assessment & Plan  Continue home eyedrop    COPD (chronic obstructive pulmonary disease) ([facility])  Assessment & Plan  Continue Symbicort  No wheezing on auscultation  Patient refusing to take Symbicort as patient has not been using the inhaler for the past few weeks    Benign prostatic hyperplasia with urinary retention  Assessment & Plan  Started on doxazosin  the day before admission by urology.  Reported felt foggy and dizzy, not feeling well after taking first dose.  Will hold doxazosin.   suspected BPH.-Avoid alpha blockers.  Patient will need outpatient follow-up with urology for cystoscopy and positive consideration for TURP               VTE Pharmacologic Prophylaxis:   High Risk (Score >/= 5) -  Pharmacological DVT Prophylaxis Ordered: enoxaparin (Lovenox). Sequential Compression Devices Ordered.    Mobility:   Basic Mobility Inpatient Raw Score: 15  -HLM Goal: 4: Move to chair/commode  -HLM Achieved: 1: Laying in bed (Simultaneous filing. User may not have seen previous data.)  HLM Goal NOT achieved. Continue with multidisciplinary rounding and encourage appropriate mobility to improve upon HLM goals.    Patient Centered Rounds: I performed bedside rounds with nursing staff today.   Discussions with Specialists or Other Care Team Provider: Cardiology    Education and Discussions with Family / Patient: Updated  (sister) at bedside.    Total Time Spent on Date of Encounter in care of patient: 40 mins. This time was spent on one or more of the following: performing physical exam; counseling and coordination of care; obtaining or reviewing history; documenting in the medical record; reviewing/ordering tests, medications or procedures; communicating with other healthcare professionals and discussing with patient's family/caregivers.    Current Length of Stay: 3 day(s)  Current Patient Status: Inpatient   Certification Statement: The patient will continue to require additional inpatient hospital stay due to acute congestive heart failure, cardiomyopathy, critical aortic stenosis  Discharge Plan: Anticipate discharge in 24-48 hrs to pending course    Code Status: Level 1 - Full Code    Subjective:   Patient denies any shortness of breath but reports significant chest pain from his chest compressions.  Denies any abdominal pain, nausea or vomiting.    Objective:     Vitals:   Temp (24hrs), Av.8 °F (36.6 °C), Min:97.5 °F (36.4 °C), Max:98.1 °F (36.7 °C)    Temp:  [97.5 °F (36.4 °C)-98.1 °F (36.7 °C)] 97.5 °F (36.4 °C)  HR:  [68-78] 76  Resp:  [16-20] 16  BP: (106-148)/(59-80) 129/74  SpO2:  [94 %-99 %] 96 %  Body mass index is 21.8 kg/m².     Input and Output Summary (last 24 hours):      Intake/Output Summary (Last 24 hours) at 2/19/2024 1840  Last data filed at 2/19/2024 1832  Gross per 24 hour   Intake 240 ml   Output 1850 ml   Net -1610 ml       Physical Exam:   Physical Exam  Constitutional:       Appearance: Normal appearance.   HENT:      Head: Normocephalic and atraumatic.   Eyes:      Extraocular Movements: Extraocular movements intact.      Pupils: Pupils are equal, round, and reactive to light.   Cardiovascular:      Rate and Rhythm: Normal rate and regular rhythm.      Heart sounds: Murmur heard.      No gallop.      Comments: Ejection systolic murmur  Pulmonary:      Effort: Pulmonary effort is normal.      Breath sounds: Normal breath sounds.   Abdominal:      General: Bowel sounds are normal.      Palpations: Abdomen is soft.      Tenderness: There is no abdominal tenderness.   Musculoskeletal:         General: No swelling or deformity. Normal range of motion.      Cervical back: Normal range of motion and neck supple.   Skin:     General: Skin is warm and dry.   Neurological:      General: No focal deficit present.      Mental Status: Gilberto is alert.          Additional Data:     Labs:  Results from last 7 days   Lab Units 02/18/24  0455 02/17/24  0505 02/16/24  1908   WBC Thousand/uL 7.13   < > 9.37   HEMOGLOBIN g/dL 11.8*   < > 12.2   HEMATOCRIT % 36.3*   < > 37.6   PLATELETS Thousands/uL 154   < > 208   NEUTROS PCT %  --   --  83*   LYMPHS PCT %  --   --  9*   MONOS PCT %  --   --  6   EOS PCT %  --   --  0    < > = values in this interval not displayed.     Results from last 7 days   Lab Units 02/19/24  0501 02/18/24  0455   SODIUM mmol/L 137 139   POTASSIUM mmol/L 3.7 4.4   CHLORIDE mmol/L 103 103   CO2 mmol/L 29 30   BUN mg/dL 18 19   CREATININE mg/dL 0.71 0.81   ANION GAP mmol/L 5 6   CALCIUM mg/dL 8.1* 8.1*   ALBUMIN g/dL  --  3.4*   TOTAL BILIRUBIN mg/dL  --  1.09*   ALK PHOS U/L  --  58   ALT U/L  --  66*   AST U/L  --  53*   GLUCOSE RANDOM mg/dL 93 99         Results  from last 7 days   Lab Units 02/16/24  1855   POC GLUCOSE mg/dl 204*     Results from last 7 days   Lab Units 02/16/24  1908   HEMOGLOBIN A1C % 5.7*           Lines/Drains:  Invasive Devices       Peripheral Intravenous Line  Duration             Peripheral IV 02/17/24 Left Forearm 2 days    Peripheral IV 02/18/24 Left;Upper;Ventral (anterior) Arm 1 day                      Telemetry:  Telemetry Orders (From admission, onward)               24 Hour Telemetry Monitoring  Continuous x 24 Hours (Telem)        Question:  Reason for 24 Hour Telemetry  Answer:  Syncope suspected to be cardiac in origin                     Telemetry Reviewed: Normal Sinus Rhythm  Indication for Continued Telemetry Use: No indication for continued use. Will discontinue.              Imaging: Reviewed radiology reports from this admission including: chest CT scan    Recent Cultures (last 7 days):         Last 24 Hours Medication List:   Current Facility-Administered Medications   Medication Dose Route Frequency Provider Last Rate    acetaminophen  975 mg Oral Q8H [facility] Saman Motley MD      aspirin  325 mg Oral Daily Saman Motley MD      budesonide-formoterol  2 puff Inhalation BID Saman Motley MD      [Transfer Hold] docusate sodium  100 mg Oral BID MICHELLE Nance      enoxaparin  1 mg/kg Subcutaneous Q12H [facility] Saman Motley MD      famotidine  20 mg Oral Daily Saman Motley MD      fluticasone  1 spray Nasal BID Saman Motley MD      hydrOXYzine HCL  25 mg Oral Q6H PRN Saman Motley MD      latanoprost  1 drop Both Eyes HS Saman Motley MD      levalbuterol  0.63 mg Nebulization Q6H PRN Saman Motley MD      metoprolol succinate  25 mg Oral Daily Saman Motley MD      midodrine  5 mg Oral TID AC Saman Motley MD      nicotine polacrilex  2 mg Oral Q2H PRN Saman Motley MD      ondansetron  4 mg Intravenous Q6H PRN Saman Motley MD      [Transfer Hold] polyethylene glycol   17 g Oral Daily MICHELLE Nance      sacubitril-valsartan  1 tablet Oral BID Saman Motley MD      sodium chloride  40 mL/hr Intravenous Continuous Lis Card PA-C 40 mL/hr (02/19/24 1332)    spironolactone  12.5 mg Oral Daily Saman Motley MD      traMADol  50 mg Oral Q6H PRN Saman Motley MD          Today, Patient Was Seen By: Saman Motley MD    **Please Note: This note may have been constructed using a voice recognition system.**

## 2024-02-19 NOTE — ASSESSMENT & PLAN NOTE
Patient was on low-dose Xanax as needed before.  Not currently.  Continue Atarax as needed  Declined psych consult.

## 2024-02-19 NOTE — ASSESSMENT & PLAN NOTE
BNP 1270  CT showed - Moderate septal thickening due to interstitial edema with small pleural effusions.   No edema to lower extremities on exam  Patient received a dose of Lasix 20 mg IV  Blood pressure continues to remain soft and could not get further diuretics  Cardiac diet with fluid restriction 1800 cc per day  Echo showed EF of 25% as stated above  Patient started on Toprol-XL 25 mg p.o. daily and Aldactone 12.5 mg p.o. daily  Patient was started midodrine 2.5 mg p.o. twice daily which was increased to 5 mg p.o. 3 times daily  Patient also started on Entresto today

## 2024-02-19 NOTE — ASSESSMENT & PLAN NOTE
Patient underwent cardiac catheterization which showed disease of the circumflex and LAD  Cardiology coordinating care with CT surgery at [facility] for outpatient evaluation for TAVR along with myocardial revascularization

## 2024-02-19 NOTE — ASSESSMENT & PLAN NOTE
2D echo showed EF of 25% with arctic valve area of 0.5 cm 2  Cardiology discussed the coordination of care with CT surgery at [facility] for evaluation for outpatient TAVR

## 2024-02-19 NOTE — ASSESSMENT & PLAN NOTE
Patient presents with syncope versus cardiac arrest at neighbor's .  Patient fell on the floor, sustained abrasion to left scalp.  Bystanders initiated CPR.  EMS arrived 4 minutes later found patient to have palpable pulses and patient had regained consciousness.  Patient remembers he was standing by the door,unable to recall anything else.  Patient had complained of feeling foggy and dizzy, not feeling well ever since he took first dose of doxazosin for prostate yesterday evening per friend at bedside.  Patient reports only ate around 1 PM during the day.  Does not eat breakfast regularly.  Patient did not actually want to go to  due to not feeling well and anxiety per friend at bedside.  Denies history of syncope.  CT head no acute findings  CT chest without contrast showed - Moderate septal thickening due to interstitial edema with small pleural effusions. Acute minimally displaced fractures of multiple bilateral anterior ribs due to CPR. Nondisplaced fracture of the anterior cortex of the superior body of the sternum due to CPR.  Initial troponin 112.  Repeat trending up.  Initial EKG showed NSR, bifascicular block, rate 97,TWI V1-V4,STD V2-V4.  Repeat EKG showed NSR, LVH,STD V5 V6,mild JESSE V1 to V3 likely due to LVH.   Peak troponin was 3327  Patient has been on Lovenox 1 mg/g subcutaneously twice daily.  Continued on Lovenox  Lipid panel showed LDL level of 82.  Hemoglobin A1c was 5.7  2D echo showed EF of 25% with severe global hypokinesis with grade 2 diastolic dysfunction with severely dilated left atrium with an aortic valve area of 1.5 cm2  Orthostatic vital signs could not be obtained as patient is not very cooperative  Cardiology input appreciated  Suspected syncope in the setting of possible critical aortic stenosis versus ventricular arrhythmias due to cardiomyopathy

## 2024-02-19 NOTE — DISCHARGE INSTRUCTIONS
Cardiac Catheterization     WHAT YOU NEED TO KNOW:   A cardiac catheterization is a procedure to look at your heart and its blood vessels. Healthcare providers can measure oxygen levels and pressures in your heart. They can also fix problems with your valves, blood vessels, or the walls of your heart. You may need this procedure if you have chest pain, heart disease, or your heart is not working properly.         DISCHARGE INSTRUCTIONS:     No driving for 24 hours, because you were sedated.    Sedation: Avoid making any legal/major decisions today, as the sedation may inhibit your decision making. Also avoid alcohol today, as the sedation may heighten the effects of the alcohol.    Apply firm, steady pressure directly over the wound if bleeding occurs. A small amount of bleeding from your wound is possible. Apply pressure with a clean gauze or towel for 5 to 10 minutes. Call 911 if bleeding becomes heavy or does not stop.  Do not attempt to drive yourself to the hospital.    Bathing: You will be able to shower the day after your procedure. Remove your bandage before you shower. Carefully wash the wound with soap and water. Pat the area dry and apply a clean band-aid. Do not take baths or go in hot tubs or pools for about one week.     Care for your wound as directed: Keep your dressing clean and dry. Monitor your wound everyday for signs of infection such as redness, swelling, or pus. Mild bruising is normal and expected. Do not put powders, lotions, or creams on your wound for about one week.    Do not lift anything heavier than 5 pounds for about 5 days. Heavy lifting can put stress on your wound and cause bleeding. If an artery in your wrist was used, do not push or pull with the arm that was used for the procedure - pretend like your wrist is broken.      Do not do vigorous activity for at least 48 hours. Vigorous activity may cause bleeding from your wound. Rest and do quiet activities. Short walks to the  bathroom and around the house are okay. Ask your healthcare provider when you can return to your normal activities.        Drink liquids to flush the contrast liquid from your body and prevent blood clots. Ask how much liquid to drink each day and which liquids are best for you.          Call 911 for any of the following:   You have any of the following signs of a heart attack:    Squeezing, pressure, or pain in your chest that lasts longer than 5 minutes or returns   Discomfort or pain in your back, neck, jaw, stomach, or arm    Trouble breathing   Nausea or vomiting   Lightheadedness or a sudden cold sweat, especially with chest pain or trouble breathing     You have any of the following signs of a stroke:    Numbness or drooping on one side of your face    Weakness in an arm or leg   Confusion or difficulty speaking   Dizziness, a severe headache, or vision loss     You feel lightheaded, short of breath, and have chest pain.    You cough up blood.    You have trouble breathing.   You cannot stop the bleeding from your wound even after you hold firm pressure for 10 minutes.    Seek care immediately if:   Blood soaks through your bandage.    Your stitches come apart.    Your arm or leg feels numb, cool, or looks pale.    Your wound gets swollen quickly.    Contact your healthcare provider if:   You have a fever or chills.   Your wound is red, swollen, or draining pus.   Your wound looks more bruised or there is new bruising on the side of your leg or arm.    You have nausea or are vomiting.   Your skin is itchy, swollen, or you have a rash.   You have questions or concerns about your condition or care.      Procedural Sedation   WHAT YOU NEED TO KNOW:   Procedural sedation is medicine used during procedures to help you feel relaxed and calm. You will remember little to none of the procedure. After sedation you may feel tired, weak, or unsteady on your feet. You may also have trouble concentrating or short-term  memory loss. These symptoms should go away in 24 hours or less.   DISCHARGE INSTRUCTIONS:     Call 911 or have someone else call for any of the following:   You have sudden trouble breathing.     You cannot be woken.      Contact Interventional Radiology at [phone]   ([facility] PATIENTS: Contact Interventional Radiology at [phone]) ([facility] PATIENTS: Contact Interventional Radiology at [phone]) if any of the following occur:     You have a severe headache or dizziness.     Your heart is beating faster than usual.    You have a fever or chills.     Your skin is itchy, swollen, or you have a rash.     You have nausea or are vomiting for more than 8 hours after the procedure.      You have questions or concerns about your condition or care.  Self-care:   Have someone stay with you for 24 hours. This person can drive you to errands and help you do things around the house. This person can also watch for problems.      Rest and do quiet activities for 24 hours. Do not exercise, ride a bike, or play sports. Stand up slowly to prevent dizziness and falls. Take short walks around the house with another person. Slowly return to your usual activities the next day.      Do not drive or use dangerous machines or tools for 24 hours. You may injure yourself or others. Examples include a lawnmower, saw, or drill. Do not return to work for 24 hours if you use dangerous machines or tools for work.      Do not make important decisions for 24 hours. For example, do not sign important papers or invest money.      Drink liquids as directed. Liquids help flush the sedation medicine out of your body. Ask how much liquid to drink each day and which liquids are best for you.      Eat small, frequent meals to prevent nausea and vomiting. Start with clear liquids such as juice or broth. If you do not vomit after clear liquids, you can eat your usual foods.      Do not drink alcohol or take medicines that make you drowsy. This  includes medicines that help you sleep and anxiety medicines. Ask your healthcare provider if it is safe for you to take pain medicine.  Follow up with your healthcare provider as directed: Write down your questions so you remember to ask them during your visits.

## 2024-02-19 NOTE — PLAN OF CARE
Problem: Potential for Falls  Goal: Patient will remain free of falls  Description: INTERVENTIONS:  - Educate patient/family on patient safety including physical limitations  - Instruct patient to call for assistance with activity   - Consult OT/PT to assist with strengthening/mobility   - Keep Call bell within reach  - Keep bed low and locked with side rails adjusted as appropriate  - Keep care items and personal belongings within reach  - Initiate and maintain comfort rounds  - Make Fall Risk Sign visible to staff  - Offer Toileting every 2 Hours, in advance of need  - Initiate/Maintain bedalarm  - Obtain necessary fall risk management equipment: bed alarm     Problem: DISCHARGE PLANNING  Goal: Discharge to home or other facility with appropriate resources  Description: INTERVENTIONS:  - Identify barriers to discharge w/patient and caregiver  - Arrange for needed discharge resources and transportation as appropriate  - Identify discharge learning needs (meds, wound care, etc.)  - Arrange for interpretive services to assist at discharge as needed  - Refer to Case Management Department for coordinating discharge planning if the patient needs post-hospital services based on physician/advanced practitioner order or complex needs related to functional status, cognitive ability, or social support system  Outcome: Progressing     Problem: Knowledge Deficit  Goal: Patient/family/caregiver demonstrates understanding of disease process, treatment plan, medications, and discharge instructions  Description: Complete learning assessment and assess knowledge base.  Interventions:  - Provide teaching at level of understanding  - Provide teaching via preferred learning methods  Outcome: Progressing     Problem: CARDIOVASCULAR - ADULT  Goal: Maintains optimal cardiac output and hemodynamic stability  Description: INTERVENTIONS:  - Monitor I/O, vital signs and rhythm  - Monitor for S/S and trends of decreased cardiac output  -  Administer and titrate ordered vasoactive medications to optimize hemodynamic stability  - Assess quality of pulses, skin color and temperature  - Assess for signs of decreased coronary artery perfusion  - Instruct patient to report change in severity of symptoms  Outcome: Progressing  Goal: Absence of cardiac dysrhythmias or at baseline rhythm  Description: INTERVENTIONS:  - Continuous cardiac monitoring, vital signs, obtain 12 lead EKG if ordered  - Administer antiarrhythmic and heart rate control medications as ordered  - Monitor electrolytes and administer replacement therapy as ordered  Outcome: Progressing     Problem: MUSCULOSKELETAL - ADULT  Goal: Maintain or return mobility to safest level of function  Description: INTERVENTIONS:  - Assess patient's ability to carry out ADLs; assess patient's baseline for ADL function and identify physical deficits which impact ability to perform ADLs (bathing, care of mouth/teeth, toileting, grooming, dressing, etc.)  - Assess/evaluate cause of self-care deficits   - Assess range of motion  - Assess patient's mobility  - Assess patient's need for assistive devices and provide as appropriate  - Encourage maximum independence but intervene and supervise when necessary  - Involve family in performance of ADLs  - Assess for home care needs following discharge   - Consider OT consult to assist with ADL evaluation and planning for discharge  - Provide patient education as appropriate  Outcome: Progressing  Goal: Maintain proper alignment of affected body part  Description: INTERVENTIONS:  - Support, maintain and protect limb and body alignment  - Provide patient/ family with appropriate education  Outcome: Progressing     Problem: RESPIRATORY - ADULT  Goal: Achieves optimal ventilation and oxygenation  Description: INTERVENTIONS:  - Assess for changes in respiratory status  - Assess for changes in mentation and behavior  - Position to facilitate oxygenation and minimize  respiratory effort  - Oxygen administered by appropriate delivery if ordered  - Initiate smoking cessation education as indicated  - Encourage broncho-pulmonary hygiene including cough, deep breathe, Incentive Spirometry  - Assess the need for suctioning and aspirate as needed  - Assess and instruct to report SOB or any respiratory difficulty  - Respiratory Therapy support as indicated  Outcome: Progressing     Problem: Prexisting or High Potential for Compromised Skin Integrity  Goal: Skin integrity is maintained or improved  Description: INTERVENTIONS:  - Identify patients at risk for skin breakdown  - Assess and monitor skin integrity  - Assess and monitor nutrition and hydration status  - Monitor labs   - Assess for incontinence   - Turn and reposition patient  - Assist with mobility/ambulation  - Relieve pressure over bony prominences  - Avoid friction and shearing  - Provide appropriate hygiene as needed including keeping skin clean and dry  - Evaluate need for skin moisturizer/barrier cream  - Collaborate with interdisciplinary team   - Patient/family teaching  - Consider wound care consult   Outcome: Progressing     - Apply yellow socks and bracelet for high fall risk patients  - Consider moving patient to room near nurses station  Outcome: Progressing     Problem: PAIN - ADULT  Goal: Verbalizes/displays adequate comfort level or baseline comfort level  Description: Interventions:  - Encourage patient to monitor pain and request assistance  - Assess pain using appropriate pain scale  - Administer analgesics based on type and severity of pain and evaluate response  - Implement non-pharmacological measures as appropriate and evaluate response  - Consider cultural and social influences on pain and pain management  - Notify physician/advanced practitioner if interventions unsuccessful or patient reports new pain  Outcome: Progressing

## 2024-02-19 NOTE — ASSESSMENT & PLAN NOTE
Echo showed EF of 25% with severe global hypokinesis with regional variations, grade 2 diastolic dysfunction with severely dilated left atrium with aortic valve area of 0.5 cm 2  Etiology not clear at the current time  Patient will need cardiac catheterization at some point to rule out coronary artery disease  Patient was started on Toprol-XL 25 mg p.o. daily but blood pressure continues to remain soft and patient was also added midodrine which was increased to 5 mg p.o. 3 times daily  Patient added on Entresto today

## 2024-02-19 NOTE — PROGRESS NOTES
Progress Note - Cardiology   [facility]     Gilberto Vann 83 y.o. adult MRN: [id number]  : 1940  Unit/Bed#: [address] Encounter: [id number]    Assessment and Plan:   Syncope.  - Presented on 24 for evaluation after what is believed to be thought of syncopal events.    Patient states that he was on his way to attend a , attempted to walk through the front doors of the  home and then remembers waking up in the hospital ED.  Review of chart indicates that patient is consciousness at  home.  When police arrived patient was agonal he breathing prompting them to start CPR. When EMS arrived patient had pulses, was awake, and fully oriented.  Patient denies any symptoms of lightheadedness or dizziness prior to syncopal episode.  - Syncope likely secondary to critical aortic valve stenosis.  - Orthostatic vital signs negative.     Critical aortic valve stenosis.  - Patient states that he has noticed worsening shortness of breath with exertion over the past several months.  He denies any lightheadedness or dizziness or any other syncopal events prior to events on 24.   - 24 TTE:   Aortic Valve The aortic valve is trileaflet. The leaflets are moderately thickened. The leaflets are moderately calcified. There is severely reduced mobility. There is mild to moderate regurgitation. There is critical stenosis.  Peak gradient 108 mmHg.  Mean gradient 63.  LVOT velocity is measured to be 0.58.  LVOT diameter 2.2 and aortic valve area 0.5 cm 2 and dimensionless index 0.11.  There may be some component of low cardiac output contributing to valve stenosis.   - No prior TTE for comparison.  - Avoid aggressive IV diuretics or IV fluid use.  - Discussed with patient recommendations for cardiac catheterization, patient is agreeable.  Will discuss with Cath Lab and interventional cardiology scheduling availability.  - Will need outpatient TAVR evaluation by CTS.      Elevated troponin.  - 2/16/24 hs troponin: 112 (0hrs), 252 (2hrs), 992 (4hrs).   - 2/17/24 hs troponin random: 3327.   - 2/18/24 hs troponin random: 3080.   - 2/17/24 EKG: Normal sinus rhythm, 81 bpm.  Left axis deviation.  Left ventricle hypertrophy with QRS widening and repolarization abnormality ( Sokolow-Self , Manny product ).   - Non-ischemic myocardial injury secondary to CPR, critical aortic valve stenosis, acute combined systolic and diastolic heart failure versus type II MI.  - Discussed with patient recommendations for cardiac catheterization, patient is agreeable.  Will discuss with Cath Lab and interventional cardiology scheduling availability.     Acute combined systolic and diastolic heart failure.  - 2/17/24 BNP: 1270.   - 2/16/23 CT chest without contrast: Moderate septal thickening due to interstitial edema with small pleural effusions.   - 2/17/24 TTE: LVEF 25%. There is severe global hypokinesis with regional variation. Diastolic function is moderately abnormal, consistent with grade II (pseudonormal) relaxation.  Left atrial filling pressure is elevated. Right Ventricle: Systolic function is low normal. Left Atrium: The atrium is severely dilated ( 60 mL/m2). Right Atrium: The atrium is moderately dilated. Aortic Valve: The aortic valve is trileaflet. The leaflets are moderately thickened. The leaflets are moderately calcified. There is severely reduced mobility. There is mild to moderate regurgitation. There is critical stenosis.  Peak gradient 108 mmHg.  Mean gradient 63.  LVOT velocity is measured to be 0.58.  LVOT diameter 2.2 and aortic valve area 0.5 cm 2 and dimensionless index 0.11.  There may be some component of low cardiac output contributing to valve stenosis. Mitral Valve: There is mild to moderate regurgitation. Tricuspid Valve: There is mild to moderate regurgitation.  Pulmonary artery pressure around 50 mmHg. IVC/SVC: The inferior vena cava is mildly dilated.  It has less  than 50% collapse with inspiration consistent with elevated right atrial pressure about 10 to 15 mmHg. Pericardium: There is a trivial pericardial effusion.  - No prior TTE for comparison.  - Patient will require ischemic workup, cardiac catheterization.  Will discuss timing of cardiac catheterization with attending.  - Continue Toprol-XL 25 mg daily and Aldactone 12.5 mg daily.  - Continue midodrine 5 mg three times daily for soft.   - Will start Entresto 24-26 mg twice daily.   - Strict I/Os.   - Daily weight, standing scale.   - CHF education.   - Continue low-sodium diet with 1800 mL fluid restriction.      Cardiomyopathy.  - 2/17/24 TTE: LVEF 25%. There is severe global hypokinesis with regional variation. Diastolic function is moderately abnormal, consistent with grade II (pseudonormal) relaxation.  Left atrial filling pressure is elevated. Right Ventricle: Systolic function is low normal. Left Atrium: The atrium is severely dilated ( 60 mL/m2). Right Atrium: The atrium is moderately dilated.Aortic Valve: The aortic valve is trileaflet. The leaflets are moderately thickened. The leaflets are moderately calcified. There is severely reduced mobility. There is mild to moderate regurgitation. There is critical stenosis.  Peak gradient 108 mmHg.  Mean gradient 63.  LVOT velocity is measured to be 0.58.  LVOT diameter 2.2 and aortic valve area 0.5 cm 2 and dimensionless index 0.11.  There may be some component of low cardiac output contributing to valve stenosis.Mitral Valve: There is mild to moderate regurgitation. Tricuspid Valve: There is mild to moderate regurgitation.  Pulmonary artery pressure around 50 mmHg. IVC/SVC: The inferior vena cava is mildly dilated.  It has less than 50% collapse with inspiration consistent with elevated right atrial pressure about 10 to 15 mmHg. Pericardium: There is a trivial pericardial effusion.  - No prior TTE for comparison.  - Continue Toprol-XL 25 mg daily and Aldactone 12.5  mg daily.  - Continue midodrine 5 mg three times daily for soft.   - Will start Entresto 24-26 mg twice daily.   - Will require ischemic workup, cardiac catheterization for cardiomyopathy, elevated troponin, and TAVR workup.     Valvular heart disease.  - Critical aortic valve stenosis.  Mild to moderate mitral valve regurgitation.  Mild to moderate tricuspid valve regurgitation.  Trace pulmonic valve regurgitation.  - 2/17/24 TTE:   Aortic Valve The aortic valve is trileaflet. The leaflets are moderately thickened. The leaflets are moderately calcified. There is severely reduced mobility. There is mild to moderate regurgitation. There is critical stenosis.  Peak gradient 108 mmHg.  Mean gradient 63.  LVOT velocity is measured to be 0.58.  LVOT diameter 2.2 and aortic valve area 0.5 cm 2 and dimensionless index 0.11.  There may be some component of low cardiac output contributing to valve stenosis.   Mitral Valve There is mild to moderate regurgitation. There is no evidence of stenosis.   Tricuspid Valve There is mild to moderate regurgitation.  Pulmonary artery pressure around 50 mmHg. There is no evidence of stenosis.   Pulmonic Valve There is trace regurgitation. There is no evidence of stenosis.      Essential hypertension.  - SBP over 24 hours .  - Continue Toprol-XL 25 mg daily, Aldactone 12.5 mg daily, Entresto 24-26 mg twice daily.   - Continue midodrine 5 mg three times daily.   - Continue to monitor BP.     Sternum/rib fractures.  - Secondary to CPR.  - 2/16/24 CT chest without contrast: Acute minimally displaced fractures of multiple bilateral anterior ribs due to CPR. Nondisplaced fracture of the anterior cortex of the superior body of the sternum due to CPR.     COPD.  - Does not appear in acute exacerbation.    - Follows outpatient with [facility].  - Care per primary team.     Pre-diabetes.   - 2/16/24 HgbA1c: 5.7.   - Care per primary team.   Subjective / Objective:        No acute  "events.  Patient continues to report chest discomfort secondary to use urinal and rib fractures.  Patient currently denies palpitations, shortness of breath, lightheadedness, dizziness, headache, nausea, or vomiting.      Discussed with patient recommendations for cardiac catheterization, patient is agreeable.  Will discuss with Cath Lab and interventional cardiology scheduling availability.    Vitals: Blood pressure 148/80, pulse 75, temperature 97.6 °F (36.4 °C), temperature source Oral, resp. rate 19, height 5' 9\" (1.753 m), weight 67 kg (147 lb 9.6 oz), SpO2 96%.  Vitals:    02/18/24 0548 02/19/24 0600   Weight: 65.5 kg (144 lb 6.4 oz) 67 kg (147 lb 9.6 oz)     Body mass index is 21.8 kg/m².  BP Readings from Last 3 Encounters:   02/19/24 148/80   12/26/23 130/78   11/28/23 130/78     Orthostatic Blood Pressures      Flowsheet Row Most Recent Value   Blood Pressure 148/80 filed at 02/19/2024 0807   Patient Position - Orthostatic VS Lying filed at 02/19/2024 0807          I/O         02/16 0701  02/17 0700 02/17 0701  02/18 0700 02/18 0701  02/19 0700    P.O.  380 360    I.V. (mL/kg)   10 (0.2)    IV Piggyback 1000      Total Intake(mL/kg) 1000 (15.1) 380 (5.8) 370 (5.6)    Urine (mL/kg/hr) 550 1950 (1.2) 350 (2.2)    Total Output 550 1950 350    Net +450 -1570 +20                 Invasive Devices       Peripheral Intravenous Line  Duration             Peripheral IV 02/17/24 Left Forearm 2 days    Peripheral IV 02/18/24 Left;Upper;Ventral (anterior) Arm 1 day                      Intake/Output Summary (Last 24 hours) at 2/19/2024 0823  Last data filed at 2/18/2024 2203  Gross per 24 hour   Intake 370 ml   Output 1500 ml   Net -1130 ml         Physical Exam:   Physical Exam  Vitals reviewed.   Constitutional:       General: Gilberto is not in acute distress.  Cardiovascular:      Rate and Rhythm: Normal rate and regular rhythm.      Pulses: Normal pulses.      Heart sounds: Murmur heard.   Pulmonary:      Effort: " Pulmonary effort is normal. No respiratory distress.      Breath sounds: Normal breath sounds.   Abdominal:      General: Abdomen is flat. There is no distension.      Palpations: Abdomen is soft.      Tenderness: There is no abdominal tenderness.   Musculoskeletal:      Right lower leg: No edema.      Left lower leg: No edema.   Skin:     General: Skin is warm and dry.   Neurological:      Mental Status: Gilberto is alert and oriented to person, place, and time.       Medications/ Allergies:     Current Facility-Administered Medications   Medication Dose Route Frequency Provider Last Rate    acetaminophen  975 mg Oral Q8H [facility] Saman Motley MD      aspirin  325 mg Oral Daily Saman Motley MD      budesonide-formoterol  2 puff Inhalation BID Saman Motley MD      docusate sodium  100 mg Oral BID MICHELLE Nance      enoxaparin  1 mg/kg Subcutaneous Q12H [facility] Saman Motley MD      famotidine  20 mg Oral Daily Saman Motley MD      fluticasone  1 spray Nasal BID Saman Motley MD      hydrOXYzine HCL  25 mg Oral Q6H PRN Saman Motley MD      latanoprost  1 drop [facility] Saman Motley MD      levalbuterol  0.63 mg Nebulization Q6H PRN Saman Motley MD      metoprolol succinate  25 mg Oral Daily Saman Motley MD      midodrine  5 mg Oral TID AC Saman Motley MD      nicotine polacrilex  2 mg Oral Q2H PRN Saman Motley MD      ondansetron  4 mg Intravenous Q6H PRN Saman Motley MD      pneumococcal 20-radha conj vacc  0.5 mL Intramuscular Once Saman Motley MD      polyethylene glycol  17 g Oral Daily MICHELLE Nance      spironolactone  12.5 mg Oral Daily Saman Motley MD      traMADol  50 mg Oral Q6H PRN Saman Motley MD       hydrOXYzine HCL, 25 mg, Q6H PRN  levalbuterol, 0.63 mg, Q6H PRN  nicotine polacrilex, 2 mg, Q2H PRN  ondansetron, 4 mg, Q6H PRN  traMADol, 50 mg, Q6H PRN      Allergies   Allergen Reactions    Shellfish-Derived  "Products - Food Allergy Other (See Comments)     Listed by PCP patient denies    Iodine - Food Allergy Other (See Comments)       VTE Pharmacologic Prophylaxis:   Enoxaparin (Lovenox)    Labs:   Troponins:  Results from last 7 days   Lab Units 02/18/24  0455 02/17/24  0505 02/17/24  0027 02/16/24  2217   HS TNI RAND ng/L 3,080* 3,327*  --   --    HSTNI D2 ng/L  --   --   --  140*   HSTNI D4 ng/L  --   --  880*  --          CBC with diff:  Results from last 7 days   Lab Units 02/18/24  0455 02/17/24  0505 02/16/24  1908   WBC Thousand/uL 7.13 8.13 9.37   HEMOGLOBIN g/dL 11.8* 11.1* 12.2   HEMATOCRIT % 36.3* 33.7* 37.6   MCV fL 92 91 92   PLATELETS Thousands/uL 154 156 208   RBC Million/uL 3.96 3.72* 4.10   MCH pg 29.8 29.8 29.8   MCHC g/dL 32.5 32.9 32.4   RDW % 15.2* 15.0 14.9   MPV fL 10.7 10.5 10.7   NRBC AUTO /100 WBCs  --   --  0       CMP:  Results from last 7 days   Lab Units 02/19/24  0501 02/18/24  0455 02/17/24  0505 02/16/24  1908   SODIUM mmol/L 137 139 139 139   POTASSIUM mmol/L 3.7 4.4 4.4 3.9   CHLORIDE mmol/L 103 103 108 105   CO2 mmol/L 29 30 24 23   ANION GAP mmol/L 5 6 7 11   BUN mg/dL 18 19 23 23   CREATININE mg/dL 0.71 0.81 0.71 1.19   CALCIUM mg/dL 8.1* 8.1* 8.0* 8.7   AST U/L  --  53* 80* 77*   ALT U/L  --  66* 86* 78*   ALK PHOS U/L  --  58 52 59   TOTAL PROTEIN g/dL  --  5.5* 5.3* 5.8*   ALBUMIN g/dL  --  3.4* 3.3* 3.6   TOTAL BILIRUBIN mg/dL  --  1.09* 0.77 0.76       Magnesium:  Results from last 7 days   Lab Units 02/18/24  0455 02/17/24  0505 02/16/24  1908   MAGNESIUM mg/dL 2.1 1.8* 2.0     Coags:    TSH:  Results from last 7 days   Lab Units 02/17/24  0505   TSH 3RD GENERATON uIU/mL 2.691     No components found for: \"TSH3\"  Lipid Profile:  Results from last 7 days   Lab Units 02/17/24  0505   CHOLESTEROL mg/dL 144   TRIGLYCERIDES mg/dL 44   HDL mg/dL 53   LDL CALC mg/dL 82     Hgb A1c:  Results from last 7 days   Lab Units 02/16/24  1908   HEMOGLOBIN A1C % 5.7*     NT-proBNP: No " "results for input(s): \"NTBNP\" in the last 72 hours.     Imaging & Testing   I have personally reviewed pertinent reports.      CT chest without contrast    Result Date: 2/16/2024  Impression: Moderate septal thickening due to interstitial edema with small pleural effusions. Acute minimally displaced fractures of multiple bilateral anterior ribs due to CPR. Nondisplaced fracture of the anterior cortex of the superior body of the sternum due to CPR. A few 5 mm and smaller nodules. Stability is unknown given the absence of comparison studies. Follow-up could be obtained with a chest CT with no contrast in 6 months to assure stability.     CT head without contrast    Result Date: 2/16/2024    Impression: No acute intracranial abnormality. Paranasal sinus disease.        EKG / Monitor: Personally reviewed.      Telemetry reviewed: Sinus rhythm, 73 bpm.    Cardiac testing:   Echo complete w/ contrast if indicated    Result Date: 2/17/2024  Narrative:   Left Ventricle: Left ventricular cavity size is mildly dilated. Wall thickness is mildly increased. There is mild concentric hypertrophy. The left ventricular ejection fraction is around 25% by visual estimation. Systolic function is severely reduced. There is severe global hypokinesis with regional variation. Diastolic function is moderately abnormal, consistent with grade II (pseudonormal) relaxation.  Left atrial filling pressure is elevated.   Right Ventricle: Systolic function is low normal.   Left Atrium: The atrium is severely dilated ( 60 mL/m2).   Right Atrium: The atrium is moderately dilated.   Aortic Valve: The aortic valve is trileaflet. The leaflets are moderately thickened. The leaflets are moderately calcified. There is severely reduced mobility. There is mild to moderate regurgitation. There is critical stenosis.  Peak gradient 108 mmHg.  Mean gradient 63.  LVOT velocity is measured to be 0.58.  LVOT diameter 2.2 and aortic valve area 0.5 cm 2 and " dimensionless index 0.11.  There may be some component of low cardiac output contributing to valve stenosis.   Mitral Valve: There is mild to moderate regurgitation.   Tricuspid Valve: There is mild to moderate regurgitation.  Pulmonary artery pressure around 50 mmHg.   IVC/SVC: The inferior vena cava is mildly dilated.  It has less than 50% collapse with inspiration consistent with elevated right atrial pressure about 10 to 15 mmHg.   Pericardium: There is a trivial pericardial effusion.   No old echo available for comparison.             Lis Card PA-C

## 2024-02-20 LAB
ANION GAP SERPL CALCULATED.3IONS-SCNC: 6 MMOL/L
BUN SERPL-MCNC: 20 MG/DL (ref 5–25)
CALCIUM SERPL-MCNC: 8.1 MG/DL (ref 8.4–10.2)
CHLORIDE SERPL-SCNC: 103 MMOL/L (ref 96–108)
CO2 SERPL-SCNC: 29 MMOL/L (ref 21–32)
CREAT SERPL-MCNC: 0.69 MG/DL (ref 0.6–1.3)
GLUCOSE SERPL-MCNC: 107 MG/DL (ref 65–140)
POTASSIUM SERPL-SCNC: 4 MMOL/L (ref 3.5–5.3)
SODIUM SERPL-SCNC: 138 MMOL/L (ref 135–147)

## 2024-02-20 PROCEDURE — 80048 BASIC METABOLIC PNL TOTAL CA: CPT | Performed by: INTERNAL MEDICINE

## 2024-02-20 PROCEDURE — 99232 SBSQ HOSP IP/OBS MODERATE 35: CPT | Performed by: INTERNAL MEDICINE

## 2024-02-20 PROCEDURE — 97535 SELF CARE MNGMENT TRAINING: CPT

## 2024-02-20 RX ORDER — ACETAMINOPHEN 325 MG/1
650 TABLET ORAL EVERY 8 HOURS SCHEDULED
Status: DISCONTINUED | OUTPATIENT
Start: 2024-02-20 | End: 2024-02-22 | Stop reason: HOSPADM

## 2024-02-20 RX ORDER — ACETAMINOPHEN 325 MG/1
650 TABLET ORAL EVERY 4 HOURS PRN
Status: DISCONTINUED | OUTPATIENT
Start: 2024-02-20 | End: 2024-02-22 | Stop reason: HOSPADM

## 2024-02-20 RX ORDER — LANOLIN ALCOHOL/MO/W.PET/CERES
3 CREAM (GRAM) TOPICAL
Status: DISCONTINUED | OUTPATIENT
Start: 2024-02-20 | End: 2024-02-22 | Stop reason: HOSPADM

## 2024-02-20 RX ADMIN — FLUTICASONE PROPIONATE 1 SPRAY: 50 SPRAY, METERED NASAL at 09:59

## 2024-02-20 RX ADMIN — ENOXAPARIN SODIUM 40 MG: 40 INJECTION SUBCUTANEOUS at 09:59

## 2024-02-20 RX ADMIN — BUDESONIDE AND FORMOTEROL FUMARATE DIHYDRATE 2 PUFF: 160; 4.5 AEROSOL RESPIRATORY (INHALATION) at 18:22

## 2024-02-20 RX ADMIN — TRAMADOL HYDROCHLORIDE 50 MG: 50 TABLET, COATED ORAL at 10:10

## 2024-02-20 RX ADMIN — DOCUSATE SODIUM 100 MG: 100 CAPSULE, LIQUID FILLED ORAL at 18:22

## 2024-02-20 RX ADMIN — SACUBITRIL AND VALSARTAN 1 TABLET: 24; 26 TABLET, FILM COATED ORAL at 10:00

## 2024-02-20 RX ADMIN — DOCUSATE SODIUM 100 MG: 100 CAPSULE, LIQUID FILLED ORAL at 10:00

## 2024-02-20 RX ADMIN — ASPIRIN 325 MG: 325 TABLET ORAL at 10:00

## 2024-02-20 RX ADMIN — METOPROLOL SUCCINATE 25 MG: 25 TABLET, EXTENDED RELEASE ORAL at 09:59

## 2024-02-20 RX ADMIN — SPIRONOLACTONE 12.5 MG: 25 TABLET ORAL at 09:59

## 2024-02-20 RX ADMIN — ACETAMINOPHEN 975 MG: 325 TABLET ORAL at 05:52

## 2024-02-20 RX ADMIN — MIDODRINE HYDROCHLORIDE 5 MG: 5 TABLET ORAL at 06:17

## 2024-02-20 RX ADMIN — MIDODRINE HYDROCHLORIDE 5 MG: 5 TABLET ORAL at 15:54

## 2024-02-20 RX ADMIN — MIDODRINE HYDROCHLORIDE 5 MG: 5 TABLET ORAL at 12:19

## 2024-02-20 RX ADMIN — ACETAMINOPHEN 650 MG: 325 TABLET ORAL at 22:20

## 2024-02-20 RX ADMIN — POLYETHYLENE GLYCOL 3350 17 G: 17 POWDER, FOR SOLUTION ORAL at 10:00

## 2024-02-20 RX ADMIN — Medication 3 MG: at 22:20

## 2024-02-20 RX ADMIN — ACETAMINOPHEN 975 MG: 325 TABLET ORAL at 00:08

## 2024-02-20 RX ADMIN — LATANOPROST 1 DROP: 50 SOLUTION OPHTHALMIC at 22:23

## 2024-02-20 RX ADMIN — FLUTICASONE PROPIONATE 1 SPRAY: 50 SPRAY, METERED NASAL at 18:22

## 2024-02-20 RX ADMIN — ACETAMINOPHEN 650 MG: 325 TABLET ORAL at 15:54

## 2024-02-20 NOTE — PROGRESS NOTES
"Progress Note - Urology      Patient: Gilberto Vann   : 1940 Sex: adult   MRN: [id number]     CSN: [id number]  Unit/Bed#: [address] [place] [address]     SUBJECTIVE:   Patient seen on morning rounds  Feeling better voiding with somewhat of a slow stream may need to consider urologic procedures such as office-based microwave or hospital-based prostatectomy if cardiac medically cleared in a few weeks if symptoms progress      Objective   Vitals: /58   Pulse 58   Temp 97.6 °F (36.4 °C) (Oral)   Resp 18   Ht 5' 9\" (1.753 m)   Wt 65.5 kg (144 lb 6.4 oz)   SpO2 98%   BMI 21.32 kg/m²     I/O last 24 hours:  In: 240 [P.O.:240]  Out: 1300 [Urine:1300]      Physical Exam:   General Alert awake   Normocephalic atraumatic PERRLA  Lungs clear bilaterally  Cardiac normal S1 normal S2  Abdomen soft, flank pain  Extremities no edema      Lab Results: CBC:   Lab Results   Component Value Date    WBC 7.13 2024    HGB 11.8 (L) 2024    HCT 36.3 (L) 2024    MCV 92 2024     2024    RBC 3.96 2024    MCH 29.8 2024    MCHC 32.5 2024    RDW 15.2 (H) 2024    MPV 10.7 2024    NRBC 0 2024     CMP:   Lab Results   Component Value Date     2024     2023    CO2 29 2024    CO2 26 2023    BUN 18 2024    BUN 20 2023    CREATININE 0.71 2024    CALCIUM 8.1 (L) 2024    AST 53 (H) 2024    AST 17 2023    ALT 66 (H) 2024    ALT 14 2023    ALKPHOS 58 2024    EGFR 86 2023    EGFR 89 2020     Urinalysis:   Lab Results   Component Value Date    COLORU Light Yellow 2020    CLARITYU Clear 2020    SPECGRAV 1.010 2020    PHUR 6.0 2020    LEUKOCYTESUR Negative 2020    NITRITE Negative 2020    GLUCOSEU Negative 2020    KETONESU Negative 2020    BILIRUBINUR Negative 2020    BLOODU Small (A) 2020     Urine Culture:   Lab " Results   Component Value Date    URINECX >100,000 cfu/ml Escherichia coli 06/27/2016     PSA:   Lab Results   Component Value Date    PSA 6.98 (H) 05/22/2023         Assessment/ Plan:  Syncopal episode  Stated obstruction slow stream on doxazosin possible entity to cause drop in blood pressure with syncopal episode patient advised no alpha blockers may need to consider urologic surgery or procedure for obstructive symptoms since not a candidate for any further  Office follow-up in a few weeks to discuss possible office-based microwave hospital-based TURP          James Blutn MD

## 2024-02-20 NOTE — CASE MANAGEMENT
Case Management Progress Note    Patient name Gilberto Vann  Location [address] [address]/[address] [place] [address]-* MRN [id number]  : 1940 Date 2024       LOS (days): 4  Geometric Mean LOS (GMLOS) (days):   Days to GMLOS:        OBJECTIVE:        Current admission status: Inpatient  Preferred Pharmacy:   [id number] - [facility] [address]  [address]  [facility] [address]  Phone: [phone] Fax: [phone]    [id number] - [place], [place] - [address]  [address] [address]  Phone: [phone] Fax: [phone]    Primary Care Provider: Dima Lloyd MD    Primary Insurance: MEDICARE  Secondary Insurance: [employer]    PROGRESS NOTE:    CM attempted to visit patient at bedside, introduced self and role. Patient's friend asked CM to come back later in 1/2 hour when patient's sister is present. CM to visit patient later when sister present.

## 2024-02-20 NOTE — OCCUPATIONAL THERAPY NOTE
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)    Pulmonary nodules    Anxiety    Anemia    Critical aortic valve stenosis    Cardiomyopathy (HCC)    CAD (coronary artery disease)       02/20/24 1418   OT Last Visit   OT Visit Date 02/20/24  (Tuesday)   Note Type   Note Type Treatment   Pain Assessment   Pain Assessment Tool FLACC   Pain Location/Orientation Location: Rib Cage;Location: Chest   Effect of Pain on Daily Activities when laughing   Patient's Stated Pain Goal No pain   Hospital Pain Intervention(s) Repositioned;Ambulation/increased activity;Emotional support   Pain Rating: FLACC (Rest) - Face 0   Pain Rating: FLACC (Rest) - Legs 0   Pain Rating: FLACC (Rest) - Activity 0   Pain Rating: FLACC (Rest) - Cry 0   Pain Rating: FLACC (Rest) - Consolability 0   Score: FLACC (Rest) 0   Pain Rating: FLACC (Activity) - Face 1   Pain Rating: FLACC (Activity) - Legs 0   Pain Rating: FLACC (Activity) - Activity 0   Pain Rating: FLACC (Activity) - Cry 2   Pain Rating: FLACC (Activity) - Consolability 1   Score: FLACC (Activity) 4   Restrictions/Precautions   Weight Bearing Precautions Per Order No  (sternal precautions / rib fracture protocol)   Other Precautions Bed Alarm;Impulsive;Fall Risk;Telemetry   Lifestyle   Autonomy Pt reports I w/ ADL/ IADL at baseline w/ out use of AD   Reciprocal Relationships Pt reports living alone. Sister and nephew present during session   Service to Others Pt reports retired since 2000   Intrinsic Gratification Pt reports he does not have any  "hobbies; takes care of the house   ADL   Where Assessed Chair  (vs bathroom)   Eating Assistance 6  Modified independent   Eating Deficit Setup   Grooming Assistance 6  Modified Independent   Grooming Deficit Increased time to complete;Supervision/safety   Grooming Comments standing in bathroom to wash face   UB Bathing Assistance 5  Supervision/Setup   UB Bathing Deficit Setup;Supervision/safety;Increased time to complete   UB Bathing Comments standing in bathroom at sink   UB Dressing Assistance 6  Modified independent   UB Dressing Deficit Setup;Supervision/safety;Verbal cueing;Increased time to complete   UB Dressing Comments don / doff long sleeve t-shirt w/ + time due to multiple lines (IV, telemetry)   LB Dressing Assistance 5  Supervision/Setup   LB Dressing Deficit Setup;Supervision/safety;Increased time to complete   LB Dressing Comments don / doff shoes including laces   Toileting Assistance  6  Modified independent   Toileting Deficit Increased time to complete;Supervison/safety   Toileting Comments voided seated on toilet in bathroom   Bed Mobility   Supine to Sit Unable to assess   Sit to Supine Unable to assess   Additional Comments Pt seated OOB in chair upon arrival and at end of session w/ needs met, call bell in reach and family present   Transfers   Sit to Stand 5  Supervision   Additional items Assist x 1;Verbal cues   Stand to Sit 5  Supervision   Additional items Verbal cues;Armrests   Toilet transfer 5  Supervision   Additional items Assist x 1;Standard toilet   Additional Comments Pt performed sit <> stand 3X   Functional Mobility   Functional Mobility 5  Supervision   Additional Comments engaged in functional mobility household distances w/ S   Additional items   (no AD)   Toilet Transfers   Toilet Transfer Type To and from   Toilet Transfer to Standard toilet   Toilet Transfer Technique Ambulating   Toilet Transfers Supervision   Subjective   Subjective \"Do you know what a groundhog is?\" " "  Cognition   Overall Cognitive Status Impaired   Arousal/Participation Alert;Cooperative   Attention Attends with cues to redirect   Orientation Level Oriented to person;Oriented to place;Oriented to situation  (generally oriented to time (month, year) \"I lost a day again\")   Memory Decreased recall of recent events   Following Commands Follows multistep commands with increased time or repetition   Comments Identified pt by full name and birthdate. Alert, generally oriented. Able to follow directions w/ + time during ADLs.   Activity Tolerance   Activity Tolerance Patient limited by pain   Medical Staff Made Aware spoke w/ RNShruti and Melina HESTER   Assessment   Assessment Pt seen for skilled OT tx session focusing on activity engagement, challenging activity tolerance, and ongoing eval. Pt agreeable to participate w/ encouragement. Pt demonstrated improved activity tolerance and required less physical assistance. Pt engaged in functional mobility w/ out use of AD. Pt engaged in UB bathing and grooming standing at sink in bathroom. Pt able to forward flex trunk to reach his shoes to don and manage tie fasteners. Pt reports chest pain / discomfort when laughing. Therapist educated pt on use of pillow to brace chest as able. Continue to recommend level III rehab resource intensity when medically stable for discharge from acute care. Will continue to follow   Plan   Treatment Interventions ADL retraining;Functional transfer training;Endurance training;Patient/family training;Equipment evaluation/education;Compensatory technique education;Continued evaluation;Energy conservation;Activityengagement   Goal Expiration Date 03/02/24   OT Treatment Day 2  (Tuesday 2/20/24)   OT Frequency 3-5x/wk   Discharge Recommendation   Rehab Resource Intensity Level, OT III (Minimum Resource Intensity)   Equipment Recommended Shower/Tub chair with back ($)   AM-PAC Daily Activity Inpatient   Lower Body Dressing 3   Bathing 3   Toileting " 4   Upper Body Dressing 3   Grooming 4   Eating 4   Daily Activity Raw Score 21   Daily Activity Standardized Score (Calc for Raw Score >=11) 44.27   AM-PAC Applied Cognition Inpatient   Following a Speech/Presentation 3   Understanding Ordinary Conversation 4   Taking Medications 3   Remembering Where Things Are Placed or Put Away 4   Remembering List of 4-5 Errands 3   Taking Care of Complicated Tasks 3   Applied Cognition Raw Score 20   Applied Cognition Standardized Score 41.76   Barthel Index   Feeding 10   Bathing 0   Grooming Score 5   Dressing Score 5   Bladder Score 10   Bowels Score 10   Toilet Use Score 10   Transfers (Bed/Chair) Score 10   Mobility (Level Surface) Score 10   Stairs Score 5   Barthel Index Score 75   End of Consult   Education Provided Yes;Family or social support of family present for education by provider   Patient Position at End of Consult Bedside chair;All needs within reach   Nurse Communication Nurse aware of consult   Licensure   [place] License Number  Jazmin Angulo, OTR/L [id number]        The patient's raw score on the AM-PAC Daily Activity Inpatient Short Form is 21. A raw score of greater than or equal to 19 suggests the patient may benefit from discharge to home. Please refer to the recommendation of the Occupational Therapist for safe discharge planning.      Jazmin Angulo, OTR/L  [id number]  [id number]

## 2024-02-20 NOTE — PROGRESS NOTES
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   Patient received a dose of Lasix 20 mg IV initially, subsequently blood pressure was soft  and could not get further diuretics  Echocardiogram EF 25% with severe global hypokinesis, grade 2 diastolic dysfunction, aortic stenosis  Blood pressure better with midodrine  Continue metoprolol, Aldactone  Entresto  Midodrine 5 mg 3 times a day  Cardiac diet with fluid restriction 1800 cc per day  Monitor intake output, daily weight  Close cardiology follow-up after discharge in 1 week      * Syncope  Assessment & Plan  Patient presents with syncope versus cardiac arrest at neighbor's .  Patient fell on the floor, sustained abrasion to left scalp.  Bystanders initiated CPR.  EMS arrived 4 minutes later found patient to have palpable pulses and patient had regained consciousness.  Patient remembers he was standing by the door,unable to recall anything else.  Patient had complained of feeling foggy and dizzy, not feeling well ever since he took first dose of doxazosin for prostate evening per friend at bedside.  Patient reportedonly ate around 1 PM during the day.  Does not eat breakfast regularly.  Patient did not actually want to go to  due to not feeling well and anxiety per friend at bedside.  Denied history of syncope.  CT head no acute findings  CT chest without  contrast showed - Moderate septal thickening due to interstitial edema with small pleural effusions. Acute minimally displaced fractures of multiple bilateral anterior ribs due to CPR. Nondisplaced fracture of the anterior cortex of the superior body of the sternum due to CPR.  Initial troponin 112.  Repeat trending up likely related to CPR, CHF, aortic stenosis.  Initial EKG showed NSR, bifascicular block, rate 97,TWI V1-V4,STD V2-V4.  Repeat EKG showed NSR, LVH,STD V5 V6,mild JESSE V1 to V3 likely due to LVH.   Peak troponin was 3327  Status post Lovenox 1 mg/g subcutaneously twice daily.    Lipid panel showed LDL level of 82.  Hemoglobin A1c was 5.7  2D echo showed EF of 25% with severe global hypokinesis with grade 2 diastolic dysfunction with severely dilated left atrium with an aortic valve area of 1.5 cm2  Orthostatic vital signs could not be obtained as patient is not very cooperative  Cardiology input appreciated  Suspected syncope in the setting of possible critical aortic stenosis versus ventricular arrhythmias due to cardiomyopathy  Continue midodrine  Discontinue alpha-blocker  Telemetry  LifeVest prior to discharge    CAD (coronary artery disease)  Assessment & Plan  Patient underwent cardiac catheterization which showed disease of the circumflex and LAD  Cardiology coordinating care with CT surgery at [facility] for outpatient evaluation for TAVR along with myocardial revascularization    Cardiomyopathy (HCC)  Assessment & Plan  Echo showed EF of 25% with severe global hypokinesis with regional variations, grade 2 diastolic dysfunction with severely dilated left atrium with aortic valve area of 0.5 cm 2  Etiology not clear at the current time  Continue medical management with Toprol-XL, Aldactone, Entresto  Cardiology follow-up    Anemia  Assessment & Plan  Baseline hemoglobin around 11  Hemoglobin stable      Pulmonary nodules  Assessment & Plan  CT showed -  A few 5 mm and smaller nodules. Stability is  unknown given the absence of comparison studies. Follow-up could be obtained with a chest CT with no contrast in 6 months to assure stability.     Closed fracture of body of sternum  Assessment & Plan  Pain control  Incentive spirometer    Closed fracture of multiple ribs of both sides  Assessment & Plan  Due to CPR  Incentive spirometer  Pain control, avoid tramadol  Tylenol as needed    Hyperglycemia  Assessment & Plan  Glucose 242  Hemoglobin A1c was 5.7    Elevated troponin  Assessment & Plan  Peak troponin was 3327  Suspected nonischemic myocardial injury in the setting of CPR, critical aortic stenosis and heart failure  Status post therapeutic Lovenox   Continue aspirin  Lipid panel showed LDL level of 82  2D echo-EF of 25% with aortic valve area of 0.5 cm squire  Hold off on starting Lipitor due to elevated LFTs   Cardiology following, input appreciated  Status post cardiac catheterization which showed disease involving circumflex and LAD  Cardiology discussed with CT surgery, recommended close outpatient follow-up for TAVR and CAD  LifeVest on discharge  Discussed with patient and family at bedside regarding compliance with medications, follow-up and further plan    Glaucoma  Assessment & Plan  Continue home eyedrop    COPD (chronic obstructive pulmonary disease) (HCC)  Assessment & Plan  Continue Symbicort  No wheezing on auscultation    Benign prostatic hyperplasia with urinary retention  Assessment & Plan  Started on doxazosin  the day before admission by urology.  Reported felt foggy and dizzy, not feeling well after taking first dose.  Will hold doxazosin.   suspected BPH.-Avoid alpha blockers.  Patient will need outpatient follow-up with urology for cystoscopy and positive consideration for TURP    Anxiety  Assessment & Plan  Patient was on low-dose Xanax as needed before.  Not currently.  Declined psych consult.               VTE Pharmacologic Prophylaxis:   Moderate Risk (Score 3-4) - Pharmacological  DVT Prophylaxis Ordered: enoxaparin (Lovenox).    Mobility:   Basic Mobility Inpatient Raw Score: 17  JH-HLM Goal: 5: Stand one or more mins  JH-HLM Achieved: 4: Move to chair/commode  HLM Goal NOT achieved. Continue with multidisciplinary rounding and encourage appropriate mobility to improve upon HLM goals.    Patient Centered Rounds: I performed bedside rounds with nursing staff today.   Discussions with Specialists or Other Care Team Provider: Cardiology    Education and Discussions with Family / Patient: Updated  (sister) at bedside.    Total Time Spent on Date of Encounter in care of patient: 45 mins. This time was spent on one or more of the following: performing physical exam; counseling and coordination of care; obtaining or reviewing history; documenting in the medical record; reviewing/ordering tests, medications or procedures; communicating with other healthcare professionals and discussing with patient's family/caregivers.    Current Length of Stay: 4 day(s)  Current Patient Status: Inpatient   Certification Statement: The patient will continue to require additional inpatient hospital stay due to cardiomyopathy  Discharge Plan: Anticipate discharge in 24-48 hrs to rehab facility.    Code Status: Level 1 - Full Code    Subjective:   Noted to be sitting comfortably in chair, denies any chest pain shortness of breath or dizziness  Reports reproducible chest discomfort, better after taking pain medication    Nursing reports that patient was noted to have disorientation and confusion after receiving tramadol    Objective:     Vitals:   Temp (24hrs), Av.8 °F (36.6 °C), Min:97.5 °F (36.4 °C), Max:98 °F (36.7 °C)    Temp:  [97.5 °F (36.4 °C)-98 °F (36.7 °C)] 97.6 °F (36.4 °C)  HR:  [58-76] 58  Resp:  [16-20] 18  BP: (100-129)/(58-79) 111/58  SpO2:  [96 %-98 %] 98 %  Body mass index is 21.32 kg/m².     Input and Output Summary (last 24 hours):     Intake/Output Summary (Last 24 hours) at  2/20/2024 1409  Last data filed at 2/19/2024 1832  Gross per 24 hour   Intake 240 ml   Output 1000 ml   Net -760 ml       Physical Exam:   Physical Exam  Vitals and nursing note reviewed.   Constitutional:       General: Gilberto is not in acute distress.     Appearance: Gilberto is well-developed.   HENT:      Head: Normocephalic and atraumatic.   Eyes:      Conjunctiva/sclera: Conjunctivae normal.   Cardiovascular:      Rate and Rhythm: Normal rate and regular rhythm.      Heart sounds: Murmur heard.   Pulmonary:      Effort: Pulmonary effort is normal. No respiratory distress.      Breath sounds: Decreased breath sounds present.   Abdominal:      Palpations: Abdomen is soft.      Tenderness: There is no abdominal tenderness.   Musculoskeletal:         General: No swelling.      Right lower leg: No edema.      Left lower leg: No edema.   Skin:     General: Skin is warm and dry.      Capillary Refill: Capillary refill takes less than 2 seconds.   Neurological:      Mental Status: Gilberto is alert. Mental status is at baseline.   Psychiatric:         Mood and Affect: Mood normal.          Additional Data:     Labs:  Results from last 7 days   Lab Units 02/18/24  0455 02/17/24  0505 02/16/24  1908   WBC Thousand/uL 7.13   < > 9.37   HEMOGLOBIN g/dL 11.8*   < > 12.2   HEMATOCRIT % 36.3*   < > 37.6   PLATELETS Thousands/uL 154   < > 208   NEUTROS PCT %  --   --  83*   LYMPHS PCT %  --   --  9*   MONOS PCT %  --   --  6   EOS PCT %  --   --  0    < > = values in this interval not displayed.     Results from last 7 days   Lab Units 02/20/24  1209 02/19/24  0501 02/18/24  0455   SODIUM mmol/L 138   < > 139   POTASSIUM mmol/L 4.0   < > 4.4   CHLORIDE mmol/L 103   < > 103   CO2 mmol/L 29   < > 30   BUN mg/dL 20   < > 19   CREATININE mg/dL 0.69   < > 0.81   ANION GAP mmol/L 6   < > 6   CALCIUM mg/dL 8.1*   < > 8.1*   ALBUMIN g/dL  --   --  3.4*   TOTAL BILIRUBIN mg/dL  --   --  1.09*   ALK PHOS U/L  --   --  58   ALT U/L  --    --  66*   AST U/L  --   --  53*   GLUCOSE RANDOM mg/dL 107   < > 99    < > = values in this interval not displayed.         Results from last 7 days   Lab Units 02/16/24  1855   POC GLUCOSE mg/dl 204*     Results from last 7 days   Lab Units 02/16/24  1908   HEMOGLOBIN A1C % 5.7*           Lines/Drains:  Invasive Devices       Peripheral Intravenous Line  Duration             Peripheral IV 02/17/24 Left Forearm 3 days    Peripheral IV 02/18/24 Left;Upper;Ventral (anterior) Arm 2 days                      Telemetry:  Telemetry Orders (From admission, onward)               24 Hour Telemetry Monitoring  Continuous x 24 Hours (Telem)        Question:  Reason for 24 Hour Telemetry  Answer:  Syncope suspected to be cardiac in origin                     Telemetry Reviewed: Normal Sinus Rhythm  Indication for Continued Telemetry Use: Lifevest (remains on tele entire hospital stay)             Imaging: Reviewed radiology reports from this admission including: chest xray and chest CT scan    Recent Cultures (last 7 days):         Last 24 Hours Medication List:   Current Facility-Administered Medications   Medication Dose Route Frequency Provider Last Rate    acetaminophen  975 mg Oral Q8H [facility] Saman Motley MD      aspirin  325 mg Oral Daily Saman Motley MD      budesonide-formoterol  2 puff Inhalation BID Saman Motley MD      docusate sodium  100 mg Oral BID Earlene Gudino MD      enoxaparin  40 mg Subcutaneous Q24H [facility] Saman Motley MD      famotidine  20 mg Oral Daily Saman Motley MD      fluticasone  1 spray Nasal BID Saman Motley MD      hydrOXYzine HCL  25 mg Oral Q6H PRN Saman Motley MD      latanoprost  1 drop Both Eyes HS Saman Motley MD      levalbuterol  0.63 mg Nebulization Q6H PRN Saman Motley MD      metoprolol succinate  25 mg Oral Daily Saman Motley MD      midodrine  5 mg Oral TID AC Saman Motley MD      nicotine polacrilex  2 mg Oral Q2H PRN  Saman Motley MD      ondansetron  4 mg Intravenous Q6H PRN Saman Motley MD      polyethylene glycol  17 g Oral Daily Earlene Gudino MD      sacubitril-valsartan  1 tablet Oral BID Saman Motley MD      spironolactone  12.5 mg Oral Daily Saman Motley MD          Today, Patient Was Seen By: Earlene Gudino MD    **Please Note: This note may have been constructed using a voice recognition system.**

## 2024-02-20 NOTE — PROGRESS NOTES
Progress Note - Cardiology   [facility]     Gilberto Vann 83 y.o. adult MRN: [id number]  : 1940  Unit/Bed#: [address] Encounter: [id number]    Assessment and Plan:   Syncope.  - Presented on 24 for evaluation after what is believed to be thought of syncopal events.    Patient states that he was on his way to attend a , attempted to walk through the front doors of the  home and then remembers waking up in the hospital ED.  Review of chart indicates that patient is consciousness at  home.  When police arrived patient was agonal he breathing prompting them to start CPR. When EMS arrived patient had pulses, was awake, and fully oriented.  Patient denies any symptoms of lightheadedness or dizziness prior to syncopal episode.  - Syncope likely secondary to critical aortic valve stenosis.  - Orthostatic vital signs negative.     Critical aortic valve stenosis.  - Patient states that he has noticed worsening shortness of breath with exertion over the past several months.  He denies any lightheadedness or dizziness or any other syncopal events prior to events on 24.   - 24 TTE:   Aortic Valve The aortic valve is trileaflet. The leaflets are moderately thickened. The leaflets are moderately calcified. There is severely reduced mobility. There is mild to moderate regurgitation. There is critical stenosis.  Peak gradient 108 mmHg.  Mean gradient 63.  LVOT velocity is measured to be 0.58.  LVOT diameter 2.2 and aortic valve area 0.5 cm 2 and dimensionless index 0.11.  There may be some component of low cardiac output contributing to valve stenosis.   - No prior TTE for comparison.  - Avoid aggressive IV diuretics or IV fluid use.  - 24 cardiac catheterization, report pending but appears to show significant CAD of the LCx and LAD.  Case was discussed with CTS at [place] who recommended outpatient evaluation for TAVR and CAD.  - Will need outpatient  TAVR evaluation by CTS.     Elevated troponin.  - 2/16/24 hs troponin: 112 (0hrs), 252 (2hrs), 992 (4hrs).   - 2/17/24 hs troponin random: 3327.   - 2/18/24 hs troponin random: 3080.   - 2/17/24 EKG: Normal sinus rhythm, 81 bpm.  Left axis deviation.  Left ventricle hypertrophy with QRS widening and repolarization abnormality ( Sokolow-Self , [place] product ).   - Non-ischemic myocardial injury secondary to CPR, critical aortic valve stenosis, acute combined systolic and diastolic heart failure versus type II MI.  - 2/19/24 cardiac catheterization, report pending but appears to show significant CAD of the LCx and LAD.  Case was discussed with CTS at [place] who recommended outpatient evaluation for TAVR and CAD.     Acute combined systolic and diastolic heart failure.  - 2/17/24 BNP: 1270.   - 2/16/23 CT chest without contrast: Moderate septal thickening due to interstitial edema with small pleural effusions.   - 2/17/24 TTE: LVEF 25%. There is severe global hypokinesis with regional variation. Diastolic function is moderately abnormal, consistent with grade II (pseudonormal) relaxation.  Left atrial filling pressure is elevated. Right Ventricle: Systolic function is low normal. Left Atrium: The atrium is severely dilated ( 60 mL/m2). Right Atrium: The atrium is moderately dilated. Aortic Valve: The aortic valve is trileaflet. The leaflets are moderately thickened. The leaflets are moderately calcified. There is severely reduced mobility. There is mild to moderate regurgitation. There is critical stenosis.  Peak gradient 108 mmHg.  Mean gradient 63.  LVOT velocity is measured to be 0.58.  LVOT diameter 2.2 and aortic valve area 0.5 cm 2 and dimensionless index 0.11.  There may be some component of low cardiac output contributing to valve stenosis. Mitral Valve: There is mild to moderate regurgitation. Tricuspid Valve: There is mild to moderate regurgitation.  Pulmonary artery pressure around 50 mmHg. IVC/SVC: The  inferior vena cava is mildly dilated.  It has less than 50% collapse with inspiration consistent with elevated right atrial pressure about 10 to 15 mmHg. Pericardium: There is a trivial pericardial effusion.  - No prior TTE for comparison.  - Continue Toprol-XL 25 mg daily and Aldactone 12.5 mg daily.  - Continue midodrine 5 mg three times daily for soft.   - Will start Entresto 24-26 mg twice daily.   - Strict I/Os.   - Daily weight, standing scale.   - CHF education.   - Continue low-sodium diet with 1800 mL fluid restriction.   - No further cardiology workup at this time.  Recommend patient follow-up with outpatient cardiology within 1 week of discharge.     Ischemic cardiomyopathy.  - 2/17/24 TTE: LVEF 25%. There is severe global hypokinesis with regional variation. Diastolic function is moderately abnormal, consistent with grade II (pseudonormal) relaxation.  Left atrial filling pressure is elevated. Right Ventricle: Systolic function is low normal. Left Atrium: The atrium is severely dilated ( 60 mL/m2). Right Atrium: The atrium is moderately dilated.Aortic Valve: The aortic valve is trileaflet. The leaflets are moderately thickened. The leaflets are moderately calcified. There is severely reduced mobility. There is mild to moderate regurgitation. There is critical stenosis.  Peak gradient 108 mmHg.  Mean gradient 63.  LVOT velocity is measured to be 0.58.  LVOT diameter 2.2 and aortic valve area 0.5 cm 2 and dimensionless index 0.11.  There may be some component of low cardiac output contributing to valve stenosis.Mitral Valve: There is mild to moderate regurgitation. Tricuspid Valve: There is mild to moderate regurgitation.  Pulmonary artery pressure around 50 mmHg. IVC/SVC: The inferior vena cava is mildly dilated.  It has less than 50% collapse with inspiration consistent with elevated right atrial pressure about 10 to 15 mmHg. Pericardium: There is a trivial pericardial effusion.  - No prior TTE for  comparison.  - Continue Toprol-XL 25 mg daily and Aldactone 12.5 mg daily.  - Continue midodrine 5 mg three times daily for soft.   - Will start Entresto 24-26 mg twice daily.   - 2/19/24 cardiac catheterization, report pending but appears to show significant CAD of the LCx and LAD.  Case was discussed with CTS at [place] who recommended outpatient evaluation for TAVR and CAD.  - Referral made to CTS.  - LifeVest ordered.     Valvular heart disease.  - Critical aortic valve stenosis.  Mild to moderate mitral valve regurgitation.  Mild to moderate tricuspid valve regurgitation.  Trace pulmonic valve regurgitation.  - 2/17/24 TTE:   Aortic Valve The aortic valve is trileaflet. The leaflets are moderately thickened. The leaflets are moderately calcified. There is severely reduced mobility. There is mild to moderate regurgitation. There is critical stenosis.  Peak gradient 108 mmHg.  Mean gradient 63.  LVOT velocity is measured to be 0.58.  LVOT diameter 2.2 and aortic valve area 0.5 cm 2 and dimensionless index 0.11.  There may be some component of low cardiac output contributing to valve stenosis.   Mitral Valve There is mild to moderate regurgitation. There is no evidence of stenosis.   Tricuspid Valve There is mild to moderate regurgitation.  Pulmonary artery pressure around 50 mmHg. There is no evidence of stenosis.   Pulmonic Valve There is trace regurgitation. There is no evidence of stenosis.      Essential hypertension.  - SBP over 24 hours 100-148.   - Continue Toprol-XL 25 mg daily, Aldactone 12.5 mg daily, Entresto 24-26 mg twice daily.   - Continue midodrine 5 mg three times daily.   - Continue to monitor BP.     Sternum/rib fractures.  - Secondary to CPR.  - 2/16/24 CT chest without contrast: Acute minimally displaced fractures of multiple bilateral anterior ribs due to CPR. Nondisplaced fracture of the anterior cortex of the superior body of the sternum due to CPR.     COPD.  - Does not appear in acute  "exacerbation.    - Follows outpatient with [facility].  - Care per primary team.     Pre-diabetes.   - 2/16/24 HgbA1c: 5.7.   - Care per primary team.   Subjective / Objective:           No acute events.  Patient underwent cardiac catheterization on 2/19/24, report pending but appears to show significant CAD of the LCx and LAD.  Case was discussed with CTS at [place] who recommended outpatient evaluation for TAVR and CAD.      Patient states he continues to have significant chest pain secondary to sternum and rib fractures.  Patient currently denies palpitations, shortness of breath, lightheadedness, dizziness, headache, nausea, or vomiting.    Vitals: Blood pressure 111/58, pulse 58, temperature 97.6 °F (36.4 °C), temperature source Oral, resp. rate 18, height 5' 9\" (1.753 m), weight 65.5 kg (144 lb 6.4 oz), SpO2 98%.  Vitals:    02/19/24 0600 02/20/24 0545   Weight: 67 kg (147 lb 9.6 oz) 65.5 kg (144 lb 6.4 oz)     Body mass index is 21.32 kg/m².  BP Readings from Last 3 Encounters:   02/20/24 111/58   12/26/23 130/78   11/28/23 130/78     Orthostatic Blood Pressures      Flowsheet Row Most Recent Value   Blood Pressure 111/58 filed at 02/20/2024 0813   Patient Position - Orthostatic VS Lying filed at 02/20/2024 0300          I/O         02/16 0701  02/17 0700 02/17 0701  02/18 0700 02/18 0701  02/19 0700    P.O.  380 360    I.V. (mL/kg)   10 (0.2)    IV Piggyback 1000      Total Intake(mL/kg) 1000 (15.1) 380 (5.8) 370 (5.6)    Urine (mL/kg/hr) 550 1950 (1.2) 350 (2.2)    Total Output 550 1950 350    Net +450 -1570 +20                 Invasive Devices       Peripheral Intravenous Line  Duration             Peripheral IV 02/17/24 Left Forearm 3 days    Peripheral IV 02/18/24 Left;Upper;Ventral (anterior) Arm 2 days                      Intake/Output Summary (Last 24 hours) at 2/20/2024 1017  Last data filed at 2/19/2024 1832  Gross per 24 hour   Intake 240 ml   Output 1300 ml   Net -1060 ml "         Physical Exam:   Physical Exam  Vitals reviewed.   Constitutional:       General: Gilberto is not in acute distress.  Cardiovascular:      Rate and Rhythm: Normal rate and regular rhythm.      Pulses: Normal pulses.      Heart sounds: Murmur heard.   Pulmonary:      Effort: Pulmonary effort is normal. No respiratory distress.      Breath sounds: Normal breath sounds.   Abdominal:      General: Abdomen is flat. There is no distension.      Palpations: Abdomen is soft.      Tenderness: There is no abdominal tenderness.   Musculoskeletal:      Right lower leg: No edema.      Left lower leg: No edema.   Skin:     General: Skin is warm and dry.   Neurological:      Mental Status: Gilberto is alert and oriented to person, place, and time.       Medications/ Allergies:     Current Facility-Administered Medications   Medication Dose Route Frequency Provider Last Rate    acetaminophen  975 mg Oral Q8H [facility] Saman Motley MD      aspirin  325 mg Oral Daily Saman Motley MD      budesonide-formoterol  2 puff Inhalation BID Saman Motley MD      docusate sodium  100 mg Oral BID Earlene Gudino MD      enoxaparin  40 mg Subcutaneous Q24H [facility] Saman Motlye MD      famotidine  20 mg Oral Daily Saman Motley MD      fluticasone  1 spray Nasal BID Saman Motley MD      hydrOXYzine HCL  25 mg Oral Q6H PRN Saman Motley MD      latanoprost  1 drop [facility] Saman Motley MD      levalbuterol  0.63 mg Nebulization Q6H PRN Saman Motley MD      metoprolol succinate  25 mg Oral Daily Saman Motley MD      midodrine  5 mg Oral TID AC Saman Motley MD      nicotine polacrilex  2 mg Oral Q2H PRN Saman Motley MD      ondansetron  4 mg Intravenous Q6H PRN Saman Motley MD      polyethylene glycol  17 g Oral Daily Earlene Gudino MD      sacubitril-valsartan  1 tablet Oral BID Saman Motley MD      spironolactone  12.5 mg Oral Daily Saman Motley MD       traMADol  50 mg Oral Q6H PRN Saman Motley MD       hydrOXYzine HCL, 25 mg, Q6H PRN  levalbuterol, 0.63 mg, Q6H PRN  nicotine polacrilex, 2 mg, Q2H PRN  ondansetron, 4 mg, Q6H PRN  traMADol, 50 mg, Q6H PRN      Allergies   Allergen Reactions    Shellfish-Derived Products - Food Allergy Other (See Comments)     Listed by PCP patient denies    Iodine - Food Allergy Other (See Comments)       VTE Pharmacologic Prophylaxis:   Enoxaparin (Lovenox)    Labs:   Troponins:  Results from last 7 days   Lab Units 02/18/24  0455 02/17/24  0505 02/17/24  0027 02/16/24  2217   HS TNI RAND ng/L 3,080* 3,327*  --   --    HSTNI D2 ng/L  --   --   --  140*   HSTNI D4 ng/L  --   --  880*  --          CBC with diff:  Results from last 7 days   Lab Units 02/18/24  0455 02/17/24  0505 02/16/24  1908   WBC Thousand/uL 7.13 8.13 9.37   HEMOGLOBIN g/dL 11.8* 11.1* 12.2   HEMATOCRIT % 36.3* 33.7* 37.6   MCV fL 92 91 92   PLATELETS Thousands/uL 154 156 208   RBC Million/uL 3.96 3.72* 4.10   MCH pg 29.8 29.8 29.8   MCHC g/dL 32.5 32.9 32.4   RDW % 15.2* 15.0 14.9   MPV fL 10.7 10.5 10.7   NRBC AUTO /100 WBCs  --   --  0       CMP:  Results from last 7 days   Lab Units 02/19/24  0501 02/18/24  0455 02/17/24  0505 02/16/24  1908   SODIUM mmol/L 137 139 139 139   POTASSIUM mmol/L 3.7 4.4 4.4 3.9   CHLORIDE mmol/L 103 103 108 105   CO2 mmol/L 29 30 24 23   ANION GAP mmol/L 5 6 7 11   BUN mg/dL 18 19 23 23   CREATININE mg/dL 0.71 0.81 0.71 1.19   CALCIUM mg/dL 8.1* 8.1* 8.0* 8.7   AST U/L  --  53* 80* 77*   ALT U/L  --  66* 86* 78*   ALK PHOS U/L  --  58 52 59   TOTAL PROTEIN g/dL  --  5.5* 5.3* 5.8*   ALBUMIN g/dL  --  3.4* 3.3* 3.6   TOTAL BILIRUBIN mg/dL  --  1.09* 0.77 0.76       Magnesium:  Results from last 7 days   Lab Units 02/18/24  0455 02/17/24  0505 02/16/24  1908   MAGNESIUM mg/dL 2.1 1.8* 2.0     Coags:    TSH:  Results from last 7 days   Lab Units 02/17/24  0505   TSH 3RD GENERATON uIU/mL 2.691     No components found for:  "\"TSH3\"  Lipid Profile:  Results from last 7 days   Lab Units 02/17/24  0505   CHOLESTEROL mg/dL 144   TRIGLYCERIDES mg/dL 44   HDL mg/dL 53   LDL CALC mg/dL 82     Hgb A1c:  Results from last 7 days   Lab Units 02/16/24  1908   HEMOGLOBIN A1C % 5.7*     NT-proBNP: No results for input(s): \"NTBNP\" in the last 72 hours.     Imaging & Testing   I have personally reviewed pertinent reports.      CT chest without contrast    Result Date: 2/16/2024  Impression: Moderate septal thickening due to interstitial edema with small pleural effusions. Acute minimally displaced fractures of multiple bilateral anterior ribs due to CPR. Nondisplaced fracture of the anterior cortex of the superior body of the sternum due to CPR. A few 5 mm and smaller nodules. Stability is unknown given the absence of comparison studies. Follow-up could be obtained with a chest CT with no contrast in 6 months to assure stability.     CT head without contrast    Result Date: 2/16/2024    Impression: No acute intracranial abnormality. Paranasal sinus disease.        EKG / Monitor: Personally reviewed.      Telemetry reviewed: Sinus rhythm, 64 bpm.    Cardiac testing:   Echo complete w/ contrast if indicated    Result Date: 2/17/2024  Narrative:   Left Ventricle: Left ventricular cavity size is mildly dilated. Wall thickness is mildly increased. There is mild concentric hypertrophy. The left ventricular ejection fraction is around 25% by visual estimation. Systolic function is severely reduced. There is severe global hypokinesis with regional variation. Diastolic function is moderately abnormal, consistent with grade II (pseudonormal) relaxation.  Left atrial filling pressure is elevated.   Right Ventricle: Systolic function is low normal.   Left Atrium: The atrium is severely dilated ( 60 mL/m2).   Right Atrium: The atrium is moderately dilated.   Aortic Valve: The aortic valve is trileaflet. The leaflets are moderately thickened. The leaflets are " moderately calcified. There is severely reduced mobility. There is mild to moderate regurgitation. There is critical stenosis.  Peak gradient 108 mmHg.  Mean gradient 63.  LVOT velocity is measured to be 0.58.  LVOT diameter 2.2 and aortic valve area 0.5 cm 2 and dimensionless index 0.11.  There may be some component of low cardiac output contributing to valve stenosis.   Mitral Valve: There is mild to moderate regurgitation.   Tricuspid Valve: There is mild to moderate regurgitation.  Pulmonary artery pressure around 50 mmHg.   IVC/SVC: The inferior vena cava is mildly dilated.  It has less than 50% collapse with inspiration consistent with elevated right atrial pressure about 10 to 15 mmHg.   Pericardium: There is a trivial pericardial effusion.   No old echo available for comparison.             Lis Card PA-C

## 2024-02-20 NOTE — CASE MANAGEMENT
Case Management Assessment & Discharge Planning Note    Patient name Gilberto Vann  Location [address] [place] [address]/[address] [place] [address]-* MRN [id number]  : 1940 Date 2024       Current Admission Date: 2024  Current Admission Diagnosis:Syncope   Patient Active Problem List    Diagnosis Date Noted    CAD (coronary artery disease) 2024    Critical aortic valve stenosis 2024    Cardiomyopathy (HCC) 2024    Syncope 2024    Glaucoma 2024    Elevated troponin 2024    Hyperglycemia 2024    Closed fracture of multiple ribs of both sides 2024    Closed fracture of body of sternum 2024    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HCC) 2024    Pulmonary nodules 2024    Anxiety 2024    Anemia 2024    Centrilobular emphysema (HCC) 2023    Hypercalcemia 2023    Shortness of breath 2023    Chronic bronchitis (HCC) 2023    Hypokalemia 2023    Encysted hydrocele 2023    Primary hypertension 2023    Pre-op examination 01/10/2023    Hydrocele 01/10/2023    Primary osteoarthritis involving multiple joints 2022    Lumbar radiculopathy 2022    Acute infective tracheobronchitis 2022    Benign prostatic hyperplasia with urinary retention 2020    Hematuria, gross 2020    COPD (chronic obstructive pulmonary disease) (HCC) 2020    Asthma 2013      LOS (days): 4  Geometric Mean LOS (GMLOS) (days): 5.4  Days to GMLOS:1.8     OBJECTIVE:    Risk of Unplanned Readmission Score: 15.92         Current admission status: Inpatient  Referral Reason: Medication Assistance, Other (Outpatient Resources)    Preferred Pharmacy:   [id number] - [place], [place] - [address]  [address]  [facility] [address]  Phone: [phone] Fax: [phone]    [id number] - [place], [place] - [address]  [address] [address]  Phone: [phone] Fax: [phone]    Primary Care Provider: Dima Lloyd MD    Primary Insurance: MEDICARE  Secondary Insurance: HUMANA    ASSESSMENT:  Active Health Care Proxies       Lucy Adames Health Care Agent - Sister   Primary Phone: [phone] (Mobile)  Home Phone: [phone]                 Readmission Root Cause  30 Day Readmission: No    Patient Information  Admitted from:: Home  Mental Status: Alert  During Assessment patient was accompanied by: Sister, Friend  Assessment information provided by:: Sister  Primary Caregiver: Family  Caregiver's Name:: Shaun Root (friend)  Caregiver's Relationship to Patient:: Friend  Caregiver's Telephone Number:: [phone]  Support Systems: Friend, Other (Comment) (sister)  County of Residence: [place]  What [facility] do you live in?: [place]  Type of Current Residence: [address] [place] home  Upon entering residence, is there a bedroom on the main floor (no further steps)?: No  A bedroom is located on the following floor levels of residence (select all that apply):: 2nd Floor  Upon entering residence, is there a bathroom on the main floor (no further steps)?: Yes  Number of steps to 2nd floor from main floor: One Flight  Living Arrangements: Lives Alone    Activities of Daily Living Prior to Admission  Functional Status: Independent  Completes ADLs independently?: Yes  Ambulates independently?: Yes  Does patient use assisted devices?: No  Does patient currently own DME?: Yes  What DME does the patient currently own?: Walker, Straight [employer]ane  Does patient have a history of Outpatient Therapy (PT/OT)?: No  Does the patient have a history of Short-Term Rehab?: No  Does patient have a history of HHC?: No  Does patient currently have HHC?: No     Patient Information Continued  Income Source: Pension/MCFP  Does patient have prescription coverage?: No  Does patient receive dialysis treatments?: No     Means  of Transportation  Means of Transport to Appts:: Friends      Social Determinants of Health (SDOH)      Flowsheet Row Most Recent Value   Housing Stability    In the last 12 months, was there a time when you were not able to pay the mortgage or rent on time? N   In the last 12 months, how many places have you lived? 1   In the last 12 months, was there a time when you did not have a steady place to sleep or slept in a shelter (including now)? N   Transportation Needs    In the past 12 months, has lack of transportation kept you from medical appointments or from getting medications? no   In the past 12 months, has lack of transportation kept you from meetings, work, or from getting things needed for daily living? No   Food Insecurity    Within the past 12 months, you worried that your food would run out before you got the money to buy more. Never true   Within the past 12 months, the food you bought just didn't last and you didn't have money to get more. Never true   Utilities    In the past 12 months has the electric, gas, oil, or water company threatened to shut off services in your home? No            DISCHARGE DETAILS:    Discharge planning discussed with:: Sister Eduarda        CM contacted family/caregiver?: Yes  Were Treatment Team discharge recommendations reviewed with patient/caregiver?: Yes  Did patient/caregiver verbalize understanding of patient care needs?: Yes  Were patient/caregiver advised of the risks associated with not following Treatment Team discharge recommendations?: Yes    Contacts  Patient Contacts: Lucy Adames (sister)  Relationship to Patient:: Family  Contact Method: In Person  Reason/Outcome: Discharge Planning, Continuity of Care    Other Referral/Resources/Interventions Provided:  Referral Comments: CM met with patient's sister Lucy outside patient's room, introduced self and role. Lucy stated patient lives alone and has been independent with adls and iadls and his friend and his  wife will assist as needed and provide transportation to medical appts. CM discussed about Life vest as recommended by Cardiology and STR per therapy recs. Lucy voiced concerns about patient's secondary insurance being terminated end of last year and still working on getting secondary insurance which may not even start until March 1 and meanwhile patient refusing Lifevest. CM made Advanced Practitioner Lis aware of the same and reached out to Madhu at [facility]'s lifevest to make him aware. Per Lucy, patient will not agree to go to STR either but wanted to wait until patient agrees with lifevest before discussing STR with him. At Lucy's request, first she needs to be reached out before reaching out to patient as patient can become upset and angry.     Treatment Team Recommendation: Short Term Rehab  Discharge Destination Plan:: Other (TBD)  Transport at Discharge :  (TBD)

## 2024-02-20 NOTE — PLAN OF CARE
Problem: OCCUPATIONAL THERAPY ADULT  Goal: Performs self-care activities at highest level of function for planned discharge setting.  See evaluation for individualized goals.  Description: Treatment Interventions: ADL retraining, Functional transfer training, UE strengthening/ROM, Endurance training, Patient/family training, Equipment evaluation/education, Activityengagement, Continued evaluation, Energy conservation, Compensatory technique education          See flowsheet documentation for full assessment, interventions and recommendations.   Note: Limitation: Decreased ADL status, Decreased UE ROM, Decreased UE strength, Decreased endurance, Decreased fine motor control, Decreased self-care trans (decreased balance and mobility)  Prognosis: Good  Assessment: Pt seen for skilled OT tx session focusing on activity engagement, challenging activity tolerance, and ongoing eval. Pt agreeable to participate w/ encouragement. Pt demonstrated improved activity tolerance and required less physical assistance. Pt engaged in functional mobility w/ out use of AD. Pt engaged in UB bathing and grooming standing at sink in bathroom. Pt able to forward flex trunk to reach his shoes to don and manage tie fasteners. Pt reports chest pain / discomfort when laughing. Therapist educated pt on use of pillow to brace chest as able. Continue to recommend level III rehab resource intensity when medically stable for discharge from acute care. Will continue to follow     Rehab Resource Intensity Level, OT: III (Minimum Resource Intensity)

## 2024-02-20 NOTE — PLAN OF CARE
Problem: Potential for Falls  Goal: Patient will remain free of falls  Description: INTERVENTIONS:  - Educate patient/family on patient safety including physical limitations  - Instruct patient to call for assistance with activity   - Consult OT/PT to assist with strengthening/mobility   - Keep Call bell within reach  - Keep bed low and locked with side rails adjusted as appropriate  - Keep care items and personal belongings within reach  - Initiate and maintain comfort rounds  - Make Fall Risk Sign visible to staff  - Offer Toileting every 2 Hours, in advance of need  - Initiate/Maintain bed/chair alarm  - Obtain necessary fall risk management equipment: bed/chair alarm  - Apply yellow socks and bracelet for high fall risk patients  - Consider moving patient to room near nurses station  Outcome: Progressing     Problem: PAIN - ADULT  Goal: Verbalizes/displays adequate comfort level or baseline comfort level  Description: Interventions:  - Encourage patient to monitor pain and request assistance  - Assess pain using appropriate pain scale  - Administer analgesics based on type and severity of pain and evaluate response  - Implement non-pharmacological measures as appropriate and evaluate response  - Consider cultural and social influences on pain and pain management  - Notify physician/advanced practitioner if interventions unsuccessful or patient reports new pain  Outcome: Progressing     Problem: SAFETY ADULT  Goal: Patient will remain free of falls  Description: INTERVENTIONS:  - Educate patient/family on patient safety including physical limitations  - Instruct patient to call for assistance with activity   - Consult OT/PT to assist with strengthening/mobility   - Keep Call bell within reach  - Keep bed low and locked with side rails adjusted as appropriate  - Keep care items and personal belongings within reach  - Initiate and maintain comfort rounds  - Make Fall Risk Sign visible to staff  - Offer Toileting  every 2 Hours, in advance of need  - Initiate/Maintain bed/chair alarm  - Obtain necessary fall risk management equipment: bed/chair alarm  - Apply yellow socks and bracelet for high fall risk patients  - Consider moving patient to room near nurses station  Outcome: Progressing  Goal: Maintain or return to baseline ADL function  Description: INTERVENTIONS:  - Educate patient/family on patient safety including physical limitations  - Instruct patient to call for assistance with activity   - Consult OT/PT to assist with strengthening/mobility   - Keep Call bell within reach  - Keep bed low and locked with side rails adjusted as appropriate  - Keep care items and personal belongings within reach  - Initiate and maintain comfort rounds  - Make Fall Risk Sign visible to staff  - Offer Toileting every 2 Hours, in advance of need  - Initiate/Maintain bed/chair alarm  - Obtain necessary fall risk management equipment:bed/chair alarm   - Apply yellow socks and bracelet for high fall risk patients  - Consider moving patient to room near nurses station  Outcome: Progressing  Goal: Maintains/Returns to pre admission functional level  Description: INTERVENTIONS:  - Perform AM-PAC 6 Click Basic Mobility/ Daily Activity assessment daily.  - Set and communicate daily mobility goal to care team and patient/family/caregiver.   - Collaborate with rehabilitation services on mobility goals if consulted  - Stand patient 3 times a day  - Ambulate patient 3 times a day  - Out of bed to chair 3 times a day   - Out of bed for meals 3  Problem: NEUROSENSORY - ADULT  Goal: Achieves stable or improved neurological status  Description: INTERVENTIONS  - Monitor and report changes in neurological status  - Monitor vital signs such as temperature, blood pressure, glucose, and any other labs ordered   - Initiate measures to prevent increased intracranial pressure  - Monitor for seizure activity and implement precautions if appropriate      Outcome:  Progressing  Goal: Achieves maximal functionality and self care  Description: INTERVENTIONS  - Monitor swallowing and airway patency with patient fatigue and changes in neurological status  - Encourage and assist patient to increase activity and self care.   - Encourage visually impaired, hearing impaired and aphasic patients to use assistive/communication devices  Outcome: Progressing     Problem: CARDIOVASCULAR - ADULT  Goal: Maintains optimal cardiac output and hemodynamic stability  Description: INTERVENTIONS:  - Monitor I/O, vital signs and rhythm  - Monitor for S/S and trends of decreased cardiac output  - Administer and titrate ordered vasoactive medications to optimize hemodynamic stability  - Assess quality of pulses, skin color and temperature  - Assess for signs of decreased coronary artery perfusion  - Instruct patient to report change in severity of symptoms  Outcome: Progressing  Goal: Absence of cardiac dysrhythmias or at baseline rhythm  Description: INTERVENTIONS:  - Continuous cardiac monitoring, vital signs, obtain 12 lead EKG if ordered  - Administer antiarrhythmic and heart rate control medications as ordered  - Monitor electrolytes and administer replacement therapy as ordered  Outcome: Progressing     Problem: MUSCULOSKELETAL - ADULT  Goal: Maintain or return mobility to safest level of function  Description: INTERVENTIONS:  - Assess patient's ability to carry out ADLs; assess patient's baseline for ADL function and identify physical deficits which impact ability to perform ADLs (bathing, care of mouth/teeth, toileting, grooming, dressing, etc.)  - Assess/evaluate cause of self-care deficits   - Assess range of motion  - Assess patient's mobility  - Assess patient's need for assistive devices and provide as appropriate  - Encourage maximum independence but intervene and supervise when necessary  - Involve family in performance of ADLs  - Assess for home care needs following discharge   -  Consider OT consult to assist with ADL evaluation and planning for discharge  - Provide patient education as appropriate  Outcome: Progressing  Goal: Maintain proper alignment of affected body part  Description: INTERVENTIONS:  - Support, maintain and protect limb and body alignment  - Provide patient/ family with appropriate education  Outcome: Progressing     Problem: RESPIRATORY - ADULT  Goal: Achieves optimal ventilation and oxygenation  Description: INTERVENTIONS:  - Assess for changes in respiratory status  - Assess for changes in mentation and behavior  - Position to facilitate oxygenation and minimize respiratory effort  - Oxygen administered by appropriate delivery if ordered  - Initiate smoking cessation education as indicated  - Encourage broncho-pulmonary hygiene including cough, deep breathe, Incentive Spirometry  - Assess the need for suctioning and aspirate as needed  - Assess and instruct to report SOB or any respiratory difficulty  - Respiratory Therapy support as indicated  Outcome: Progressing     Problem: Prexisting or High Potential for Compromised Skin Integrity  Goal: Skin integrity is maintained or improved  Description: INTERVENTIONS:  - Identify patients at risk for skin breakdown  - Assess and monitor skin integrity  - Assess and monitor nutrition and hydration status  - Monitor labs   - Assess for incontinence   - Turn and reposition patient  - Assist with mobility/ambulation  - Relieve pressure over bony prominences  - Avoid friction and shearing  - Provide appropriate hygiene as needed including keeping skin clean and dry  - Evaluate need for skin moisturizer/barrier cream  - Collaborate with interdisciplinary team   - Patient/family teaching  - Consider wound care consult   Outcome: Progressing    times a day  - Out of bed for toileting  - Record patient progress and toleration of activity level   Outcome: Progressing

## 2024-02-21 PROBLEM — J20.9 ACUTE INFECTIVE TRACHEOBRONCHITIS: Status: RESOLVED | Noted: 2022-11-08 | Resolved: 2024-02-21

## 2024-02-21 LAB
ANION GAP SERPL CALCULATED.3IONS-SCNC: 6 MMOL/L
BUN SERPL-MCNC: 21 MG/DL (ref 5–25)
CALCIUM SERPL-MCNC: 8.5 MG/DL (ref 8.4–10.2)
CHLORIDE SERPL-SCNC: 105 MMOL/L (ref 96–108)
CO2 SERPL-SCNC: 29 MMOL/L (ref 21–32)
CREAT SERPL-MCNC: 0.69 MG/DL (ref 0.6–1.3)
ERYTHROCYTE [DISTWIDTH] IN BLOOD BY AUTOMATED COUNT: 14.9 % (ref 11.6–15.1)
GLUCOSE SERPL-MCNC: 100 MG/DL (ref 65–140)
HCT VFR BLD AUTO: 35.5 % (ref 36.5–46.1)
HGB BLD-MCNC: 11.8 G/DL (ref 12–15.4)
MAGNESIUM SERPL-MCNC: 2 MG/DL (ref 1.9–2.7)
MCH RBC QN AUTO: 29.9 PG (ref 26.8–34.3)
MCHC RBC AUTO-ENTMCNC: 33.2 G/DL (ref 31.4–37.4)
MCV RBC AUTO: 90 FL (ref 82–98)
PLATELET # BLD AUTO: 210 THOUSANDS/UL (ref 149–390)
PMV BLD AUTO: 11.2 FL (ref 8.9–12.7)
POTASSIUM SERPL-SCNC: 4.3 MMOL/L (ref 3.5–5.3)
RBC # BLD AUTO: 3.94 MILLION/UL (ref 3.88–5.12)
SODIUM SERPL-SCNC: 140 MMOL/L (ref 135–147)
WBC # BLD AUTO: 7.39 THOUSAND/UL (ref 4.31–10.16)

## 2024-02-21 PROCEDURE — 99232 SBSQ HOSP IP/OBS MODERATE 35: CPT | Performed by: INTERNAL MEDICINE

## 2024-02-21 PROCEDURE — 85027 COMPLETE CBC AUTOMATED: CPT | Performed by: INTERNAL MEDICINE

## 2024-02-21 PROCEDURE — 80048 BASIC METABOLIC PNL TOTAL CA: CPT | Performed by: INTERNAL MEDICINE

## 2024-02-21 PROCEDURE — 83735 ASSAY OF MAGNESIUM: CPT | Performed by: INTERNAL MEDICINE

## 2024-02-21 PROCEDURE — 97530 THERAPEUTIC ACTIVITIES: CPT

## 2024-02-21 RX ORDER — METOPROLOL SUCCINATE 25 MG/1
25 TABLET, EXTENDED RELEASE ORAL DAILY
Qty: 30 TABLET | Refills: 0 | Status: SHIPPED | OUTPATIENT
Start: 2024-02-22

## 2024-02-21 RX ORDER — ASPIRIN 325 MG
325 TABLET ORAL DAILY
Qty: 30 TABLET | Refills: 0 | Status: SHIPPED | OUTPATIENT
Start: 2024-02-22

## 2024-02-21 RX ORDER — MIDODRINE HYDROCHLORIDE 5 MG/1
5 TABLET ORAL
Qty: 90 TABLET | Refills: 0 | Status: SHIPPED | OUTPATIENT
Start: 2024-02-21

## 2024-02-21 RX ORDER — FAMOTIDINE 20 MG/1
20 TABLET, FILM COATED ORAL DAILY
Qty: 30 TABLET | Refills: 0 | Status: SHIPPED | OUTPATIENT
Start: 2024-02-22

## 2024-02-21 RX ORDER — SPIRONOLACTONE 25 MG/1
12.5 TABLET ORAL DAILY
Qty: 30 TABLET | Refills: 0 | Status: SHIPPED | OUTPATIENT
Start: 2024-02-22

## 2024-02-21 RX ORDER — LOSARTAN POTASSIUM 25 MG/1
25 TABLET ORAL DAILY
Status: DISCONTINUED | OUTPATIENT
Start: 2024-02-22 | End: 2024-02-22 | Stop reason: HOSPADM

## 2024-02-21 RX ADMIN — ACETAMINOPHEN 650 MG: 325 TABLET ORAL at 21:00

## 2024-02-21 RX ADMIN — SPIRONOLACTONE 12.5 MG: 25 TABLET ORAL at 09:30

## 2024-02-21 RX ADMIN — LATANOPROST 1 DROP: 50 SOLUTION OPHTHALMIC at 21:00

## 2024-02-21 RX ADMIN — ASPIRIN 325 MG: 325 TABLET ORAL at 09:30

## 2024-02-21 RX ADMIN — Medication 3 MG: at 21:00

## 2024-02-21 RX ADMIN — FAMOTIDINE 20 MG: 20 TABLET, FILM COATED ORAL at 09:29

## 2024-02-21 RX ADMIN — BUDESONIDE AND FORMOTEROL FUMARATE DIHYDRATE 2 PUFF: 160; 4.5 AEROSOL RESPIRATORY (INHALATION) at 21:01

## 2024-02-21 RX ADMIN — DOCUSATE SODIUM 100 MG: 100 CAPSULE, LIQUID FILLED ORAL at 09:29

## 2024-02-21 RX ADMIN — METOPROLOL SUCCINATE 25 MG: 25 TABLET, EXTENDED RELEASE ORAL at 09:30

## 2024-02-21 RX ADMIN — ENOXAPARIN SODIUM 40 MG: 40 INJECTION SUBCUTANEOUS at 09:31

## 2024-02-21 RX ADMIN — SACUBITRIL AND VALSARTAN 1 TABLET: 24; 26 TABLET, FILM COATED ORAL at 09:30

## 2024-02-21 RX ADMIN — POLYETHYLENE GLYCOL 3350 17 G: 17 POWDER, FOR SOLUTION ORAL at 09:31

## 2024-02-21 RX ADMIN — MIDODRINE HYDROCHLORIDE 5 MG: 5 TABLET ORAL at 17:28

## 2024-02-21 RX ADMIN — ACETAMINOPHEN 650 MG: 325 TABLET ORAL at 05:36

## 2024-02-21 RX ADMIN — MIDODRINE HYDROCHLORIDE 5 MG: 5 TABLET ORAL at 12:04

## 2024-02-21 RX ADMIN — FLUTICASONE PROPIONATE 1 SPRAY: 50 SPRAY, METERED NASAL at 09:32

## 2024-02-21 RX ADMIN — MIDODRINE HYDROCHLORIDE 5 MG: 5 TABLET ORAL at 06:14

## 2024-02-21 RX ADMIN — DOCUSATE SODIUM 100 MG: 100 CAPSULE, LIQUID FILLED ORAL at 17:28

## 2024-02-21 RX ADMIN — FLUTICASONE PROPIONATE 1 SPRAY: 50 SPRAY, METERED NASAL at 17:29

## 2024-02-21 RX ADMIN — ACETAMINOPHEN 650 MG: 325 TABLET ORAL at 14:44

## 2024-02-21 NOTE — ASSESSMENT & PLAN NOTE
BNP 1270  CT showed - Moderate septal thickening due to interstitial edema with small pleural effusions.   Patient received a dose of Lasix 20 mg IV initially, subsequently blood pressure was soft  and could not get further diuretics  Echocardiogram EF 25% with severe global hypokinesis, grade 2 diastolic dysfunction, aortic stenosis  Blood pressure better with midodrine  Continue metoprolol, Aldactone  Entresto  Midodrine 5 mg 3 times a day  Cardiac diet with fluid restriction 1800 cc per day  Monitor intake output, daily weight  Close cardiology follow-up after discharge in 1 week

## 2024-02-21 NOTE — PLAN OF CARE
Problem: Potential for Falls  Goal: Patient will remain free of falls  Description: INTERVENTIONS:  - Educate patient/family on patient safety including physical limitations  - Instruct patient to call for assistance with activity   - Consult OT/PT to assist with strengthening/mobility   - Keep Call bell within reach  - Keep bed low and locked with side rails adjusted as appropriate  - Keep care items and personal belongings within reach  - Initiate and maintain comfort rounds  - Make Fall Risk Sign visible to staff  - Offer Toileting every 2 Hours, in advance of need  - Initiate/Maintain bed/chair alarm  - Obtain necessary fall risk management equipment: bed/chair alarm  - Apply yellow socks and bracelet for high fall risk patients  - Consider moving patient to room near nurses station  Outcome: Progressing     Problem: PAIN - ADULT  Goal: Verbalizes/displays adequate comfort level or baseline comfort level  Description: Interventions:  - Encourage patient to monitor pain and request assistance  - Assess pain using appropriate pain scale  - Administer analgesics based on type and severity of pain and evaluate response  - Implement non-pharmacological measures as appropriate and evaluate response  - Consider cultural and social influences on pain and pain management  - Notify physician/advanced practitioner if interventions unsuccessful or patient reports new pain  Outcome: Progressing     Problem: SAFETY ADULT  Goal: Patient will remain free of falls  Description: INTERVENTIONS:  - Educate patient/family on patient safety including physical limitations  - Instruct patient to call for assistance with activity   - Consult OT/PT to assist with strengthening/mobility   - Keep Call bell within reach  - Keep bed low and locked with side rails adjusted as appropriate  - Keep care items and personal belongings within reach  - Initiate and maintain comfort rounds  - Make Fall Risk Sign visible to staff  - Offer Toileting  every 2 Hours, in advance of need  - Initiate/Maintain bed/chair alarm  - Obtain necessary fall risk management equipment: bed/chair alarm  - Apply yellow socks and bracelet for high fall risk patients  - Consider moving patient to room near nurses station  Outcome: Progressing  Goal: Maintain or return to baseline ADL function  Description: INTERVENTIONS:  - Educate patient/family on patient safety including physical limitations  - Instruct patient to call for assistance with activity   - Consult OT/PT to assist with strengthening/mobility   - Keep Call bell within reach  - Keep bed low and locked with side rails adjusted as appropriate  - Keep care items and personal belongings within reach  - Initiate and maintain comfort rounds  - Make Fall Risk Sign visible to staff  - Offer Toileting every 2 Hours, in advance of need  - Initiate/Maintain bed/chair alarm  - Obtain necessary fall risk management equipment:bed/chair alarm   - Apply yellow socks and bracelet for high fall risk patients  - Consider moving patient to room near nurses station  Outcome: Progressing  Goal: Maintains/Returns to pre admission functional level  Description: INTERVENTIONS:  - Perform AM-PAC 6 Click Basic Mobility/ Daily Activity assessment daily.  - Set and communicate daily mobility goal to care team and patient/family/caregiver.   - Collaborate with rehabilitation services on mobility goals if consulted  - Perform Range of Motion 2 times a day.  - Reposition patient every 2 hours.  - Dangle patient 2 times a day  - Stand patient 2 times a day  - Ambulate patient 3 times a day  - Out of bed to chair 3 times a day   - Out of bed for meals 3 times a day  - Out of bed for toileting  - Record patient progress and toleration of activity level   Outcome: Progressing     Problem: DISCHARGE PLANNING  Goal: Discharge to home or other facility with appropriate resources  Description: INTERVENTIONS:  - Identify barriers to discharge w/patient and  caregiver  - Arrange for needed discharge resources and transportation as appropriate  - Identify discharge learning needs (meds, wound care, etc.)  - Arrange for interpretive services to assist at discharge as needed  - Refer to Case Management Department for coordinating discharge planning if the patient needs post-hospital services based on physician/advanced practitioner order or complex needs related to functional status, cognitive ability, or social support system  Outcome: Progressing     Problem: Knowledge Deficit  Goal: Patient/family/caregiver demonstrates understanding of disease process, treatment plan, medications, and discharge instructions  Description: Complete learning assessment and assess knowledge base.  Interventions:  - Provide teaching at level of understanding  - Provide teaching via preferred learning methods  Outcome: Progressing     Problem: NEUROSENSORY - ADULT  Goal: Achieves stable or improved neurological status  Description: INTERVENTIONS  - Monitor and report changes in neurological status  - Monitor vital signs such as temperature, blood pressure, glucose, and any other labs ordered   - Initiate measures to prevent increased intracranial pressure  - Monitor for seizure activity and implement precautions if appropriate      Outcome: Progressing  Goal: Achieves maximal functionality and self care  Description: INTERVENTIONS  - Monitor swallowing and airway patency with patient fatigue and changes in neurological status  - Encourage and assist patient to increase activity and self care.   - Encourage visually impaired, hearing impaired and aphasic patients to use assistive/communication devices  Outcome: Progressing     Problem: CARDIOVASCULAR - ADULT  Goal: Maintains optimal cardiac output and hemodynamic stability  Description: INTERVENTIONS:  - Monitor I/O, vital signs and rhythm  - Monitor for S/S and trends of decreased cardiac output  - Administer and titrate ordered vasoactive  medications to optimize hemodynamic stability  - Assess quality of pulses, skin color and temperature  - Assess for signs of decreased coronary artery perfusion  - Instruct patient to report change in severity of symptoms  Outcome: Progressing  Goal: Absence of cardiac dysrhythmias or at baseline rhythm  Description: INTERVENTIONS:  - Continuous cardiac monitoring, vital signs, obtain 12 lead EKG if ordered  - Administer antiarrhythmic and heart rate control medications as ordered  - Monitor electrolytes and administer replacement therapy as ordered  Outcome: Progressing     Problem: MUSCULOSKELETAL - ADULT  Goal: Maintain or return mobility to safest level of function  Description: INTERVENTIONS:  - Assess patient's ability to carry out ADLs; assess patient's baseline for ADL function and identify physical deficits which impact ability to perform ADLs (bathing, care of mouth/teeth, toileting, grooming, dressing, etc.)  - Assess/evaluate cause of self-care deficits   - Assess range of motion  - Assess patient's mobility  - Assess patient's need for assistive devices and provide as appropriate  - Encourage maximum independence but intervene and supervise when necessary  - Involve family in performance of ADLs  - Assess for home care needs following discharge   - Consider OT consult to assist with ADL evaluation and planning for discharge  - Provide patient education as appropriate  Outcome: Progressing  Goal: Maintain proper alignment of affected body part  Description: INTERVENTIONS:  - Support, maintain and protect limb and body alignment  - Provide patient/ family with appropriate education  Outcome: Progressing     Problem: RESPIRATORY - ADULT  Goal: Achieves optimal ventilation and oxygenation  Description: INTERVENTIONS:  - Assess for changes in respiratory status  - Assess for changes in mentation and behavior  - Position to facilitate oxygenation and minimize respiratory effort  - Oxygen administered by  appropriate delivery if ordered  - Initiate smoking cessation education as indicated  - Encourage broncho-pulmonary hygiene including cough, deep breathe, Incentive Spirometry  - Assess the need for suctioning and aspirate as needed  - Assess and instruct to report SOB or any respiratory difficulty  - Respiratory Therapy support as indicated  Outcome: Progressing     Problem: Prexisting or High Potential for Compromised Skin Integrity  Goal: Skin integrity is maintained or improved  Description: INTERVENTIONS:  - Identify patients at risk for skin breakdown  - Assess and monitor skin integrity  - Assess and monitor nutrition and hydration status  - Monitor labs   - Assess for incontinence   - Turn and reposition patient  - Assist with mobility/ambulation  - Relieve pressure over bony prominences  - Avoid friction and shearing  - Provide appropriate hygiene as needed including keeping skin clean and dry  - Evaluate need for skin moisturizer/barrier cream  - Collaborate with interdisciplinary team   - Patient/family teaching  - Consider wound care consult   Outcome: Progressing

## 2024-02-21 NOTE — CASE MANAGEMENT
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) 2024    Pulmonary nodules 2024    Anxiety 2024    Anemia 2024    Centrilobular emphysema (HCC) 2023    Hypercalcemia 2023    Shortness of breath 2023    Chronic bronchitis (HCC) 2023    Hypokalemia 2023    Encysted hydrocele 2023    Primary hypertension 2023    Pre-op examination 01/10/2023    Hydrocele 01/10/2023    Primary osteoarthritis involving multiple joints 2022    Lumbar radiculopathy 2022    Acute infective tracheobronchitis 2022    Benign prostatic hyperplasia with urinary retention 2020    Hematuria, gross 2020    COPD (chronic obstructive pulmonary disease) (HCC) 2020    Asthma 2013      LOS (days): 5  Geometric Mean LOS (GMLOS) (days): 5.4  Days to GMLOS:0.8     OBJECTIVE:  Risk of Unplanned Readmission Score: 14.96         Current admission status: Inpatient   Preferred Pharmacy:   [id number] [place], [place] - [address]  [address]  [facility] [address]  Phone: [phone] Fax: [phone]    [id number] - [place], [place] - [address]  [address] [address]  Phone: [phone] Fax:  [phone]    Primary Care Provider: Dima Lloyd MD    Primary Insurance: MEDICARE  Secondary Insurance:     DISCHARGE DETAILS:    Discharge planning discussed with:: Sister Lucy  Freedom of Choice: Yes  Comments - Freedom of Choice: Patient agreeable for STR at d/c.  CM contacted family/caregiver?: Yes  Were Treatment Team discharge recommendations reviewed with patient/caregiver?: Yes  Did patient/caregiver verbalize understanding of patient care needs?: Yes  Were patient/caregiver advised of the risks associated with not following Treatment Team discharge recommendations?: Yes    Contacts  Patient Contacts: Lucy Adames (sister)  Relationship to Patient:: Family  Contact Method: In Person  Reason/Outcome: Discharge Planning    Other Referral/Resources/Interventions Provided:  Interventions: Prescription Price Check, Short Term Rehab  Referral Comments: CM called [facility] pharmacy for price check on Entresto and was informed that the cost of entresto is $900 and after using discount card, it would be $700 for patient. Dr. Gudino and Advanced Practitioner Lis made aware of the same. Patient's sister Lucy reached out to CM to inform that patient is agreeable to go to STR. Lucy agreeable for CM to place referrals for STR facilities in [facility] and [place]  and aware that CM will reach out to her with list of accepting facilities for her review. CM placed blanket referral in [facility] for STR.     Treatment Team Recommendation: Short Term Rehab  Discharge Destination Plan:: Short Term Rehab  Transport at Discharge : Other (Comment) (TBD)

## 2024-02-21 NOTE — ASSESSMENT & PLAN NOTE
2D echo showed EF of 25% with arctic valve area of 0.5 cm 2  Cardiology discussed the coordination of care with CT surgery at [facility] for evaluation for outpatient TAVR

## 2024-02-21 NOTE — PROGRESS NOTES
Progress Note - Cardiology   [facility]     Gilberto Vann 83 y.o. adult MRN: [id number]  : 1940  Unit/Bed#: [facility] [address] Encounter: [id number]    Assessment and Plan:   Syncope.  - Presented on 24 for evaluation after what is believed to be thought of syncopal events.    Patient states that he was on his way to attend a , attempted to walk through the front doors of the  home and then remembers waking up in the hospital ED.  Review of chart indicates that patient is consciousness at  home.  When police arrived patient was agonal he breathing prompting them to start CPR. When EMS arrived patient had pulses, was awake, and fully oriented.  Patient denies any symptoms of lightheadedness or dizziness prior to syncopal episode.  - Syncope likely secondary to critical aortic valve stenosis.  - Orthostatic vital signs negative.     Critical aortic valve stenosis.  - Patient states that he has noticed worsening shortness of breath with exertion over the past several months.  He denies any lightheadedness or dizziness or any other syncopal events prior to events on 24.   - 24 TTE:   Aortic Valve The aortic valve is trileaflet. The leaflets are moderately thickened. The leaflets are moderately calcified. There is severely reduced mobility. There is mild to moderate regurgitation. There is critical stenosis.  Peak gradient 108 mmHg.  Mean gradient 63.  LVOT velocity is measured to be 0.58.  LVOT diameter 2.2 and aortic valve area 0.5 cm 2 and dimensionless index 0.11.  There may be some component of low cardiac output contributing to valve stenosis.   - No prior TTE for comparison.  - Avoid aggressive IV diuretics or IV fluid use.  - 24 cardiac catheterization: severe Cx disease + Moderate LAD disease, eccentic AI difficult to keep RCA cannulated. Case was discussed with CTS at [facility] who recommended outpatient evaluation for TAVR and CAD.  - Will  need outpatient TAVR evaluation by CTS. Referral sent to CTS.  Notified Tata Wilson, Director of structural heart program, of patient's case.     Elevated troponin.  - 2/16/24 hs troponin: 112 (0hrs), 252 (2hrs), 992 (4hrs).   - 2/17/24 hs troponin random: 3327.   - 2/18/24 hs troponin random: 3080.   - 2/17/24 EKG: Normal sinus rhythm, 81 bpm.  Left axis deviation.  Left ventricle hypertrophy with QRS widening and repolarization abnormality ( Sokolow-Self , Manny product ).   - Non-ischemic myocardial injury secondary to CPR, critical aortic valve stenosis, acute combined systolic and diastolic heart failure versus type II MI.  - 2/19/24 cardiac catheterization: severe Cx disease + Moderate LAD disease, eccentic AI difficult to keep RCA cannulated. Case was discussed with CTS at [facility] who recommended outpatient evaluation for TAVR and CAD.     Acute combined systolic and diastolic heart failure.  - 2/17/24 BNP: 1270.   - 2/16/23 CT chest without contrast: Moderate septal thickening due to interstitial edema with small pleural effusions.   - 2/17/24 TTE: LVEF 25%. There is severe global hypokinesis with regional variation. Diastolic function is moderately abnormal, consistent with grade II (pseudonormal) relaxation.  Left atrial filling pressure is elevated. Right Ventricle: Systolic function is low normal. Left Atrium: The atrium is severely dilated ( 60 mL/m2). Right Atrium: The atrium is moderately dilated. Aortic Valve: The aortic valve is trileaflet. The leaflets are moderately thickened. The leaflets are moderately calcified. There is severely reduced mobility. There is mild to moderate regurgitation. There is critical stenosis.  Peak gradient 108 mmHg.  Mean gradient 63.  LVOT velocity is measured to be 0.58.  LVOT diameter 2.2 and aortic valve area 0.5 cm 2 and dimensionless index 0.11.  There may be some component of low cardiac output contributing to valve stenosis. Mitral Valve: There is mild to moderate  regurgitation. Tricuspid Valve: There is mild to moderate regurgitation.  Pulmonary artery pressure around 50 mmHg. IVC/SVC: The inferior vena cava is mildly dilated.  It has less than 50% collapse with inspiration consistent with elevated right atrial pressure about 10 to 15 mmHg. Pericardium: There is a trivial pericardial effusion.  - No prior TTE for comparison.  - Continue Toprol-XL 25 mg daily and Aldactone 12.5 mg daily.  - Continue midodrine 5 mg three times daily for soft BP.   - Continue Entresto 24-26 mg twice daily.   - Strict I/Os.   - Daily weight, standing scale.   - CHF education.   - Continue low-sodium diet with 1800 mL fluid restriction.   - No further cardiology workup at this time.  Recommend patient follow-up with outpatient cardiology within 1 week of discharge.     Ischemic cardiomyopathy.  - 2/17/24 TTE: LVEF 25%. There is severe global hypokinesis with regional variation. Diastolic function is moderately abnormal, consistent with grade II (pseudonormal) relaxation.  Left atrial filling pressure is elevated. Right Ventricle: Systolic function is low normal. Left Atrium: The atrium is severely dilated ( 60 mL/m2). Right Atrium: The atrium is moderately dilated.Aortic Valve: The aortic valve is trileaflet. The leaflets are moderately thickened. The leaflets are moderately calcified. There is severely reduced mobility. There is mild to moderate regurgitation. There is critical stenosis.  Peak gradient 108 mmHg.  Mean gradient 63.  LVOT velocity is measured to be 0.58.  LVOT diameter 2.2 and aortic valve area 0.5 cm 2 and dimensionless index 0.11.  There may be some component of low cardiac output contributing to valve stenosis.Mitral Valve: There is mild to moderate regurgitation. Tricuspid Valve: There is mild to moderate regurgitation.  Pulmonary artery pressure around 50 mmHg. IVC/SVC: The inferior vena cava is mildly dilated.  It has less than 50% collapse with inspiration consistent with  elevated right atrial pressure about 10 to 15 mmHg. Pericardium: There is a trivial pericardial effusion.  - No prior TTE for comparison.  - Continue Toprol-XL 25 mg daily and Aldactone 12.5 mg daily.  - Continue midodrine 5 mg three times daily for soft BP.   - Continue Entresto 24-26 mg twice daily.   - 2/19/24 cardiac catheterization: severe Cx disease + Moderate LAD disease, eccentic AI difficult to keep RCA cannulated. Case was discussed with CTS at [facility] who recommended outpatient evaluation for TAVR and CAD.  - Referral made to CTS.  - LifeVest ordered.     Valvular heart disease.  - Critical aortic valve stenosis.  Mild to moderate mitral valve regurgitation.  Mild to moderate tricuspid valve regurgitation.  Trace pulmonic valve regurgitation.  - 2/17/24 TTE:   Aortic Valve The aortic valve is trileaflet. The leaflets are moderately thickened. The leaflets are moderately calcified. There is severely reduced mobility. There is mild to moderate regurgitation. There is critical stenosis.  Peak gradient 108 mmHg.  Mean gradient 63.  LVOT velocity is measured to be 0.58.  LVOT diameter 2.2 and aortic valve area 0.5 cm 2 and dimensionless index 0.11.  There may be some component of low cardiac output contributing to valve stenosis.   Mitral Valve There is mild to moderate regurgitation. There is no evidence of stenosis.   Tricuspid Valve There is mild to moderate regurgitation.  Pulmonary artery pressure around 50 mmHg. There is no evidence of stenosis.   Pulmonic Valve There is trace regurgitation. There is no evidence of stenosis.      Essential hypertension.  - SBP over 24 hours .   - Continue Toprol-XL 25 mg daily, Aldactone 12.5 mg daily, Entresto 24-26 mg twice daily.   - Continue midodrine 5 mg three times daily.   - Continue to monitor BP.     Sternum/rib fractures.  - Secondary to CPR.  - 2/16/24 CT chest without contrast: Acute minimally displaced fractures of multiple bilateral anterior ribs due to  "CPR. Nondisplaced fracture of the anterior cortex of the superior body of the sternum due to CPR.     COPD.  - Does not appear in acute exacerbation.    - Follows outpatient with [facility].  - Care per primary team.     Pre-diabetes.   - 2/16/24 HgbA1c: 5.7.   - Care per primary team.   Subjective / Objective:           No acute events.  Patient being fitted with LifeVest on upon arrival to bedside.  Discussed with patient recommendations for outpatient rehab therapy.      Patient states he continues to have significant chest pain secondary to sternum and rib fractures.  Patient currently denies palpitations, shortness of breath, lightheadedness, dizziness, headache, nausea, or vomiting.    Vitals: Blood pressure 109/58, pulse 64, temperature 97.8 °F (36.6 °C), temperature source Oral, resp. rate 16, height 5' 9\" (1.753 m), weight 65.4 kg (144 lb 3.2 oz), SpO2 96%.  Vitals:    02/20/24 0545 02/21/24 0548   Weight: 65.5 kg (144 lb 6.4 oz) 65.4 kg (144 lb 3.2 oz)     Body mass index is 21.29 kg/m².  BP Readings from Last 3 Encounters:   02/21/24 109/58   12/26/23 130/78   11/28/23 130/78     Orthostatic Blood Pressures      Flowsheet Row Most Recent Value   Blood Pressure 109/58 filed at 02/21/2024 0246   Patient Position - Orthostatic VS Lying filed at 02/21/2024 0246          I/O         02/16 0701  02/17 0700 02/17 0701  02/18 0700 02/18 0701  02/19 0700    P.O.  380 360    I.V. (mL/kg)   10 (0.2)    IV Piggyback 1000      Total Intake(mL/kg) 1000 (15.1) 380 (5.8) 370 (5.6)    Urine (mL/kg/hr) 550 1950 (1.2) 350 (2.2)    Total Output 550 1950 350    Net +450 -1570 +20                 Invasive Devices       Peripheral Intravenous Line  Duration             Peripheral IV 02/17/24 Left Forearm 4 days                    No intake or output data in the 24 hours ending 02/21/24 0741        Physical Exam:   Physical Exam  Vitals reviewed.   Constitutional:       General: Gilberto is not in acute " distress.  Cardiovascular:      Rate and Rhythm: Normal rate and regular rhythm.      Pulses: Normal pulses.      Heart sounds: Murmur heard.   Pulmonary:      Effort: Pulmonary effort is normal. No respiratory distress.      Breath sounds: Normal breath sounds.   Abdominal:      General: Abdomen is flat. There is no distension.      Palpations: Abdomen is soft.      Tenderness: There is no abdominal tenderness.   Musculoskeletal:      Right lower leg: No edema.      Left lower leg: No edema.   Skin:     General: Skin is warm and dry.   Neurological:      Mental Status: Gilberto is alert and oriented to person, place, and time.       Medications/ Allergies:     Current Facility-Administered Medications   Medication Dose Route Frequency Provider Last Rate    acetaminophen  650 mg Oral Q4H PRN Earlene Gudino MD      acetaminophen  650 mg Oral Q8H [facility] Earlene Gudino MD      aspirin  325 mg Oral Daily Saman Motley MD      budesonide-formoterol  2 puff Inhalation BID Saman Motley MD      docusate sodium  100 mg Oral BID Earlene Gudino MD      enoxaparin  40 mg Subcutaneous Q24H [facility] Saman Motley MD      famotidine  20 mg Oral Daily Saman Motley MD      fluticasone  1 spray Nasal BID Saman Motley MD      latanoprost  1 drop Both Eyes HS Saman Motley MD      levalbuterol  0.63 mg Nebulization Q6H PRN Saman Motley MD      melatonin  3 mg Oral HS Earlene Gudino MD      metoprolol succinate  25 mg Oral Daily Saman Motley MD      midodrine  5 mg Oral TID AC Saman Motley MD      nicotine polacrilex  2 mg Oral Q2H PRN Saman Motley MD      ondansetron  4 mg Intravenous Q6H PRN Saman Motley MD      polyethylene glycol  17 g Oral Daily Earlene Gudino MD      sacubitril-valsartan  1 tablet Oral BID Saman Motley MD      spironolactone  12.5 mg Oral Daily Saman Motley MD       acetaminophen, 650 mg, Q4H PRN  levalbuterol, 0.63 mg, Q6H PRN  nicotine  polacrilex, 2 mg, Q2H PRN  ondansetron, 4 mg, Q6H PRN      Allergies   Allergen Reactions    Shellfish-Derived Products - Food Allergy Other (See Comments)     Listed by PCP patient denies    Iodine - Food Allergy Other (See Comments)       VTE Pharmacologic Prophylaxis:   Enoxaparin (Lovenox)    Labs:   Troponins:  Results from last 7 days   Lab Units 02/18/24  0455 02/17/24  0505 02/17/24  0027 02/16/24  2217   HS TNI RAND ng/L 3,080* 3,327*  --   --    HSTNI D2 ng/L  --   --   --  140*   HSTNI D4 ng/L  --   --  880*  --          CBC with diff:  Results from last 7 days   Lab Units 02/21/24  0543 02/18/24  0455 02/17/24  0505 02/16/24  1908   WBC Thousand/uL 7.39 7.13 8.13 9.37   HEMOGLOBIN g/dL 11.8* 11.8* 11.1* 12.2   HEMATOCRIT % 35.5* 36.3* 33.7* 37.6   MCV fL 90 92 91 92   PLATELETS Thousands/uL 210 154 156 208   RBC Million/uL 3.94 3.96 3.72* 4.10   MCH pg 29.9 29.8 29.8 29.8   MCHC g/dL 33.2 32.5 32.9 32.4   RDW % 14.9 15.2* 15.0 14.9   MPV fL 11.2 10.7 10.5 10.7   NRBC AUTO /100 WBCs  --   --   --  0       CMP:  Results from last 7 days   Lab Units 02/21/24  0543 02/20/24  1209 02/19/24  0501 02/18/24  0455 02/17/24  0505 02/16/24  1908   SODIUM mmol/L 140 138 137 139 139 139   POTASSIUM mmol/L 4.3 4.0 3.7 4.4 4.4 3.9   CHLORIDE mmol/L 105 103 103 103 108 105   CO2 mmol/L 29 29 29 30 24 23   ANION GAP mmol/L 6 6 5 6 7 11   BUN mg/dL 21 20 18 19 23 23   CREATININE mg/dL 0.69 0.69 0.71 0.81 0.71 1.19   CALCIUM mg/dL 8.5 8.1* 8.1* 8.1* 8.0* 8.7   AST U/L  --   --   --  53* 80* 77*   ALT U/L  --   --   --  66* 86* 78*   ALK PHOS U/L  --   --   --  58 52 59   TOTAL PROTEIN g/dL  --   --   --  5.5* 5.3* 5.8*   ALBUMIN g/dL  --   --   --  3.4* 3.3* 3.6   TOTAL BILIRUBIN mg/dL  --   --   --  1.09* 0.77 0.76       Magnesium:  Results from last 7 days   Lab Units 02/21/24  0543 02/18/24  0455 02/17/24  0505 02/16/24  1908   MAGNESIUM mg/dL 2.0 2.1 1.8* 2.0     Coags:    TSH:  Results from last 7 days   Lab Units  "02/17/24  0505   TSH 3RD GENERATON uIU/mL 2.691     No components found for: \"TSH3\"  Lipid Profile:  Results from last 7 days   Lab Units 02/17/24  0505   CHOLESTEROL mg/dL 144   TRIGLYCERIDES mg/dL 44   HDL mg/dL 53   LDL CALC mg/dL 82     Hgb A1c:  Results from last 7 days   Lab Units 02/16/24  1908   HEMOGLOBIN A1C % 5.7*     NT-proBNP: No results for input(s): \"NTBNP\" in the last 72 hours.     Imaging & Testing   I have personally reviewed pertinent reports.      CT chest without contrast    Result Date: 2/16/2024  Impression: Moderate septal thickening due to interstitial edema with small pleural effusions. Acute minimally displaced fractures of multiple bilateral anterior ribs due to CPR. Nondisplaced fracture of the anterior cortex of the superior body of the sternum due to CPR. A few 5 mm and smaller nodules. Stability is unknown given the absence of comparison studies. Follow-up could be obtained with a chest CT with no contrast in 6 months to assure stability.     CT head without contrast    Result Date: 2/16/2024    Impression: No acute intracranial abnormality. Paranasal sinus disease.        EKG / Monitor: Personally reviewed.      Telemetry reviewed: Sinus rhythm, 64 bpm.    Cardiac testing:   Echo complete w/ contrast if indicated    Result Date: 2/17/2024  Narrative:   Left Ventricle: Left ventricular cavity size is mildly dilated. Wall thickness is mildly increased. There is mild concentric hypertrophy. The left ventricular ejection fraction is around 25% by visual estimation. Systolic function is severely reduced. There is severe global hypokinesis with regional variation. Diastolic function is moderately abnormal, consistent with grade II (pseudonormal) relaxation.  Left atrial filling pressure is elevated.   Right Ventricle: Systolic function is low normal.   Left Atrium: The atrium is severely dilated ( 60 mL/m2).   Right Atrium: The atrium is moderately dilated.   Aortic Valve: The aortic valve " is trileaflet. The leaflets are moderately thickened. The leaflets are moderately calcified. There is severely reduced mobility. There is mild to moderate regurgitation. There is critical stenosis.  Peak gradient 108 mmHg.  Mean gradient 63.  LVOT velocity is measured to be 0.58.  LVOT diameter 2.2 and aortic valve area 0.5 cm 2 and dimensionless index 0.11.  There may be some component of low cardiac output contributing to valve stenosis.   Mitral Valve: There is mild to moderate regurgitation.   Tricuspid Valve: There is mild to moderate regurgitation.  Pulmonary artery pressure around 50 mmHg.   IVC/SVC: The inferior vena cava is mildly dilated.  It has less than 50% collapse with inspiration consistent with elevated right atrial pressure about 10 to 15 mmHg.   Pericardium: There is a trivial pericardial effusion.   No old echo available for comparison.             Lis Card PA-C

## 2024-02-21 NOTE — ASSESSMENT & PLAN NOTE
Peak troponin was 3327  Suspected nonischemic myocardial injury in the setting of CPR, critical aortic stenosis and heart failure  Status post therapeutic Lovenox   Continue aspirin  Lipid panel showed LDL level of 82  2D echo-EF of 25% with aortic valve area of 0.5 cm squire  Hold off on starting Lipitor due to elevated LFTs   Cardiology following, input appreciated  Status post cardiac catheterization which showed disease involving circumflex and LAD  Cardiology discussed with CT surgery, recommended close outpatient follow-up for TAVR and CAD  LifeVest on discharge  Discussed with patient and family at bedside regarding compliance with medications, follow-up and further plan

## 2024-02-21 NOTE — ASSESSMENT & PLAN NOTE
Patient presents with syncope versus cardiac arrest at neighbor's .  Patient fell on the floor, sustained abrasion to left scalp.  Bystanders initiated CPR.  EMS arrived 4 minutes later found patient to have palpable pulses and patient had regained consciousness.  Patient remembers he was standing by the door,unable to recall anything else.  Patient had complained of feeling foggy and dizzy, not feeling well ever since he took first dose of doxazosin for prostate evening per friend at bedside.  Patient reportedonly ate around 1 PM during the day.  Does not eat breakfast regularly.  Patient did not actually want to go to  due to not feeling well and anxiety per friend at bedside.  Denied history of syncope.  CT head no acute findings  CT chest without contrast showed - Moderate septal thickening due to interstitial edema with small pleural effusions. Acute minimally displaced fractures of multiple bilateral anterior ribs due to CPR. Nondisplaced fracture of the anterior cortex of the superior body of the sternum due to CPR.  Initial troponin 112.  Repeat trending up likely related to CPR, CHF, aortic stenosis.  Initial EKG showed NSR, bifascicular block, rate 97,TWI V1-V4,STD V2-V4.  Repeat EKG showed NSR, LVH,STD V5 V6,mild JESSE V1 to V3 likely due to LVH.   Peak troponin was 3327  Status post Lovenox 1 mg/g subcutaneously twice daily.    Lipid panel showed LDL level of 82.  Hemoglobin A1c was 5.7  2D echo showed EF of 25% with severe global hypokinesis with grade 2 diastolic dysfunction with severely dilated left atrium with an aortic valve area of 1.5 cm2  Orthostatic vital signs could not be obtained as patient is not very cooperative  Cardiology input appreciated  Suspected syncope in the setting of possible critical aortic stenosis versus ventricular arrhythmias due to cardiomyopathy  Continue midodrine  Discontinue alpha-blocker  Telemetry  LifeVest prior to discharge

## 2024-02-21 NOTE — PROGRESS NOTES
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   Patient received a dose of Lasix 20 mg IV initially, subsequently blood pressure was soft  and could not get further diuretics  Echocardiogram EF 25% with severe global hypokinesis, grade 2 diastolic dysfunction, aortic stenosis  Blood pressure better with midodrine  Continue metoprolol, Aldactone  Initially started on Entresto, not covered.  Changed to losartan  Midodrine 5 mg 3 times a day  Cardiac diet with fluid restriction 1800 cc per day  Monitor intake output, daily weight  Close cardiology follow-up after discharge in 1 week      * Syncope  Assessment & Plan  Patient presents with syncope versus cardiac arrest at neighbor's .  Patient fell on the floor, sustained abrasion to left scalp.  Bystanders initiated CPR.  EMS arrived 4 minutes later found patient to have palpable pulses and patient had regained consciousness.  Patient remembers he was standing by the door,unable to recall anything else.  Patient had complained of feeling foggy and dizzy, not feeling well ever since he took first dose of doxazosin for prostate evening per friend at bedside.  Patient reportedonly ate around 1 PM during the day.  Does not eat breakfast regularly.  Patient did not actually want to go to  due to not feeling well and anxiety per friend at bedside.  Denied history of  syncope.  CT head no acute findings  CT chest without contrast showed - Moderate septal thickening due to interstitial edema with small pleural effusions. Acute minimally displaced fractures of multiple bilateral anterior ribs due to CPR. Nondisplaced fracture of the anterior cortex of the superior body of the sternum due to CPR.  Initial troponin 112.  Repeat trending up likely related to CPR, CHF, aortic stenosis.  Initial EKG showed NSR, bifascicular block, rate 97,TWI V1-V4,STD V2-V4.  Repeat EKG showed NSR, LVH,STD V5 V6,mild JESSE V1 to V3 likely due to LVH.   Peak troponin was 3327  Status post Lovenox 1 mg/g subcutaneously twice daily.    Lipid panel showed LDL level of 82.  Hemoglobin A1c was 5.7  2D echo showed EF of 25% with severe global hypokinesis with grade 2 diastolic dysfunction with severely dilated left atrium with an aortic valve area of 1.5 cm2  Orthostatic vital signs could not be obtained as patient is not very cooperative  Cardiology input appreciated  Suspected syncope in the setting of possible critical aortic stenosis versus ventricular arrhythmias due to cardiomyopathy  Continue midodrine  Discontinued alpha-blocker  Telemetry  LifeVest prior to discharge    CAD (coronary artery disease)  Assessment & Plan  Patient underwent cardiac catheterization which showed disease of the circumflex and LAD  Cardiology coordinating care with [facility] for outpatient evaluation for TAVR along with myocardial revascularization    Cardiomyopathy (HCC)  Assessment & Plan  Echo showed EF of 25% with severe global hypokinesis with regional variations, grade 2 diastolic dysfunction with severely dilated left atrium with aortic valve area of 0.5 cm 2  Etiology not clear at the current time  Continue medical management with Toprol-XL, Aldactone, losartan  Cardiology follow-up    Anemia  Assessment & Plan  Baseline hemoglobin around 11  Hemoglobin stable      Pulmonary nodules  Assessment & Plan  CT  showed -  A few 5 mm and smaller nodules. Stability is unknown given the absence of comparison studies. Follow-up could be obtained with a chest CT with no contrast in 6 months to assure stability.     Closed fracture of body of sternum  Assessment & Plan  Pain control  Incentive spirometer    Closed fracture of multiple ribs of both sides  Assessment & Plan  Due to CPR  Incentive spirometer  Pain control, avoid tramadol  Tylenol as needed    Hyperglycemia  Assessment & Plan  Glucose 242  Hemoglobin A1c was 5.7    Elevated troponin  Assessment & Plan  Peak troponin was 3327  Suspected nonischemic myocardial injury in the setting of CPR, critical aortic stenosis and heart failure  Status post therapeutic Lovenox   Continue aspirin  Lipid panel showed LDL level of 82  2D echo-EF of 25% with aortic valve area of 0.5 cm squared  Hold off on starting Lipitor due to elevated LFTs, will recheck  Cardiology following, input appreciated  Status post cardiac catheterization which showed disease involving circumflex and LAD  Cardiology discussed with CT surgery, recommended close outpatient follow-up for TAVR and CAD  LifeVest on discharge  Discussed with patient and family at bedside regarding compliance with medications, follow-up and further plan    Glaucoma  Assessment & Plan  Continue home eyedrop    COPD (chronic obstructive pulmonary disease) (HCC)  Assessment & Plan  Continue Symbicort  No wheezing on auscultation    Benign prostatic hyperplasia with urinary retention  Assessment & Plan  Started on doxazosin  the day before admission by urology.  Reported felt foggy and dizzy, not feeling well after taking first dose.  Will hold doxazosin.   suspected BPH.-Avoid alpha blockers.  Patient will need outpatient follow-up with urology for cystoscopy and positive consideration for TURP    Anxiety  Assessment & Plan  Patient was on low-dose Xanax as needed before.  Not currently.  Declined psych consult.               VTE  Pharmacologic Prophylaxis:   Moderate Risk (Score 3-4) - Pharmacological DVT Prophylaxis Ordered: enoxaparin (Lovenox).    Mobility:   Basic Mobility Inpatient Raw Score: 17  JH-HLM Goal: 5: Stand one or more mins  JH-HLM Achieved: 6: Walk 10 steps or more  HLM Goal NOT achieved. Continue with multidisciplinary rounding and encourage appropriate mobility to improve upon HLM goals.    Patient Centered Rounds: I performed bedside rounds with nursing staff today.   Discussions with Specialists or Other Care Team Provider: Cardiology    Education and Discussions with Family / Patient: Updated  (sister) at bedside.    Total Time Spent on Date of Encounter in care of patient: 45 mins. This time was spent on one or more of the following: performing physical exam; counseling and coordination of care; obtaining or reviewing history; documenting in the medical record; reviewing/ordering tests, medications or procedures; communicating with other healthcare professionals and discussing with patient's family/caregivers.    Current Length of Stay: 5 day(s)  Current Patient Status: Inpatient   Certification Statement: The patient will continue to require additional inpatient hospital stay due to cardiomyopathy , medication adjustment  Discharge Plan: Anticipate discharge in 24-48 hrs to rehab facility.    Code Status: Level 1 - Full Code    Subjective:   Sitting up comfortably in chair  Does not offer any new complaint  Denies chest pain   Reports that his mobility is improving still feels little unsteady        Objective:     Vitals:   Temp (24hrs), Av.8 °F (36.6 °C), Min:97.7 °F (36.5 °C), Max:97.8 °F (36.6 °C)    Temp:  [97.7 °F (36.5 °C)-97.8 °F (36.6 °C)] 97.8 °F (36.6 °C)  HR:  [58-67] 64  Resp:  [16-18] 16  BP: ()/(50-58) 109/58  SpO2:  [95 %-98 %] 96 %  Body mass index is 21.29 kg/m².     Input and Output Summary (last 24 hours):   No intake or output data in the 24 hours ending 24  0851      Physical Exam:   Physical Exam  Vitals and nursing note reviewed.   Constitutional:       General: Gilberto is not in acute distress.     Appearance: Gilberto is well-developed.   HENT:      Head: Normocephalic and atraumatic.   Eyes:      Conjunctiva/sclera: Conjunctivae normal.   Cardiovascular:      Rate and Rhythm: Normal rate and regular rhythm.      Heart sounds: Murmur heard.   Pulmonary:      Effort: Pulmonary effort is normal. No respiratory distress.      Breath sounds: Decreased breath sounds present.   Abdominal:      Palpations: Abdomen is soft.      Tenderness: There is no abdominal tenderness.   Musculoskeletal:         General: No swelling.      Right lower leg: No edema.      Left lower leg: No edema.   Skin:     General: Skin is warm and dry.   Neurological:      Mental Status: Gilberto is alert.   Psychiatric:         Mood and Affect: Mood normal.          Additional Data:     Labs:  Results from last 7 days   Lab Units 02/21/24  0543 02/17/24  0505 02/16/24  1908   WBC Thousand/uL 7.39   < > 9.37   HEMOGLOBIN g/dL 11.8*   < > 12.2   HEMATOCRIT % 35.5*   < > 37.6   PLATELETS Thousands/uL 210   < > 208   NEUTROS PCT %  --   --  83*   LYMPHS PCT %  --   --  9*   MONOS PCT %  --   --  6   EOS PCT %  --   --  0    < > = values in this interval not displayed.     Results from last 7 days   Lab Units 02/21/24  0543 02/19/24  0501 02/18/24  0455   SODIUM mmol/L 140   < > 139   POTASSIUM mmol/L 4.3   < > 4.4   CHLORIDE mmol/L 105   < > 103   CO2 mmol/L 29   < > 30   BUN mg/dL 21   < > 19   CREATININE mg/dL 0.69   < > 0.81   ANION GAP mmol/L 6   < > 6   CALCIUM mg/dL 8.5   < > 8.1*   ALBUMIN g/dL  --   --  3.4*   TOTAL BILIRUBIN mg/dL  --   --  1.09*   ALK PHOS U/L  --   --  58   ALT U/L  --   --  66*   AST U/L  --   --  53*   GLUCOSE RANDOM mg/dL 100   < > 99    < > = values in this interval not displayed.         Results from last 7 days   Lab Units 02/16/24  1855   POC GLUCOSE mg/dl 204*      Results from last 7 days   Lab Units 02/16/24  1908   HEMOGLOBIN A1C % 5.7*           Lines/Drains:  Invasive Devices       Peripheral Intravenous Line  Duration             Peripheral IV 02/17/24 Left Forearm 4 days                      Telemetry:  Telemetry Orders (From admission, onward)               24 Hour Telemetry Monitoring  Continuous x 24 Hours (Telem)        Question:  Reason for 24 Hour Telemetry  Answer:  Syncope suspected to be cardiac in origin                     Telemetry Reviewed: Normal Sinus Rhythm  Indication for Continued Telemetry Use: Lifevest (remains on tele entire hospital stay)             Imaging: Reviewed radiology reports from this admission including: chest xray and chest CT scan    Recent Cultures (last 7 days):         Last 24 Hours Medication List:   Current Facility-Administered Medications   Medication Dose Route Frequency Provider Last Rate    acetaminophen  650 mg Oral Q4H PRN Earlene Gudino MD      acetaminophen  650 mg Oral Q8H [facility] Earlene Gudino MD      aspirin  325 mg Oral Daily Saman Motley MD      budesonide-formoterol  2 puff Inhalation BID Saman Motley MD      docusate sodium  100 mg Oral BID Earlene Gudino MD      enoxaparin  40 mg Subcutaneous Q24H [facility] Saman Motley MD      famotidine  20 mg Oral Daily Saman Motley MD      fluticasone  1 spray Nasal BID Saman Motley MD      latanoprost  1 drop Both Eyes HS Saman Motley MD      levalbuterol  0.63 mg Nebulization Q6H PRN Saman Motley MD      melatonin  3 mg Oral HS Earlene Gudino MD      metoprolol succinate  25 mg Oral Daily Saman Motley MD      midodrine  5 mg Oral TID AC Saman Motley MD      nicotine polacrilex  2 mg Oral Q2H PRN Saman Motley MD      ondansetron  4 mg Intravenous Q6H PRN Saman Motley MD      polyethylene glycol  17 g Oral Daily Earlene Gudino MD      sacubitril-valsartan  1 tablet Oral BID Saman Motley MD       spironolactone  12.5 mg Oral Daily Saman Motley MD          Today, Patient Was Seen By: Earlene Gudino MD    **Please Note: This note may have been constructed using a voice recognition system.**

## 2024-02-21 NOTE — PHYSICAL THERAPY NOTE
"   PT TREATMENT     02/21/24 1515   PT Last Visit   PT Visit Date 02/21/24   Note Type   Note Type Treatment   Pain Assessment   Pain Assessment Tool 0-10   Pain Score No Pain   Restrictions/Precautions   Other Precautions Fall Risk   General   Chart Reviewed Yes   Cognition   Arousal/Participation Cooperative;Alert   Attention Attends with cues to redirect   Orientation Level Oriented X4   Following Commands Follows one step commands without difficulty   Subjective   Subjective \"I don't feel like myself\"   Transfers   Sit to Stand 5  Supervision   Stand to Sit 5  Supervision   Ambulation/Elevation   Gait pattern Forward Flexion  (staggering gait, occasional mis stepping.)   Gait Assistance 4  Minimal assist   Assistive Device   (none)   Distance 3x100 feet   Balance   Static Sitting Good   Static Standing Fair +   Ambulatory Fair -   Activity Tolerance   Activity Tolerance Patient tolerated treatment well;Patient limited by fatigue   Assessment   Prognosis Good   Problem List Decreased strength;Decreased endurance;Impaired balance;Decreased mobility   Assessment Pt demonstrates improving endurance and tolerance to functional activity.  Pt is able to ambulate without a device but demonstrates an unsteady gait.  Pt did not want to try a cane or walker.  Pt lives alone and should benefit from continued skilled PT.    The patient's -[facility] Basic Mobility Inpatient Short Form Raw Score is 17. A Raw score of greater than 16 suggests the patient may benefit from discharge to home, however pt lives alone and is below his baseline level of function. Please also refer to the recommendation of the Physical Therapist for safe discharge planning.         Plan   Treatment/Interventions Therapeutic exercise;Elevations;LE strengthening/ROM;Functional transfer training;ADL retraining;Gait training;Bed mobility;Equipment eval/education;Patient/family training   Progress Progressing toward goals   PT Frequency Other " (Comment)  (5x/week)   Discharge Recommendation   Rehab Resource Intensity Level, PT II (Moderate Resource Intensity)   AM-PAC Basic Mobility Inpatient   Turning in Flat Bed Without Bedrails 3   Lying on Back to Sitting on Edge of Flat Bed Without Bedrails 3   Moving Bed to Chair 3   Standing Up From Chair Using Arms 3   Walk in Room 3   Climb 3-5 Stairs With Railing 2   Basic Mobility Inpatient Raw Score 17   Basic Mobility Standardized Score 39.67   Highest Level Of Mobility   JH-HLM Goal 5: Stand one or more mins   JH-HLM Achieved 7: Walk 25 feet or more   End of Consult   Patient Position at End of Consult All needs within reach;Bedside chair   Licensure   [place] License Number  Halie Barreto PT  [id number]

## 2024-02-21 NOTE — ASSESSMENT & PLAN NOTE
Echo showed EF of 25% with severe global hypokinesis with regional variations, grade 2 diastolic dysfunction with severely dilated left atrium with aortic valve area of 0.5 cm 2  Etiology not clear at the current time  Continue medical management with Toprol-XL, Aldactone, Entresto  Cardiology follow-up

## 2024-02-21 NOTE — ASSESSMENT & PLAN NOTE
Patient underwent cardiac catheterization which showed disease of the circumflex and LAD  Cardiology coordinating care with CT surgery at [facility] for outpatient evaluation for TAVR along with myocardial revascularization

## 2024-02-22 VITALS
OXYGEN SATURATION: 98 % | DIASTOLIC BLOOD PRESSURE: 58 MMHG | WEIGHT: 143.3 LBS | RESPIRATION RATE: 18 BRPM | HEIGHT: 69 IN | TEMPERATURE: 97.8 F | HEART RATE: 62 BPM | BODY MASS INDEX: 21.22 KG/M2 | SYSTOLIC BLOOD PRESSURE: 118 MMHG

## 2024-02-22 LAB
ALBUMIN SERPL BCP-MCNC: 3.2 G/DL (ref 3.5–5)
ALP SERPL-CCNC: 79 U/L (ref 34–104)
ALT SERPL W P-5'-P-CCNC: 41 U/L (ref 7–52)
ANION GAP SERPL CALCULATED.3IONS-SCNC: 7 MMOL/L
AST SERPL W P-5'-P-CCNC: 28 U/L (ref 5–45)
BILIRUB DIRECT SERPL-MCNC: 0.18 MG/DL (ref 0–0.2)
BILIRUB SERPL-MCNC: 0.7 MG/DL (ref 0.2–1)
BUN SERPL-MCNC: 17 MG/DL (ref 5–25)
CALCIUM SERPL-MCNC: 8.5 MG/DL (ref 8.4–10.2)
CHLORIDE SERPL-SCNC: 106 MMOL/L (ref 96–108)
CO2 SERPL-SCNC: 29 MMOL/L (ref 21–32)
CREAT SERPL-MCNC: 0.7 MG/DL (ref 0.6–1.3)
ERYTHROCYTE [DISTWIDTH] IN BLOOD BY AUTOMATED COUNT: 15.2 % (ref 11.6–15.1)
GLUCOSE SERPL-MCNC: 91 MG/DL (ref 65–140)
HCT VFR BLD AUTO: 36.4 % (ref 36.5–46.1)
HGB BLD-MCNC: 12 G/DL (ref 12–15.4)
MAGNESIUM SERPL-MCNC: 1.9 MG/DL (ref 1.9–2.7)
MCH RBC QN AUTO: 29.9 PG (ref 26.8–34.3)
MCHC RBC AUTO-ENTMCNC: 33 G/DL (ref 31.4–37.4)
MCV RBC AUTO: 91 FL (ref 82–98)
PLATELET # BLD AUTO: 216 THOUSANDS/UL (ref 149–390)
PMV BLD AUTO: 10.4 FL (ref 8.9–12.7)
POTASSIUM SERPL-SCNC: 4.2 MMOL/L (ref 3.5–5.3)
PROT SERPL-MCNC: 5.4 G/DL (ref 6.4–8.4)
RBC # BLD AUTO: 4.02 MILLION/UL (ref 3.88–5.12)
SODIUM SERPL-SCNC: 142 MMOL/L (ref 135–147)
WBC # BLD AUTO: 6.63 THOUSAND/UL (ref 4.31–10.16)

## 2024-02-22 PROCEDURE — 85027 COMPLETE CBC AUTOMATED: CPT | Performed by: INTERNAL MEDICINE

## 2024-02-22 PROCEDURE — 83735 ASSAY OF MAGNESIUM: CPT | Performed by: INTERNAL MEDICINE

## 2024-02-22 PROCEDURE — 99232 SBSQ HOSP IP/OBS MODERATE 35: CPT | Performed by: INTERNAL MEDICINE

## 2024-02-22 PROCEDURE — 99239 HOSP IP/OBS DSCHRG MGMT >30: CPT | Performed by: INTERNAL MEDICINE

## 2024-02-22 PROCEDURE — 80076 HEPATIC FUNCTION PANEL: CPT | Performed by: INTERNAL MEDICINE

## 2024-02-22 PROCEDURE — 80048 BASIC METABOLIC PNL TOTAL CA: CPT | Performed by: INTERNAL MEDICINE

## 2024-02-22 RX ORDER — ACETAMINOPHEN 325 MG/1
650 TABLET ORAL EVERY 4 HOURS PRN
Start: 2024-02-22

## 2024-02-22 RX ORDER — LEVALBUTEROL INHALATION SOLUTION 0.63 MG/3ML
0.63 SOLUTION RESPIRATORY (INHALATION) EVERY 6 HOURS PRN
Start: 2024-02-22

## 2024-02-22 RX ORDER — ATORVASTATIN CALCIUM 20 MG/1
20 TABLET, FILM COATED ORAL
Start: 2024-02-22

## 2024-02-22 RX ORDER — LANOLIN ALCOHOL/MO/W.PET/CERES
3 CREAM (GRAM) TOPICAL
Start: 2024-02-22

## 2024-02-22 RX ORDER — ACETAMINOPHEN 325 MG/1
650 TABLET ORAL EVERY 8 HOURS SCHEDULED
Start: 2024-02-22 | End: 2024-02-29

## 2024-02-22 RX ORDER — LOSARTAN POTASSIUM 25 MG/1
25 TABLET ORAL DAILY
Start: 2024-02-23

## 2024-02-22 RX ORDER — DOCUSATE SODIUM 100 MG/1
100 CAPSULE, LIQUID FILLED ORAL 2 TIMES DAILY
Start: 2024-02-22

## 2024-02-22 RX ADMIN — LOSARTAN POTASSIUM 25 MG: 25 TABLET, FILM COATED ORAL at 08:45

## 2024-02-22 RX ADMIN — MIDODRINE HYDROCHLORIDE 5 MG: 5 TABLET ORAL at 06:11

## 2024-02-22 RX ADMIN — ACETAMINOPHEN 650 MG: 325 TABLET ORAL at 14:49

## 2024-02-22 RX ADMIN — FAMOTIDINE 20 MG: 20 TABLET, FILM COATED ORAL at 08:45

## 2024-02-22 RX ADMIN — ENOXAPARIN SODIUM 40 MG: 40 INJECTION SUBCUTANEOUS at 08:45

## 2024-02-22 RX ADMIN — BUDESONIDE AND FORMOTEROL FUMARATE DIHYDRATE 2 PUFF: 160; 4.5 AEROSOL RESPIRATORY (INHALATION) at 08:45

## 2024-02-22 RX ADMIN — METOPROLOL SUCCINATE 25 MG: 25 TABLET, EXTENDED RELEASE ORAL at 08:45

## 2024-02-22 RX ADMIN — DOCUSATE SODIUM 100 MG: 100 CAPSULE, LIQUID FILLED ORAL at 08:45

## 2024-02-22 RX ADMIN — ACETAMINOPHEN 650 MG: 325 TABLET ORAL at 05:33

## 2024-02-22 RX ADMIN — ASPIRIN 325 MG: 325 TABLET ORAL at 08:45

## 2024-02-22 RX ADMIN — POLYETHYLENE GLYCOL 3350 17 G: 17 POWDER, FOR SOLUTION ORAL at 08:44

## 2024-02-22 RX ADMIN — MIDODRINE HYDROCHLORIDE 5 MG: 5 TABLET ORAL at 12:04

## 2024-02-22 RX ADMIN — SPIRONOLACTONE 12.5 MG: 25 TABLET ORAL at 08:45

## 2024-02-22 RX ADMIN — FLUTICASONE PROPIONATE 1 SPRAY: 50 SPRAY, METERED NASAL at 08:45

## 2024-02-22 NOTE — ASSESSMENT & PLAN NOTE
BNP 1270  CT showed - Moderate septal thickening due to interstitial edema with small pleural effusions.   Patient received a dose of Lasix 20 mg IV initially, subsequently blood pressure was soft  and could not get further diuretics  Echocardiogram EF 25% with severe global hypokinesis, grade 2 diastolic dysfunction, aortic stenosis  Blood pressure better with midodrine, currently euvolemic  Continue metoprolol, Aldactone  Initially started on Entresto, not covered.  Changed to losartan  Midodrine 5 mg 3 times a day  Cardiac diet with fluid restriction 1800 cc per day  Monitor intake output, daily weight  Close cardiology follow-up after discharge in 1 week

## 2024-02-22 NOTE — INCIDENTAL FINDINGS
The following findings require follow up:  Radiographic finding   Finding: CT chest - Acute minimally displaced fractures of multiple bilateral anterior ribs due to CPR., Nondisplaced fracture of the anterior cortex of the superior body of the sternum due to CPR., A few 5 mm and smaller nodules. Stability is unknown given the absence of comparison studies.    Follow up required: Follow-up could be obtained with a chest CT with no contrast in 6 months to assure stability.,        Please notify the following clinician to assist with the follow up:   Dr. Dima Lloyd MD, pulmonology

## 2024-02-22 NOTE — DISCHARGE SUMMARY
Discharge Summary - [facility] Internal Medicine    Patient Information: Gilberto Vann 83 y.o. adult MRN: [id number]  Unit/Bed#: [address] [place] [address] Encounter: [id number]    Discharging Physician / Practitioner: Earlene Gudino MD  PCP: Dima Lloyd MD  Admission Date: 2024  Discharge Date: 24    Reason for Admission: Syncope (Witnessed syncope or Cardiac arrest at . Patient fell on the floor, abrasion to left scalp. Bystanders initiated  cpr . On EMT arrival  4 min later patient has palpable pulses, regains conscientiousness. 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 *        Critical aortic valve stenosis  Assessment & Plan  2D echo showed EF of 25% with arctic valve area of 0.5 cm 2  Cardiology discussed the coordination of care with CT surgery at 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   Patient received a dose of Lasix 20 mg IV initially, subsequently blood pressure was soft  and could not get further diuretics  Echocardiogram EF 25% with severe global hypokinesis, grade 2 diastolic dysfunction, aortic stenosis  Blood pressure better with midodrine, currently euvolemic  Continue metoprolol,  Aldactone  Initially started on Entresto, not covered.  Changed to losartan  Midodrine 5 mg 3 times a day  Cardiac diet with fluid restriction 1800 cc per day  Monitor intake output, daily weight  Close cardiology follow-up after discharge in 1 week      * Syncope  Assessment & Plan  Patient presents with syncope versus cardiac arrest at neighbor's .  Patient fell on the floor, sustained abrasion to left scalp.  Bystanders initiated CPR.  EMS arrived 4 minutes later found patient to have palpable pulses and patient had regained consciousness.  Patient remembers he was standing by the door,unable to recall anything else.  Patient had complained of feeling foggy and dizzy, not feeling well ever since he took first dose of doxazosin for prostate evening per friend at bedside.  Patient reportedonly ate around 1 PM during the day.  Does not eat breakfast regularly.  Patient did not actually want to go to  due to not feeling well and anxiety per friend at bedside.  Denied history of syncope.  CT head no acute findings  CT chest without contrast showed - Moderate septal thickening due to interstitial edema with small pleural effusions. Acute minimally displaced fractures of multiple bilateral anterior ribs due to CPR. Nondisplaced fracture of the anterior cortex of the superior body of the sternum due to CPR.  Initial troponin 112.  Repeat trending up likely related to CPR, CHF, aortic stenosis.  Initial EKG showed NSR, bifascicular block, rate 97,TWI V1-V4,STD V2-V4.  Repeat EKG showed NSR, LVH,STD V5 V6,mild JESSE V1 to V3 likely due to LVH.   Peak troponin was 3327  Status post Lovenox 1 mg/g subcutaneously twice daily.    Lipid panel showed LDL level of 82.  Hemoglobin A1c was 5.7  2D echo showed EF of 25% with severe global hypokinesis with grade 2 diastolic dysfunction with severely dilated left atrium with an aortic valve area of 1.5 cm2  Cardiology input appreciated  Suspected syncope in the setting of  possible critical aortic stenosis versus ventricular arrhythmias due to cardiomyopathy  Continue midodrine  Discontinued alpha-blocker  LifeVest prior to discharge  Follow-up with cardiology and cardiothoracic surgery after discharge  Monitor blood pressure and orthostasis    CAD (coronary artery disease)  Assessment & Plan  Patient underwent cardiac catheterization which showed disease of the circumflex and LAD  Cardiology coordinating care with CT surgery at [facility] for outpatient evaluation for TAVR along with myocardial revascularization    Cardiomyopathy (HCC)  Assessment & Plan  Echo showed EF of 25% with severe global hypokinesis with regional variations, grade 2 diastolic dysfunction with severely dilated left atrium with aortic valve area of 0.5 cm 2  Etiology not clear at the current time  Continue medical management with Toprol-XL, Aldactone, losartan  Cardiology follow-up    Anemia  Assessment & Plan  Baseline hemoglobin around 11  Hemoglobin stable      Pulmonary nodules  Assessment & Plan  CT showed -  A few 5 mm and smaller nodules. Stability is unknown given the absence of comparison studies. Follow-up could be obtained with a chest CT with no contrast in 6 months to assure stability.     Closed fracture of body of sternum  Assessment & Plan  Pain control  Incentive spirometer    Closed fracture of multiple ribs of both sides  Assessment & Plan  Due to CPR  Incentive spirometer  Pain control with short course of scheduled Tylenol, avoid tramadol  Tylenol as needed    Hyperglycemia  Assessment & Plan    Hemoglobin A1c was 5.7  Monitor    Elevated troponin  Assessment & Plan  Peak troponin was 3327  Suspected nonischemic myocardial injury in the setting of CPR, critical aortic stenosis and heart failure  Status post therapeutic Lovenox   Continue aspirin  Lipid panel showed LDL level of 82  2D echo-EF of 25% with aortic valve area of 0.5 cm squared  Held off on starting Lipitor due to elevated LFTs,  initially subsequently started on Lipitor 20 mg daily with improvement in LFTs  Cardiology following, input appreciated  Status post cardiac catheterization which showed disease involving circumflex and LAD  Cardiology discussed with CT surgery, recommended close outpatient follow-up for TAVR and CAD  Continue aspirin, Toprol, statin  LifeVest on discharge  Discussed with patient and family at bedside regarding compliance with medications, follow-up and further plan    Glaucoma  Assessment & Plan  Continue home eyedrop    COPD (chronic obstructive pulmonary disease) (HCC)  Assessment & Plan  Continue Symbicort  No wheezing on auscultation    Benign prostatic hyperplasia with urinary retention  Assessment & Plan  Started on doxazosin  the day before admission by urology.  Reported felt foggy and dizzy, not feeling well after taking first dose.  Will hold doxazosin.   suspected BPH.-Avoid alpha blockers.  Patient will need outpatient follow-up with urology for cystoscopy and positive consideration for TURP    Anxiety  Assessment & Plan  Patient was on low-dose Xanax as needed before.  Not currently.  Declined psych consult.        Consultations During Hospital Stay:  IP CONSULT TO CARDIOLOGY  IP CONSULT TO CASE MANAGEMENT  IP CONSULT TO UROLOGY    Procedures Performed:     Procedure(s):  Cardiac catheterization  Cardiac Coronary Angiogram     Significant Findings:     Refer to hospital course and above listed diagnosis related plan for details    Imaging while in hospital:    Cardiac catheterization    Result Date: 2/20/2024  Narrative:   Severe Cx disease + Moderate LAD disease   Eccentic AI difficult to keep RCA cannulated TR Band 2 hours Severe CX stenosis, Moderate LAD stenosis Critical AS/Severe HF Endovascular/TAVR valuation outpatient     Echo complete w/ contrast if indicated    Result Date: 2/17/2024  Narrative:   Left Ventricle: Left ventricular cavity size is mildly dilated. Wall thickness is mildly  increased. There is mild concentric hypertrophy. The left ventricular ejection fraction is around 25% by visual estimation. Systolic function is severely reduced. There is severe global hypokinesis with regional variation. Diastolic function is moderately abnormal, consistent with grade II (pseudonormal) relaxation.  Left atrial filling pressure is elevated.   Right Ventricle: Systolic function is low normal.   Left Atrium: The atrium is severely dilated ( 60 mL/m2).   Right Atrium: The atrium is moderately dilated.   Aortic Valve: The aortic valve is trileaflet. The leaflets are moderately thickened. The leaflets are moderately calcified. There is severely reduced mobility. There is mild to moderate regurgitation. There is critical stenosis.  Peak gradient 108 mmHg.  Mean gradient 63.  LVOT velocity is measured to be 0.58.  LVOT diameter 2.2 and aortic valve area 0.5 cm 2 and dimensionless index 0.11.  There may be some component of low cardiac output contributing to valve stenosis.   Mitral Valve: There is mild to moderate regurgitation.   Tricuspid Valve: There is mild to moderate regurgitation.  Pulmonary artery pressure around 50 mmHg.   IVC/SVC: The inferior vena cava is mildly dilated.  It has less than 50% collapse with inspiration consistent with elevated right atrial pressure about 10 to 15 mmHg.   Pericardium: There is a trivial pericardial effusion.   No old echo available for comparison.     CT chest without contrast    Result Date: 2/16/2024  Narrative: CT CHEST WITHOUT IV CONTRAST INDICATION:   Central chest pain after chest compressions. Per my review of the medical record, history of COPD with apparent syncope. CPR administered. Recent rhinorrhea and minimally productive cough. COMPARISON: Chest radiograph 5/17/2023, abdomen CT 4/12/2020. TECHNIQUE: Chest CT without intravenous contrast.  Axial, sagittal, coronal 2D reformats and coronal MIPS from source data.  3D reconstructions of the bony thorax  were performed in order to improved sensitivity of evaluation for rib fractures. Radiation dose length product (DLP):  319 mGy-cm . Radiation dose exposure minimized using iterative reconstruction and automated exposure control. FINDINGS: LUNGS: Moderate septal thickening. 5 mm solid nodule lateral segment right middle lobe (3/145). Two 3 mm juxtapleural lateral segment right lower lobe nodules (3/210). 3 mm nodules in the left upper lobe (3/62, 73, 100). 5 mm solid superior segment left lower lobe nodule (3/102). Mild dependent atelectasis in the lower lobes. A few benign thin-walled cysts. AIRWAYS: No significant filling defects. Mild diffuse bronchial wall thickening. PLEURA: Small effusions. HEART/GREAT VESSELS: Moderate cardiomegaly. Moderate coronary artery calcification indicating atherosclerotic heart disease. Moderate calcification of the aortic valve leaflets which can contribute to aortic stenosis. Pulmonary artery enlargement. MEDIASTINUM AND LJ:  Unremarkable. CHEST WALL AND LOWER NECK: Unremarkable. UPPER ABDOMEN: Benign calcification in the right hepatic lobe. OSSEOUS STRUCTURES: Acute mildly displaced fractures of multiple bilateral anterior ribs due to CPR. Nondisplaced fracture of the anterior cortex of the body of the sternum. Mild degenerative disease in the spine.     Impression: Moderate septal thickening due to interstitial edema with small pleural effusions. Acute minimally displaced fractures of multiple bilateral anterior ribs due to CPR. Nondisplaced fracture of the anterior cortex of the superior body of the sternum due to CPR. A few 5 mm and smaller nodules. Stability is unknown given the absence of comparison studies. Follow-up could be obtained with a chest CT with no contrast in 6 months to assure stability. This study was marked in Epic for immediate notification and follow-up. Workstation performed: [id number]     CT head without contrast    Result Date: 2/16/2024  Narrative: CT  BRAIN - WITHOUT CONTRAST INDICATION:   Fall, head strike on aspirin. COMPARISON:  None. TECHNIQUE:  CT examination of the brain was performed.  Multiplanar 2D reformatted images were created from the source data. Radiation dose length product (DLP) for this visit:  1024 mGy-cm .  This examination, like all CT scans performed in the [facility], was performed utilizing techniques to minimize radiation dose exposure, including the use of iterative reconstruction and automated exposure control. IMAGE QUALITY:  Diagnostic. FINDINGS: PARENCHYMA:  No intracranial mass, mass effect or midline shift. No CT signs of acute infarction.  No acute parenchymal hemorrhage. VENTRICLES AND EXTRA-AXIAL SPACES:  Ventricles and extra-axial CSF spaces are prominent commensurate with the degree of volume loss.  No hydrocephalus.  No acute extra-axial hemorrhage. VISUALIZED ORBITS: Normal visualized orbits. PARANASAL SINUSES: Mucoperiosteal thickening the bilateral ethmoid, frontal, and inferior maxillary sinuses. The mastoid air cells appear adequately aerated and clear without air-fluid levels. CALVARIUM AND EXTRACRANIAL SOFT TISSUES: Bony calvarium and temporomandibular joints are intact. Superficial lipoma in the posterior occipital region. Scalp soft tissues otherwise unremarkable.     Impression: No acute intracranial abnormality. Paranasal sinus disease. Workstation performed: [id number]       Incidental Findings:   CT chest finding as above including lung nodules    Test Results Pending at Discharge (will require follow up):   As per After Visit Summary     Outpatient Tests Requested:  CMP in 1 week    Complications:  Refer to hospital course and above listed diagnosis related plan, if any    Hospital Course:   As per HPI  Gilberto Vann is a 83 y.o. adult patient with history of COPD, chronic back pain, BPH, glaucoma who originally presented to the hospital on 2/16/2024 due to syncopal episode.  Patient was at  a  home and when he had a syncopal episode with loss of consciousness and agonal breathing.  Bystander did CPR, when EMS arrived 4 minutes later patient was noted to have palpable pulses and had regained consciousness.  Patient was subsequently presented to ED for further evaluation. Patient remembered he was standing by the door,unable to recall anything else.  Patient had complained of feeling foggy and dizzy, not feeling well ever since he took first dose of doxazosin for prostate prior per friend at bedside.  Patient reported only ate around 1 PM during the day.  Does not eat breakfast regularly.  Patient did not actually want to go to  due to not feeling well and anxiety per friend at bedside.  Friend reports patient has anxiety issue, does not sleep well at night.  Patient denied history of syncope, denied history of heart attack/cardiac arrhythmia/CHF.  Patient reported diffuse anterior chest pain currently from rib fractures.  Patient denied headache dizziness nausea vomiting abdominal pain SOB cough fever chills.  Patient denied trouble urinating.  Patient was hemodynamically stable was evaluated and subsequently admitted to the hospital for further evaluation.  Patient's troponin were elevated.  EKG revealed normal sinus rhythm LVD, evidence of repolarization abnormality and IVCD.  CT head did not reveal any acute abnormality.  Echocardiogram revealed EF of 25% with critical aortic stenosis.  Patient was treated with therapeutic Lovenox started on aspirin, metoprolol and Aldactone.  Patient was also started on midodrine to support blood pressure.  Patient and family were educated about the diagnoses, syncopal episode thought to be secondary to aortic stenosis it was unclear if patient had cardiac arrest.  Patient subsequently underwent cardiac catheterization which revealed CAD involving circumflex 70% and LAD 60%.  Patient was continued on CHF medication, limit hemodynamically stable.   "Cardiology team discussed with CT surgery for evaluation for CAD and aortic stenosis recommended close outpatient follow-up after discharge.  Patient was evaluated for Entresto but was unable to afford due to insurance issues.  Patient was started on losartan.  Patient did have chest discomfort associated with rib fracture which is currently controlled with Tylenol.  Patient was also trialed tramadol which caused delirium and was subsequently discontinued.  LifeVest was arranged.  Patient remained clinically stable, evaluated and recommended rehab and now being discharged to rehab.  Patient will follow-up with cardiology and cardiothoracic surgery closely after discharge.        Please see above list of diagnoses and related plan for additional information.       Condition at Discharge: stable     Discharge Day Visit / Exam:     Subjective: Sitting up in chair  Chest pain is ruled with Tylenol  Denies any dizziness or lightheadedness,  ambulating in the room  Breathing at baseline  Looking forward to go to rehab      Vitals: Blood Pressure: 118/58 (02/22/24 0810)  Pulse: 62 (02/22/24 0810)  Temperature: 97.8 °F (36.6 °C) (02/22/24 0810)  Temp Source: Oral (02/22/24 0810)  Respirations: 18 (02/22/24 0810)  Height: 5' 9\" (175.3 cm) (02/17/24 1000)  Weight - Scale: 65 kg (143 lb 4.8 oz) (02/22/24 0540)  SpO2: 98 % (02/22/24 0810) on room air  Exam:   Physical Exam  Constitutional:       General: Gilberto is not in acute distress.  HENT:      Head: Normocephalic and atraumatic.   Cardiovascular:      Rate and Rhythm: Normal rate.      Heart sounds: Murmur heard.   Pulmonary:      Effort: Pulmonary effort is normal. No respiratory distress.      Breath sounds: No wheezing, rhonchi or rales.   Chest:      Chest wall: No tenderness.   Abdominal:      General: Bowel sounds are normal. There is no distension.      Palpations: Abdomen is soft.      Tenderness: There is no abdominal tenderness. There is no guarding or rebound. " "  Musculoskeletal:      Right lower leg: No edema.      Left lower leg: No edema.   Skin:     General: Skin is warm and dry.      Findings: No rash.   Neurological:      Mental Status: Gilberto is alert. Mental status is at baseline.      Cranial Nerves: No cranial nerve deficit.         Discharge instructions/Information to patient and family:(Discharge Medications and Follow up):   See after visit summary for information provided to patient and family.      Provisions for Follow-Up Care:  See after visit summary for information related to follow-up care and any pertinent home health orders.      Disposition: Short-term rehab at [facility]    Planned Readmission:  No     Discharge Statement:  I spent 45 minutes discharging the patient. This time was spent on the day of discharge. I had direct contact with the patient on the day of discharge. Greater than 50% of the total time was spent examining patient, answering all patient questions, arranging and discussing plan of care with patient as well as directly providing post-discharge instructions.  Additional time then spent on discharge activities.  Discussed with patient and family at bedside at length.    Discharge Medications:  See after visit summary for reconciled discharge medications provided to patient and family.      ** Please Note: \"This note has been constructed using a voice recognition system.Therefore there may be syntax, spelling, and/or grammatical errors. Please call if you have any questions. \"**        "

## 2024-02-22 NOTE — PLAN OF CARE
Problem: Potential for Falls  Goal: Patient will remain free of falls  Description: INTERVENTIONS:  - Educate patient/family on patient safety including physical limitations  - Instruct patient to call for assistance with activity   - Consult OT/PT to assist with strengthening/mobility   - Keep Call bell within reach  - Keep bed low and locked with side rails adjusted as appropriate  - Keep care items and personal belongings within reach  - Initiate and maintain comfort rounds  - Make Fall Risk Sign visible to staff  - Offer Toileting every 2 Hours, in advance of need  - Initiate/Maintain bed/chair alarm  - Obtain necessary fall risk management equipment: bed/chair alarm  - Apply yellow socks and bracelet for high fall risk patients  - Consider moving patient to room near nurses station  2/22/2024 1628 by Deena Worthington RN  Outcome: Adequate for Discharge  2/22/2024 1628 by Deena Worthington RN  Outcome: Progressing  2/22/2024 0812 by Deena Worthington RN  Outcome: Progressing     Problem: PAIN - ADULT  Goal: Verbalizes/displays adequate comfort level or baseline comfort level  Description: Interventions:  - Encourage patient to monitor pain and request assistance  - Assess pain using appropriate pain scale  - Administer analgesics based on type and severity of pain and evaluate response  - Implement non-pharmacological measures as appropriate and evaluate response  - Consider cultural and social influences on pain and pain management  - Notify physician/advanced practitioner if interventions unsuccessful or patient reports new pain  2/22/2024 1628 by Deena Worthington RN  Outcome: Adequate for Discharge  2/22/2024 1628 by Deena Worthington RN  Outcome: Progressing  2/22/2024 0812 by Deena Worthington RN  Outcome: Progressing     Problem: SAFETY ADULT  Goal: Patient will remain free of falls  Description: INTERVENTIONS:  - Educate patient/family on patient safety including physical limitations  - Instruct patient to call for  assistance with activity   - Consult OT/PT to assist with strengthening/mobility   - Keep Call bell within reach  - Keep bed low and locked with side rails adjusted as appropriate  - Keep care items and personal belongings within reach  - Initiate and maintain comfort rounds  - Make Fall Risk Sign visible to staff  - Offer Toileting every 2 Hours, in advance of need  - Initiate/Maintain bed/chair alarm  - Obtain necessary fall risk management equipment: bed/chair alarm  - Apply yellow socks and bracelet for high fall risk patients  - Consider moving patient to room near nurses station  2/22/2024 1628 by Deena Worthington RN  Outcome: Adequate for Discharge  2/22/2024 1628 by Deena Worthington RN  Outcome: Progressing  2/22/2024 0812 by Deena Worthington RN  Outcome: Progressing  Goal: Maintain or return to baseline ADL function  Description: INTERVENTIONS:  - Educate patient/family on patient safety including physical limitations  - Instruct patient to call for assistance with activity   - Consult OT/PT to assist with strengthening/mobility   - Keep Call bell within reach  - Keep bed low and locked with side rails adjusted as appropriate  - Keep care items and personal belongings within reach  - Initiate and maintain comfort rounds  - Make Fall Risk Sign visible to staff  - Offer Toileting every 2 Hours, in advance of need  - Initiate/Maintain bed/chair alarm  - Obtain necessary fall risk management equipment:bed/chair alarm   - Apply yellow socks and bracelet for high fall risk patients  - Consider moving patient to room near nurses station  2/22/2024 1628 by Deena Worthington RN  Outcome: Adequate for Discharge  2/22/2024 1628 by Deena Worthington RN  Outcome: Progressing  2/22/2024 0812 by Deena Worthington RN  Outcome: Progressing  Goal: Maintains/Returns to pre admission functional level  Description: INTERVENTIONS:  - Perform AM-PAC 6 Click Basic Mobility/ Daily Activity assessment daily.  - Set and communicate daily mobility  goal to care team and patient/family/caregiver.   - Collaborate with rehabilitation services on mobility goals if consulted  - Perform Range of Motion   times a day.  - Reposition patient every  hours.  - Dangle patient  times a day  - Stand patient  times a day  - Ambulate patient  times a day  - Out of bed to chair  times a day   - Out of bed for meals  times a day  - Out of bed for toileting  - Record patient progress and toleration of activity level   2/22/2024 1628 by Deena Worthington RN  Outcome: Adequate for Discharge  2/22/2024 1628 by Deena Worthington RN  Outcome: Progressing  2/22/2024 0812 by Deena Worthington RN  Outcome: Progressing     Problem: DISCHARGE PLANNING  Goal: Discharge to home or other facility with appropriate resources  Description: INTERVENTIONS:  - Identify barriers to discharge w/patient and caregiver  - Arrange for needed discharge resources and transportation as appropriate  - Identify discharge learning needs (meds, wound care, etc.)  - Arrange for interpretive services to assist at discharge as needed  - Refer to Case Management Department for coordinating discharge planning if the patient needs post-hospital services based on physician/advanced practitioner order or complex needs related to functional status, cognitive ability, or social support system  2/22/2024 1628 by Deena Worthington RN  Outcome: Adequate for Discharge  2/22/2024 1628 by Deena Worthington RN  Outcome: Progressing  2/22/2024 0812 by Deena Worthington RN  Outcome: Progressing     Problem: Knowledge Deficit  Goal: Patient/family/caregiver demonstrates understanding of disease process, treatment plan, medications, and discharge instructions  Description: Complete learning assessment and assess knowledge base.  Interventions:  - Provide teaching at level of understanding  - Provide teaching via preferred learning methods  2/22/2024 1628 by Deena Worthington RN  Outcome: Adequate for Discharge  2/22/2024 1628 by Deena Worthington  RN  Outcome: Progressing  2/22/2024 0812 by Deena Worthington RN  Outcome: Progressing     Problem: NEUROSENSORY - ADULT  Goal: Achieves stable or improved neurological status  Description: INTERVENTIONS  - Monitor and report changes in neurological status  - Monitor vital signs such as temperature, blood pressure, glucose, and any other labs ordered   - Initiate measures to prevent increased intracranial pressure  - Monitor for seizure activity and implement precautions if appropriate      2/22/2024 1628 by Deena Worthington RN  Outcome: Adequate for Discharge  2/22/2024 1628 by Deena Worthington RN  Outcome: Progressing  2/22/2024 0812 by Deena Worthington RN  Outcome: Progressing  Goal: Achieves maximal functionality and self care  Description: INTERVENTIONS  - Monitor swallowing and airway patency with patient fatigue and changes in neurological status  - Encourage and assist patient to increase activity and self care.   - Encourage visually impaired, hearing impaired and aphasic patients to use assistive/communication devices  2/22/2024 1628 by Deena Worthington RN  Outcome: Adequate for Discharge  2/22/2024 1628 by Deena Worthington RN  Outcome: Progressing  2/22/2024 0812 by Deena Worthington RN  Outcome: Progressing     Problem: CARDIOVASCULAR - ADULT  Goal: Maintains optimal cardiac output and hemodynamic stability  Description: INTERVENTIONS:  - Monitor I/O, vital signs and rhythm  - Monitor for S/S and trends of decreased cardiac output  - Administer and titrate ordered vasoactive medications to optimize hemodynamic stability  - Assess quality of pulses, skin color and temperature  - Assess for signs of decreased coronary artery perfusion  - Instruct patient to report change in severity of symptoms  2/22/2024 1628 by Deena Worthington RN  Outcome: Adequate for Discharge  2/22/2024 1628 by Deena Worthington RN  Outcome: Progressing  2/22/2024 0812 by Deena Worthington RN  Outcome: Progressing  Goal: Absence of cardiac dysrhythmias or  at baseline rhythm  Description: INTERVENTIONS:  - Continuous cardiac monitoring, vital signs, obtain 12 lead EKG if ordered  - Administer antiarrhythmic and heart rate control medications as ordered  - Monitor electrolytes and administer replacement therapy as ordered  2/22/2024 1628 by Deena Worthington RN  Outcome: Adequate for Discharge  2/22/2024 1628 by Deena Worthington RN  Outcome: Progressing  2/22/2024 0812 by Deena Worthington RN  Outcome: Progressing     Problem: MUSCULOSKELETAL - ADULT  Goal: Maintain or return mobility to safest level of function  Description: INTERVENTIONS:  - Assess patient's ability to carry out ADLs; assess patient's baseline for ADL function and identify physical deficits which impact ability to perform ADLs (bathing, care of mouth/teeth, toileting, grooming, dressing, etc.)  - Assess/evaluate cause of self-care deficits   - Assess range of motion  - Assess patient's mobility  - Assess patient's need for assistive devices and provide as appropriate  - Encourage maximum independence but intervene and supervise when necessary  - Involve family in performance of ADLs  - Assess for home care needs following discharge   - Consider OT consult to assist with ADL evaluation and planning for discharge  - Provide patient education as appropriate  2/22/2024 1628 by Deena Worthington RN  Outcome: Adequate for Discharge  2/22/2024 1628 by Deena Worthington RN  Outcome: Progressing  2/22/2024 0812 by Deena Worthington RN  Outcome: Progressing  Goal: Maintain proper alignment of affected body part  Description: INTERVENTIONS:  - Support, maintain and protect limb and body alignment  - Provide patient/ family with appropriate education  2/22/2024 1628 by Deena Worthington RN  Outcome: Adequate for Discharge  2/22/2024 1628 by Deena Worthington RN  Outcome: Progressing  2/22/2024 0812 by Deena Worthington RN  Outcome: Progressing     Problem: RESPIRATORY - ADULT  Goal: Achieves optimal ventilation and  oxygenation  Description: INTERVENTIONS:  - Assess for changes in respiratory status  - Assess for changes in mentation and behavior  - Position to facilitate oxygenation and minimize respiratory effort  - Oxygen administered by appropriate delivery if ordered  - Initiate smoking cessation education as indicated  - Encourage broncho-pulmonary hygiene including cough, deep breathe, Incentive Spirometry  - Assess the need for suctioning and aspirate as needed  - Assess and instruct to report SOB or any respiratory difficulty  - Respiratory Therapy support as indicated  2/22/2024 1628 by Deena Worthington RN  Outcome: Adequate for Discharge  2/22/2024 1628 by Deena Worthington RN  Outcome: Progressing  2/22/2024 0812 by Deena Worthington RN  Outcome: Progressing     Problem: OCCUPATIONAL THERAPY ADULT  Goal: Performs self-care activities at highest level of function for planned discharge setting.  See evaluation for individualized goals.  Description: Treatment Interventions: ADL retraining, Functional transfer training, UE strengthening/ROM, Endurance training, Patient/family training, Equipment evaluation/education, Activityengagement, Continued evaluation, Energy conservation, Compensatory technique education          See flowsheet documentation for full assessment, interventions and recommendations.   Outcome: Adequate for Discharge     Problem: PHYSICAL THERAPY ADULT  Goal: Performs mobility at highest level of function for planned discharge setting.  See evaluation for individualized goals.  Description:   Outcome: Adequate for Discharge     Problem: Prexisting or High Potential for Compromised Skin Integrity  Goal: Skin integrity is maintained or improved  Description: INTERVENTIONS:  - Identify patients at risk for skin breakdown  - Assess and monitor skin integrity  - Assess and monitor nutrition and hydration status  - Monitor labs   - Assess for incontinence   - Turn and reposition patient  - Assist with  mobility/ambulation  - Relieve pressure over bony prominences  - Avoid friction and shearing  - Provide appropriate hygiene as needed including keeping skin clean and dry  - Evaluate need for skin moisturizer/barrier cream  - Collaborate with interdisciplinary team   - Patient/family teaching  - Consider wound care consult   2/22/2024 1628 by Deena Worthington RN  Outcome: Adequate for Discharge  2/22/2024 1628 by Deena Worthington RN  Outcome: Progressing  2/22/2024 0812 by Deena Worthington RN  Outcome: Progressing

## 2024-02-22 NOTE — PLAN OF CARE
Problem: Potential for Falls  Goal: Patient will remain free of falls  Description: INTERVENTIONS:  - Educate patient/family on patient safety including physical limitations  - Instruct patient to call for assistance with activity   - Consult OT/PT to assist with strengthening/mobility   - Keep Call bell within reach  - Keep bed low and locked with side rails adjusted as appropriate  - Keep care items and personal belongings within reach  - Initiate and maintain comfort rounds  - Make Fall Risk Sign visible to staff  - Offer Toileting every 2 Hours, in advance of need  - Initiate/Maintain bed/chair alarm  - Obtain necessary fall risk management equipment: bed/chair alarm  - Apply yellow socks and bracelet for high fall risk patients  - Consider moving patient to room near nurses station  Outcome: Progressing

## 2024-02-22 NOTE — DISCHARGE SUMMARY
Discharge Summary - [facility] Internal Medicine    Patient Information: Gilberto Vann 83 y.o. adult MRN: [id number]  Unit/Bed#: [address] [place] [address] Encounter: [id number]    Discharging Physician / Practitioner: Earlene Gudino MD  PCP: Dima Lloyd MD  Admission Date: 2024  Discharge Date: 24    Reason for Admission: Syncope (Witnessed syncope or Cardiac arrest at . Patient fell on the floor, abrasion to left scalp. Bystanders initiated  cpr . On EMT arrival  4 min later patient has palpable pulses, regains conscientiousness. 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 *        Critical aortic valve stenosis  Assessment & Plan  2D echo showed EF of 25% with arctic valve area of 0.5 cm 2  Cardiology discussed the coordination of care with CT surgery at 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   Patient received a dose of Lasix 20 mg IV initially, subsequently blood pressure was soft  and could not get further diuretics  Echocardiogram EF 25% with severe global hypokinesis, grade 2 diastolic dysfunction, aortic stenosis  Blood pressure better with midodrine  Continue metoprolol, Aldactone  Initially  started on Entresto, not covered.  Changed to losartan  Midodrine 5 mg 3 times a day  Cardiac diet with fluid restriction 1800 cc per day  Monitor intake output, daily weight  Close cardiology follow-up after discharge in 1 week      * Syncope  Assessment & Plan  Patient presents with syncope versus cardiac arrest at neighbor's .  Patient fell on the floor, sustained abrasion to left scalp.  Bystanders initiated CPR.  EMS arrived 4 minutes later found patient to have palpable pulses and patient had regained consciousness.  Patient remembers he was standing by the door,unable to recall anything else.  Patient had complained of feeling foggy and dizzy, not feeling well ever since he took first dose of doxazosin for prostate evening per friend at bedside.  Patient reportedonly ate around 1 PM during the day.  Does not eat breakfast regularly.  Patient did not actually want to go to  due to not feeling well and anxiety per friend at bedside.  Denied history of syncope.  CT head no acute findings  CT chest without contrast showed - Moderate septal thickening due to interstitial edema with small pleural effusions. Acute minimally displaced fractures of multiple bilateral anterior ribs due to CPR. Nondisplaced fracture of the anterior cortex of the superior body of the sternum due to CPR.  Initial troponin 112.  Repeat trending up likely related to CPR, CHF, aortic stenosis.  Initial EKG showed NSR, bifascicular block, rate 97,TWI V1-V4,STD V2-V4.  Repeat EKG showed NSR, LVH,STD V5 V6,mild JESSE V1 to V3 likely due to LVH.   Peak troponin was 3327  Status post Lovenox 1 mg/g subcutaneously twice daily.    Lipid panel showed LDL level of 82.  Hemoglobin A1c was 5.7  2D echo showed EF of 25% with severe global hypokinesis with grade 2 diastolic dysfunction with severely dilated left atrium with an aortic valve area of 1.5 cm2  Orthostatic vital signs could not be obtained as patient is not very  cooperative  Cardiology input appreciated  Suspected syncope in the setting of possible critical aortic stenosis versus ventricular arrhythmias due to cardiomyopathy  Continue midodrine  Discontinued alpha-blocker  Telemetry  LifeVest prior to discharge    CAD (coronary artery disease)  Assessment & Plan  Patient underwent cardiac catheterization which showed disease of the circumflex and LAD  Cardiology coordinating care with CT surgery at [facility] for outpatient evaluation for TAVR along with myocardial revascularization    Cardiomyopathy (HCC)  Assessment & Plan  Echo showed EF of 25% with severe global hypokinesis with regional variations, grade 2 diastolic dysfunction with severely dilated left atrium with aortic valve area of 0.5 cm 2  Etiology not clear at the current time  Continue medical management with Toprol-XL, Aldactone, losartan  Cardiology follow-up    Anemia  Assessment & Plan  Baseline hemoglobin around 11  Hemoglobin stable      Pulmonary nodules  Assessment & Plan  CT showed -  A few 5 mm and smaller nodules. Stability is unknown given the absence of comparison studies. Follow-up could be obtained with a chest CT with no contrast in 6 months to assure stability.     Closed fracture of body of sternum  Assessment & Plan  Pain control  Incentive spirometer    Closed fracture of multiple ribs of both sides  Assessment & Plan  Due to CPR  Incentive spirometer  Pain control, avoid tramadol  Tylenol as needed    Hyperglycemia  Assessment & Plan  Glucose 242  Hemoglobin A1c was 5.7    Elevated troponin  Assessment & Plan  Peak troponin was 3327  Suspected nonischemic myocardial injury in the setting of CPR, critical aortic stenosis and heart failure  Status post therapeutic Lovenox   Continue aspirin  Lipid panel showed LDL level of 82  2D echo-EF of 25% with aortic valve area of 0.5 cm squared  Hold off on starting Lipitor due to elevated LFTs, will recheck  Cardiology following, input appreciated  Status  post cardiac catheterization which showed disease involving circumflex and LAD  Cardiology discussed with CT surgery, recommended close outpatient follow-up for TAVR and CAD  LifeVest on discharge  Discussed with patient and family at bedside regarding compliance with medications, follow-up and further plan    Glaucoma  Assessment & Plan  Continue home eyedrop    COPD (chronic obstructive pulmonary disease) (HCC)  Assessment & Plan  Continue Symbicort  No wheezing on auscultation    Benign prostatic hyperplasia with urinary retention  Assessment & Plan  Started on doxazosin  the day before admission by urology.  Reported felt foggy and dizzy, not feeling well after taking first dose.  Will hold doxazosin.   suspected BPH.-Avoid alpha blockers.  Patient will need outpatient follow-up with urology for cystoscopy and positive consideration for TURP    Anxiety  Assessment & Plan  Patient was on low-dose Xanax as needed before.  Not currently.  Declined psych consult.        Consultations During Hospital Stay:  IP CONSULT TO CARDIOLOGY  IP CONSULT TO CASE MANAGEMENT  IP CONSULT TO UROLOGY    Procedures Performed:     ***    Significant Findings:     Refer to hospital course and above listed diagnosis related plan for details    Imaging while in hospital:    Cardiac catheterization    Result Date: 2/20/2024  Narrative:   Severe Cx disease + Moderate LAD disease   Eccentic AI difficult to keep RCA cannulated TR Band 2 hours Severe CX stenosis, Moderate LAD stenosis Critical AS/Severe HF Endovascular/TAVR valuation outpatient     Echo complete w/ contrast if indicated    Result Date: 2/17/2024  Narrative:   Left Ventricle: Left ventricular cavity size is mildly dilated. Wall thickness is mildly increased. There is mild concentric hypertrophy. The left ventricular ejection fraction is around 25% by visual estimation. Systolic function is severely reduced. There is severe global hypokinesis with regional variation. Diastolic  function is moderately abnormal, consistent with grade II (pseudonormal) relaxation.  Left atrial filling pressure is elevated.   Right Ventricle: Systolic function is low normal.   Left Atrium: The atrium is severely dilated ( 60 mL/m2).   Right Atrium: The atrium is moderately dilated.   Aortic Valve: The aortic valve is trileaflet. The leaflets are moderately thickened. The leaflets are moderately calcified. There is severely reduced mobility. There is mild to moderate regurgitation. There is critical stenosis.  Peak gradient 108 mmHg.  Mean gradient 63.  LVOT velocity is measured to be 0.58.  LVOT diameter 2.2 and aortic valve area 0.5 cm 2 and dimensionless index 0.11.  There may be some component of low cardiac output contributing to valve stenosis.   Mitral Valve: There is mild to moderate regurgitation.   Tricuspid Valve: There is mild to moderate regurgitation.  Pulmonary artery pressure around 50 mmHg.   IVC/SVC: The inferior vena cava is mildly dilated.  It has less than 50% collapse with inspiration consistent with elevated right atrial pressure about 10 to 15 mmHg.   Pericardium: There is a trivial pericardial effusion.   No old echo available for comparison.     CT chest without contrast    Result Date: 2/16/2024  Narrative: CT CHEST WITHOUT IV CONTRAST INDICATION:   Central chest pain after chest compressions. Per my review of the medical record, history of COPD with apparent syncope. CPR administered. Recent rhinorrhea and minimally productive cough. COMPARISON: Chest radiograph 5/17/2023, abdomen CT 4/12/2020. TECHNIQUE: Chest CT without intravenous contrast.  Axial, sagittal, coronal 2D reformats and coronal MIPS from source data.  3D reconstructions of the bony thorax were performed in order to improved sensitivity of evaluation for rib fractures. Radiation dose length product (DLP):  319 mGy-cm . Radiation dose exposure minimized using iterative reconstruction and automated exposure control.  FINDINGS: LUNGS: Moderate septal thickening. 5 mm solid nodule lateral segment right middle lobe (3/145). Two 3 mm juxtapleural lateral segment right lower lobe nodules (3/210). 3 mm nodules in the left upper lobe (3/62, 73, 100). 5 mm solid superior segment left lower lobe nodule (3/102). Mild dependent atelectasis in the lower lobes. A few benign thin-walled cysts. AIRWAYS: No significant filling defects. Mild diffuse bronchial wall thickening. PLEURA: Small effusions. HEART/GREAT VESSELS: Moderate cardiomegaly. Moderate coronary artery calcification indicating atherosclerotic heart disease. Moderate calcification of the aortic valve leaflets which can contribute to aortic stenosis. Pulmonary artery enlargement. MEDIASTINUM AND LJ:  Unremarkable. CHEST WALL AND LOWER NECK: Unremarkable. UPPER ABDOMEN: Benign calcification in the right hepatic lobe. OSSEOUS STRUCTURES: Acute mildly displaced fractures of multiple bilateral anterior ribs due to CPR. Nondisplaced fracture of the anterior cortex of the body of the sternum. Mild degenerative disease in the spine.     Impression: Moderate septal thickening due to interstitial edema with small pleural effusions. Acute minimally displaced fractures of multiple bilateral anterior ribs due to CPR. Nondisplaced fracture of the anterior cortex of the superior body of the sternum due to CPR. A few 5 mm and smaller nodules. Stability is unknown given the absence of comparison studies. Follow-up could be obtained with a chest CT with no contrast in 6 months to assure stability. This study was marked in Epic for immediate notification and follow-up. Workstation performed: [id number]     CT head without contrast    Result Date: 2/16/2024  Narrative: CT BRAIN - WITHOUT CONTRAST INDICATION:   Fall, head strike on aspirin. COMPARISON:  None. TECHNIQUE:  CT examination of the brain was performed.  Multiplanar 2D reformatted images were created from the source data. Radiation dose  length product (DLP) for this visit:  1024 mGy-cm .  This examination, like all CT scans performed in the [facility], was performed utilizing techniques to minimize radiation dose exposure, including the use of iterative reconstruction and automated exposure control. IMAGE QUALITY:  Diagnostic. FINDINGS: PARENCHYMA:  No intracranial mass, mass effect or midline shift. No CT signs of acute infarction.  No acute parenchymal hemorrhage. VENTRICLES AND EXTRA-AXIAL SPACES:  Ventricles and extra-axial CSF spaces are prominent commensurate with the degree of volume loss.  No hydrocephalus.  No acute extra-axial hemorrhage. VISUALIZED ORBITS: Normal visualized orbits. PARANASAL SINUSES: Mucoperiosteal thickening the bilateral ethmoid, frontal, and inferior maxillary sinuses. The mastoid air cells appear adequately aerated and clear without air-fluid levels. CALVARIUM AND EXTRACRANIAL SOFT TISSUES: Bony calvarium and temporomandibular joints are intact. Superficial lipoma in the posterior occipital region. Scalp soft tissues otherwise unremarkable.     Impression: No acute intracranial abnormality. Paranasal sinus disease. Workstation performed: [id number]       Incidental Findings:   ***     Test Results Pending at Discharge (will require follow up):   As per After Visit Summary     Outpatient Tests Requested:  ***    Complications:  Refer to hospital course and above listed diagnosis related plan, if any    Hospital Course:     Gilbetro Vann is a 83 y.o. adult patient who originally presented to the hospital on 2/16/2024 due to ***        Please see above list of diagnoses and related plan for additional information.       Condition at Discharge: {condition:61008}     Discharge Day Visit / Exam:     Subjective:  ***    Vitals: Blood Pressure: 118/58 (02/22/24 0810)  Pulse: 62 (02/22/24 0810)  Temperature: 97.8 °F (36.6 °C) (02/22/24 0810)  Temp Source: Oral (02/22/24 0810)  Respirations: 18 (02/22/24  "0810)  Height: 5' 9\" (175.3 cm) (02/17/24 1000)  Weight - Scale: 65 kg (143 lb 4.8 oz) (02/22/24 0540)  SpO2: 98 % (02/22/24 0810)  Exam:   Physical Exam    Discharge instructions/Information to patient and family:(Discharge Medications and Follow up):   See after visit summary for information provided to patient and family.      Provisions for Follow-Up Care:  See after visit summary for information related to follow-up care and any pertinent home health orders.      Disposition: {Discharge Disposition:79071}    Planned Readmission:  {Yes or No:52993}     Discharge Statement:  I spent *** minutes discharging the patient. This time was spent on the day of discharge. I had direct contact with the patient on the day of discharge. Greater than 50% of the total time was spent examining patient, answering all patient questions, arranging and discussing plan of care with patient as well as directly providing post-discharge instructions.  Additional time then spent on discharge activities.    Discharge Medications:  See after visit summary for reconciled discharge medications provided to patient and family.      ** Please Note: \"This note has been constructed using a voice recognition system.Therefore there may be syntax, spelling, and/or grammatical errors. Please call if you have any questions. \"**        "

## 2024-02-22 NOTE — PROGRESS NOTES
"Progress Note - Urology      Patient: Gilberto Vann   : 1940 Sex: adult   MRN: [id number]     CSN: [id number]  Unit/Bed#: [address] [place] [address]     SUBJECTIVE:   Patient seen on afternoon rounds being discharged today  Off doxazosin for possible syncopal episode  Seen in the office for follow-up    Objective   Vitals: /58 (BP Location: Left arm)   Pulse 62   Temp 97.8 °F (36.6 °C) (Oral)   Resp 18   Ht 5' 9\" (1.753 m)   Wt 65 kg (143 lb 4.8 oz)   SpO2 98%   BMI 21.16 kg/m²     No intake/output data recorded.      Physical Exam:   General Alert awake   Normocephalic atraumatic PERRLA  Lungs clear bilaterally  Cardiac normal S1 normal S2  Abdomen soft, flank pain  Extremities no edema      Lab Results: CBC:   Lab Results   Component Value Date    WBC 6.63 2024    HGB 12.0 2024    HCT 36.4 (L) 2024    MCV 91 2024     2024    RBC 4.02 2024    MCH 29.9 2024    MCHC 33.0 2024    RDW 15.2 (H) 2024    MPV 10.4 2024    NRBC 0 2024     CMP:   Lab Results   Component Value Date     2024     2023    CO2 29 2024    CO2 26 2023    BUN 17 2024    BUN 20 2023    CREATININE 0.70 2024    CALCIUM 8.5 2024    AST 28 2024    AST 17 2023    ALT 41 2024    ALT 14 2023    ALKPHOS 79 2024    EGFR 86 2023    EGFR 89 2020     Urinalysis:   Lab Results   Component Value Date    COLORU Light Yellow 2020    CLARITYU Clear 2020    SPECGRAV 1.010 2020    PHUR 6.0 2020    LEUKOCYTESUR Negative 2020    NITRITE Negative 2020    GLUCOSEU Negative 2020    KETONESU Negative 2020    BILIRUBINUR Negative 2020    BLOODU Small (A) 2020     Urine Culture:   Lab Results   Component Value Date    URINECX >100,000 cfu/ml Escherichia coli 2016     PSA:   Lab Results   Component Value Date    PSA 6.98 (H) " 05/22/2023         Assessment/ Plan:  Syncopal episode  BPH obstruction  Discussed with patient to hold all alpha blockers will see in the office for flex cystoscopy possible office-based microwave therapy        James Blunt MD

## 2024-02-22 NOTE — ASSESSMENT & PLAN NOTE
2D echo showed EF of 25% with arctic valve area of 0.5 cm 2  Cardiology discussed the coordination of care with CT surgery at [facility] for evaluation for outpatient TAVR

## 2024-02-22 NOTE — PLAN OF CARE
Problem: Potential for Falls  Goal: Patient will remain free of falls  Description: INTERVENTIONS:  - Educate patient/family on patient safety including physical limitations  - Instruct patient to call for assistance with activity   - Consult OT/PT to assist with strengthening/mobility   - Keep Call bell within reach  - Keep bed low and locked with side rails adjusted as appropriate  - Keep care items and personal belongings within reach  - Initiate and maintain comfort rounds  - Make Fall Risk Sign visible to staff  - Offer Toileting every 2 Hours, in advance of need  - Initiate/Maintain bed/chair alarm  - Obtain necessary fall risk management equipment: bed/chair alarm  - Apply yellow socks and bracelet for high fall risk patients  - Consider moving patient to room near nurses station  Outcome: Progressing     Problem: PAIN - ADULT  Goal: Verbalizes/displays adequate comfort level or baseline comfort level  Description: Interventions:  - Encourage patient to monitor pain and request assistance  - Assess pain using appropriate pain scale  - Administer analgesics based on type and severity of pain and evaluate response  - Implement non-pharmacological measures as appropriate and evaluate response  - Consider cultural and social influences on pain and pain management  - Notify physician/advanced practitioner if interventions unsuccessful or patient reports new pain  Outcome: Progressing     Problem: SAFETY ADULT  Goal: Patient will remain free of falls  Description: INTERVENTIONS:  - Educate patient/family on patient safety including physical limitations  - Instruct patient to call for assistance with activity   - Consult OT/PT to assist with strengthening/mobility   - Keep Call bell within reach  - Keep bed low and locked with side rails adjusted as appropriate  - Keep care items and personal belongings within reach  - Initiate and maintain comfort rounds  - Make Fall Risk Sign visible to staff  - Offer Toileting  every 2 Hours, in advance of need  - Initiate/Maintain bed/chair alarm  - Obtain necessary fall risk management equipment: bed/chair alarm  - Apply yellow socks and bracelet for high fall risk patients  - Consider moving patient to room near nurses station  Outcome: Progressing  Goal: Maintain or return to baseline ADL function  Description: INTERVENTIONS:  - Educate patient/family on patient safety including physical limitations  - Instruct patient to call for assistance with activity   - Consult OT/PT to assist with strengthening/mobility   - Keep Call bell within reach  - Keep bed low and locked with side rails adjusted as appropriate  - Keep care items and personal belongings within reach  - Initiate and maintain comfort rounds  - Make Fall Risk Sign visible to staff  - Offer Toileting every 2 Hours, in advance of need  - Initiate/Maintain bed/chair alarm  - Obtain necessary fall risk management equipment:bed/chair alarm   - Apply yellow socks and bracelet for high fall risk patients  - Consider moving patient to room near nurses station  Outcome: Progressing  Goal: Maintains/Returns to pre admission functional level  Description: INTERVENTIONS:  - Perform AM-PAC 6 Click Basic Mobility/ Daily Activity assessment daily.  - Set and communicate daily mobility goal to care team and patient/family/caregiver.   - Collaborate with rehabilitation services on mobility goals if consulted  - Perform Range of Motion  times a day.  - Reposition patient every  hours.  - Dangle patient  times a day  - Stand patient  times a day  - Ambulate patient  times a day  - Out of bed to chair  times a day   - Out of bed for meals  times a day  - Out of bed for toileting  - Record patient progress and toleration of activity level   Outcome: Progressing     Problem: DISCHARGE PLANNING  Goal: Discharge to home or other facility with appropriate resources  Description: INTERVENTIONS:  - Identify barriers to discharge w/patient and  caregiver  - Arrange for needed discharge resources and transportation as appropriate  - Identify discharge learning needs (meds, wound care, etc.)  - Arrange for interpretive services to assist at discharge as needed  - Refer to Case Management Department for coordinating discharge planning if the patient needs post-hospital services based on physician/advanced practitioner order or complex needs related to functional status, cognitive ability, or social support system  Outcome: Progressing     Problem: Knowledge Deficit  Goal: Patient/family/caregiver demonstrates understanding of disease process, treatment plan, medications, and discharge instructions  Description: Complete learning assessment and assess knowledge base.  Interventions:  - Provide teaching at level of understanding  - Provide teaching via preferred learning methods  Outcome: Progressing     Problem: NEUROSENSORY - ADULT  Goal: Achieves stable or improved neurological status  Description: INTERVENTIONS  - Monitor and report changes in neurological status  - Monitor vital signs such as temperature, blood pressure, glucose, and any other labs ordered   - Initiate measures to prevent increased intracranial pressure  - Monitor for seizure activity and implement precautions if appropriate      Outcome: Progressing  Goal: Achieves maximal functionality and self care  Description: INTERVENTIONS  - Monitor swallowing and airway patency with patient fatigue and changes in neurological status  - Encourage and assist patient to increase activity and self care.   - Encourage visually impaired, hearing impaired and aphasic patients to use assistive/communication devices  Outcome: Progressing     Problem: CARDIOVASCULAR - ADULT  Goal: Maintains optimal cardiac output and hemodynamic stability  Description: INTERVENTIONS:  - Monitor I/O, vital signs and rhythm  - Monitor for S/S and trends of decreased cardiac output  - Administer and titrate ordered vasoactive  medications to optimize hemodynamic stability  - Assess quality of pulses, skin color and temperature  - Assess for signs of decreased coronary artery perfusion  - Instruct patient to report change in severity of symptoms  Outcome: Progressing  Goal: Absence of cardiac dysrhythmias or at baseline rhythm  Description: INTERVENTIONS:  - Continuous cardiac monitoring, vital signs, obtain 12 lead EKG if ordered  - Administer antiarrhythmic and heart rate control medications as ordered  - Monitor electrolytes and administer replacement therapy as ordered  Outcome: Progressing     Problem: MUSCULOSKELETAL - ADULT  Goal: Maintain or return mobility to safest level of function  Description: INTERVENTIONS:  - Assess patient's ability to carry out ADLs; assess patient's baseline for ADL function and identify physical deficits which impact ability to perform ADLs (bathing, care of mouth/teeth, toileting, grooming, dressing, etc.)  - Assess/evaluate cause of self-care deficits   - Assess range of motion  - Assess patient's mobility  - Assess patient's need for assistive devices and provide as appropriate  - Encourage maximum independence but intervene and supervise when necessary  - Involve family in performance of ADLs  - Assess for home care needs following discharge   - Consider OT consult to assist with ADL evaluation and planning for discharge  - Provide patient education as appropriate  Outcome: Progressing  Goal: Maintain proper alignment of affected body part  Description: INTERVENTIONS:  - Support, maintain and protect limb and body alignment  - Provide patient/ family with appropriate education  Outcome: Progressing     Problem: RESPIRATORY - ADULT  Goal: Achieves optimal ventilation and oxygenation  Description: INTERVENTIONS:  - Assess for changes in respiratory status  - Assess for changes in mentation and behavior  - Position to facilitate oxygenation and minimize respiratory effort  - Oxygen administered by  appropriate delivery if ordered  - Initiate smoking cessation education as indicated  - Encourage broncho-pulmonary hygiene including cough, deep breathe, Incentive Spirometry  - Assess the need for suctioning and aspirate as needed  - Assess and instruct to report SOB or any respiratory difficulty  - Respiratory Therapy support as indicated  Outcome: Progressing     Problem: Prexisting or High Potential for Compromised Skin Integrity  Goal: Skin integrity is maintained or improved  Description: INTERVENTIONS:  - Identify patients at risk for skin breakdown  - Assess and monitor skin integrity  - Assess and monitor nutrition and hydration status  - Monitor labs   - Assess for incontinence   - Turn and reposition patient  - Assist with mobility/ambulation  - Relieve pressure over bony prominences  - Avoid friction and shearing  - Provide appropriate hygiene as needed including keeping skin clean and dry  - Evaluate need for skin moisturizer/barrier cream  - Collaborate with interdisciplinary team   - Patient/family teaching  - Consider wound care consult   Outcome: Progressing

## 2024-02-22 NOTE — ASSESSMENT & PLAN NOTE
Patient presents with syncope versus cardiac arrest at neighbor's .  Patient fell on the floor, sustained abrasion to left scalp.  Bystanders initiated CPR.  EMS arrived 4 minutes later found patient to have palpable pulses and patient had regained consciousness.  Patient remembers he was standing by the door,unable to recall anything else.  Patient had complained of feeling foggy and dizzy, not feeling well ever since he took first dose of doxazosin for prostate evening per friend at bedside.  Patient reportedonly ate around 1 PM during the day.  Does not eat breakfast regularly.  Patient did not actually want to go to  due to not feeling well and anxiety per friend at bedside.  Denied history of syncope.  CT head no acute findings  CT chest without contrast showed - Moderate septal thickening due to interstitial edema with small pleural effusions. Acute minimally displaced fractures of multiple bilateral anterior ribs due to CPR. Nondisplaced fracture of the anterior cortex of the superior body of the sternum due to CPR.  Initial troponin 112.  Repeat trending up likely related to CPR, CHF, aortic stenosis.  Initial EKG showed NSR, bifascicular block, rate 97,TWI V1-V4,STD V2-V4.  Repeat EKG showed NSR, LVH,STD V5 V6,mild JESSE V1 to V3 likely due to LVH.   Peak troponin was 3327  Status post Lovenox 1 mg/g subcutaneously twice daily.    Lipid panel showed LDL level of 82.  Hemoglobin A1c was 5.7  2D echo showed EF of 25% with severe global hypokinesis with grade 2 diastolic dysfunction with severely dilated left atrium with an aortic valve area of 1.5 cm2  Cardiology input appreciated  Suspected syncope in the setting of possible critical aortic stenosis versus ventricular arrhythmias due to cardiomyopathy  Continue midodrine  Discontinued alpha-blocker  LifeVest prior to discharge  Follow-up with cardiology and cardiothoracic surgery after discharge  Monitor blood pressure and orthostasis

## 2024-02-22 NOTE — ASSESSMENT & PLAN NOTE
Patient underwent cardiac catheterization which showed disease of the circumflex and LAD  Cardiology coordinating care with CT surgery at \[facility]\"" for outpatient evaluation for TAVR along with myocardial revascularization

## 2024-02-22 NOTE — PROGRESS NOTES
Progress Note - Cardiology   [facility]     Gilbetro Vann 83 y.o. adult MRN: [id number]  : 1940  Unit/Bed#: [place] [address] Encounter: [id number]       ASSESSMENT:  S/p syncope  S/p CPR with rib and sternal fracture  24 CT chest without contrast: Acute minimally displaced fractures of multiple bilateral anterior ribs due to CPR. Nondisplaced fracture of the anterior cortex of the superior body of the sternum      Critical aortic stenosis     CAD involving circumflex 70% and LAD 60% stenosis     Elevated cardiac troponin, probable nonischemic myocardial injury secondary to CPR, systolic and diastolic heart failure and critical aortic stenosis     Acute combined systolic and diastolic heart failure  EF 25%  24 TTE: LVEF 25%. There is severe global hypokinesis with regional variation. Diastolic function is moderately abnormal, consistent with grade II (pseudonormal) relaxation.  Left atrial filling pressure is elevated. Right Ventricle: Systolic function is low normal. Left Atrium: The atrium is severely dilated ( 60 mL/m2). Right Atrium: The atrium is moderately dilated. Aortic Valve: The aortic valve is trileaflet. The leaflets are moderately thickened. The leaflets are moderately calcified. There is severely reduced mobility. There is mild to moderate regurgitation. There is critical stenosis.  Peak gradient 108 mmHg.  Mean gradient 63.  LVOT velocity is measured to be 0.58.  LVOT diameter 2.2 and aortic valve area 0.5 cm 2 and dimensionless index 0.11.  Mitral Valve: There is mild to moderate regurgitation. Tricuspid Valve: There is mild to moderate regurgitation.  Pulmonary artery pressure around 50 mmHg. IVC/SVC: The inferior vena cava is mildly dilated.  It has less than 50% collapse with inspiration consistent with elevated right atrial pressure about 10 to 15 mmHg. Pericardium: There is a trivial pericardial effusion.      Hypertension, BP is stable at 118/58 with  "heart rate of 62/min    COPD  Prediabetes, hemoglobin A1c 5.7     RECOMMENDATIONS:  Discussed with CT surgeon at [facility], Dr. Armin Pierce who recommends outpatient evaluation for TAVR and myocardial revascularization     Continue aspirin, Toprol, Entresto and Aldactone  LifeVest prior to discharge     Outpatient follow-up with [facility] cardiovascular surgery     Patient is possibly going to rehab prior to going home          Subjective / Objective:     Review of Systems  Awake, alert and comfortable.  Denies any anginal pain, unusual dyspnea or dizziness  Vitals: Blood pressure 118/58, pulse 62, temperature 97.8 °F (36.6 °C), temperature source Oral, resp. rate 18, height 5' 9\" (1.753 m), weight 65 kg (143 lb 4.8 oz), SpO2 98%.  Vitals:    02/21/24 0548 02/22/24 0540   Weight: 65.4 kg (144 lb 3.2 oz) 65 kg (143 lb 4.8 oz)     Body mass index is 21.16 kg/m².  BP Readings from Last 3 Encounters:   02/22/24 118/58   12/26/23 130/78   11/28/23 130/78     Orthostatic Blood Pressures      Flowsheet Row Most Recent Value   Blood Pressure 118/58 filed at 02/22/2024 0810   Patient Position - Orthostatic VS Sitting filed at 02/22/2024 0810          I/O         02/20 0701  02/21 0700 02/21 0701  02/22 0700 02/22 0701  02/23 0700    P.O.       Total Intake(mL/kg)       Urine (mL/kg/hr)       Total Output       Net                    Invasive Devices       Peripheral Intravenous Line  Duration             Peripheral IV 02/17/24 Left Forearm 5 days                    No intake or output data in the 24 hours ending 02/22/24 1039      Physical Exam:   Physical Exam    Neurologic:  Alert & oriented x 3,  no focal deficits noted   Constitutional:  Well developed, well nourished,  With no acute distress  Eyes:  PERRL, conjunctiva normal   HENT:  Atraumatic, external ears normal, nose normal, .  NECK: Normal range of motion, no tenderness, neck is supple , No JVP  Respiratory:  Bilateral air entry, mostly clear to " auscultation  Cardiovascular: Regular S1-S2 with a III/VI systolic murmur.  No pericardial rub or gallop audible  GI:  Soft, nondistended, audible bowel sounds, nontender, no hepatosplenomegaly appreciated  Musculoskeletal:  No tenderness, no deformities.    Extremities:  No appreciable pitting edema. Distal pulses are present  Psychiatric:  Speech and behavior appropriate             Medications/ Allergies:     Current Facility-Administered Medications   Medication Dose Route Frequency Provider Last Rate    acetaminophen  650 mg Oral Q4H PRN Earlene Gudino MD      acetaminophen  650 mg Oral Q8H [facility] Earlene Gudino MD      aspirin  325 mg Oral Daily Saman Motley MD      budesonide-formoterol  2 puff Inhalation BID Saman Motley MD      docusate sodium  100 mg Oral BID Earlene Gudino MD      enoxaparin  40 mg Subcutaneous Q24H [facility] Saman Motley MD      famotidine  20 mg Oral Daily Saman Motley MD      fluticasone  1 spray Nasal BID Saman Motley MD      latanoprost  1 drop Both Eyes HS Saman Motley MD      levalbuterol  0.63 mg Nebulization Q6H PRN Saman Motley MD      losartan  25 mg Oral Daily Lis Card PA-C      melatonin  3 mg Oral HS Earlene Gudino MD      metoprolol succinate  25 mg Oral Daily Saman Motley MD      midodrine  5 mg Oral TID AC Saman Motley MD      nicotine polacrilex  2 mg Oral Q2H PRN Saman Motley MD      ondansetron  4 mg Intravenous Q6H PRN Saman Motley MD      polyethylene glycol  17 g Oral Daily Earlene Gudino MD      spironolactone  12.5 mg Oral Daily Saman Motley MD       acetaminophen, 650 mg, Q4H PRN  levalbuterol, 0.63 mg, Q6H PRN  nicotine polacrilex, 2 mg, Q2H PRN  ondansetron, 4 mg, Q6H PRN      Allergies   Allergen Reactions    Shellfish-Derived Products - Food Allergy Other (See Comments)     Listed by PCP patient denies    Iodine - Food Allergy Other (See Comments)           Labs:   Troponins:   "    CBC with diff:  Results from last 7 days   Lab Units 02/22/24  0536 02/21/24  0543 02/18/24  0455 02/17/24  0505 02/16/24  1908   WBC Thousand/uL 6.63 7.39 7.13 8.13 9.37   HEMOGLOBIN g/dL 12.0 11.8* 11.8* 11.1* 12.2   HEMATOCRIT % 36.4* 35.5* 36.3* 33.7* 37.6   MCV fL 91 90 92 91 92   PLATELETS Thousands/uL 216 210 154 156 208   RBC Million/uL 4.02 3.94 3.96 3.72* 4.10   MCH pg 29.9 29.9 29.8 29.8 29.8   MCHC g/dL 33.0 33.2 32.5 32.9 32.4   RDW % 15.2* 14.9 15.2* 15.0 14.9   MPV fL 10.4 11.2 10.7 10.5 10.7   NRBC AUTO /100 WBCs  --   --   --   --  0       CMP:  Results from last 7 days   Lab Units 02/22/24  0536 02/21/24  0543 02/20/24  1209 02/19/24  0501 02/18/24  0455 02/17/24  0505 02/16/24  1908   SODIUM mmol/L 142 140 138 137 139 139 139   POTASSIUM mmol/L 4.2 4.3 4.0 3.7 4.4 4.4 3.9   CHLORIDE mmol/L 106 105 103 103 103 108 105   CO2 mmol/L 29 29 29 29 30 24 23   ANION GAP mmol/L 7 6 6 5 6 7 11   BUN mg/dL 17 21 20 18 19 23 23   CREATININE mg/dL 0.70 0.69 0.69 0.71 0.81 0.71 1.19   CALCIUM mg/dL 8.5 8.5 8.1* 8.1* 8.1* 8.0* 8.7   AST U/L 28  --   --   --  53* 80* 77*   ALT U/L 41  --   --   --  66* 86* 78*   ALK PHOS U/L 79  --   --   --  58 52 59   TOTAL PROTEIN g/dL 5.4*  --   --   --  5.5* 5.3* 5.8*   ALBUMIN g/dL 3.2*  --   --   --  3.4* 3.3* 3.6   TOTAL BILIRUBIN mg/dL 0.70  --   --   --  1.09* 0.77 0.76       Magnesium:  Results from last 7 days   Lab Units 02/22/24  0536 02/21/24  0543 02/18/24  0455 02/17/24  0505 02/16/24  1908   MAGNESIUM mg/dL 1.9 2.0 2.1 1.8* 2.0     Coags:    TSH:  Results from last 7 days   Lab Units 02/17/24  0505   TSH 3RD GENERATON uIU/mL 2.691     No components found for: \"TSH3\"  Lipid Profile:  Results from last 7 days   Lab Units 02/17/24  0505   CHOLESTEROL mg/dL 144   TRIGLYCERIDES mg/dL 44   HDL mg/dL 53   LDL CALC mg/dL 82     Hgb A1c:  Results from last 7 days   Lab Units 02/16/24  1908   HEMOGLOBIN A1C % 5.7*     NT-proBNP: No results for input(s): \"NTBNP\" in the " last 72 hours.     Imaging & Testing   I have personally reviewed pertinent reports.    Cardiac catheterization    Result Date: 2/20/2024  Narrative:   Severe Cx disease + Moderate LAD disease   Eccentic AI difficult to keep RCA cannulated TR Band 2 hours Severe CX stenosis, Moderate LAD stenosis Critical AS/Severe HF Endovascular/TAVR valuation outpatient     Echo complete w/ contrast if indicated    Result Date: 2/17/2024  Narrative:   Left Ventricle: Left ventricular cavity size is mildly dilated. Wall thickness is mildly increased. There is mild concentric hypertrophy. The left ventricular ejection fraction is around 25% by visual estimation. Systolic function is severely reduced. There is severe global hypokinesis with regional variation. Diastolic function is moderately abnormal, consistent with grade II (pseudonormal) relaxation.  Left atrial filling pressure is elevated.   Right Ventricle: Systolic function is low normal.   Left Atrium: The atrium is severely dilated ( 60 mL/m2).   Right Atrium: The atrium is moderately dilated.   Aortic Valve: The aortic valve is trileaflet. The leaflets are moderately thickened. The leaflets are moderately calcified. There is severely reduced mobility. There is mild to moderate regurgitation. There is critical stenosis.  Peak gradient 108 mmHg.  Mean gradient 63.  LVOT velocity is measured to be 0.58.  LVOT diameter 2.2 and aortic valve area 0.5 cm 2 and dimensionless index 0.11.  There may be some component of low cardiac output contributing to valve stenosis.   Mitral Valve: There is mild to moderate regurgitation.   Tricuspid Valve: There is mild to moderate regurgitation.  Pulmonary artery pressure around 50 mmHg.   IVC/SVC: The inferior vena cava is mildly dilated.  It has less than 50% collapse with inspiration consistent with elevated right atrial pressure about 10 to 15 mmHg.   Pericardium: There is a trivial pericardial effusion.   No old echo available for  comparison.     CT chest without contrast    Result Date: 2/16/2024  Narrative: CT CHEST WITHOUT IV CONTRAST INDICATION:   Central chest pain after chest compressions. Per my review of the medical record, history of COPD with apparent syncope. CPR administered. Recent rhinorrhea and minimally productive cough. COMPARISON: Chest radiograph 5/17/2023, abdomen CT 4/12/2020. TECHNIQUE: Chest CT without intravenous contrast.  Axial, sagittal, coronal 2D reformats and coronal MIPS from source data.  3D reconstructions of the bony thorax were performed in order to improved sensitivity of evaluation for rib fractures. Radiation dose length product (DLP):  319 mGy-cm . Radiation dose exposure minimized using iterative reconstruction and automated exposure control. FINDINGS: LUNGS: Moderate septal thickening. 5 mm solid nodule lateral segment right middle lobe (3/145). Two 3 mm juxtapleural lateral segment right lower lobe nodules (3/210). 3 mm nodules in the left upper lobe (3/62, 73, 100). 5 mm solid superior segment left lower lobe nodule (3/102). Mild dependent atelectasis in the lower lobes. A few benign thin-walled cysts. AIRWAYS: No significant filling defects. Mild diffuse bronchial wall thickening. PLEURA: Small effusions. HEART/GREAT VESSELS: Moderate cardiomegaly. Moderate coronary artery calcification indicating atherosclerotic heart disease. Moderate calcification of the aortic valve leaflets which can contribute to aortic stenosis. Pulmonary artery enlargement. MEDIASTINUM AND LJ:  Unremarkable. CHEST WALL AND LOWER NECK: Unremarkable. UPPER ABDOMEN: Benign calcification in the right hepatic lobe. OSSEOUS STRUCTURES: Acute mildly displaced fractures of multiple bilateral anterior ribs due to CPR. Nondisplaced fracture of the anterior cortex of the body of the sternum. Mild degenerative disease in the spine.     Impression: Moderate septal thickening due to interstitial edema with small pleural effusions. Acute  minimally displaced fractures of multiple bilateral anterior ribs due to CPR. Nondisplaced fracture of the anterior cortex of the superior body of the sternum due to CPR. A few 5 mm and smaller nodules. Stability is unknown given the absence of comparison studies. Follow-up could be obtained with a chest CT with no contrast in 6 months to assure stability. This study was marked in Epic for immediate notification and follow-up. Workstation performed: [id number]     CT head without contrast    Result Date: 2/16/2024  Narrative: CT BRAIN - WITHOUT CONTRAST INDICATION:   Fall, head strike on aspirin. COMPARISON:  None. TECHNIQUE:  CT examination of the brain was performed.  Multiplanar 2D reformatted images were created from the source data. Radiation dose length product (DLP) for this visit:  1024 mGy-cm .  This examination, like all CT scans performed in the [facility], was performed utilizing techniques to minimize radiation dose exposure, including the use of iterative reconstruction and automated exposure control. IMAGE QUALITY:  Diagnostic. FINDINGS: PARENCHYMA:  No intracranial mass, mass effect or midline shift. No CT signs of acute infarction.  No acute parenchymal hemorrhage. VENTRICLES AND EXTRA-AXIAL SPACES:  Ventricles and extra-axial CSF spaces are prominent commensurate with the degree of volume loss.  No hydrocephalus.  No acute extra-axial hemorrhage. VISUALIZED ORBITS: Normal visualized orbits. PARANASAL SINUSES: Mucoperiosteal thickening the bilateral ethmoid, frontal, and inferior maxillary sinuses. The mastoid air cells appear adequately aerated and clear without air-fluid levels. CALVARIUM AND EXTRACRANIAL SOFT TISSUES: Bony calvarium and temporomandibular joints are intact. Superficial lipoma in the posterior occipital region. Scalp soft tissues otherwise unremarkable.     Impression: No acute intracranial abnormality. Paranasal sinus disease. Workstation performed: [id number]     "    EKG / Monitor: Personally reviewed.    Sinus rhythm at 64/min            Dr. Betty Grider MD, [facility]      \"This note has been constructed using a voice recognition system.Therefore there may be syntax, spelling, and/or grammatical errors. Please call if you have any questions. \"  "

## 2024-02-22 NOTE — DISCHARGE INSTR - AVS FIRST PAGE
Continue aspirin, Toprol, losartan, Aldactone, atorvastatin  Continue LifeVest as instructed      Schedule appointment with cardiovascular surgery as soon as possible for evaluation for valve replacement and coronary artery disease

## 2024-02-22 NOTE — CASE MANAGEMENT
Case Management Discharge Planning Note    Patient name Gilberto Vann  Location [address] [address]/[address] [place] [address]-* MRN [id number]  : 1940 Date 2024       Current Admission Date: 2024  Current Admission Diagnosis:Syncope   Patient Active Problem List    Diagnosis Date Noted    CAD (coronary artery disease) 2024    Critical aortic valve stenosis 2024    Cardiomyopathy (HCC) 2024    Syncope 2024    Glaucoma 2024    Elevated troponin 2024    Hyperglycemia 2024    Closed fracture of multiple ribs of both sides 2024    Closed fracture of body of sternum 2024    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HCC) 2024    Pulmonary nodules 2024    Anxiety 2024    Anemia 2024    Centrilobular emphysema (HCC) 2023    Hypercalcemia 2023    Shortness of breath 2023    Chronic bronchitis (HCC) 2023    Hypokalemia 2023    Encysted hydrocele 2023    Primary hypertension 2023    Pre-op examination 01/10/2023    Hydrocele 01/10/2023    Primary osteoarthritis involving multiple joints 2022    Lumbar radiculopathy 2022    Benign prostatic hyperplasia with urinary retention 2020    Hematuria, gross 2020    COPD (chronic obstructive pulmonary disease) (HCC) 2020    Asthma 2013      LOS (days): 6  Geometric Mean LOS (GMLOS) (days): 5.4  Days to GMLOS:-0.3     OBJECTIVE:  Risk of Unplanned Readmission Score: 13.83         Current admission status: Inpatient   Preferred Pharmacy:   [id number] - [place], [place] - [address]  [address]  [facility] [address]  Phone: [phone] Fax: [phone]    [id number] - [place], [place] - [address]  [address] [address]  Phone: [phone] Fax: [phone]    Primary Care Provider: Dima Lloyd,  MD    Primary Insurance: MEDICARE  Secondary Insurance:     DISCHARGE DETAILS:    Other Referral/Resources/Interventions Provided:  Referral Comments: CM reserved [facility] in aidin per patient's preference. [facility] of patient being medically cleared for discharge as informed by attending physician. Patient's sister Lucy made CM aware that patient's friend will be driving hime to the facility.     Treatment Team Recommendation: Short Term Rehab  Discharge Destination Plan:: Short Term Rehab  Transport at Discharge : Family      IMM Given (Date):: 02/22/24  IMM Given to:: Patient (IMM reviewed with patient, patient verbalized understanding. Patient signed the IMM, a copy provided to patient and a copy placed in scan bin for chart.)

## 2024-02-22 NOTE — NURSING NOTE
Pt to [facility] with lifevest on.  Pt's sister will be taking pt in her car.  Will be transferred there for STR.  All paperwork printed out and sent with pt to [facility].  IV removed.

## 2024-02-22 NOTE — [place] UNIVERSAL TRANSFER FORM
"[facility] [place] UNIVERSAL [place] FORM  (ALL ITEMS MUST BE COMPLETED)    1. TRANSFER FROM: [facility]      TRANSFER TO: 1600    2. DATE OF TRANSFER: 2/22/2024                        TIME OF TRANSFER: 1600    3. PATIENT NAME: Gilberto Vann E      YOB: 1940                             GENDER: adult    4. LANGUAGE:   English    5. PHYSICIAN NAME:  Earlene Gudino MD                   PHONE: [phone]    6. CODE STATUS: Level 1 - Full Code        Out of Hospital DNR Attached: No    7. :                                      :  Extended Emergency Contact Information  Primary Emergency Contact: Lucy Adames  Home Phone: [phone]  Mobile Phone: [phone]  Relation: Sister  Secondary Emergency Contact: anayeli mullen  Home Phone: [phone]  Mobile Phone: [phone]  Relation: Friend           Health Care Representative/Proxy:  No           Legal Guardian:  No             NAME OF:           HEALTH CARE REPRESENTATIVE/PROXY:                                         OR           LEGAL GUARDIAN, IF NOT :                                               PHONE:  (Day)           (Night)                        (Cell)    8. REASON FOR TRANSFER: (Must include brief medical history and recent changes in physical function or cognition.) STR            V/S: /58 (BP Location: Left arm)   Pulse 62   Temp 97.8 °F (36.6 °C) (Oral)   Resp 18   Ht 5' 9\" (1.753 m)   Wt 65 kg (143 lb 4.8 oz)   SpO2 98%   BMI 21.16 kg/m²           PAIN: None    9. PRIMARY DIAGNOSIS: Syncope      Secondary Diagnosis:         Pacemaker: No      Internal Defib: No          Mental Health Diagnosis (if Applicable):    10. RESTRAINTS: No     11. RESPIRATORY NEEDS: None    12. ISOLATION/PRECAUTION: None    13. ALLERGY: Shellfish-derived products - food allergy and Iodine - food allergy    14. SENSORY:       Vision Good    15. SKIN CONDITION: " No Wounds    16. DIET: Regular    17. IV ACCESS: None    18. PERSONAL ITEMS SENT WITH PATIENT: None    19. ATTACHED DOCUMENTS: MUST ATTACH CURRENT MEDICATION INFORMATION Face Sheet, MAR, Medication Reconciliation, and TAR    20. AT RISK ALERTS:Falls        HARM TO: N/A    21. WEIGHT BEARING STATUS:         Left Leg: Full        Right Leg: Full    22. MENTAL STATUS:Alert and Oriented    23. FUNCTION:        Walk: Self        Transfer: Self        Toilet: With Help        Feed: Self    24. IMMUNIZATIONS/SCREENING:     Immunization History   Administered Date(s) Administered    COVID-19 J&J ([place]) vaccine 0.5 mL 05/21/2021, 11/05/2021    INFLUENZA 11/27/2018, 09/10/2019, 10/18/2021    Influenza, high dose seasonal 0.7 mL 09/25/2023    Pneumococcal Conjugate Vaccine 20-valent (Pcv20), Polysace 02/19/2024    Pneumococcal Polysaccharide PPV23 01/01/2003    influenza, injectable, quadrivalent 11/27/2018, 09/10/2019, 10/18/2021       25. BOWEL: Continent and Date Last BM2/22/24    26. BLADDER: Continent    27. SENDING FACILITY CONTACT: Deena Worthington RN                  Title: RN        Unit: 3N        Phone: [phone]          REC'G FACILITY CONTACT (if known):        Title:        Unit:         Phone:         FORM PREFILLED BY (if applicable)       Title:       Unit:        Phone:         FORM COMPLETED BY Deena Worthington RN      Title: [facility]      Phone: [phone]

## 2024-02-22 NOTE — ASSESSMENT & PLAN NOTE
Peak troponin was 3327  Suspected nonischemic myocardial injury in the setting of CPR, critical aortic stenosis and heart failure  Status post therapeutic Lovenox   Continue aspirin  Lipid panel showed LDL level of 82  2D echo-EF of 25% with aortic valve area of 0.5 cm squared  Held off on starting Lipitor due to elevated LFTs, initially subsequently started on Lipitor 20 mg daily with improvement in LFTs  Cardiology following, input appreciated  Status post cardiac catheterization which showed disease involving circumflex and LAD  Cardiology discussed with CT surgery, recommended close outpatient follow-up for TAVR and CAD  Continue aspirin, Toprol, statin  LifeVest on discharge  Discussed with patient and family at bedside regarding compliance with medications, follow-up and further plan

## 2024-02-22 NOTE — ASSESSMENT & PLAN NOTE
Due to CPR  Incentive spirometer  Pain control with short course of scheduled Tylenol, avoid tramadol  Tylenol as needed

## 2024-02-22 NOTE — ASSESSMENT & PLAN NOTE
Echo showed EF of 25% with severe global hypokinesis with regional variations, grade 2 diastolic dysfunction with severely dilated left atrium with aortic valve area of 0.5 cm 2  Etiology not clear at the current time  Continue medical management with Toprol-XL, Aldactone, losartan  Cardiology follow-up

## 2024-02-23 ENCOUNTER — TELEPHONE (OUTPATIENT)
Dept: CARDIAC SURGERY | Facility: CLINIC | Age: 84
End: 2024-02-23

## 2024-02-23 NOTE — TELEPHONE ENCOUNTER
Spoke with patient and his sister Naheed. Informed them we will schedule patient to see cardiac surgeon once patient is discharged from [facility]. Discussed pre TAVR testing and dental clearance. Encouraged to make dental appt. Naheed will call me in a week with patient update. Gave naheed our office contact information. All questions addressed.    09-Dec-2010

## 2024-02-28 ENCOUNTER — TELEPHONE (OUTPATIENT)
Age: 84
End: 2024-02-28

## 2024-02-28 NOTE — TELEPHONE ENCOUNTER
Lucy the patients sister called  the patient is in rehab in [place] and she is in [place]    she wanted to know if Dr Weinberg could recommend a cardiology surgeon    the patient changed insurance effective 3/1/24  to a [employer]   the patients sister is also concerned about the patients weight  he is now at 137   the patient does eat  but she feels he has been steadily losing weight and she wasn't sure if he mentioned it to the doctor when he saw him   please give her a call   thank you

## 2024-02-28 NOTE — PROGRESS NOTES
Progress Note - Cardiology Office  [facility]    Gilberto Vann 83 y.o. adult MRN: [id number]  : 1940  Encounter: [id number]      ASSESSMENT:  History of syncope, probably due to critical aortic stenosis  S/p CPR with rib and sternal fracture  24 CT chest without contrast: Acute minimally displaced fractures of multiple bilateral anterior ribs due to CPR. Nondisplaced fracture of the anterior cortex of the superior body of the sternum      Critical aortic stenosis     CAD involving circumflex 70% and LAD 60% stenosis     Elevated cardiac troponin, probable nonischemic myocardial injury secondary to CPR, systolic and diastolic heart failure and critical aortic stenosis     Acute combined systolic and diastolic heart failure  EF 25%  24 TTE: LVEF 25%. There is severe global hypokinesis with regional variation. Diastolic function is moderately abnormal, consistent with grade II (pseudonormal) relaxation.  Left atrial filling pressure is elevated. Right Ventricle: Systolic function is low normal. Left Atrium: The atrium is severely dilated ( 60 mL/m2). Right Atrium: The atrium is moderately dilated. Aortic Valve: The aortic valve is trileaflet. The leaflets are moderately thickened. The leaflets are moderately calcified. There is severely reduced mobility. There is mild to moderate regurgitation. There is critical stenosis.  Peak gradient 108 mmHg.  Mean gradient 63.  LVOT velocity is measured to be 0.58.  LVOT diameter 2.2 and aortic valve area 0.5 cm 2 and dimensionless index 0.11.  Mitral Valve: There is mild to moderate regurgitation. Tricuspid Valve: There is mild to moderate regurgitation.  Pulmonary artery pressure around 50 mmHg. IVC/SVC: The inferior vena cava is mildly dilated.  It has less than 50% collapse with inspiration consistent with elevated right atrial pressure about 10 to 15 mmHg. Pericardium: There is a trivial pericardial effusion.      Hypertension,    BP is 120/66 mmHg with heart rate of 62/min     COPD  Prediabetes, hemoglobin A1c 5.7     RECOMMENDATIONS:  Continue aspirin, Toprol, losartan, Aldactone and atorvastatin  Patient is wearing LifeVest  Awaiting evaluation by cardiovascular surgery at [facility] for TAVR and revascularization        Please call [phone] if any questions.    HPI :     Gilberto Vann is a 83 y.o. year old adult who came for follow up.   He denies any cardiac symptoms today.  He is going to see a dentist today.  I again advised the patient and his accompanying family member to make the appointment with cardiovascular surgery at [facility]    REVIEW OF SYSTEMS:  Denies any new or acute cardiac symptoms today.  Denies chest pain, unusual dyspnea, dizziness, palpitations or syncope      Historical Information   Past Medical History:   Diagnosis Date    Arthritis     Asthma     Back pain     BPH (benign prostatic hypertrophy) with urinary obstruction     COPD (chronic obstructive pulmonary disease) (HCC)     Dizziness     Glaucoma     bilat    Insomnia     RLS (restless legs syndrome)     Sinusitis      Past Surgical History:   Procedure Laterality Date    CARDIAC CATHETERIZATION N/A 2/19/2024    Procedure: Cardiac catheterization;  Surgeon: Boston Bergman MD;  Location: [facility] CARDIAC [facility];  Service: Cardiology    CARDIAC CATHETERIZATION N/A 2/19/2024    Procedure: Cardiac Coronary Angiogram;  Surgeon: Boston Bergman MD;  Location: [facility] CARDIAC C[facility];  Service: Cardiology    CATARACT EXTRACTION Left     EAR SURGERY      to wart    EPIDIDYMAL CYST EXCISION Right 05/04/2000    HEMORROIDECTOMY      NH EXCISION HYDROCELE UNILATERAL Left 9/14/2023    Procedure: HYDROCELECTOMY, sPERMATOCELECTOMY;  Surgeon: James Blunt MD;  Location: [facility] MAIN OR;  Service: Urology    NH TRURL ELECTROSURG RESCJ PROSTATE BLEED COMPLETE N/A 04/16/2020    Procedure: CYSTOSCOPY, TRANSURETHRAL RESECTION OF PROSTATE (TURP);  Surgeon: Reji Meneses MD;  Location:  [facility];  Service: Urology    PROSTATE BIOPSY Bilateral 2003    BPH with mild acute and chronic inflammation    PROSTATE BIOPSY Bilateral 2005    BPH with marked artropy and chronic inflammation     Social History     Substance and Sexual Activity   Alcohol Use Not Currently     Social History     Substance and Sexual Activity   Drug Use Never     Social History     Tobacco Use   Smoking Status Former    Current packs/day: 0.00    Average packs/day: 1 pack/day for 15.0 years (15.0 ttl pk-yrs)    Types: Cigarettes    Start date:     Quit date:     Years since quittin.2    Passive exposure: Never   Smokeless Tobacco Current    Types: Chew   Tobacco Comments    Chews cigars     Family History:   Family History   Problem Relation Age of Onset    Asthma Father        Meds/Allergies     Allergies   Allergen Reactions    Shellfish-Derived Products - Food Allergy Other (See Comments)     Listed by PCP patient denies    Iodine - Food Allergy Other (See Comments)       Current Outpatient Medications:     acetaminophen (TYLENOL) 325 mg tablet, Take 2 tablets (650 mg total) by mouth every 4 (four) hours as needed for mild pain, headaches or fever, Disp: , Rfl:     atorvastatin (LIPITOR) 20 mg tablet, Take 1 tablet (20 mg total) by mouth daily with dinner, Disp: , Rfl:     budesonide-formoterol (SYMBICORT) 160-4.5 mcg/act inhaler, Inhale 2 puffs 2 (two) times a day Rinse mouth after use., Disp: 10.2 g, Rfl: 5    docusate sodium (COLACE) 100 mg capsule, Take 1 capsule (100 mg total) by mouth 2 (two) times a day, Disp: , Rfl:     famotidine (PEPCID) 20 mg tablet, Take 1 tablet (20 mg total) by mouth daily, Disp: 30 tablet, Rfl: 0    fluticasone (FLONASE) 50 mcg/act nasal spray, 1 spray into each nostril 2 (two) times a day, Disp: 15.8 mL, Rfl: 3    latanoprost (XALATAN) 0.005 % ophthalmic solution, Administer 1 drop to both eyes daily at bedtime, Disp: , Rfl:     levalbuterol (XOPENEX) 0.63 mg/3 mL  "nebulizer solution, Take 3 mL (0.63 mg total) by nebulization every 6 (six) hours as needed for wheezing or shortness of breath, Disp: , Rfl:     losartan (COZAAR) 25 mg tablet, Take 1 tablet (25 mg total) by mouth daily, Disp: , Rfl:     melatonin 3 mg, Take 1 tablet (3 mg total) by mouth daily at bedtime, Disp: , Rfl:     metoprolol succinate (TOPROL-XL) 25 mg 24 hr tablet, Take 1 tablet (25 mg total) by mouth daily, Disp: 30 tablet, Rfl: 0    midodrine (PROAMATINE) 5 mg tablet, Take 1 tablet (5 mg total) by mouth 3 (three) times a day before meals, Disp: 90 tablet, Rfl: 0    nicotine polacrilex (NICORETTE) 2 mg gum, Chew 1 each (2 mg total) every 2 (two) hours as needed for smoking cessation, Disp: 100 each, Rfl: 0    spironolactone (ALDACTONE) 25 mg tablet, Take 0.5 tablets (12.5 mg total) by mouth daily, Disp: 30 tablet, Rfl: 0    acetaminophen (TYLENOL) 325 mg tablet, Take 2 tablets (650 mg total) by mouth every 8 (eight) hours for 7 days (Patient not taking: Reported on 2/29/2024), Disp: , Rfl:     aspirin 325 mg tablet, Take 1 tablet (325 mg total) by mouth daily (Patient not taking: Reported on 2/29/2024), Disp: 30 tablet, Rfl: 0    Vitals: Blood pressure 120/66, pulse 62, height 5' 9\" (1.753 m), weight 65.8 kg (145 lb), SpO2 98%.    Body mass index is 21.41 kg/m².  Vitals:    02/29/24 1000   Weight: 65.8 kg (145 lb)     BP Readings from Last 3 Encounters:   02/29/24 120/66   02/22/24 118/58   12/26/23 130/78       Physical Exam:  Physical Exam    Neurologic:  Alert & oriented x 3, no new focal deficits, Not in any acute distress,  Constitutional:  Well developed, well nourished, non-toxic appearance   Eyes:  Pupil equal and reacting to light, conjunctiva normal,   HENT:  Atraumatic, oropharynx moist, Neck- normal range of motion, no tenderness,  Neck supple, No JVP, No LNP   Respiratory:  Bilateral air entry, mostly clear to auscultation  Cardiovascular: S1-S2 regular with a I/VI systolic murmur   GI:  " Soft, nondistended, normal bowel sounds, nontender, no hepatosplenomegaly appreciated.  Musculoskeletal:   no tenderness, no deformities.   Skin:  Well hydrated, no rash   Lymphatic:  No lymphadenopathy noted   Extremities:  No lower extremity edema      Cardiac testing:       Results for orders placed during the hospital encounter of 02/16/24    Echo complete w/ contrast if indicated    Interpretation Summary    Left Ventricle: Left ventricular cavity size is mildly dilated. Wall thickness is mildly increased. There is mild concentric hypertrophy. The left ventricular ejection fraction is around 25% by visual estimation. Systolic function is severely reduced. There is severe global hypokinesis with regional variation. Diastolic function is moderately abnormal, consistent with grade II (pseudonormal) relaxation.  Left atrial filling pressure is elevated.    Right Ventricle: Systolic function is low normal.    Left Atrium: The atrium is severely dilated ( 60 mL/m2).    Right Atrium: The atrium is moderately dilated.    Aortic Valve: The aortic valve is trileaflet. The leaflets are moderately thickened. The leaflets are moderately calcified. There is severely reduced mobility. There is mild to moderate regurgitation. There is critical stenosis.  Peak gradient 108 mmHg.  Mean gradient 63.  LVOT velocity is measured to be 0.58.  LVOT diameter 2.2 and aortic valve area 0.5 cm 2 and dimensionless index 0.11.  There may be some component of low cardiac output contributing to valve stenosis.    Mitral Valve: There is mild to moderate regurgitation.    Tricuspid Valve: There is mild to moderate regurgitation.  Pulmonary artery pressure around 50 mmHg.    IVC/SVC: The inferior vena cava is mildly dilated.  It has less than 50% collapse with inspiration consistent with elevated right atrial pressure about 10 to 15 mmHg.    Pericardium: There is a trivial pericardial effusion.    No old echo available for  "comparison.      Imaging:  Chest X-Ray:   XR chest pa & lateral    Result Date: 5/18/2023  Impression No acute cardiopulmonary disease. Workstation performed: [id number]       CT-scan of the chest:     No CTA results available for this patient.  Lab Review   Lab Results   Component Value Date    WBC 6.63 02/22/2024    HGB 12.0 02/22/2024    HCT 36.4 (L) 02/22/2024    MCV 91 02/22/2024    RDW 15.2 (H) 02/22/2024     02/22/2024     BMP:  Lab Results   Component Value Date    SODIUM 142 02/22/2024    K 4.2 02/22/2024     02/22/2024    CO2 29 02/22/2024    BUN 17 02/22/2024    CREATININE 0.70 02/22/2024    GLUC 91 02/22/2024    GLUF 103 (H) 09/15/2023    CALCIUM 8.5 02/22/2024    CORRECTEDCA 8.6 02/18/2024    EGFR 86 06/26/2023    MG 1.9 02/22/2024     LFT:  Lab Results   Component Value Date    AST 28 02/22/2024    ALT 41 02/22/2024    ALKPHOS 79 02/22/2024    TP 5.4 (L) 02/22/2024    ALB 3.2 (L) 02/22/2024      No components found for: \"TSH3\"  Lab Results   Component Value Date    BJX8YDYITMJY 2.691 02/17/2024     Lab Results   Component Value Date    HGBA1C 5.7 (H) 02/16/2024     Lipid Profile:   Lab Results   Component Value Date    CHOLESTEROL 144 02/17/2024    HDL 53 02/17/2024    LDLCALC 82 02/17/2024    TRIG 44 02/17/2024     Lab Results   Component Value Date    CHOLESTEROL 144 02/17/2024     No results found for: \"CKTOTAL\", \"CKMB\", \"CKMBINDEX\", \"TROPONINI\"  No results found for: \"NTBNP\"   Recent Results (from the past 672 hour(s))   Fingerstick Glucose (POCT)    Collection Time: 02/16/24  6:55 PM   Result Value Ref Range    POC Glucose 204 (H) 65 - 140 mg/dl   ECG 12 lead    Collection Time: 02/16/24  6:57 PM   Result Value Ref Range    Ventricular Rate 97 BPM    Atrial Rate 97 BPM    CO Interval 176 ms    QRSD Interval 152 ms    QT Interval 430 ms    QTC Interval 546 ms    P Axis 83 degrees    QRS Axis -74 degrees    T Wave Axis 82 degrees   CBC and differential    Collection Time: 02/16/24  7:08 " PM   Result Value Ref Range    WBC 9.37 4.31 - 10.16 Thousand/uL    RBC 4.10 3.88 - 5.12 Million/uL    Hemoglobin 12.2 12.0 - 15.4 g/dL    Hematocrit 37.6 36.5 - 46.1 %    MCV 92 82 - 98 fL    MCH 29.8 26.8 - 34.3 pg    MCHC 32.4 31.4 - 37.4 g/dL    RDW 14.9 11.6 - 15.1 %    MPV 10.7 8.9 - 12.7 fL    Platelets 208 149 - 390 Thousands/uL    nRBC 0 /100 WBCs    Neutrophils Relative 83 (H) 43 - 75 %    Immat GRANS % 1 0 - 2 %    Lymphocytes Relative 9 (L) 14 - 44 %    Monocytes Relative 6 4 - 12 %    Eosinophils Relative 0 0 - 6 %    Basophils Relative 1 0 - 1 %    Neutrophils Absolute 7.73 (H) 1.85 - 7.62 Thousands/µL    Immature Grans Absolute 0.11 0.00 - 0.20 Thousand/uL    Lymphocytes Absolute 0.86 0.60 - 4.47 Thousands/µL    Monocytes Absolute 0.58 0.17 - 1.22 Thousand/µL    Eosinophils Absolute 0.03 0.00 - 0.61 Thousand/µL    Basophils Absolute 0.06 0.00 - 0.10 Thousands/µL   Comprehensive metabolic panel    Collection Time: 02/16/24  7:08 PM   Result Value Ref Range    Sodium 139 135 - 147 mmol/L    Potassium 3.9 3.5 - 5.3 mmol/L    Chloride 105 96 - 108 mmol/L    CO2 23 21 - 32 mmol/L    ANION GAP 11 mmol/L    BUN 23 5 - 25 mg/dL    Creatinine 1.19 0.60 - 1.30 mg/dL    Glucose 242 (H) 65 - 140 mg/dL    Calcium 8.7 8.4 - 10.2 mg/dL    AST 77 (H) 5 - 45 U/L    ALT 78 (H) 7 - 52 U/L    Alkaline Phosphatase 59 34 - 104 U/L    Total Protein 5.8 (L) 6.4 - 8.4 g/dL    Albumin 3.6 3.5 - 5.0 g/dL    Total Bilirubin 0.76 0.20 - 1.00 mg/dL    eGFR     COVID/FLU/RSV    Collection Time: 02/16/24  7:08 PM    Specimen: Nose; Nares   Result Value Ref Range    SARS-CoV-2 Negative Negative    INFLUENZA A PCR Negative Negative    INFLUENZA B PCR Negative Negative    RSV PCR Negative Negative   Magnesium    Collection Time: 02/16/24  7:08 PM   Result Value Ref Range    Magnesium 2.0 1.9 - 2.7 mg/dL   Hemoglobin A1C    Collection Time: 02/16/24  7:08 PM   Result Value Ref Range    Hemoglobin A1C 5.7 (H) Normal 4.0-5.6%; PreDiabetic  "5.7-6.4%; Diabetic >=6.5%; Glycemic control for adults with diabetes <7.0% %     mg/dl   HS Troponin 0hr (reflex protocol)    Collection Time: 02/16/24  8:27 PM   Result Value Ref Range    hs TnI 0hr 112 (H) \"Refer to ACS Flowchart\"- see link ng/L   HS Troponin I 2hr    Collection Time: 02/16/24 10:17 PM   Result Value Ref Range    hs TnI 2hr 252 (H) \"Refer to ACS Flowchart\"- see link ng/L    Delta 2hr hsTnI 140 (H) <20 ng/L   ECG 12 lead    Collection Time: 02/16/24 11:54 PM   Result Value Ref Range    Ventricular Rate 81 BPM    Atrial Rate 81 BPM    KS Interval 190 ms    QRSD Interval 138 ms    QT Interval 428 ms    QTC Interval 497 ms    P [place] 64 degrees    QRS Axis -48 degrees    T Wave [place] 102 degrees   HS Troponin I 4hr    Collection Time: 02/17/24 12:27 AM   Result Value Ref Range    hs TnI 4hr 992 (H) \"Refer to ACS Flowchart\"- see link ng/L    Delta 4hr hsTnI 880 (H) <20 ng/L   B-Type Natriuretic Peptide(BNP)    Collection Time: 02/17/24  1:52 AM   Result Value Ref Range    BNP 1,270 (H) 0 - 100 pg/mL   MRSA culture    Collection Time: 02/17/24  1:52 AM    Specimen: Nose; Nares   Result Value Ref Range    MRSA Culture Only       No Methicillin Resistant Staphlyococcus aureus (MRSA) isolated   Comprehensive metabolic panel    Collection Time: 02/17/24  5:05 AM   Result Value Ref Range    Sodium 139 135 - 147 mmol/L    Potassium 4.4 3.5 - 5.3 mmol/L    Chloride 108 96 - 108 mmol/L    CO2 24 21 - 32 mmol/L    ANION GAP 7 mmol/L    BUN 23 5 - 25 mg/dL    Creatinine 0.71 0.60 - 1.30 mg/dL    Glucose 118 65 - 140 mg/dL    Calcium 8.0 (L) 8.4 - 10.2 mg/dL    Corrected Calcium 8.6 8.3 - 10.1 mg/dL    AST 80 (H) 5 - 45 U/L    ALT 86 (H) 7 - 52 U/L    Alkaline Phosphatase 52 34 - 104 U/L    Total Protein 5.3 (L) 6.4 - 8.4 g/dL    Albumin 3.3 (L) 3.5 - 5.0 g/dL    Total Bilirubin 0.77 0.20 - 1.00 mg/dL    eGFR     Magnesium    Collection Time: 02/17/24  5:05 AM   Result Value Ref Range    Magnesium 1.8 (L) 1.9 " - 2.7 mg/dL   CBC    Collection Time: 02/17/24  5:05 AM   Result Value Ref Range    WBC 8.13 4.31 - 10.16 Thousand/uL    RBC 3.72 (L) 3.88 - 5.12 Million/uL    Hemoglobin 11.1 (L) 12.0 - 15.4 g/dL    Hematocrit 33.7 (L) 36.5 - 46.1 %    MCV 91 82 - 98 fL    MCH 29.8 26.8 - 34.3 pg    MCHC 32.9 31.4 - 37.4 g/dL    RDW 15.0 11.6 - 15.1 %    Platelets 156 149 - 390 Thousands/uL    MPV 10.5 8.9 - 12.7 fL   Lipid Panel with Direct LDL reflex    Collection Time: 02/17/24  5:05 AM   Result Value Ref Range    Cholesterol 144 See Comment mg/dL    Triglycerides 44 See Comment mg/dL    HDL, Direct 53 >=40 mg/dL    LDL Calculated 82 0 - 100 mg/dL   TSH, 3rd generation with Free T4 reflex    Collection Time: 02/17/24  5:05 AM   Result Value Ref Range    TSH 3RD GENERATON 2.691 0.450 - 4.500 uIU/mL   High Sensitivity Troponin I Random    Collection Time: 02/17/24  5:05 AM   Result Value Ref Range    HS TnI random 3,327 (H) 8 - 18 ng/L   Echo complete w/ contrast if indicated    Collection Time: 02/17/24 10:44 AM   Result Value Ref Range    AV peak gradient 45 mmHg    Triscuspid Valve Regurgitation Peak Gradient 41.0 mmHg    RAA A4C 26.6 cm2    LA Volume Index (BP) 61.3 mL/m2    MV Peak A Clark 0.32 m/s    MV stenosis pressure 1/2 time 67 ms    MV Peak E Clark 107 cm/s    AV peak gradient 103 mmHg    LVOT stroke volume 53.84     Ao .68 cm    Aortic valve peak velocity 5.08 m/s    LVOT peak VTI 14.17 cm    LVOT peak clark 0.58 m/s    LVOT diameter 2.2 cm    E wave deceleration time 231 ms    E/A ratio 3.34     MV valve area p 1/2 method 3.28     AV LVOT peak gradient 1 mmHg    AV mean gradient 62 mmHg    AV regurgitation pressure 1/2 time 312 ms    TR Peak Clark 3.2 m/s    AV area peak clark 0.4 cm2    AV area by cont VTI 0.4 cm2    LVOT mn grad 1.0 mmHg    RVID d 4.4 cm    A4C EF 32 %    Aortic valve mean velocity 37.70 m/s    Tricuspid valve peak regurgitation velocity 3.19 m/s    Left ventricular stroke volume (2D) 31.00 mL    IVSd  1.10 cm    Tricuspid annular plane systolic excursion 1.60 cm    Ao root 3.70 cm    LVPWd 1.30 cm    LA size 4.8 cm    LA volume (BP) 111 mL    FS 9 28 - 44    LVIDS 5.00 cm    IVS 1.1 cm    LVIDd 5.50 cm    LA length (A2C) 6.10 cm    AV Deceleration Time 1,075 ms    LEFT VENTRICLE SYSTOLIC VOLUME (MOD BIPLANE) 2D 116 mL    LV DIASTOLIC VOLUME (MOD BIPLANE) 2D 147 mL    LVOT Cardiac Index 2.13 l/min/m2    LVOT stroke volume index 29.80 ml/m2    LVOT Cardiac Output 3.85 l/min    Left Atrium Area-systolic Four Chamber 26.8 cm2    Left Atrium Area-systolic Apical Two Chamber 32.6 cm2    MV E' Tissue Velocity Lateral 9 cm/s    MV E' Tissue Velocity Septal 4 cm/s    LVSV, 2D 31 mL    BSA 1.81 m2    LVOT area 3.80 cm2    DVI 0.11     AV valve area 0.43 cm2    LV EF 25    Comprehensive metabolic panel    Collection Time: 02/18/24  4:55 AM   Result Value Ref Range    Sodium 139 135 - 147 mmol/L    Potassium 4.4 3.5 - 5.3 mmol/L    Chloride 103 96 - 108 mmol/L    CO2 30 21 - 32 mmol/L    ANION GAP 6 mmol/L    BUN 19 5 - 25 mg/dL    Creatinine 0.81 0.60 - 1.30 mg/dL    Glucose 99 65 - 140 mg/dL    Calcium 8.1 (L) 8.4 - 10.2 mg/dL    Corrected Calcium 8.6 8.3 - 10.1 mg/dL    AST 53 (H) 5 - 45 U/L    ALT 66 (H) 7 - 52 U/L    Alkaline Phosphatase 58 34 - 104 U/L    Total Protein 5.5 (L) 6.4 - 8.4 g/dL    Albumin 3.4 (L) 3.5 - 5.0 g/dL    Total Bilirubin 1.09 (H) 0.20 - 1.00 mg/dL    eGFR     High Sensitivity Troponin I Random    Collection Time: 02/18/24  4:55 AM   Result Value Ref Range    HS TnI random 3,080 (H) 8 - 18 ng/L   Magnesium    Collection Time: 02/18/24  4:55 AM   Result Value Ref Range    Magnesium 2.1 1.9 - 2.7 mg/dL   CBC    Collection Time: 02/18/24  4:55 AM   Result Value Ref Range    WBC 7.13 4.31 - 10.16 Thousand/uL    RBC 3.96 3.88 - 5.12 Million/uL    Hemoglobin 11.8 (L) 12.0 - 15.4 g/dL    Hematocrit 36.3 (L) 36.5 - 46.1 %    MCV 92 82 - 98 fL    MCH 29.8 26.8 - 34.3 pg    MCHC 32.5 31.4 - 37.4 g/dL    RDW  15.2 (H) 11.6 - 15.1 %    Platelets 154 149 - 390 Thousands/uL    MPV 10.7 8.9 - 12.7 fL   Basic metabolic panel    Collection Time: 02/19/24  5:01 AM   Result Value Ref Range    Sodium 137 135 - 147 mmol/L    Potassium 3.7 3.5 - 5.3 mmol/L    Chloride 103 96 - 108 mmol/L    CO2 29 21 - 32 mmol/L    ANION GAP 5 mmol/L    BUN 18 5 - 25 mg/dL    Creatinine 0.71 0.60 - 1.30 mg/dL    Glucose 93 65 - 140 mg/dL    Calcium 8.1 (L) 8.4 - 10.2 mg/dL    eGFR     Basic metabolic panel    Collection Time: 02/20/24 12:09 PM   Result Value Ref Range    Sodium 138 135 - 147 mmol/L    Potassium 4.0 3.5 - 5.3 mmol/L    Chloride 103 96 - 108 mmol/L    CO2 29 21 - 32 mmol/L    ANION GAP 6 mmol/L    BUN 20 5 - 25 mg/dL    Creatinine 0.69 0.60 - 1.30 mg/dL    Glucose 107 65 - 140 mg/dL    Calcium 8.1 (L) 8.4 - 10.2 mg/dL    eGFR     CBC    Collection Time: 02/21/24  5:43 AM   Result Value Ref Range    WBC 7.39 4.31 - 10.16 Thousand/uL    RBC 3.94 3.88 - 5.12 Million/uL    Hemoglobin 11.8 (L) 12.0 - 15.4 g/dL    Hematocrit 35.5 (L) 36.5 - 46.1 %    MCV 90 82 - 98 fL    MCH 29.9 26.8 - 34.3 pg    MCHC 33.2 31.4 - 37.4 g/dL    RDW 14.9 11.6 - 15.1 %    Platelets 210 149 - 390 Thousands/uL    MPV 11.2 8.9 - 12.7 fL   Basic metabolic panel    Collection Time: 02/21/24  5:43 AM   Result Value Ref Range    Sodium 140 135 - 147 mmol/L    Potassium 4.3 3.5 - 5.3 mmol/L    Chloride 105 96 - 108 mmol/L    CO2 29 21 - 32 mmol/L    ANION GAP 6 mmol/L    BUN 21 5 - 25 mg/dL    Creatinine 0.69 0.60 - 1.30 mg/dL    Glucose 100 65 - 140 mg/dL    Calcium 8.5 8.4 - 10.2 mg/dL    eGFR     Magnesium    Collection Time: 02/21/24  5:43 AM   Result Value Ref Range    Magnesium 2.0 1.9 - 2.7 mg/dL   CBC    Collection Time: 02/22/24  5:36 AM   Result Value Ref Range    WBC 6.63 4.31 - 10.16 Thousand/uL    RBC 4.02 3.88 - 5.12 Million/uL    Hemoglobin 12.0 12.0 - 15.4 g/dL    Hematocrit 36.4 (L) 36.5 - 46.1 %    MCV 91 82 - 98 fL    MCH 29.9 26.8 - 34.3 pg     "MCHC 33.0 31.4 - 37.4 g/dL    RDW 15.2 (H) 11.6 - 15.1 %    Platelets 216 149 - 390 Thousands/uL    MPV 10.4 8.9 - 12.7 fL   Basic metabolic panel    Collection Time: 02/22/24  5:36 AM   Result Value Ref Range    Sodium 142 135 - 147 mmol/L    Potassium 4.2 3.5 - 5.3 mmol/L    Chloride 106 96 - 108 mmol/L    CO2 29 21 - 32 mmol/L    ANION GAP 7 mmol/L    BUN 17 5 - 25 mg/dL    Creatinine 0.70 0.60 - 1.30 mg/dL    Glucose 91 65 - 140 mg/dL    Calcium 8.5 8.4 - 10.2 mg/dL    eGFR     Magnesium    Collection Time: 02/22/24  5:36 AM   Result Value Ref Range    Magnesium 1.9 1.9 - 2.7 mg/dL   Hepatic function panel    Collection Time: 02/22/24  5:36 AM   Result Value Ref Range    Total Bilirubin 0.70 0.20 - 1.00 mg/dL    Bilirubin, Direct 0.18 0.00 - 0.20 mg/dL    Alkaline Phosphatase 79 34 - 104 U/L    AST 28 5 - 45 U/L    ALT 41 7 - 52 U/L    Total Protein 5.4 (L) 6.4 - 8.4 g/dL    Albumin 3.2 (L) 3.5 - 5.0 g/dL             Dr. Betty Grider MD, [facility]      \"This note has been constructed using a voice recognition system.Therefore there may be syntax, spelling, and/or grammatical errors. Please call if you have any questions. \"  "

## 2024-02-29 ENCOUNTER — OFFICE VISIT (OUTPATIENT)
Dept: CARDIOLOGY CLINIC | Facility: CLINIC | Age: 84
End: 2024-02-29
Payer: MEDICARE

## 2024-02-29 VITALS
DIASTOLIC BLOOD PRESSURE: 66 MMHG | HEIGHT: 69 IN | HEART RATE: 62 BPM | BODY MASS INDEX: 21.48 KG/M2 | SYSTOLIC BLOOD PRESSURE: 120 MMHG | OXYGEN SATURATION: 98 % | WEIGHT: 145 LBS

## 2024-02-29 DIAGNOSIS — I42.9 CARDIOMYOPATHY, UNSPECIFIED TYPE (HCC): Primary | ICD-10-CM

## 2024-02-29 PROCEDURE — 99214 OFFICE O/P EST MOD 30 MIN: CPT | Performed by: INTERNAL MEDICINE

## 2024-03-04 ENCOUNTER — TELEPHONE (OUTPATIENT)
Dept: CARDIOLOGY CLINIC | Facility: CLINIC | Age: 84
End: 2024-03-04

## 2024-03-04 ENCOUNTER — TELEPHONE (OUTPATIENT)
Dept: CARDIAC SURGERY | Facility: CLINIC | Age: 84
End: 2024-03-04

## 2024-03-04 NOTE — TELEPHONE ENCOUNTER
Sister called asking for dental clearance for multiple teeth to be extracted.    Rafa DEBBIE  Ph [phone]  - advised to reach out to dentist, not sister.      They were told to call our office to seek recommendations regarding antibiotic prophylaxis prior to these extraction.     Also, does patient have an approval to drive?

## 2024-03-04 NOTE — TELEPHONE ENCOUNTER
I spoke to dental office, provided fax, and they will send request.     I also left a detailed message for naheed regarding holding off on driving until seen by cardiac surgeon.

## 2024-03-04 NOTE — TELEPHONE ENCOUNTER
Spoke to patient's sister, with the insurance that he has now both Dr. Pierce and  [facility] are not in network with it.(Ref#[id number]) Told her that the closest facility that may be in network with his insurance would be [facility] and either his cardiologist can put in a referral to [facility] or she can call them directly. Gave numbers to both offices.

## 2024-03-05 NOTE — TELEPHONE ENCOUNTER
Pt.'s Sister called, she confirmed having an appt.in [place] with Cardiothoracic - Dr. Rivers for March 12th.

## 2024-03-07 ENCOUNTER — TELEPHONE (OUTPATIENT)
Dept: OTHER | Facility: OTHER | Age: 84
End: 2024-03-07

## 2024-03-07 ENCOUNTER — NURSE TRIAGE (OUTPATIENT)
Dept: OTHER | Facility: OTHER | Age: 84
End: 2024-03-07

## 2024-03-07 ENCOUNTER — OFFICE VISIT (OUTPATIENT)
Dept: INTERNAL MEDICINE CLINIC | Facility: CLINIC | Age: 84
End: 2024-03-07
Payer: MEDICARE

## 2024-03-07 VITALS
HEART RATE: 73 BPM | SYSTOLIC BLOOD PRESSURE: 108 MMHG | WEIGHT: 143 LBS | BODY MASS INDEX: 21.18 KG/M2 | DIASTOLIC BLOOD PRESSURE: 70 MMHG | OXYGEN SATURATION: 100 % | HEIGHT: 69 IN

## 2024-03-07 DIAGNOSIS — I10 PRIMARY HYPERTENSION: ICD-10-CM

## 2024-03-07 DIAGNOSIS — J43.2 CENTRILOBULAR EMPHYSEMA (HCC): ICD-10-CM

## 2024-03-07 DIAGNOSIS — I25.5 ISCHEMIC CARDIOMYOPATHY: ICD-10-CM

## 2024-03-07 DIAGNOSIS — I42.9 CARDIOMYOPATHY, UNSPECIFIED TYPE (HCC): ICD-10-CM

## 2024-03-07 DIAGNOSIS — J41.0 SIMPLE CHRONIC BRONCHITIS (HCC): ICD-10-CM

## 2024-03-07 DIAGNOSIS — I35.0 CRITICAL AORTIC VALVE STENOSIS: ICD-10-CM

## 2024-03-07 DIAGNOSIS — I25.10 CORONARY ARTERY DISEASE INVOLVING NATIVE CORONARY ARTERY OF NATIVE HEART WITHOUT ANGINA PECTORIS: Primary | ICD-10-CM

## 2024-03-07 PROCEDURE — 99213 OFFICE O/P EST LOW 20 MIN: CPT | Performed by: INTERNAL MEDICINE

## 2024-03-07 PROCEDURE — G2211 COMPLEX E/M VISIT ADD ON: HCPCS | Performed by: INTERNAL MEDICINE

## 2024-03-07 NOTE — PROGRESS NOTES
Dr. Lloyd's Office Visit Note  24     Gilberto Vann 83 y.o. adult MRN: [id number]  : 1940    Assessment:     1. Coronary artery disease involving native coronary artery of native heart without angina pectoris  Assessment & Plan:  No chest pain no difficulty breathing seen by cardiology cardiology for follow-up notes reviewed agree and continue management medication as follows    Lipitor 20 mg daily    Follow-up with cardiology awaiting continue metoprolol XL 25 mg daily    Aspirin on hold because awaiting    TTAVR      2. Critical aortic valve stenosis  Assessment & Plan:  The aortic valve is trileaflet. The leaflets are moderately thickened. The leaflets are moderately calcified. There is severely reduced mobility. There is mild to moderate regurgitation. There is critical stenosis.  Peak gradient 108 mmHg.  Mean gradient 63.  LVOT velocity is measured to be 0.58.  LVOT diameter 2.2 and aortic valve area 0.5 cm 2 and dimensionless index 0.11.    Awaiting evaluation by cardiovascular surgery at [facility] for TAVR and revascularization    Elbow reviewed by cardiology cardiology follow-up note reviewed for now no difficulty breathing no chest pain overall at baseline awaiting to be reevaluated by cardiothoracic surgeon      3. Primary hypertension    4. Ischemic cardiomyopathy  Assessment & Plan:  Echo showed EF of 25% with severe global hypokinesis with regional variations, grade 2 diastolic dysfunction with severely dilated left atrium with aortic valve area of 0.5 cm 2  Etiology not clear at the current time  Continue medical management with Toprol-XL, Aldactone, losartan  Cardiology follow-up    Above reviewed from the hospital record seen by cardiology cardiology follow-up note reviewed    For now patient euvolemic CHF compensated asymptomatic            Discussion Summary and Plan:  Today's care plan and medications were reviewed with patient in detail and all their questions answered to their  satisfaction.    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    Review of Systems   Constitutional:  Positive for activity change. Negative for appetite change, chills, diaphoresis, fatigue, fever and unexpected weight change.   HENT:  Negative for congestion, dental problem, drooling, ear discharge, ear pain, facial swelling, hearing loss, mouth sores, nosebleeds, postnasal drip, rhinorrhea, sinus pressure, sneezing, sore throat, tinnitus, trouble swallowing and voice change.    Eyes:  Negative for photophobia, pain, discharge, redness, itching and visual disturbance.   Respiratory:  Positive for shortness of breath. Negative for apnea, cough, choking, chest tightness, wheezing and stridor.    Cardiovascular:  Negative for chest pain, palpitations and leg swelling.   Gastrointestinal:  Negative for abdominal distention, abdominal pain, anal bleeding, blood in stool, constipation, diarrhea, nausea, rectal pain and vomiting.   Endocrine: Negative for cold intolerance, heat intolerance, polydipsia, polyphagia and polyuria.   Genitourinary:  Negative for decreased urine volume, difficulty urinating, dysuria, enuresis, flank pain, frequency, genital sores, hematuria and urgency.   Musculoskeletal:  Positive for arthralgias. Negative for back pain, gait problem, joint  swelling, myalgias, neck pain and neck stiffness.   Skin:  Negative for color change, pallor, rash and wound.   Allergic/Immunologic: Negative.  Negative for environmental allergies, food allergies and immunocompromised state.   Neurological:  Positive for dizziness. Negative for tremors, seizures, syncope, facial asymmetry, speech difficulty, weakness, light-headedness, numbness and headaches.   Psychiatric/Behavioral:  Negative for agitation, behavioral problems, confusion, decreased concentration, dysphoric mood, hallucinations, self-injury, sleep disturbance and suicidal ideas. The patient is not nervous/anxious and is not hyperactive.          Historical Information   Patient Active Problem List   Diagnosis   • Benign prostatic hyperplasia with urinary retention   • Hematuria, gross   • Asthma   • COPD (chronic obstructive pulmonary disease) ([facility])   • Primary osteoarthritis involving multiple joints   • Lumbar radiculopathy   • Pre-op examination   • Hydrocele   • Primary hypertension   • Encysted hydrocele   • Hypercalcemia   • Shortness of breath   • Chronic bronchitis ([facility])   • Hypokalemia   • Centrilobular emphysema ([facility])   • Syncope   • Glaucoma   • Elevated troponin   • Hyperglycemia   • Closed fracture of multiple ribs of both sides   • Closed fracture of body of sternum   • 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[facility])   • Pulmonary nodules   • Anxiety   • Anemia   • Critical aortic valve stenosis   • Cardiomyopathy ([facility])   • CAD (coronary artery disease)     Past Medical History:   Diagnosis Date   • Arthritis    • Asthma    • Back pain    • BPH (benign prostatic hypertrophy) with urinary obstruction    • COPD (chronic obstructive pulmonary disease) ([facility])    • Dizziness    • Glaucoma     bilat   • Insomnia    • RLS (restless legs syndrome)    • Sinusitis      Past Surgical History:   Procedure Laterality Date   • CARDIAC CATHETERIZATION N/A 2/19/2024    Procedure:  Cardiac catheterization;  Surgeon: Boston Bergman MD;  Location: [facility]ARD[facility];  Service: Cardiology   • CARDIAC CATHETERIZATION N/A 2024    Procedure: Cardiac Coronary Angiogram;  Surgeon: Boston Bergman MD;  Location: [facility] CARDIAC [facility];  Service: Cardiology   • CATARACT EXTRACTION Left    • EAR SURGERY      to wart   • EPIDIDYMAL CYST EXCISION Right 2000   • HEMORROIDECTOMY     • SC EXCISION HYDROCELE UNILATERAL Left 2023    Procedure: HYDROCELECTOMY, sPERMATOCELECTOMY;  Surgeon: James Blunt MD;  Location: [facility] MAIN [facility];  Service: Urology   • SC TRURL ELECTROSURG RESCJ PROSTATE BLEED COMPLETE N/A 2020    Procedure: CYSTOSCOPY, TRANSURETHRAL RESECTION OF PROSTATE (TURP);  Surgeon: Reji Meneses MD;  Location: [facility];  Service: Urology   • PROSTATE BIOPSY Bilateral 2003    BPH with mild acute and chronic inflammation   • PROSTATE BIOPSY Bilateral 2005    BPH with marked artropy and chronic inflammation     Social History     Substance and Sexual Activity   Alcohol Use Not Currently     Social History     Substance and Sexual Activity   Drug Use Never     Social History     Tobacco Use   Smoking Status Former   • Current packs/day: 0.00   • Average packs/day: 1 pack/day for 15.0 years (15.0 ttl pk-yrs)   • Types: Cigarettes   • Start date:    • Quit date:    • Years since quittin.2   • Passive exposure: Never   Smokeless Tobacco Current   • Types: Chew   Tobacco Comments    Chews cigars     Family History   Problem Relation Age of Onset   • Asthma Father      Health Maintenance Due   Topic   • Hepatitis A Vaccine (1 of 2 - Risk 2-dose series)   • Osteoporosis Screening    • Zoster Vaccine (1 of 2)   • COVID-19 Vaccine (3 - 2023-24 season)   • Fall Risk    • Medicare Annual Wellness Visit (AWV)       Meds/Allergies       Current Outpatient Medications:   •  acetaminophen (TYLENOL) 325 mg tablet, Take 2 tablets (650 mg total) by mouth every 4 (four) hours as  "needed for mild pain, headaches or fever, Disp: , Rfl:   •  atorvastatin (LIPITOR) 20 mg tablet, Take 1 tablet (20 mg total) by mouth daily with dinner, Disp: , Rfl:   •  budesonide-formoterol (SYMBICORT) 160-4.5 mcg/act inhaler, Inhale 2 puffs 2 (two) times a day Rinse mouth after use., Disp: 10.2 g, Rfl: 5  •  docusate sodium (COLACE) 100 mg capsule, Take 1 capsule (100 mg total) by mouth 2 (two) times a day, Disp: , Rfl:   •  famotidine (PEPCID) 20 mg tablet, Take 1 tablet (20 mg total) by mouth daily, Disp: 30 tablet, Rfl: 0  •  fluticasone (FLONASE) 50 mcg/act nasal spray, 1 spray into each nostril 2 (two) times a day, Disp: 15.8 mL, Rfl: 3  •  latanoprost (XALATAN) 0.005 % ophthalmic solution, Administer 1 drop to both eyes daily at bedtime, Disp: , Rfl:   •  levalbuterol (XOPENEX) 0.63 mg/3 mL nebulizer solution, Take 3 mL (0.63 mg total) by nebulization every 6 (six) hours as needed for wheezing or shortness of breath, Disp: , Rfl:   •  losartan (COZAAR) 25 mg tablet, Take 1 tablet (25 mg total) by mouth daily, Disp: , Rfl:   •  metoprolol succinate (TOPROL-XL) 25 mg 24 hr tablet, Take 1 tablet (25 mg total) by mouth daily, Disp: 30 tablet, Rfl: 0  •  midodrine (PROAMATINE) 5 mg tablet, Take 1 tablet (5 mg total) by mouth 3 (three) times a day before meals, Disp: 90 tablet, Rfl: 0  •  spironolactone (ALDACTONE) 25 mg tablet, Take 0.5 tablets (12.5 mg total) by mouth daily, Disp: 30 tablet, Rfl: 0  •  aspirin 325 mg tablet, Take 1 tablet (325 mg total) by mouth daily (Patient not taking: Reported on 2/29/2024), Disp: 30 tablet, Rfl: 0      Objective:    Vitals:   /70   Pulse 73   Ht 5' 9\" (1.753 m)   Wt 64.9 kg (143 lb)   SpO2 100%   BMI 21.12 kg/m²   Body mass index is 21.12 kg/m².  Vitals:    03/07/24 1225   Weight: 64.9 kg (143 lb)       Physical Exam  Vitals reviewed.   Constitutional:       General: Gilberto is not in acute distress.     Appearance: Gilberto is well-developed. Gilberto is not " ill-appearing, toxic-appearing or diaphoretic.   HENT:      Head: Normocephalic and atraumatic.      Right Ear: External ear normal.      Left Ear: External ear normal.      Nose: Nose normal.      Mouth/Throat:      Pharynx: No oropharyngeal exudate.   Eyes:      General: Lids are normal. Lids are everted, no foreign bodies appreciated. No scleral icterus.        Right eye: No discharge.         Left eye: No discharge.      Conjunctiva/sclera: Conjunctivae normal.      Pupils: Pupils are equal, round, and reactive to light.   Neck:      Thyroid: No thyromegaly.      Vascular: Normal carotid pulses. No carotid bruit, hepatojugular reflux or JVD.      Trachea: No tracheal tenderness or tracheal deviation.   Cardiovascular:      Rate and Rhythm: Normal rate and regular rhythm.      Pulses: Normal pulses.      Heart sounds: Murmur heard.      No friction rub. No gallop.   Pulmonary:      Effort: Pulmonary effort is normal. No respiratory distress.      Breath sounds: Normal breath sounds. No stridor. No wheezing or rales.   Chest:      Chest wall: No tenderness.   Abdominal:      General: Bowel sounds are normal. There is no distension.      Palpations: Abdomen is soft. There is no mass.      Tenderness: There is no abdominal tenderness. There is no guarding or rebound.   Musculoskeletal:         General: No tenderness or deformity. Normal range of motion.      Cervical back: Normal range of motion and neck supple. No edema, erythema or rigidity. No spinous process tenderness or muscular tenderness. Normal range of motion.   Lymphadenopathy:      Head:      Right side of head: No submental, submandibular, tonsillar, preauricular or posterior auricular adenopathy.      Left side of head: No submental, submandibular, tonsillar, preauricular, posterior auricular or occipital adenopathy.      Cervical: No cervical adenopathy.      Right cervical: No superficial, deep or posterior cervical adenopathy.     Left cervical: No  superficial, deep or posterior cervical adenopathy.      Upper Body:      Right upper body: No pectoral adenopathy.      Left upper body: No pectoral adenopathy.   Skin:     General: Skin is warm and dry.      Coloration: Skin is not pale.      Findings: No erythema or rash.   Neurological:      General: No focal deficit present.      Mental Status: Gilberto is alert and oriented to person, place, and time.      Cranial Nerves: No cranial nerve deficit.      Sensory: No sensory deficit.      Motor: No tremor, abnormal muscle tone or seizure activity.      Coordination: Coordination normal.      Gait: Gait abnormal.      Deep Tendon Reflexes: Reflexes are normal and symmetric. Reflexes normal.   Psychiatric:         Behavior: Behavior normal.         Thought Content: Thought content normal.         Judgment: Judgment normal.         Lab Review   No results displayed because visit has over 200 results.            Patient Instructions   Heart Failure   AMBULATORY CARE:   Heart failure  is a condition that does not allow your heart to fill or pump properly. Not enough oxygen in your blood gets to your organs and tissues. Fluid may not move through your body properly. Fluid may build up and cause swelling and trouble breathing. This is known as congestive heart failure. It is important to manage your health to improve your quality of life.       Signs and symptoms  depend on the type of heart failure you have and how severe it is. You may have any of the following:  Trouble breathing with activity that worsens to trouble breathing at rest    Shortness of breath while lying flat    Severe shortness of breath and coughing at night that usually wakes you    Feeling lightheaded when you stand up    Purple color around your mouth and nails    Confusion or anxiety    Chest pain at night    Periods of no breathing, then breathing fast    Lack of energy (often worsened by physical activity), or trouble sleeping    Swelling in  your ankles, legs, or abdomen    Heartbeat that is fast or not regular    Fingers and toes feel cool to the touch    Call your local emergency number (911 in the [place]) if:   You have any of the following signs of a heart attack:      Squeezing, pressure, or pain in your chest    You may  also have any of the following:     Discomfort or pain in your back, neck, jaw, stomach, or arm    Shortness of breath    Nausea or vomiting    Lightheadedness or a sudden cold sweat      Seek immediate care if:   Your heartbeat is fast, slow, or uneven all the time.      Call your doctor if:   You have symptoms of worsening heart failure:      Shortness of breath at rest, at night, or that is getting worse in any way    Weight gain of 3 or more pounds (1.4 kg) in a day, or more than your healthcare provider says is okay    More swelling in your legs or ankles    Abdominal pain or swelling    More coughing    Loss of appetite    Feeling tired all the time    You feel depressed, or you have lost interest in things you used to enjoy.    You often feel worried or afraid.    You have questions or concerns about your condition or care.    Treatment:  Heart failure is often caused by damage or injury to your heart. The damage may be caused by other heart problems, diabetes, or high blood pressure. The damage may have also been caused by an infection. Your healthcare providers will help you manage any other health conditions that may be causing your heart failure. The goals of treatment are to manage, slow, or reverse heart damage. Treatment may include any of the following:  Medicines  may be needed to help regulate your heart rhythm. You may also need medicines to lower your blood pressure, and to decrease extra fluids.    Oxygen  may help you breathe easier if your oxygen level is lower than normal. A CPAP machine may be used to keep your airway open while you sleep.         Cardiac rehab  is a program run by specialists who will help you  safely strengthen your heart. In the program you will learn about exercise, relaxation, stress management, and heart-healthy nutrition. Cardiac rehab may be recommended if your heart failure is not severe.    Surgery  can be done to implant a pacemaker or another device in your chest to regulate your heart rhythm. Other types of surgery can open blocked heart vessels, replace a damaged heart valve, or remove scar tissue.    Manage swelling from extra fluid:   Elevate (raise) your legs above the level of your heart.  This will help with fluid that builds up in your legs or ankles. Elevate your legs as often as possible during the day. Prop your legs on pillows or blankets to keep them elevated comfortably. Try not to stand for long periods of time during the day. Move around to keep your blood circulating.         Limit sodium (salt).  Ask how much sodium you can have each day. Your healthcare provider may give you a limit, such as 2,300 milligrams (mg) a day. Your provider or a dietitian can teach you how to read food labels for the number of mg in a food. He or she can also help you find ways to have less salt. For example, if you add salt to food as you cook, do not add more at the table.         Drink liquids as directed.  You may need to limit the amount of liquid you drink within 24 hours. Your healthcare provider will tell you how much liquid to have and which liquids are best for you. He or she may tell you to limit liquid to 1.5 to 2 liters in a day. He or she will also tell you how often to drink liquid throughout the day.    Weigh yourself every morning.  Use the same scale, in the same spot. Do this after you use the bathroom, but before you eat or drink. Wear the same type of clothing each time. Write down your weight and call your healthcare provider if you have a sudden weight gain. Swelling and weight gain are signs of fluid buildup.       Manage heart failure:  Your quality of life may improve with  treatment and the following:  Do not smoke.  Nicotine and other chemicals in cigarettes and cigars can cause lung and heart damage. Ask your healthcare provider for information if you currently smoke and need help to quit. E-cigarettes or smokeless tobacco still contain nicotine. Talk to your healthcare provider before you use these products.    Do not drink alcohol or use illegal drugs.  Alcohol and drugs can increase your risk for high blood pressure, diabetes, and coronary artery disease.    Eat heart-healthy foods.  Heart-healthy foods include fruits, vegetables, lean meat (such as beef, chicken, or pork), and low-fat dairy products. Fatty fish such as salmon and tuna are also heart healthy. Other heart-healthy foods include walnuts, whole-grain breads, and legumes such as allison beans. Replace butter and margarine with heart-healthy oils such as olive oil or canola oil. Your provider or a dietitian can help you create heart-healthy meal plans.         Manage any chronic health conditions you have.  These include high blood pressure, diabetes, obesity, high cholesterol, metabolic syndrome, and COPD. You will have fewer symptoms if you manage these health conditions. Follow your healthcare provider's recommendations and follow up with him or her regularly.    Maintain a healthy weight.  Being overweight can increase your risk for high blood pressure, diabetes, and coronary artery disease. These conditions can make your symptoms worse. Ask your healthcare provider what a healthy weight is for you. Ask him or her to help you create a weight loss plan, if needed.    Stay active.  Activity can help keep your symptoms from getting worse. Walking is a type of physical activity that helps maintain your strength and improve your mood. Physical activity also helps you manage your weight. Work with your healthcare provider to create an exercise plan that is right for you.         Get vaccines as directed.  The flu,  "COVID-19, and pneumonia can be severe for a person who has heart failure. Vaccines protect you from these infections. Get a flu vaccine every year as soon as it is recommended, usually in September or October. Get a COVID-19 vaccine and booster as directed. You may also need the pneumonia vaccine. Your healthcare provider can tell you if you need other vaccines, and when to get them.       Follow up with your doctor or cardiologist within 7 days or as directed:  You may need to return for other tests. You may need home health care. A healthcare provider will monitor your vital signs, weight, and make sure your medicines are working. Write down your questions so you remember to ask them during your visits.  For support or more information:  Heart failure can be difficult to manage. It may be helpful to talk with others who have heart failure. You may learn how to better manage your condition or get emotional support. For more information:  [employer]  [address] [place] [phone]   Phone: 9- 618 - [phone]  Web Address: http://www.heart.org     © Copyright [employer] 2023 Information is for End User's use only and may not be sold, redistributed or otherwise used for commercial purposes.  The above information is an  only. It is not intended as medical advice for individual conditions or treatments. Talk to your doctor, nurse or pharmacist before following any medical regimen to see if it is safe and effective for you.       Dima Lloyd MD        \"This note has been constructed using a voice recognition system.Therefore there may be syntax, spelling, and/or grammatical errors. Please call if you have any questions. \"  "

## 2024-03-07 NOTE — ASSESSMENT & PLAN NOTE
No chest pain no difficulty breathing seen by cardiology cardiology for follow-up notes reviewed agree and continue management medication as follows    Lipitor 20 mg daily    Follow-up with cardiology awaiting continue metoprolol XL 25 mg daily    Aspirin on hold because awaiting    TTAVR

## 2024-03-07 NOTE — PATIENT INSTRUCTIONS
Heart Failure   AMBULATORY CARE:   Heart failure  is a condition that does not allow your heart to fill or pump properly. Not enough oxygen in your blood gets to your organs and tissues. Fluid may not move through your body properly. Fluid may build up and cause swelling and trouble breathing. This is known as congestive heart failure. It is important to manage your health to improve your quality of life.       Signs and symptoms  depend on the type of heart failure you have and how severe it is. You may have any of the following:  Trouble breathing with activity that worsens to trouble breathing at rest    Shortness of breath while lying flat    Severe shortness of breath and coughing at night that usually wakes you    Feeling lightheaded when you stand up    Purple color around your mouth and nails    Confusion or anxiety    Chest pain at night    Periods of no breathing, then breathing fast    Lack of energy (often worsened by physical activity), or trouble sleeping    Swelling in your ankles, legs, or abdomen    Heartbeat that is fast or not regular    Fingers and toes feel cool to the touch    Call your local emergency number (911 in the ) if:   You have any of the following signs of a heart attack:      Squeezing, pressure, or pain in your chest    You may  also have any of the following:     Discomfort or pain in your back, neck, jaw, stomach, or arm    Shortness of breath    Nausea or vomiting    Lightheadedness or a sudden cold sweat      Seek immediate care if:   Your heartbeat is fast, slow, or uneven all the time.      Call your doctor if:   You have symptoms of worsening heart failure:      Shortness of breath at rest, at night, or that is getting worse in any way    Weight gain of 3 or more pounds (1.4 kg) in a day, or more than your healthcare provider says is okay    More swelling in your legs or ankles    Abdominal pain or swelling    More coughing    Loss of appetite    Feeling tired all the  time    You feel depressed, or you have lost interest in things you used to enjoy.    You often feel worried or afraid.    You have questions or concerns about your condition or care.    Treatment:  Heart failure is often caused by damage or injury to your heart. The damage may be caused by other heart problems, diabetes, or high blood pressure. The damage may have also been caused by an infection. Your healthcare providers will help you manage any other health conditions that may be causing your heart failure. The goals of treatment are to manage, slow, or reverse heart damage. Treatment may include any of the following:  Medicines  may be needed to help regulate your heart rhythm. You may also need medicines to lower your blood pressure, and to decrease extra fluids.    Oxygen  may help you breathe easier if your oxygen level is lower than normal. A CPAP machine may be used to keep your airway open while you sleep.         Cardiac rehab  is a program run by specialists who will help you safely strengthen your heart. In the program you will learn about exercise, relaxation, stress management, and heart-healthy nutrition. Cardiac rehab may be recommended if your heart failure is not severe.    Surgery  can be done to implant a pacemaker or another device in your chest to regulate your heart rhythm. Other types of surgery can open blocked heart vessels, replace a damaged heart valve, or remove scar tissue.    Manage swelling from extra fluid:   Elevate (raise) your legs above the level of your heart.  This will help with fluid that builds up in your legs or ankles. Elevate your legs as often as possible during the day. Prop your legs on pillows or blankets to keep them elevated comfortably. Try not to stand for long periods of time during the day. Move around to keep your blood circulating.         Limit sodium (salt).  Ask how much sodium you can have each day. Your healthcare provider may give you a limit, such as  2,300 milligrams (mg) a day. Your provider or a dietitian can teach you how to read food labels for the number of mg in a food. He or she can also help you find ways to have less salt. For example, if you add salt to food as you cook, do not add more at the table.         Drink liquids as directed.  You may need to limit the amount of liquid you drink within 24 hours. Your healthcare provider will tell you how much liquid to have and which liquids are best for you. He or she may tell you to limit liquid to 1.5 to 2 liters in a day. He or she will also tell you how often to drink liquid throughout the day.    Weigh yourself every morning.  Use the same scale, in the same spot. Do this after you use the bathroom, but before you eat or drink. Wear the same type of clothing each time. Write down your weight and call your healthcare provider if you have a sudden weight gain. Swelling and weight gain are signs of fluid buildup.       Manage heart failure:  Your quality of life may improve with treatment and the following:  Do not smoke.  Nicotine and other chemicals in cigarettes and cigars can cause lung and heart damage. Ask your healthcare provider for information if you currently smoke and need help to quit. E-cigarettes or smokeless tobacco still contain nicotine. Talk to your healthcare provider before you use these products.    Do not drink alcohol or use illegal drugs.  Alcohol and drugs can increase your risk for high blood pressure, diabetes, and coronary artery disease.    Eat heart-healthy foods.  Heart-healthy foods include fruits, vegetables, lean meat (such as beef, chicken, or pork), and low-fat dairy products. Fatty fish such as salmon and tuna are also heart healthy. Other heart-healthy foods include walnuts, whole-grain breads, and legumes such as allison beans. Replace butter and margarine with heart-healthy oils such as olive oil or canola oil. Your provider or a dietitian can help you create  heart-healthy meal plans.         Manage any chronic health conditions you have.  These include high blood pressure, diabetes, obesity, high cholesterol, metabolic syndrome, and COPD. You will have fewer symptoms if you manage these health conditions. Follow your healthcare provider's recommendations and follow up with him or her regularly.    Maintain a healthy weight.  Being overweight can increase your risk for high blood pressure, diabetes, and coronary artery disease. These conditions can make your symptoms worse. Ask your healthcare provider what a healthy weight is for you. Ask him or her to help you create a weight loss plan, if needed.    Stay active.  Activity can help keep your symptoms from getting worse. Walking is a type of physical activity that helps maintain your strength and improve your mood. Physical activity also helps you manage your weight. Work with your healthcare provider to create an exercise plan that is right for you.         Get vaccines as directed.  The flu, COVID-19, and pneumonia can be severe for a person who has heart failure. Vaccines protect you from these infections. Get a flu vaccine every year as soon as it is recommended, usually in September or October. Get a COVID-19 vaccine and booster as directed. You may also need the pneumonia vaccine. Your healthcare provider can tell you if you need other vaccines, and when to get them.       Follow up with your doctor or cardiologist within 7 days or as directed:  You may need to return for other tests. You may need home health care. A healthcare provider will monitor your vital signs, weight, and make sure your medicines are working. Write down your questions so you remember to ask them during your visits.  For support or more information:  Heart failure can be difficult to manage. It may be helpful to talk with others who have heart failure. You may learn how to better manage your condition or get emotional support. For more  information:  American [employer] Association  [address] [place] [address]   Phone: 9- 737 - 333-2905  Web Address: http://www.heart.org     © Copyright [employer]ative 2023 Information is for End User's use only and may not be sold, redistributed or otherwise used for commercial purposes.  The above information is an  only. It is not intended as medical advice for individual conditions or treatments. Talk to your doctor, nurse or pharmacist before following any medical regimen to see if it is safe and effective for you.

## 2024-03-07 NOTE — ASSESSMENT & PLAN NOTE
Echo showed EF of 25% with severe global hypokinesis with regional variations, grade 2 diastolic dysfunction with severely dilated left atrium with aortic valve area of 0.5 cm 2  Etiology not clear at the current time  Continue medical management with Toprol-XL, Aldactone, losartan  Cardiology follow-up    Above reviewed from the hospital record seen by cardiology cardiology follow-up note reviewed    For now patient euvolemic CHF compensated asymptomatic

## 2024-03-07 NOTE — TELEPHONE ENCOUNTER
Sister called to provide us with phone number to new oral surgeon. Will call and ask they fax us clearance form. P#[phone]

## 2024-03-07 NOTE — ASSESSMENT & PLAN NOTE
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 The leaflets are moderately thickened. The leaflets are moderately calcified. There is severely reduced mobility. There is mild to moderate regurgitation. There is critical stenosis.  Peak gradient 108 mmHg.  Mean gradient 63.  LVOT velocity is measured to be 0.58.  LVOT diameter 2.2 and aortic valve area 0.5 cm 2 and dimensionless index 0.11.    Awaiting evaluation by cardiovascular surgery at [facility] for TAVR and revascularization    Elbow reviewed by cardiology cardiology follow-up note reviewed for now no difficulty breathing no chest pain overall at baseline awaiting to be reevaluated by cardiothoracic surgeon

## 2024-03-08 ENCOUNTER — TELEPHONE (OUTPATIENT)
Age: 84
End: 2024-03-08

## 2024-03-08 RX ORDER — METOPROLOL SUCCINATE 25 MG/1
25 TABLET, EXTENDED RELEASE ORAL DAILY
Qty: 30 TABLET | Refills: 0 | Status: SHIPPED | OUTPATIENT
Start: 2024-03-08

## 2024-03-08 RX ORDER — LOSARTAN POTASSIUM 25 MG/1
25 TABLET ORAL DAILY
Qty: 90 TABLET | Refills: 1 | Status: SHIPPED | OUTPATIENT
Start: 2024-03-08

## 2024-03-08 RX ORDER — SPIRONOLACTONE 25 MG/1
12.5 TABLET ORAL DAILY
Qty: 30 TABLET | Refills: 0 | Status: SHIPPED | OUTPATIENT
Start: 2024-03-08

## 2024-03-08 RX ORDER — ATORVASTATIN CALCIUM 20 MG/1
20 TABLET, FILM COATED ORAL
Qty: 90 TABLET | Refills: 1 | Status: SHIPPED | OUTPATIENT
Start: 2024-03-08

## 2024-03-08 NOTE — TELEPHONE ENCOUNTER
"Regarding: Urgent medication request/ has a life vest  ----- Message from Kerrie Seo sent at 3/7/2024  8:40 PM EST -----  \"My brother left a bag of his medication at doctor office today during appointment. He also has a life vest. I am not sure what to do\"   Medication   spironolactone (ALDACTONE) 25 mg tablet     midodrine (PROAMATINE) 5 mg tablet       metoprolol succinate (TOPROL-XL) 25 mg 24 hr tablet      [id number] - [address] ( [address]  [address] [place] [address] ( [address])[facility] [address]  Phone: [phone]  Fax: [phone]    "

## 2024-03-08 NOTE — TELEPHONE ENCOUNTER
Pt sister is calling pt. Left bag of medication in office during his office visit today . Can you please f/u and advise . Thank you.

## 2024-03-08 NOTE — TELEPHONE ENCOUNTER
Reason for Disposition  • [1] Prescription refill request for ESSENTIAL medicine (i.e., likelihood of harm to patient if not taken) AND [2] triager unable to refill per department policy    Protocols used: Medication Refill and Renewal Call-ADULT-

## 2024-03-08 NOTE — TELEPHONE ENCOUNTER
Meds sent to pharmacy.    Pt/OT/Nursing/HHA ordered through [facility]. Paperwork faxed to [place].

## 2024-03-08 NOTE — TELEPHONE ENCOUNTER
There was no bag of meds left behind.  I personally handed him his visit summary, which he inserted into his bag of medications (sturdy paper bag with handles), and walked out with bag in hand.  Lucille confirmed this with Lucy on even date.

## 2024-03-08 NOTE — TELEPHONE ENCOUNTER
"Answer Assessment - Initial Assessment Questions  1. DRUG NAME: \"What medicine do you need to have refilled?\"      All oral meds     2. REFILLS REMAINING: \"How many refills are remaining?\" (Note: The label on the medicine or pill bottle will show how many refills are remaining. If there are no refills remaining, then a renewal may be needed.)      Na    3. EXPIRATION DATE: \"What is the expiration date?\" (Note: The label states when the prescription will , and thus can no longer be refilled.)      na    4. PRESCRIBING HCP: \"Who prescribed it?\" Reason: If prescribed by specialist, call should be referred to that group.      Cardiology    5. SYMPTOMS: \"Do you have any symptoms?\"      No worsening symptoms, no syncope or dizziness, no lifevest discharges    Hasn't taken oral meds in 2 days bc assisted living sent the as loose packets and he wasn't clear. Then left bag at PCP office today. Does he need to get doses tonight or okay to wait?    Protocols used: Medication Refill and Renewal Call-ADULT-[facility]      Medications left in Dr Weinberg's office yesterday. Please check and see if they are still there. If not, please send new [facility] [place] to patient ASAP.   "

## 2024-03-08 NOTE — TELEPHONE ENCOUNTER
Pt sister called, she is trying to get his medications straight.  With his hospitalization and nursing home stay's he is a little confused.   He also lost a bag of prescriptions he got from the nursing home and would not  rx from pharmacy because he did not know the prescribing doctor.  Reviewed medications with pt sister and list from nursing home PCP and cardiology notes.  I also called [facility] on their behalf.  [facility] is working on 6 refills for pt  Atorvastatin  Losartan  Aldactone  Metoprolol  These are the 4 prescriptions sister stated he needed.  I also reviewed the OTC medications (except ASA that is on hold).      When I called [facility], they stated they have a prescription from the hospital for Entresto.  Sister does not have this medication listed anywhere on the notes from nursing home or the hospital stay.     Should he be taking this medication?  (The 6th refill they have for pt is for his eye drops, FYI).

## 2024-03-11 ENCOUNTER — TELEPHONE (OUTPATIENT)
Age: 84
End: 2024-03-11

## 2024-03-11 DIAGNOSIS — J43.2 CENTRILOBULAR EMPHYSEMA (HCC): Primary | ICD-10-CM

## 2024-03-11 RX ORDER — FLUTICASONE PROPIONATE AND SALMETEROL 250; 50 UG/1; UG/1
1 POWDER RESPIRATORY (INHALATION) 2 TIMES DAILY
Qty: 180 BLISTER | Refills: 5 | Status: SHIPPED | OUTPATIENT
Start: 2024-03-11 | End: 2024-06-09

## 2024-03-11 NOTE — TELEPHONE ENCOUNTER
Incoming call: Lucy pt's sister   (Not on communication consent - only able to take information)     Re: Pt of [place]     Currently taking Symbicort - med is too expensive for pt with his new insurance. Can pt have an alternative sent to [facility] in [place]?

## 2024-03-15 ENCOUNTER — TELEPHONE (OUTPATIENT)
Dept: CARDIOLOGY CLINIC | Facility: CLINIC | Age: 84
End: 2024-03-15

## 2024-03-15 ENCOUNTER — TELEPHONE (OUTPATIENT)
Age: 84
End: 2024-03-15

## 2024-03-15 NOTE — TELEPHONE ENCOUNTER
Patient currently admitted at [facility].  Patient just had surgery.  Patient's sister call with a concern about patient's life vest and driving.  Patient giving sister a hard time with betty life vest.  Patient is driving and sister wants to know if this is OK or not.  Patient's sister is wondering if something can be written up stating that patient is not allowed to drive.  Patient stated that nobody ever told him he couldn't drive.  Patient's sister stated that they were under the impression that patient would not have to wear life vest after surgery.

## 2024-03-15 NOTE — TELEPHONE ENCOUNTER
pATIENT'S SISTER CALLED TO UPDATE THE PATIENT'S INSURANCE BUT WE HAD IT ALREADY AND THEN ASKED ABOUT THE ALTERNATIVE MEDS AND TOLD HER THAT ADVAIR WAS ORDERED FOR THE PATIENT.

## 2024-03-18 NOTE — TELEPHONE ENCOUNTER
Called and spoke to patient's sister.  Patient had pacemaker implanted Friday evening.  Patient was told that he can continue to drive after 3 days.

## 2024-03-19 ENCOUNTER — TELEPHONE (OUTPATIENT)
Age: 84
End: 2024-03-19

## 2024-03-19 NOTE — TELEPHONE ENCOUNTER
[facility] will be going out to see the pt tomorrow or Thurs to evaluate the pt.

## 2024-03-20 ENCOUNTER — TELEPHONE (OUTPATIENT)
Age: 84
End: 2024-03-20

## 2024-03-20 NOTE — TELEPHONE ENCOUNTER
Lucille from [facility] called to say that the pt is refusing their services. They will close out the referral at this time.

## 2024-03-20 NOTE — TELEPHONE ENCOUNTER
"Spoke with insurance company about mis-identified PCP on patient's card.  They searched for Dr. Lloyd and found him listed as a \"participating provider\" but then advised that he is shown as \"not contracted\", so there is a credentialing issue.  I advised pt's sister, Lucy, that this matter was being excalated to the Credentialing Dept and that I would get back to her once resolved.  "

## 2024-03-20 NOTE — TELEPHONE ENCOUNTER
Pt sister was calling in requesting if we can call the pt insurance company stating she has been having issues getting the insurance company to switching the PCP to our office on his insurance card. Pt sister stated they assisted him to a provider in [facility] that they never heard of and is completely out of the way for the pt. Lucy stated she spoke with insurance and they told them they need a call from our office to update their system and provided a phone number for us to call. Their phone number is [phone] pt daughter would like a call back if we resolve this issue for them and stated we can leave a voicemail if she doesn't answer. Please advise with the pt sister in regards to this matter thank you.

## 2024-03-29 DIAGNOSIS — I42.9 CARDIOMYOPATHY, UNSPECIFIED TYPE (HCC): ICD-10-CM

## 2024-03-29 RX ORDER — METOPROLOL SUCCINATE 25 MG/1
25 TABLET, EXTENDED RELEASE ORAL DAILY
Qty: 30 TABLET | Refills: 5 | Status: SHIPPED | OUTPATIENT
Start: 2024-03-29

## 2024-04-02 ENCOUNTER — OFFICE VISIT (OUTPATIENT)
Dept: INTERNAL MEDICINE CLINIC | Facility: CLINIC | Age: 84
End: 2024-04-02
Payer: COMMERCIAL

## 2024-04-02 VITALS — WEIGHT: 144 LBS | BODY MASS INDEX: 21.33 KG/M2 | HEART RATE: 84 BPM | HEIGHT: 69 IN | OXYGEN SATURATION: 99 %

## 2024-04-02 DIAGNOSIS — I35.0 CRITICAL AORTIC VALVE STENOSIS: Primary | ICD-10-CM

## 2024-04-02 DIAGNOSIS — J41.0 SIMPLE CHRONIC BRONCHITIS (HCC): ICD-10-CM

## 2024-04-02 DIAGNOSIS — I10 PRIMARY HYPERTENSION: ICD-10-CM

## 2024-04-02 DIAGNOSIS — I25.10 CORONARY ARTERY DISEASE INVOLVING NATIVE CORONARY ARTERY OF NATIVE HEART WITHOUT ANGINA PECTORIS: ICD-10-CM

## 2024-04-02 DIAGNOSIS — Z00.00 MEDICARE ANNUAL WELLNESS VISIT, SUBSEQUENT: ICD-10-CM

## 2024-04-02 PROCEDURE — 99213 OFFICE O/P EST LOW 20 MIN: CPT | Performed by: INTERNAL MEDICINE

## 2024-04-02 PROCEDURE — G0439 PPPS, SUBSEQ VISIT: HCPCS | Performed by: INTERNAL MEDICINE

## 2024-04-02 RX ORDER — CLOPIDOGREL BISULFATE 75 MG/1
75 TABLET ORAL DAILY
COMMUNITY

## 2024-04-02 NOTE — ASSESSMENT & PLAN NOTE
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 The leaflets are moderately thickened. The leaflets are moderately calcified. There is severely reduced mobility. There is mild to moderate regurgitation. There is critical stenosis.  Peak gradient 108 mmHg.  Mean gradient 63.  LVOT velocity is measured to be 0.58.  LVOT diameter 2.2 and aortic valve area 0.5 cm 2 and dimensionless index 0.11.    Underwent TAVR symptoms improved awaiting to be seen by cardiology tomorrow for follow-up continue Plavix 75 mg daily patient euvolemic no chest pain difficulty breathing

## 2024-04-02 NOTE — PROGRESS NOTES
Assessment and Plan:     Problem List Items Addressed This Visit        Cardiovascular and Mediastinum    Primary hypertension     Asymptomatic blood pressure controlled continue regimen medication as follows    Losartan 25 mg daily with low-salt diet         Critical aortic valve stenosis - Primary     The aortic valve is trileaflet. The leaflets are moderately thickened. The leaflets are moderately calcified. There is severely reduced mobility. There is mild to moderate regurgitation. There is critical stenosis.  Peak gradient 108 mmHg.  Mean gradient 63.  LVOT velocity is measured to be 0.58.  LVOT diameter 2.2 and aortic valve area 0.5 cm 2 and dimensionless index 0.11.    Underwent TAVR symptoms improved awaiting to be seen by cardiology tomorrow for follow-up continue Plavix 75 mg daily patient euvolemic no chest pain difficulty breathing         Relevant Medications    clopidogrel (PLAVIX) 75 mg tablet    CAD (coronary artery disease)     No chest pain no difficulty breathing continue Plavix 75 mg daily aspirin metoprolol and atorvastatin         Relevant Medications    clopidogrel (PLAVIX) 75 mg tablet       Respiratory    COPD (chronic obstructive pulmonary disease) (HCC)     From difficulty breathing with minimal exertionContinue Symbicort  No wheezing on auscultation for now symptoms controlled at baseline        Other Visit Diagnoses     Medicare annual wellness visit, subsequent                Depression Screening and Follow-up Plan: Patient was screened for depression during today's encounter. They screened negative with a PHQ-2 score of 0.    Falls Plan of Care: balance, strength, and gait training instructions were provided.       Preventive health issues were discussed with patient, and age appropriate screening tests were ordered as noted in patient's After Visit Summary.  Personalized health advice and appropriate referrals for health education or preventive services given if needed, as noted in  patient's After Visit Summary.     History of Present Illness:     Patient presents for a Medicare Wellness Visit    Came in for follow-up had a TAVR done symptoms improved no chest pain some difficulty breathing intermittent with mild exertion due to the COPD awaiting to be seen by cardiology tomorrow    Complete annual wellness exam done for counseling screening follow-up recommendations see attached encounter       Patient Care Team:  Dima Lloyd MD as PCP - General (Internal Medicine)  Shaun Hernandez DO     Review of Systems:     Review of Systems   Constitutional:  Positive for activity change and fatigue. Negative for appetite change, chills, diaphoresis, fever and unexpected weight change.   HENT:  Negative for congestion, dental problem, drooling, ear discharge, ear pain, facial swelling, hearing loss, mouth sores, nosebleeds, postnasal drip, rhinorrhea, sinus pressure, sneezing, sore throat, tinnitus, trouble swallowing and voice change.    Eyes:  Negative for photophobia, pain, discharge, redness, itching and visual disturbance.   Respiratory:  Positive for shortness of breath. Negative for apnea, cough, choking, chest tightness, wheezing and stridor.    Cardiovascular:  Negative for chest pain, palpitations and leg swelling.   Gastrointestinal:  Negative for abdominal distention, abdominal pain, anal bleeding, blood in stool, constipation, diarrhea, nausea, rectal pain and vomiting.   Endocrine: Negative for cold intolerance, heat intolerance, polydipsia, polyphagia and polyuria.   Genitourinary:  Negative for decreased urine volume, difficulty urinating, dysuria, enuresis, flank pain, frequency, genital sores, hematuria and urgency.   Musculoskeletal:  Negative for arthralgias, back pain, gait problem, joint swelling, myalgias, neck pain and neck stiffness.   Skin:  Negative for color change, pallor, rash and wound.   Allergic/Immunologic: Negative.  Negative for environmental allergies, food  allergies and immunocompromised state.   Neurological:  Negative for dizziness, tremors, seizures, syncope, facial asymmetry, speech difficulty, weakness, light-headedness, numbness and headaches.   Psychiatric/Behavioral:  Negative for agitation, behavioral problems, confusion, decreased concentration, dysphoric mood, hallucinations, self-injury, sleep disturbance and suicidal ideas. The patient is not nervous/anxious and is not hyperactive.         Problem List:     Patient Active Problem List   Diagnosis   • Benign prostatic hyperplasia with urinary retention   • Hematuria, gross   • Asthma   • COPD (chronic obstructive pulmonary disease) ([facility])   • Primary osteoarthritis involving multiple joints   • Lumbar radiculopathy   • Pre-op examination   • Hydrocele   • Primary hypertension   • Encysted hydrocele   • Hypercalcemia   • Shortness of breath   • Chronic bronchitis ([facility])   • Hypokalemia   • Centrilobular emphysema ([facility])   • Syncope   • Glaucoma   • Elevated troponin   • Hyperglycemia   • Closed fracture of multiple ribs of both sides   • Closed fracture of body of sternum   • 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[facility])   • Pulmonary nodules   • Anxiety   • Anemia   • Critical aortic valve stenosis   • Cardiomyopathy ([facility])   • CAD (coronary artery disease)      Past Medical and Surgical History:     Past Medical History:   Diagnosis Date   • Arthritis    • Asthma    • Back pain    • BPH (benign prostatic hypertrophy) with urinary obstruction    • COPD (chronic obstructive pulmonary disease) ([facility])    • Dizziness    • Glaucoma     bilat   • Insomnia    • RLS (restless legs syndrome)    • Sinusitis      Past Surgical History:   Procedure Laterality Date   • CARDIAC CATHETERIZATION N/A 2/19/2024    Procedure: Cardiac catheterization;  Surgeon: Boston Bergman MD;  Location: [facility];  Service: Cardiology   • CARDIAC CATHETERIZATION N/A 2/19/2024    Procedure: Cardiac  Coronary Angiogram;  Surgeon: Boston Bergman MD;  Location: [facility];  Service: Cardiology   • CATARACT EXTRACTION Left    • EAR SURGERY      to wart   • EPIDIDYMAL CYST EXCISION Right 2000   • HEMORROIDECTOMY     • DE EXCISION HYDROCELE UNILATERAL Left 2023    Procedure: HYDROCELECTOMY, sPERMATOCELECTOMY;  Surgeon: James Blunt MD;  Location: [facility];  Service: Urology   • DE TRURL ELECTROSURG RESCJ PROSTATE BLEED COMPLETE N/A 2020    Procedure: CYSTOSCOPY, TRANSURETHRAL RESECTION OF PROSTATE (TURP);  Surgeon: Reji Meneses MD;  Location: [facility];  Service: Urology   • PROSTATE BIOPSY Bilateral 2003    BPH with mild acute and chronic inflammation   • PROSTATE BIOPSY Bilateral 2005    BPH with marked artropy and chronic inflammation      Family History:     Family History   Problem Relation Age of Onset   • Asthma Father       Social History:     Social History     Socioeconomic History   • Marital status: Unknown     Spouse name: None   • Number of children: None   • Years of education: None   • Highest education level: None   Occupational History   • None   Tobacco Use   • Smoking status: Former     Current packs/day: 0.00     Average packs/day: 1 pack/day for 15.0 years (15.0 ttl pk-yrs)     Types: Cigarettes     Start date:      Quit date:      Years since quittin.2     Passive exposure: Never   • Smokeless tobacco: Current     Types: Chew   • Tobacco comments:     Chews cigars   Vaping Use   • Vaping status: Never Used   Substance and Sexual Activity   • Alcohol use: Not Currently   • Drug use: Never   • Sexual activity: Not Currently   Other Topics Concern   • None   Social History Narrative   • None     Social Determinants of Health     Financial Resource Strain: Patient Declined (1/10/2023)    Overall Financial Resource Strain (CARDIA)    • Difficulty of Paying Living Expenses: Patient declined   Food Insecurity: No Food Insecurity (2024)     Hunger Vital Sign    • Worried About Running Out of Food in the Last Year: Never true    • Ran Out of Food in the Last Year: Never true   Transportation Needs: No Transportation Needs (4/2/2024)    PRAPARE - Transportation    • Lack of Transportation (Medical): No    • Lack of Transportation (Non-Medical): No   Physical Activity: Not on file   Stress: Not on file   Social Connections: Feeling Somewhat Isolated (3/9/2024)    Received from [facility]    OASIS : Social Isolation    • Frequency of experiencing loneliness or isolation: Sometimes   Intimate Partner Violence: Not on file   Housing Stability: Low Risk  (4/2/2024)    Housing Stability Vital Sign    • Unable to Pay for Housing in the Last Year: No    • Number of Places Lived in the Last Year: 1    • Unstable Housing in the Last Year: No      Medications and Allergies:     Current Outpatient Medications   Medication Sig Dispense Refill   • Aspirin 81 MG CAPS Take by mouth     • atorvastatin (LIPITOR) 20 mg tablet Take 1 tablet (20 mg total) by mouth daily with dinner 90 tablet 1   • clopidogrel (PLAVIX) 75 mg tablet Take 75 mg by mouth daily     • fluticasone (FLONASE) 50 mcg/act nasal spray 1 spray into each nostril 2 (two) times a day 15.8 mL 3   • losartan (COZAAR) 25 mg tablet Take 1 tablet (25 mg total) by mouth daily 90 tablet 1   • Fluticasone-Salmeterol (Advair Diskus) 250-50 mcg/dose inhaler Inhale 1 puff 2 (two) times a day Rinse mouth after use. (Patient not taking: Reported on 4/2/2024) 180 blister 5     No current facility-administered medications for this visit.     Allergies   Allergen Reactions   • Shellfish-Derived Products - Food Allergy Other (See Comments)     Listed by PCP patient denies   • Iodine - Food Allergy Other (See Comments)      Immunizations:     Immunization History   Administered Date(s) Administered   • COVID-19 J&J (Cyn) vaccine 0.5 mL 05/21/2021, 11/05/2021   • INFLUENZA 11/27/2018, 09/10/2019, 10/18/2021    • Influenza, high dose seasonal 0.7 mL 09/25/2023   • Pneumococcal Conjugate Vaccine 20-valent (Pcv20), Polysace 02/19/2024   • Pneumococcal Polysaccharide PPV23 01/01/2003   • influenza, injectable, quadrivalent 11/27/2018, 09/10/2019, 10/18/2021      Health Maintenance:     There are no preventive care reminders to display for this patient.      Topic Date Due   • Hepatitis A Vaccine (1 of 2 - Risk 2-dose series) Never done   • COVID-19 Vaccine (3 - 2023-24 season) 09/01/2023      Medicare Screening Tests and Risk Assessments:     Gilberto is here for Gilberto's Subsequent Wellness visit.     Health Risk Assessment:   Patient rates overall health as fair. Patient feels that their physical health rating is slightly worse. Patient is satisfied with their life. Eyesight was rated as same. Hearing was rated as same. Patient feels that their emotional and mental health rating is same. Patients states they are sometimes angry. Patient states they are sometimes unusually tired/fatigued. Pain experienced in the last 7 days has been none. Patient states that Gilberto has experienced no weight loss or gain in last 6 months. He thinks that he lost some weight    Depression Screening:   PHQ-2 Score: 0      Fall Risk Screening:   In the past year, patient has experienced: history of falling in past year    Number of falls: 1  Injured during fall?: Yes    Feels unsteady when standing or walking?: No    Worried about falling?: No      Home Safety:  Patient does not have trouble with stairs inside or outside of their home. Patient has working smoke alarms and has working carbon monoxide detector. Home safety hazards include: none. He is careful on the stairs. Lives alone.    Nutrition:   Current diet is Regular. Eats a regular diet. Proteins and vegetables    Medications:   Patient is currently taking over-the-counter supplements. OTC medications include: see medication list. Patient is able to manage medications. He has a friend  that helps him. Sister helps as well.    Activities of Daily Living (ADLs)/Instrumental Activities of Daily Living (IADLs):   Walk and transfer into and out of bed and chair?: Yes  Dress and groom yourself?: Yes    Bathe or shower yourself?: Yes    Feed yourself? Yes  Do your laundry/housekeeping?: Yes  Manage your money, pay your bills and track your expenses?: Yes  Make your own meals?: Yes    Do your own shopping?: Yes    ADL comments: He is independent.    Previous Hospitalizations:   Any hospitalizations or ED visits within the last 12 months?: Yes    How many hospitalizations have you had in the last year?: 1-2    Hospitalization Comments: He fell at a  was hosptalized. Has arortic stenosis.    Advance Care Planning:   Living will: Yes    Durable POA for healthcare: Yes    Advanced directive: Yes    Advanced directive counseling given: Yes    ACP document given: Yes    Patient declined ACP directive: Yes    End of Life Decisions reviewed with patient: Yes    Provider agrees with end of life decisions: Yes      Comments: He will bring docs next visit.    Cognitive Screening:   Provider or family/friend/caregiver concerned regarding cognition?: No    PREVENTIVE SCREENINGS      Cardiovascular Screening:    General: Screening Current    Due for: Lipid Panel      Diabetes Screening:     General: Screening Current    Due for: Blood Glucose      Colorectal Cancer Screening:     General: Patient Declines      Prostate Cancer Screening:    General: Screening Not Indicated      Osteoporosis Screening:    General: Patient Declines      Abdominal Aortic Aneurysm (AAA) Screening:    Risk factors include: tobacco use        General: Patient Declines      Lung Cancer Screening:     General: Screening Not Indicated      Hepatitis C Screening:    General: Patient Declines      Preventive Screening Comments: He has no risk for HEP C.    Screening, Brief Intervention, and Referral to Treatment  "(SBIRT)    Screening  Typical number of drinks in a day: 0  Typical number of drinks in a week: 0  Interpretation: Low risk drinking behavior.    AUDIT-C Screenin) How often did you have a drink containing alcohol in the past year? never  2) How many drinks did you have on a typical day when you were drinking in the past year? 0  3) How often did you have 6 or more drinks on one occasion in the past year? never    AUDIT-C Score: 0  Interpretation: Score 0-3 (male): Negative screen for alcohol misuse    Single Item Drug Screening:  How often have you used an illegal drug (including marijuana) or a prescription medication for non-medical reasons in the past year? never    Single Item Drug Screen Score: 0  Interpretation: Negative screen for possible drug use disorder    Brief Intervention  Alcohol & drug use screenings were reviewed. No concerns regarding substance use disorder identified.     Other Counseling Topics:   Skin self-exam and sunscreen.     No results found.     Physical Exam:     Pulse 84   Ht 5' 9\" (1.753 m)   Wt 65.3 kg (144 lb)   SpO2 99%   BMI 21.27 kg/m²     Physical Exam  Vitals and nursing note reviewed.   Constitutional:       General: Gilberto is not in acute distress.     Appearance: Gilberto is well-developed.   HENT:      Head: Normocephalic and atraumatic.   Eyes:      Conjunctiva/sclera: Conjunctivae normal.   Cardiovascular:      Rate and Rhythm: Normal rate and regular rhythm.      Heart sounds: Murmur heard.   Pulmonary:      Effort: Pulmonary effort is normal. No respiratory distress.      Breath sounds: Normal breath sounds.   Abdominal:      Palpations: Abdomen is soft.      Tenderness: There is no abdominal tenderness.   Musculoskeletal:         General: No swelling.      Cervical back: Neck supple.   Skin:     General: Skin is warm and dry.      Capillary Refill: Capillary refill takes less than 2 seconds.   Neurological:      General: No focal deficit present.      Mental " Status: Gilberto is alert and oriented to person, place, and time.   Psychiatric:         Mood and Affect: Mood normal.          Dima Lloyd MD

## 2024-04-02 NOTE — ASSESSMENT & PLAN NOTE
No chest pain no difficulty breathing continue Plavix 75 mg daily aspirin metoprolol and atorvastatin

## 2024-04-02 NOTE — PATIENT INSTRUCTIONS
Medicare Preventive Visit Patient Instructions  Thank you for completing your Welcome to Medicare Visit or Medicare Annual Wellness Visit today. Your next wellness visit will be due in one year (4/3/2025).  The screening/preventive services that you may require over the next 5-10 years are detailed below. Some tests may not apply to you based off risk factors and/or age. Screening tests ordered at today's visit but not completed yet may show as past due. Also, please note that scanned in results may not display below.  Preventive Screenings:  Service Recommendations Previous Testing/Comments   Colorectal Cancer Screening  Colonoscopy    Fecal Occult Blood Test (FOBT)/Fecal Immunochemical Test (FIT)  Fecal DNA/Cologuard Test  Flexible Sigmoidoscopy Age: 45-75 years old   Colonoscopy: every 10 years (May be performed more frequently if at higher risk)  OR  FOBT/FIT: every 1 year  OR  Cologuard: every 3 years  OR  Sigmoidoscopy: every 5 years  Screening may be recommended earlier than age 45 if at higher risk for colorectal cancer. Also, an individualized decision between you and your healthcare provider will decide whether screening between the ages of 76-85 would be appropriate. Colonoscopy: Not on file  FOBT/FIT: Not on file  Cologuard: Not on file  Sigmoidoscopy: Not on file          Prostate Cancer Screening Individualized decision between patient and health care provider in men between ages of 55-69   Medicare will cover every 12 months beginning on the day after your 50th birthday PSA: 6.98 ng/mL           Hepatitis C Screening Once for adults born between 1945 and 1965  More frequently in patients at high risk for Hepatitis C Hep C Antibody: Not on file        Diabetes Screening 1-2 times per year if you're at risk for diabetes or have pre-diabetes Fasting glucose: 103 mg/dL (9/15/2023)  A1C: 5.7 % (2/16/2024)      Cholesterol Screening Once every 5 years if you don't have a lipid disorder. May order more often  based on risk factors. Lipid panel: 02/17/2024         Other Preventive Screenings Covered by Medicare:  Abdominal Aortic Aneurysm (AAA) Screening: covered once if your at risk. You're considered to be at risk if you have a family history of AAA or a male between the age of 65-75 who smoking at least 100 cigarettes in your lifetime.  Lung Cancer Screening: covers low dose CT scan once per year if you meet all of the following conditions: (1) Age 55-77; (2) No signs or symptoms of lung cancer; (3) Current smoker or have quit smoking within the last 15 years; (4) You have a tobacco smoking history of at least 20 pack years (packs per day x number of years you smoked); (5) You get a written order from a healthcare provider.  Glaucoma Screening: covered annually if you're considered high risk: (1) You have diabetes OR (2) Family history of glaucoma OR (3)  aged 50 and older OR (4)  American aged 65 and older  Osteoporosis Screening: covered every 2 years if you meet one of the following conditions: (1) Have a vertebral abnormality; (2) On glucocorticoid therapy for more than 3 months; (3) Have primary hyperparathyroidism; (4) On osteoporosis medications and need to assess response to drug therapy.  HIV Screening: covered annually if you're between the age of 15-65. Also covered annually if you are younger than 15 and older than 65 with risk factors for HIV infection. For pregnant patients, it is covered up to 3 times per pregnancy.    Immunizations:  Immunization Recommendations   Influenza Vaccine Annual influenza vaccination during flu season is recommended for all persons aged >= 6 months who do not have contraindications   Pneumococcal Vaccine   * Pneumococcal conjugate vaccine = PCV13 (Prevnar 13), PCV15 (Vaxneuvance), PCV20 (Prevnar 20)  * Pneumococcal polysaccharide vaccine = PPSV23 (Pneumovax) Adults 19-65 yo with certain risk factors or if 65+ yo  If never received any pneumonia vaccine:  recommend Prevnar 20 (PCV20)  Give PCV20 if previously received 1 dose of PCV13 or PPSV23   Hepatitis B Vaccine 3 dose series if at intermediate or high risk (ex: diabetes, end stage renal disease, liver disease)   Respiratory syncytial virus (RSV) Vaccine - COVERED BY MEDICARE PART D  * RSVPreF3 (Arexvy) CDC recommends that adults 60 years of age and older may receive a single dose of RSV vaccine using shared clinical decision-making (SCDM)   Tetanus (Td) Vaccine - COST NOT COVERED BY MEDICARE PART B Following completion of primary series, a booster dose should be given every 10 years to maintain immunity against tetanus. Td may also be given as tetanus wound prophylaxis.   Tdap Vaccine - COST NOT COVERED BY MEDICARE PART B Recommended at least once for all adults. For pregnant patients, recommended with each pregnancy.   Shingles Vaccine (Shingrix) - COST NOT COVERED BY MEDICARE PART B  2 shot series recommended in those 19 years and older who have or will have weakened immune systems or those 50 years and older     Health Maintenance Due:  There are no preventive care reminders to display for this patient.  Immunizations Due:      Topic Date Due   • Hepatitis A Vaccine (1 of 2 - Risk 2-dose series) Never done   • COVID-19 Vaccine (3 - 2023-24 season) 09/01/2023     Advance Directives   What are advance directives?  Advance directives are legal documents that state your wishes and plans for medical care. These plans are made ahead of time in case you lose your ability to make decisions for yourself. Advance directives can apply to any medical decision, such as the treatments you want, and if you want to donate organs.   What are the types of advance directives?  There are many types of advance directives, and each state has rules about how to use them. You may choose a combination of any of the following:  Living will:  This is a written record of the treatment you want. You can also choose which treatments you do  not want, which to limit, and which to stop at a certain time. This includes surgery, medicine, IV fluid, and tube feedings.   Durable power of  for healthcare (DPAHC):  This is a written record that states who you want to make healthcare choices for you when you are unable to make them for yourself. This person, called a proxy, is usually a family member or a friend. You may choose more than 1 proxy.  Do not resuscitate (DNR) order:  A DNR order is used in case your heart stops beating or you stop breathing. It is a request not to have certain forms of treatment, such as CPR. A DNR order may be included in other types of advance directives.  Medical directive:  This covers the care that you want if you are in a coma, near death, or unable to make decisions for yourself. You can list the treatments you want for each condition. Treatment may include pain medicine, surgery, blood transfusions, dialysis, IV or tube feedings, and a ventilator (breathing machine).  Values history:  This document has questions about your views, beliefs, and how you feel and think about life. This information can help others choose the care that you would choose.  Why are advance directives important?  An advance directive helps you control your care. Although spoken wishes may be used, it is better to have your wishes written down. Spoken wishes can be misunderstood, or not followed. Treatments may be given even if you do not want them. An advance directive may make it easier for your family to make difficult choices about your care.   How to Quit Using Smokeless Tobacco   Why it is important to stop using smokeless tobacco:  Smokeless tobacco comes in many forms. Examples include chew, snuff, dip, dissolvable tobacco, and snus. All smokeless tobacco products contain nicotine and may contain as much nicotine as 3 cigarettes. You may be physically dependent on nicotine. You may also be emotionally addicted to it. The cravings can  be strong, but it is important to quit using smokeless tobacco. You will improve your health and decrease your cancer, stroke, and heart attack risk. Mouth sores and tooth problems will also improve when you quit. You can benefit from quitting no matter how long you have used smokeless tobacco.   Prepare to stop using smokeless tobacco:  Nicotine is a highly addictive drug. Withdrawal symptoms can happen when you stop and make it hard to quit. The following can help keep you on track:  Set a quit date.    Tell friends, family, and coworkers that you plan to quit.    Remove all smokeless tobacco products from your home, car, and workplace.    Manage weight gain after you quit:  Nicotine can affect your metabolism. You may gain a few pounds after you quit. The following can help you control your weight:  Eat healthy foods.    Drink water before, during, and between meals.    Exercise as directed.         © Copyright [employer] 2018 Information is for End User's use only and may not be sold, redistributed or otherwise used for commercial purposes. All illustrations and images included in CareNotes® are the copyrighted property of [employer] or [employer]    Medicare Preventive Visit Patient Instructions  Thank you for completing your Welcome to Medicare Visit or Medicare Annual Wellness Visit today. Your next wellness visit will be due in one year (4/3/2025).  The screening/preventive services that you may require over the next 5-10 years are detailed below. Some tests may not apply to you based off risk factors and/or age. Screening tests ordered at today's visit but not completed yet may show as past due. Also, please note that scanned in results may not display below.  Preventive Screenings:  Service Recommendations Previous Testing/Comments   Colorectal Cancer Screening  Colonoscopy    Fecal Occult Blood Test (FOBT)/Fecal Immunochemical Test (FIT)  Fecal DNA/Cologuard Test  Flexible Sigmoidoscopy  Age: 45-75 years old   Colonoscopy: every 10 years (May be performed more frequently if at higher risk)  OR  FOBT/FIT: every 1 year  OR  Cologuard: every 3 years  OR  Sigmoidoscopy: every 5 years  Screening may be recommended earlier than age 45 if at higher risk for colorectal cancer. Also, an individualized decision between you and your healthcare provider will decide whether screening between the ages of 76-85 would be appropriate. Colonoscopy: Not on file  FOBT/FIT: Not on file  Cologuard: Not on file  Sigmoidoscopy: Not on file          Prostate Cancer Screening Individualized decision between patient and health care provider in men between ages of 55-69   Medicare will cover every 12 months beginning on the day after your 50th birthday PSA: 6.98 ng/mL           Hepatitis C Screening Once for adults born between 1945 and 1965  More frequently in patients at high risk for Hepatitis C Hep C Antibody: Not on file        Diabetes Screening 1-2 times per year if you're at risk for diabetes or have pre-diabetes Fasting glucose: 103 mg/dL (9/15/2023)  A1C: 5.7 % (2/16/2024)      Cholesterol Screening Once every 5 years if you don't have a lipid disorder. May order more often based on risk factors. Lipid panel: 02/17/2024         Other Preventive Screenings Covered by Medicare:  Abdominal Aortic Aneurysm (AAA) Screening: covered once if your at risk. You're considered to be at risk if you have a family history of AAA or a male between the age of 65-75 who smoking at least 100 cigarettes in your lifetime.  Lung Cancer Screening: covers low dose CT scan once per year if you meet all of the following conditions: (1) Age 55-77; (2) No signs or symptoms of lung cancer; (3) Current smoker or have quit smoking within the last 15 years; (4) You have a tobacco smoking history of at least 20 pack years (packs per day x number of years you smoked); (5) You get a written order from a healthcare provider.  Glaucoma Screening:  covered annually if you're considered high risk: (1) You have diabetes OR (2) Family history of glaucoma OR (3)  aged 50 and older OR (4)  American aged 65 and older  Osteoporosis Screening: covered every 2 years if you meet one of the following conditions: (1) Have a vertebral abnormality; (2) On glucocorticoid therapy for more than 3 months; (3) Have primary hyperparathyroidism; (4) On osteoporosis medications and need to assess response to drug therapy.  HIV Screening: covered annually if you're between the age of 15-65. Also covered annually if you are younger than 15 and older than 65 with risk factors for HIV infection. For pregnant patients, it is covered up to 3 times per pregnancy.    Immunizations:  Immunization Recommendations   Influenza Vaccine Annual influenza vaccination during flu season is recommended for all persons aged >= 6 months who do not have contraindications   Pneumococcal Vaccine   * Pneumococcal conjugate vaccine = PCV13 (Prevnar 13), PCV15 (Vaxneuvance), PCV20 (Prevnar 20)  * Pneumococcal polysaccharide vaccine = PPSV23 (Pneumovax) Adults 19-65 yo with certain risk factors or if 65+ yo  If never received any pneumonia vaccine: recommend Prevnar 20 (PCV20)  Give PCV20 if previously received 1 dose of PCV13 or PPSV23   Hepatitis B Vaccine 3 dose series if at intermediate or high risk (ex: diabetes, end stage renal disease, liver disease)   Respiratory syncytial virus (RSV) Vaccine - COVERED BY MEDICARE PART D  * RSVPreF3 (Arexvy) CDC recommends that adults 60 years of age and older may receive a single dose of RSV vaccine using shared clinical decision-making (SCDM)   Tetanus (Td) Vaccine - COST NOT COVERED BY MEDICARE PART B Following completion of primary series, a booster dose should be given every 10 years to maintain immunity against tetanus. Td may also be given as tetanus wound prophylaxis.   Tdap Vaccine - COST NOT COVERED BY MEDICARE PART B Recommended at  least once for all adults. For pregnant patients, recommended with each pregnancy.   Shingles Vaccine (Shingrix) - COST NOT COVERED BY MEDICARE PART B  2 shot series recommended in those 19 years and older who have or will have weakened immune systems or those 50 years and older     Health Maintenance Due:  There are no preventive care reminders to display for this patient.  Immunizations Due:      Topic Date Due   • Hepatitis A Vaccine (1 of 2 - Risk 2-dose series) Never done   • COVID-19 Vaccine (3 - 2023-24 season) 09/01/2023     Advance Directives   What are advance directives?  Advance directives are legal documents that state your wishes and plans for medical care. These plans are made ahead of time in case you lose your ability to make decisions for yourself. Advance directives can apply to any medical decision, such as the treatments you want, and if you want to donate organs.   What are the types of advance directives?  There are many types of advance directives, and each state has rules about how to use them. You may choose a combination of any of the following:  Living will:  This is a written record of the treatment you want. You can also choose which treatments you do not want, which to limit, and which to stop at a certain time. This includes surgery, medicine, IV fluid, and tube feedings.   Durable power of  for healthcare (DPAHC):  This is a written record that states who you want to make healthcare choices for you when you are unable to make them for yourself. This person, called a proxy, is usually a family member or a friend. You may choose more than 1 proxy.  Do not resuscitate (DNR) order:  A DNR order is used in case your heart stops beating or you stop breathing. It is a request not to have certain forms of treatment, such as CPR. A DNR order may be included in other types of advance directives.  Medical directive:  This covers the care that you want if you are in a coma, near death,  or unable to make decisions for yourself. You can list the treatments you want for each condition. Treatment may include pain medicine, surgery, blood transfusions, dialysis, IV or tube feedings, and a ventilator (breathing machine).  Values history:  This document has questions about your views, beliefs, and how you feel and think about life. This information can help others choose the care that you would choose.  Why are advance directives important?  An advance directive helps you control your care. Although spoken wishes may be used, it is better to have your wishes written down. Spoken wishes can be misunderstood, or not followed. Treatments may be given even if you do not want them. An advance directive may make it easier for your family to make difficult choices about your care.   How to Quit Using Smokeless Tobacco   Why it is important to stop using smokeless tobacco:  Smokeless tobacco comes in many forms. Examples include chew, snuff, dip, dissolvable tobacco, and snus. All smokeless tobacco products contain nicotine and may contain as much nicotine as 3 cigarettes. You may be physically dependent on nicotine. You may also be emotionally addicted to it. The cravings can be strong, but it is important to quit using smokeless tobacco. You will improve your health and decrease your cancer, stroke, and heart attack risk. Mouth sores and tooth problems will also improve when you quit. You can benefit from quitting no matter how long you have used smokeless tobacco.   Prepare to stop using smokeless tobacco:  Nicotine is a highly addictive drug. Withdrawal symptoms can happen when you stop and make it hard to quit. The following can help keep you on track:  Set a quit date.    Tell friends, family, and coworkers that you plan to quit.    Remove all smokeless tobacco products from your home, car, and workplace.    Manage weight gain after you quit:  Nicotine can affect your metabolism. You may gain a few pounds  after you quit. The following can help you control your weight:  Eat healthy foods.    Drink water before, during, and between meals.    Exercise as directed.         © Copyright [employer] 2018 Information is for End User's use only and may not be sold, redistributed or otherwise used for commercial purposes. All illustrations and images included in CareNotes® are the copyrighted property of [employer] or [employer]

## 2024-04-02 NOTE — ASSESSMENT & PLAN NOTE
Asymptomatic blood pressure controlled continue regimen medication as follows    Losartan 25 mg daily with low-salt diet

## 2024-04-02 NOTE — PROGRESS NOTES
Progress Note - Cardiology Office  [facility]    Gilberto Vann 83 y.o. adult MRN: [id number]  : 1940  Encounter: [id number]      ASSESSMENT:  S/p MIRCA pacemaker implantation for Mobitz type II AV block and LBBB    S/p TAVR, 2024 at [facility] with 29MM CE BOVINE PERICARDIAL TISSUE TRANSCATHETER HEART VALVE, for Critical aortic stenosis    History of syncope, probably due to critical aortic stenosis  S/p CPR with rib and sternal fracture  24 CT chest without contrast: Acute minimally displaced fractures of multiple bilateral anterior ribs due to CPR. Nondisplaced fracture of the anterior cortex of the superior body of the sternum      CAD involving circumflex 70% and LAD 60% stenosis     Chronic combined systolic and diastolic heart failure  EF 25%  24 TTE:   LVEF 25%. There is severe global hypokinesis with regional variation. Diastolic function is moderately abnormal, consistent with grade II (pseudonormal) relaxation.  Left atrial filling pressure is elevated. Right Ventricle: Systolic function is low normal. Left Atrium: The atrium is severely dilated ( 60 mL/m2). Right Atrium: The atrium is moderately dilated. Aortic Valve: The aortic valve is trileaflet. The leaflets are moderately thickened. The leaflets are moderately calcified. There is severely reduced mobility. There is mild to moderate regurgitation. There is critical stenosis.  Peak gradient 108 mmHg.  Mean gradient 63.  LVOT velocity is measured to be 0.58.  LVOT diameter 2.2 and aortic valve area 0.5 cm 2 and dimensionless index 0.11.  Mitral Valve: There is mild to moderate regurgitation. Tricuspid Valve: There is mild to moderate regurgitation.  Pulmonary artery pressure around 50 mmHg. IVC/SVC: The inferior vena cava is mildly dilated.  It has less than 50% collapse with inspiration consistent with elevated right atrial pressure about 10 to 15 mmHg. Pericardium: There is a trivial  pericardial effusion.      Hypertension,   BP is 140/70 mmHg with heart rate of 70/min     COPD  Prediabetes, hemoglobin A1c 5.7     RECOMMENDATIONS:  Continue aspirin, Plavix, losartan and atorvastatin  Add Toprol XL 25 mg  Repeat TTE in 3 months  Low-salt and low-cholesterol diet              Please call [phone] if any questions.    HPI :     Gilberto Vann is a 83 y.o. year old adult who came for follow up.  He had TAVR and Merker pacemaker implantation at [facility].  He is doing well and denies any specific cardiac symptoms  He had cardiomyopathy with EF of 25%.  Postprocedure he is currently only on losartan.  We will start him on Toprol XL and gradually titrate up his medications and possibly add Aldactone and Jardiance on subsequent visits    REVIEW OF SYSTEMS:  Denies any new or acute cardiac symptoms.  Denies chest pain, unusual dyspnea, palpitations or syncope      Historical Information   Past Medical History:   Diagnosis Date    Arthritis     Asthma     Back pain     BPH (benign prostatic hypertrophy) with urinary obstruction     COPD (chronic obstructive pulmonary disease) (HCC)     Dizziness     Glaucoma     bilat    Insomnia     RLS (restless legs syndrome)     Sinusitis      Past Surgical History:   Procedure Laterality Date    CARDIAC CATHETERIZATION N/A 2/19/2024    Procedure: Cardiac catheterization;  Surgeon: Boston Bergmna MD;  Location: [facility];  Service: Cardiology    CARDIAC CATHETERIZATION N/A 2/19/2024    Procedure: Cardiac Coronary Angiogram;  Surgeon: Boston Bergman MD;  Location: [facility];  Service: Cardiology    CATARACT EXTRACTION Left     EAR SURGERY      to wart    EPIDIDYMAL CYST EXCISION Right 05/04/2000    HEMORROIDECTOMY      WY EXCISION HYDROCELE UNILATERAL Left 9/14/2023    Procedure: HYDROCELECTOMY, sPERMATOCELECTOMY;  Surgeon: James Blunt MD;  Location: [facility];  Service: Urology    WY TRURL ELECTROSURG RESCJ PROSTATE BLEED  COMPLETE N/A 2020    Procedure: CYSTOSCOPY, TRANSURETHRAL RESECTION OF PROSTATE (TURP);  Surgeon: Reji Meneses MD;  Location: [facility];  Service: Urology    PROSTATE BIOPSY Bilateral 2003    BPH with mild acute and chronic inflammation    PROSTATE BIOPSY Bilateral 2005    BPH with marked artropy and chronic inflammation     Social History     Substance and Sexual Activity   Alcohol Use Not Currently     Social History     Substance and Sexual Activity   Drug Use Never     Social History     Tobacco Use   Smoking Status Former    Current packs/day: 0.00    Average packs/day: 1 pack/day for 15.0 years (15.0 ttl pk-yrs)    Types: Cigarettes    Start date:     Quit date:     Years since quittin.3    Passive exposure: Never   Smokeless Tobacco Current    Types: Chew   Tobacco Comments    Chews cigars     Family History:   Family History   Problem Relation Age of Onset    Asthma Father        Meds/Allergies     Allergies   Allergen Reactions    Shellfish-Derived Products - Food Allergy Other (See Comments)     Listed by PCP patient denies    Iodine - Food Allergy Other (See Comments)       Current Outpatient Medications:     Aspirin 81 MG CAPS, Take by mouth, Disp: , Rfl:     atorvastatin (LIPITOR) 20 mg tablet, Take 1 tablet (20 mg total) by mouth daily with dinner, Disp: 90 tablet, Rfl: 1    clopidogrel (PLAVIX) 75 mg tablet, Take 75 mg by mouth daily, Disp: , Rfl:     fluticasone (FLONASE) 50 mcg/act nasal spray, 1 spray into each nostril 2 (two) times a day, Disp: 15.8 mL, Rfl: 3    losartan (COZAAR) 25 mg tablet, Take 1 tablet (25 mg total) by mouth daily, Disp: 90 tablet, Rfl: 1    metoprolol succinate (TOPROL-XL) 25 mg 24 hr tablet, Take 1 tablet (25 mg total) by mouth daily, Disp: 90 tablet, Rfl: 2    Fluticasone-Salmeterol (Advair Diskus) 250-50 mcg/dose inhaler, Inhale 1 puff 2 (two) times a day Rinse mouth after use. (Patient not taking: Reported on 2024), Disp: 180  "blister, Rfl: 5    Vitals: Blood pressure 142/70, pulse 70, height 5' 9\" (1.753 m), weight 63.5 kg (140 lb), SpO2 99%.    Body mass index is 20.67 kg/m².  Vitals:    04/03/24 1134   Weight: 63.5 kg (140 lb)     BP Readings from Last 3 Encounters:   04/03/24 142/70   03/07/24 108/70   02/29/24 120/66       Physical Exam:  Physical Exam    Neurologic:  Alert & oriented x 3, no new focal deficits, Not in any acute distress,  Constitutional:  Well developed, well nourished, non-toxic appearance   Eyes:  Pupil equal and reacting to light, conjunctiva normal,   HENT:  Atraumatic, oropharynx moist, Neck- normal range of motion, no tenderness,  Neck supple, No JVP, No LNP   Respiratory:  Bilateral air entry, mostly clear to auscultation  Cardiovascular: S1-S2 regular rhythm with 1/6 systolic murmur  GI:  Soft, nondistended, normal bowel sounds, nontender, no hepatosplenomegaly appreciated.  Musculoskeletal: no tenderness, no deformities.   Skin:  Well hydrated, no rash   Lymphatic:  No lymphadenopathy noted   Extremities:  No edema        Diagnostic Studies Review Cardio:      EKG: Sinus rhythm with frequent ventricular paced complexes.  Heart rate 70/min.  Left axis deviation, LVH with QRS widening and repolarization abnormality    Cardiac testing:     Results for orders placed during the hospital encounter of 02/16/24    Echo complete w/ contrast if indicated    Interpretation Summary    Left Ventricle: Left ventricular cavity size is mildly dilated. Wall thickness is mildly increased. There is mild concentric hypertrophy. The left ventricular ejection fraction is around 25% by visual estimation. Systolic function is severely reduced. There is severe global hypokinesis with regional variation. Diastolic function is moderately abnormal, consistent with grade II (pseudonormal) relaxation.  Left atrial filling pressure is elevated.    Right Ventricle: Systolic function is low normal.    Left Atrium: The atrium is severely " dilated ( 60 mL/m2).    Right Atrium: The atrium is moderately dilated.    Aortic Valve: The aortic valve is trileaflet. The leaflets are moderately thickened. The leaflets are moderately calcified. There is severely reduced mobility. There is mild to moderate regurgitation. There is critical stenosis.  Peak gradient 108 mmHg.  Mean gradient 63.  LVOT velocity is measured to be 0.58.  LVOT diameter 2.2 and aortic valve area 0.5 cm 2 and dimensionless index 0.11.  There may be some component of low cardiac output contributing to valve stenosis.    Mitral Valve: There is mild to moderate regurgitation.    Tricuspid Valve: There is mild to moderate regurgitation.  Pulmonary artery pressure around 50 mmHg.    IVC/SVC: The inferior vena cava is mildly dilated.  It has less than 50% collapse with inspiration consistent with elevated right atrial pressure about 10 to 15 mmHg.    Pericardium: There is a trivial pericardial effusion.    No old echo available for comparison.      Imaging:  Chest X-Ray:   XR chest pa & lateral    Result Date: 5/18/2023  Impression No acute cardiopulmonary disease. Workstation performed: [id number]       CT-scan of the chest:     CTA chest wo w contrast    Result Date: 3/14/2024  Impression *  Mildly dilated aortic root. No significant focal vascular stenosis. *  6 mm groundglass right lung nodule. Recommend follow-up per Fleischner Society criteria. *  Small bilateral pleural effusions. *   Enlarged prostate. *      Lab Review   Lab Results   Component Value Date    WBC 6.63 02/22/2024    HGB 12.0 02/22/2024    HCT 36.4 (L) 02/22/2024    MCV 91 02/22/2024    RDW 15.2 (H) 02/22/2024     02/22/2024     BMP:  Lab Results   Component Value Date    SODIUM 142 02/22/2024    K 4.2 02/22/2024     02/22/2024    CO2 29 02/22/2024    BUN 17 02/22/2024    CREATININE 0.70 02/22/2024    GLUC 91 02/22/2024    GLUF 103 (H) 09/15/2023    CALCIUM 8.5 02/22/2024    CORRECTEDCA 8.6 02/18/2024    EGFR  "86 06/26/2023    MG 1.9 02/22/2024     LFT:  Lab Results   Component Value Date    AST 28 02/22/2024    ALT 41 02/22/2024    ALKPHOS 79 02/22/2024    TP 5.4 (L) 02/22/2024    ALB 3.2 (L) 02/22/2024      No components found for: \"TSH3\"  Lab Results   Component Value Date    XOP0MMRTSWHA 2.691 02/17/2024     Lab Results   Component Value Date    HGBA1C 5.7 (H) 02/16/2024     Lipid Profile:   Lab Results   Component Value Date    CHOLESTEROL 144 02/17/2024    HDL 53 02/17/2024    LDLCALC 82 02/17/2024    TRIG 44 02/17/2024     Lab Results   Component Value Date    CHOLESTEROL 144 02/17/2024     No results found for: \"CKTOTAL\", \"CKMB\", \"CKMBINDEX\", \"TROPONINI\"  No results found for: \"NTBNP\"   No results found for this or any previous visit (from the past 672 hour(s)).          Dr. Betty Grider MD, FACC      \"This note has been constructed using a voice recognition system.Therefore there may be syntax, spelling, and/or grammatical errors. Please call if you have any questions. \"  "

## 2024-04-02 NOTE — ASSESSMENT & PLAN NOTE
From difficulty breathing with minimal exertionContinue Symbicort  No wheezing on auscultation for now symptoms controlled at baseline

## 2024-04-03 ENCOUNTER — OFFICE VISIT (OUTPATIENT)
Dept: CARDIOLOGY CLINIC | Facility: CLINIC | Age: 84
End: 2024-04-03
Payer: COMMERCIAL

## 2024-04-03 VITALS
SYSTOLIC BLOOD PRESSURE: 142 MMHG | HEIGHT: 69 IN | BODY MASS INDEX: 20.73 KG/M2 | HEART RATE: 70 BPM | DIASTOLIC BLOOD PRESSURE: 70 MMHG | WEIGHT: 140 LBS | OXYGEN SATURATION: 99 %

## 2024-04-03 DIAGNOSIS — I25.10 CORONARY ARTERY DISEASE, UNSPECIFIED VESSEL OR LESION TYPE, UNSPECIFIED WHETHER ANGINA PRESENT, UNSPECIFIED WHETHER NATIVE OR TRANSPLANTED HEART: Primary | ICD-10-CM

## 2024-04-03 DIAGNOSIS — R79.89 ELEVATED TROPONIN: ICD-10-CM

## 2024-04-03 DIAGNOSIS — I42.9 CARDIOMYOPATHY, UNSPECIFIED TYPE (HCC): ICD-10-CM

## 2024-04-03 DIAGNOSIS — I10 PRIMARY HYPERTENSION: ICD-10-CM

## 2024-04-03 DIAGNOSIS — R06.02 SHORTNESS OF BREATH: ICD-10-CM

## 2024-04-03 DIAGNOSIS — I35.0 CRITICAL AORTIC VALVE STENOSIS: ICD-10-CM

## 2024-04-03 PROCEDURE — 93000 ELECTROCARDIOGRAM COMPLETE: CPT | Performed by: INTERNAL MEDICINE

## 2024-04-03 PROCEDURE — 99214 OFFICE O/P EST MOD 30 MIN: CPT | Performed by: INTERNAL MEDICINE

## 2024-04-03 RX ORDER — METOPROLOL SUCCINATE 25 MG/1
25 TABLET, EXTENDED RELEASE ORAL DAILY
Qty: 90 TABLET | Refills: 2 | Status: SHIPPED | OUTPATIENT
Start: 2024-04-03

## 2024-04-15 DIAGNOSIS — I25.10 CORONARY ARTERY DISEASE INVOLVING NATIVE CORONARY ARTERY OF NATIVE HEART WITHOUT ANGINA PECTORIS: Primary | ICD-10-CM

## 2024-04-15 RX ORDER — CLOPIDOGREL BISULFATE 75 MG/1
75 TABLET ORAL DAILY
Qty: 30 TABLET | Refills: 2 | Status: SHIPPED | OUTPATIENT
Start: 2024-04-15

## 2024-06-03 ENCOUNTER — RA CDI HCC (OUTPATIENT)
Dept: OTHER | Facility: HOSPITAL | Age: 84
End: 2024-06-03

## 2024-06-03 DIAGNOSIS — I11.0 HYPERTENSIVE HEART FAILURE (HCC): Primary | ICD-10-CM

## 2024-06-03 PROBLEM — Z01.818 PRE-OP EXAMINATION: Status: RESOLVED | Noted: 2023-01-10 | Resolved: 2024-06-03

## 2024-06-03 NOTE — PROGRESS NOTES
HCC coding opportunities          Chart Reviewed number of suggestions sent to Provider: 1  I11.0     Patients Insurance     Medicare Insurance: [facility] Medicare Advantage

## 2024-06-10 ENCOUNTER — OFFICE VISIT (OUTPATIENT)
Dept: INTERNAL MEDICINE CLINIC | Facility: CLINIC | Age: 84
End: 2024-06-10
Payer: COMMERCIAL

## 2024-06-10 VITALS
OXYGEN SATURATION: 96 % | DIASTOLIC BLOOD PRESSURE: 64 MMHG | HEART RATE: 82 BPM | BODY MASS INDEX: 21.71 KG/M2 | HEIGHT: 69 IN | SYSTOLIC BLOOD PRESSURE: 120 MMHG | WEIGHT: 146.6 LBS

## 2024-06-10 DIAGNOSIS — J41.0 SIMPLE CHRONIC BRONCHITIS (HCC): ICD-10-CM

## 2024-06-10 DIAGNOSIS — I11.0 HYPERTENSIVE HEART FAILURE (HCC): Primary | ICD-10-CM

## 2024-06-10 DIAGNOSIS — I25.5 ISCHEMIC CARDIOMYOPATHY: ICD-10-CM

## 2024-06-10 PROCEDURE — G2211 COMPLEX E/M VISIT ADD ON: HCPCS | Performed by: INTERNAL MEDICINE

## 2024-06-10 PROCEDURE — 99213 OFFICE O/P EST LOW 20 MIN: CPT | Performed by: INTERNAL MEDICINE

## 2024-06-10 NOTE — ASSESSMENT & PLAN NOTE
Echo showed EF of 25% with severe global hypokinesis with regional variations, grade 2 diastolic dysfunction with severely dilated left atrium with aortic valve area of 0.5 cm 2  Etiology not clear at the current time  Continue medical management with Toprol-XL, Aldactone, losartan  Cardiology follow-up    Above reviewed for now no chest pain dyspnea on exertion at baseline awaiting to be seen by cardiology no chest pain no difficulty breathing for now

## 2024-06-10 NOTE — ASSESSMENT & PLAN NOTE
Wt Readings from Last 3 Encounters:   06/10/24 66.5 kg (146 lb 9.6 oz)   04/03/24 63.5 kg (140 lb)   04/02/24 65.3 kg (144 lb)   Patient euvolemic CHF compensated dyspnea on exertion at baseline continue low-salt diet fluid restriction Daily weight monitoring continue regimen as follows  Continue aspirin, Plavix, losartan and atorvastatin  Add Toprol XL 25 mg  Repeat TTE in 3 months  Low-salt and low-cholesterol diet

## 2024-06-10 NOTE — PROGRESS NOTES
Dr. Lloyd's Office Visit Note  06/10/24     Gilberto Vann 83 y.o. male MRN: [id number]  : 1940    Assessment:     1. Hypertensive heart failure (HCC)  Assessment & Plan:  Wt Readings from Last 3 Encounters:   06/10/24 66.5 kg (146 lb 9.6 oz)   24 63.5 kg (140 lb)   24 65.3 kg (144 lb)   Patient euvolemic CHF compensated dyspnea on exertion at baseline continue low-salt diet fluid restriction Daily weight monitoring continue regimen as follows  Continue aspirin, Plavix, losartan and atorvastatin  Add Toprol XL 25 mg  Repeat TTE in 3 months  Low-salt and low-cholesterol diet        2. Ischemic cardiomyopathy  Assessment & Plan:  Echo showed EF of 25% with severe global hypokinesis with regional variations, grade 2 diastolic dysfunction with severely dilated left atrium with aortic valve area of 0.5 cm 2  Etiology not clear at the current time  Continue medical management with Toprol-XL, Aldactone, losartan  Cardiology follow-up    Above reviewed for now no chest pain dyspnea on exertion at baseline awaiting to be seen by cardiology no chest pain no difficulty breathing for now  3. Simple chronic bronchitis (HCC)  Assessment & Plan:  From difficulty breathing with minimal exertionContinue Symbicort Proventil as needed dyspnea on exertion at baseline advised follow-up with the pulmonary  No wheezing on auscultation for now symptoms controlled at baseline        Discussion Summary and Plan:  Today's care plan and medications were reviewed with patient in detail and all their questions answered to their satisfaction.    Chief Complaint   Patient presents with   • Follow-up     Was in the hospital had a heart attack. Having pain in the right leg.       Subjective:  Came in follow-up chronic medical conditions seen by cardiology status post pacemaker and status post TAVR dyspnea on exertion at baseline awaiting to be followed by cardiology patient claims cannot afford the inhaler so had stopped using  [facility] cardiology follow-up notes reviewed        The following portions of the patient's history were reviewed and updated as appropriate: allergies, current medications, past family history, past medical history, past social history, past surgical history and problem list.    Review of Systems   Constitutional:  Positive for activity change. Negative for appetite change, chills, diaphoresis, fatigue, fever and unexpected weight change.   HENT:  Negative for dental problem, drooling, ear discharge, ear pain, facial swelling, hearing loss, mouth sores, nosebleeds, postnasal drip, sinus pressure, sneezing, sore throat, tinnitus, trouble swallowing and voice change.    Eyes:  Negative for photophobia, pain, discharge, redness, itching and visual disturbance.   Respiratory:  Positive for shortness of breath. Negative for apnea, choking, chest tightness and stridor.    Cardiovascular:  Negative for chest pain, palpitations and leg swelling.   Gastrointestinal:  Negative for abdominal distention, abdominal pain, anal bleeding, blood in stool, constipation, diarrhea, nausea, rectal pain and vomiting.   Endocrine: Negative for cold intolerance, heat intolerance, polydipsia, polyphagia and polyuria.   Genitourinary:  Negative for decreased urine volume, difficulty urinating, dysuria, enuresis, flank pain, frequency, genital sores, hematuria and urgency.   Musculoskeletal:  Negative for arthralgias, back pain, gait problem, joint swelling, myalgias, neck pain and neck stiffness.   Skin:  Negative for color change, pallor, rash and wound.   Allergic/Immunologic: Negative.  Negative for environmental allergies, food allergies and immunocompromised state.   Neurological:  Negative for dizziness, tremors, seizures, syncope, facial asymmetry, speech difficulty, weakness, light-headedness, numbness and headaches.   Psychiatric/Behavioral:  Negative for agitation, behavioral problems, confusion, decreased concentration,  dysphoric mood, hallucinations, self-injury, sleep disturbance and suicidal ideas. The patient is not nervous/anxious and is not hyperactive.          Historical Information   Patient Active Problem List   Diagnosis   • Benign prostatic hyperplasia with urinary retention   • Hematuria, gross   • Asthma   • COPD (chronic obstructive pulmonary disease) ([facility])   • Primary osteoarthritis involving multiple joints   • Lumbar radiculopathy   • Hydrocele   • Encysted hydrocele   • Hypercalcemia   • Shortness of breath   • Chronic bronchitis ([facility])   • Hypokalemia   • Centrilobular emphysema ([facility])   • Syncope   • Glaucoma   • Elevated troponin   • Hyperglycemia   • Closed fracture of multiple ribs of both sides   • Closed fracture of body of sternum   • 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[facility])   • Pulmonary nodules   • Anxiety   • Anemia   • Critical aortic valve stenosis   • Cardiomyopathy ([facility])   • CAD (coronary artery disease)   • Hypertensive heart failure ([facility])     Past Medical History:   Diagnosis Date   • Arthritis    • Asthma    • Back pain    • BPH (benign prostatic hypertrophy) with urinary obstruction    • COPD (chronic obstructive pulmonary disease) ([facility])    • Dizziness    • Glaucoma     bilat   • Insomnia    • RLS (restless legs syndrome)    • Sinusitis      Past Surgical History:   Procedure Laterality Date   • CARDIAC CATHETERIZATION N/A 2/19/2024    Procedure: Cardiac catheterization;  Surgeon: Boston Bergman MD;  Location: [facility];  Service: Cardiology   • CARDIAC CATHETERIZATION N/A 2/19/2024    Procedure: Cardiac Coronary Angiogram;  Surgeon: Boston Bergman MD;  Location: [facility];  Service: Cardiology   • CATARACT EXTRACTION Left    • EAR SURGERY      to wart   • EPIDIDYMAL CYST EXCISION Right 05/04/2000   • HEMORROIDECTOMY     • DC EXCISION HYDROCELE UNILATERAL Left 9/14/2023    Procedure: HYDROCELECTOMY, sPERMATOCELECTOMY;  Surgeon: James  MD Merlene;  Location: [facility];  Service: Urology   • [place] TRURL ELECTROSURG RESCJ PROSTATE BLEED COMPLETE N/A 2020    Procedure: CYSTOSCOPY, TRANSURETHRAL RESECTION OF PROSTATE (TURP);  Surgeon: Reji Meneses MD;  Location: [facility];  Service: Urology   • PROSTATE BIOPSY Bilateral 2003    BPH with mild acute and chronic inflammation   • PROSTATE BIOPSY Bilateral 2005    BPH with marked artropy and chronic inflammation     Social History     Substance and Sexual Activity   Alcohol Use Not Currently     Social History     Substance and Sexual Activity   Drug Use Never     Social History     Tobacco Use   Smoking Status Former   • Current packs/day: 0.00   • Average packs/day: 1 pack/day for 15.0 years (15.0 ttl pk-yrs)   • Types: Cigarettes   • Start date:    • Quit date:    • Years since quittin.4   • Passive exposure: Never   Smokeless Tobacco Current   • Types: Chew   Tobacco Comments    Chews cigars     Family History   Problem Relation Age of Onset   • Asthma Father      Health Maintenance Due   Topic   • Hepatitis A Vaccine (1 of 2 - Risk 2-dose series)   • Zoster Vaccine (1 of 2)   • RSV Vaccine Age 60+ Years (1 - 1-dose 60+ series)   • COVID-19 Vaccine (3 - 2023-24 season)      Meds/Allergies       Current Outpatient Medications:   •  Aspirin 81 MG CAPS, Take by mouth, Disp: , Rfl:   •  atorvastatin (LIPITOR) 20 mg tablet, Take 1 tablet (20 mg total) by mouth daily with dinner, Disp: 90 tablet, Rfl: 1  •  clopidogrel (PLAVIX) 75 mg tablet, Take 1 tablet (75 mg total) by mouth daily, Disp: 30 tablet, Rfl: 2  •  fluticasone (FLONASE) 50 mcg/act nasal spray, 1 spray into each nostril 2 (two) times a day, Disp: 15.8 mL, Rfl: 3  •  losartan (COZAAR) 25 mg tablet, Take 1 tablet (25 mg total) by mouth daily, Disp: 90 tablet, Rfl: 1  •  metoprolol succinate (TOPROL-XL) 25 mg 24 hr tablet, Take 1 tablet (25 mg total) by mouth daily, Disp: 90 tablet, Rfl: 2  •   "Fluticasone-Salmeterol (Advair Diskus) 250-50 mcg/dose inhaler, Inhale 1 puff 2 (two) times a day Rinse mouth after use. (Patient not taking: Reported on 4/2/2024), Disp: 180 blister, Rfl: 5      Objective:    Vitals:   /64   Pulse 82   Ht 5' 9\" (1.753 m)   Wt 66.5 kg (146 lb 9.6 oz)   SpO2 96%   BMI 21.65 kg/m²   Body mass index is 21.65 kg/m².  Vitals:    06/10/24 1314   Weight: 66.5 kg (146 lb 9.6 oz)       Physical Exam  Vitals reviewed.   Constitutional:       General: He is not in acute distress.     Appearance: He is well-developed. He is not ill-appearing, toxic-appearing or diaphoretic.   HENT:      Head: Normocephalic and atraumatic.      Right Ear: External ear normal.      Left Ear: External ear normal.      Nose: Nose normal.      Mouth/Throat:      Pharynx: No oropharyngeal exudate.   Eyes:      General: Lids are normal. Lids are everted, no foreign bodies appreciated. No scleral icterus.        Right eye: No discharge.         Left eye: No discharge.      Conjunctiva/sclera: Conjunctivae normal.      Pupils: Pupils are equal, round, and reactive to light.   Neck:      Thyroid: No thyromegaly.      Vascular: Normal carotid pulses. No carotid bruit, hepatojugular reflux or JVD.      Trachea: No tracheal tenderness or tracheal deviation.   Cardiovascular:      Rate and Rhythm: Normal rate and regular rhythm.      Pulses: Normal pulses.      Heart sounds: Murmur heard.      No friction rub. No gallop.   Pulmonary:      Effort: Pulmonary effort is normal. No respiratory distress.      Breath sounds: Normal breath sounds. No stridor. No wheezing or rales.   Chest:      Chest wall: No tenderness.   Abdominal:      General: Bowel sounds are normal. There is no distension.      Palpations: Abdomen is soft. There is no mass.      Tenderness: There is no abdominal tenderness. There is no guarding or rebound.   Musculoskeletal:         General: No tenderness or deformity. Normal range of motion.      " "Cervical back: Normal range of motion and neck supple. No edema, erythema or rigidity. No spinous process tenderness or muscular tenderness. Normal range of motion.   Lymphadenopathy:      Head:      Right side of head: No submental, submandibular, tonsillar, preauricular or posterior auricular adenopathy.      Left side of head: No submental, submandibular, tonsillar, preauricular, posterior auricular or occipital adenopathy.      Cervical: No cervical adenopathy.      Right cervical: No superficial, deep or posterior cervical adenopathy.     Left cervical: No superficial, deep or posterior cervical adenopathy.      Upper Body:      Right upper body: No pectoral adenopathy.      Left upper body: No pectoral adenopathy.   Skin:     General: Skin is warm and dry.      Coloration: Skin is not pale.      Findings: No erythema or rash.   Neurological:      General: No focal deficit present.      Mental Status: He is alert and oriented to person, place, and time.      Cranial Nerves: No cranial nerve deficit.      Sensory: No sensory deficit.      Motor: No tremor, abnormal muscle tone or seizure activity.      Coordination: Coordination normal.      Gait: Gait abnormal.      Deep Tendon Reflexes: Reflexes are normal and symmetric. Reflexes normal.   Psychiatric:         Behavior: Behavior normal.         Thought Content: Thought content normal.         Judgment: Judgment normal.         Lab Review   No visits with results within 2 Month(s) from this visit.   Latest known visit with results is:   No results displayed because visit has over 200 results.            Patient Instructions   Pt is symptom free for this problem.  This diagnosis or problem is stable/well controlled  Patient is advised to continue same meds as outlined in medicine list        Dima Lloyd MD        \"This note has been constructed using a voice recognition system.Therefore there may be syntax, spelling, and/or grammatical errors. Please call " "if you have any questions. \"  "

## 2024-06-10 NOTE — PATIENT INSTRUCTIONS
Pt is symptom free for this problem.  This diagnosis or problem is stable/well controlled  Patient is advised to continue same meds as outlined in medicine list

## 2024-06-10 NOTE — ASSESSMENT & PLAN NOTE
From difficulty breathing with minimal exertionContinue Symbicort Proventil as needed dyspnea on exertion at baseline advised follow-up with the pulmonary  No wheezing on auscultation for now symptoms controlled at baseline

## 2024-06-12 ENCOUNTER — OFFICE VISIT (OUTPATIENT)
Dept: CARDIOLOGY CLINIC | Facility: CLINIC | Age: 84
End: 2024-06-12
Payer: COMMERCIAL

## 2024-06-12 VITALS
WEIGHT: 144 LBS | OXYGEN SATURATION: 98 % | BODY MASS INDEX: 21.33 KG/M2 | HEART RATE: 64 BPM | DIASTOLIC BLOOD PRESSURE: 80 MMHG | HEIGHT: 69 IN | SYSTOLIC BLOOD PRESSURE: 140 MMHG

## 2024-06-12 DIAGNOSIS — R06.09 DOE (DYSPNEA ON EXERTION): ICD-10-CM

## 2024-06-12 DIAGNOSIS — I35.0 CRITICAL AORTIC VALVE STENOSIS: ICD-10-CM

## 2024-06-12 DIAGNOSIS — I42.9 CARDIOMYOPATHY, UNSPECIFIED TYPE (HCC): ICD-10-CM

## 2024-06-12 DIAGNOSIS — I25.10 CORONARY ARTERY DISEASE, UNSPECIFIED VESSEL OR LESION TYPE, UNSPECIFIED WHETHER ANGINA PRESENT, UNSPECIFIED WHETHER NATIVE OR TRANSPLANTED HEART: ICD-10-CM

## 2024-06-12 DIAGNOSIS — Z95.0 PACEMAKER: ICD-10-CM

## 2024-06-12 DIAGNOSIS — I73.9 PVD (PERIPHERAL VASCULAR DISEASE) (HCC): ICD-10-CM

## 2024-06-12 DIAGNOSIS — J43.2 CENTRILOBULAR EMPHYSEMA (HCC): ICD-10-CM

## 2024-06-12 DIAGNOSIS — I10 PRIMARY HYPERTENSION: ICD-10-CM

## 2024-06-12 DIAGNOSIS — Z01.810 PREOPERATIVE CARDIOVASCULAR EXAMINATION: ICD-10-CM

## 2024-06-12 PROCEDURE — 99215 OFFICE O/P EST HI 40 MIN: CPT | Performed by: INTERNAL MEDICINE

## 2024-06-12 PROCEDURE — 93000 ELECTROCARDIOGRAM COMPLETE: CPT | Performed by: INTERNAL MEDICINE

## 2024-06-12 NOTE — PROGRESS NOTES
Progress Note - Cardiology Office  [facility]    Gilberto Vann 83 y.o. male MRN: [id number]  : 1940  ?Encounter: [id number]      Assessment:     1. WU (dyspnea on exertion)    2. Critical aortic valve stenosis s/p TAVR at [place]    3. Primary hypertension    4. Cardiomyopathy, unspecified type ([facility])    5. Coronary artery disease, unspecified vessel or lesion type, unspecified whether angina present, unspecified whether native or transplanted heart    6. Pacemaker, Medtronic Micra    7. Centrilobular emphysema ([facility])    8. PVD (peripheral vascular disease) ([facility])    9. Preoperative cardiovascular examination        Discussion Summary and Plan:    1.  Dyspnea on exertion.  Chronic most likely due to underlying lung disease and valve issue which is slowly improving    2.  Critical aortic stenosis s/p TAVR in 2024.  [place] report reviewed    3.  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    5.  History of Micra pacemaker currently not paced.    6.  History of COPD and emphysema on inhalers    7.  PVD which is symptomatic need femoropopliteal bypass on right lower extremity scheduled to have procedure on 2024    8.  Cardiomyopathy most likely related to aortic stenosis we hope the EF is improved    9.  Preoperative clearance for cardiovascular examination.  Patient seems to be doing well since his procedure.  There is no clinical evidence of heart failure or active ischemia.  He seems to be taking his medication well and his functional capacity has improved as per his friend.  His EKG does shows LVH with repolarization abnormality.  His echo from February and 2024 reviewed.  Will update a limited echo to see his EF.  I believe he is in optimal condition for the procedure as planned and is at intermediate risk for the surgery.  Since he has pacemaker done and valve done recently he will need  antibiotic prophylaxis before the procedure.        Patient / Caretaker was advised and educated to call our office  immediately if  patient has any new symptoms of chest pain/shortness of breath, near-syncope, syncope, light headedness sustained palpitations  or any other cardiovascular symptoms before their scheduled follow-up appointment.  Office number was provided #[phone].  Please call [phone] if any questions.  Counseling :  A description of the counseling.  Goals and Barriers.  Patient's ability to self care: Yes  Medication side effect reviewed with patient in detail and all their questions answered to their satisfaction.    HPI :     Gilberto Vann is a 83 y.o. year old male who came for follow up. Patient was evaluated by my colleagues.  He had history of sick sinus syndrome post Micra pacemaker, s/p TAVR at [place] on 3/14/2024 with a 29 mm transcatheter valve, history of CPR, history of rib and sternal fracture during CPR, coronary artery disease involving circumflex and LAD, history of combined systolic and diastolic heart failure, with latest echo in April 2024 at [place] where EF is improved to 45 to 50%, history of 29 mm Arora LEA ultra Resilia valve, and currently complaining about leg pain when he walks and he was diagnosed to have PVD and now need vascular surgery.  His medications reviewed he is compliant with his he is taking inhalers as well as losartan 25, Toprol-XL 25 Plavix aspirin and atorvastatin.  EKG shows sinus rhythm with a heart rate 64 bpm though he has underlying Micra pacemaker.  His EF is improved after his TAVR procedure but last echo was in April 2024.  He came with his friend.  He chews tobacco he does not smoke.  EKG reviewed.    Review of Systems   Constitutional:  Negative for activity change, chills, diaphoresis, fever and unexpected weight change.   HENT:  Negative for congestion.    Eyes:  Negative for discharge and redness.   Respiratory:   Positive for shortness of breath. Negative for cough, chest tightness and wheezing.         Chronic shortness of breath not changed actually improved   Cardiovascular: Negative.  Negative for chest pain, palpitations and leg swelling.   Gastrointestinal:  Negative for abdominal pain, diarrhea and nausea.   Endocrine: Negative.    Genitourinary:  Negative for decreased urine volume and urgency.   Musculoskeletal:  Positive for gait problem. Negative for arthralgias and back pain.        PVD.   Skin:  Negative for rash and wound.   Allergic/Immunologic: Negative.    Neurological:  Negative for dizziness, seizures, syncope, weakness, light-headedness and headaches.   Hematological: Negative.    Psychiatric/Behavioral:  Negative for agitation and confusion. The patient is not nervous/anxious.        Historical Information   Past Medical History:   Diagnosis Date   • Arthritis    • Asthma    • Back pain    • BPH (benign prostatic hypertrophy) with urinary obstruction    • COPD (chronic obstructive pulmonary disease) (HCC)    • Dizziness    • Glaucoma     bilat   • Insomnia    • RLS (restless legs syndrome)    • Sinusitis      Past Surgical History:   Procedure Laterality Date   • CARDIAC CATHETERIZATION N/A 2/19/2024    Procedure: Cardiac catheterization;  Surgeon: Boston Bergman MD;  Location: [facility];  Service: Cardiology   • CARDIAC CATHETERIZATION N/A 2/19/2024    Procedure: Cardiac Coronary Angiogram;  Surgeon: Boston Bergman MD;  Location: [facility];  Service: Cardiology   • CATARACT EXTRACTION Left    • EAR SURGERY      to wart   • EPIDIDYMAL CYST EXCISION Right 05/04/2000   • HEMORROIDECTOMY     • ID EXCISION HYDROCELE UNILATERAL Left 9/14/2023    Procedure: HYDROCELECTOMY, sPERMATOCELECTOMY;  Surgeon: James Blunt MD;  Location: [facility];  Service: Urology   • ID TRURL ELECTROSURG RESCJ PROSTATE BLEED COMPLETE N/A 04/16/2020    Procedure: CYSTOSCOPY, TRANSURETHRAL RESECTION OF PROSTATE  (TURP);  Surgeon: Reji Meneses MD;  Location: [facility];  Service: Urology   • PROSTATE BIOPSY Bilateral 2003    BPH with mild acute and chronic inflammation   • PROSTATE BIOPSY Bilateral 2005    BPH with marked artropy and chronic inflammation     Social History     Substance and Sexual Activity   Alcohol Use Not Currently     Social History     Substance and Sexual Activity   Drug Use Never     Social History     Tobacco Use   Smoking Status Former   • Current packs/day: 0.00   • Average packs/day: 1 pack/day for 15.0 years (15.0 ttl pk-yrs)   • Types: Cigarettes   • Start date:    • Quit date:    • Years since quittin.4   • Passive exposure: Never   Smokeless Tobacco Current   • Types: Chew   Tobacco Comments    Chews cigars     Family History:   Family History   Problem Relation Age of Onset   • Asthma Father        Meds/Allergies     Allergies   Allergen Reactions   • Shellfish-Derived Products - Food Allergy Other (See Comments)     Listed by PCP patient denies   • Iodine - Food Allergy Other (See Comments)       Current Outpatient Medications:   •  Aspirin 81 MG CAPS, Take by mouth, Disp: , Rfl:   •  atorvastatin (LIPITOR) 20 mg tablet, Take 1 tablet (20 mg total) by mouth daily with dinner, Disp: 90 tablet, Rfl: 1  •  clopidogrel (PLAVIX) 75 mg tablet, Take 1 tablet (75 mg total) by mouth daily, Disp: 30 tablet, Rfl: 2  •  fluticasone (FLONASE) 50 mcg/act nasal spray, 1 spray into each nostril 2 (two) times a day, Disp: 15.8 mL, Rfl: 3  •  losartan (COZAAR) 25 mg tablet, Take 1 tablet (25 mg total) by mouth daily, Disp: 90 tablet, Rfl: 1  •  metoprolol succinate (TOPROL-XL) 25 mg 24 hr tablet, Take 1 tablet (25 mg total) by mouth daily, Disp: 90 tablet, Rfl: 2  •  Fluticasone-Salmeterol (Advair Diskus) 250-50 mcg/dose inhaler, Inhale 1 puff 2 (two) times a day Rinse mouth after use. (Patient not taking: Reported on 2024), Disp: 180 blister, Rfl: 5    Vitals: Blood pressure  "140/80, pulse 64, height 5' 9\" (1.753 m), weight 65.3 kg (144 lb), SpO2 98%.  ?  Body mass index is 21.27 kg/m².  Wt Readings from Last 3 Encounters:   06/12/24 65.3 kg (144 lb)   06/10/24 66.5 kg (146 lb 9.6 oz)   04/03/24 63.5 kg (140 lb)     Vitals:    06/12/24 1131   Weight: 65.3 kg (144 lb)     BP Readings from Last 3 Encounters:   06/12/24 140/80   06/10/24 120/64   04/03/24 142/70       Physical Exam:  Neurologic:  Alert & oriented x 3, no new focal deficits, Not in any acute distress,  Constitutional:  Adequate built, non-toxic appearance   Neck: Normal range of motion, no tenderness,  Neck supple   Respiratory:  Bilateral air entry, mostly clear to auscultation  Cardiovascular: S1-S2 regular with a 2/6 ejection systolic murmur and S4 is present  GI:  Soft, nondistended,  nontender, no hepatosplenomegaly appreciated.  Musculoskeletal:  No edema, no tenderness, no deformities.   Skin:  Well hydrated, no rash   Extremities:  No edema and distal pulses are present  Psychiatric:  Speech and behavior appropriate         Diagnostic Studies Review Cardio:  Devious cardiac cath, latest echo in April and reports from TAVR procedure reviewed.    EKG:    Twelve-lead EKG done on 6/12/2024 shows sinus rhythm LVH with repolarization abnormality.  Not much change from previous EKG.    Imaging:  Chest X-Ray:   No Chest XR results available for this patient.    CT-scan of the chest:     CTA chest wo w contrast    Result Date: 3/14/2024  Impression *  Mildly dilated aortic root. No significant focal vascular stenosis. *  6 mm groundglass right lung nodule. Recommend follow-up per Fleischner Society criteria. *  Small bilateral pleural effusions. *   Enlarged prostate. *      Lab Review   Lab Results   Component Value Date    WBC 6.63 02/22/2024    HGB 12.0 02/22/2024    HCT 36.4 (L) 02/22/2024    MCV 91 02/22/2024    RDW 15.2 (H) 02/22/2024     02/22/2024     BMP:  Lab Results   Component Value Date    SODIUM 142 " "02/22/2024    K 4.2 02/22/2024     02/22/2024    CO2 29 02/22/2024    BUN 17 02/22/2024    CREATININE 0.70 02/22/2024    GLUC 91 02/22/2024    GLUF 103 (H) 09/15/2023    CALCIUM 8.5 02/22/2024    CORRECTEDCA 8.6 02/18/2024    EGFR 86 06/26/2023    MG 1.9 02/22/2024     Troponins:    LFT:  Lab Results   Component Value Date    AST 28 02/22/2024    ALT 41 02/22/2024    ALKPHOS 79 02/22/2024    TP 5.4 (L) 02/22/2024    ALB 3.2 (L) 02/22/2024      No components found for: \"TSH3\"  Lab Results   Component Value Date    DSS0MTISLBMW 2.691 02/17/2024     Lab Results   Component Value Date    HGBA1C 5.7 (H) 02/16/2024     Lipid Profile:   Lab Results   Component Value Date    CHOLESTEROL 144 02/17/2024    HDL 53 02/17/2024    LDLCALC 82 02/17/2024    TRIG 44 02/17/2024     Lab Results   Component Value Date    CHOLESTEROL 144 02/17/2024     No results found for: \"CKTOTAL\", \"CKMB\", \"CKMBINDEX\", \"TROPONINI\"  No results found for: \"NTBNP\"   No results found for this or any previous visit (from the past 672 hour(s)).          Dr. Kaushal Mcclure MD [facility]        \"This note was completed in part utilizing m-modal fluency direct voice recognition software.   Grammatical errors, random word insertion, spelling mistakes, and incomplete sentences may be an occasional consequence of the system secondary to software limitations, ambient noise and hardware issues.    Please read the chart carefully and recognize, using context, where substitutions have occurred.  If you have any questions or concerns about the context, text or information contained within the body of this dictation, please contact myself, the provider, for further clarification.\"  "

## 2024-06-13 ENCOUNTER — TELEPHONE (OUTPATIENT)
Age: 84
End: 2024-06-13

## 2024-06-13 NOTE — TELEPHONE ENCOUNTER
Left a v/m for the patient to return a call to the clinical staff.    Yes, the patient will need to have a repeat Echo completed to see his Ejection Fraction as planned as we do not have one for comparison and due to fact s/p TAVR.

## 2024-06-13 NOTE — TELEPHONE ENCOUNTER
Shaun is requesting a call back in regard to the patient's CC for 6/18 ENDARTERECTOMY ILIAC / FEMORAL WITH ANGIOPLASTY AND STENTING     Shaun stated that the vascular surgery office told him the patient was cleared and did not need an echo and Shaun ants to know if the patient needs the echo. Please advice. [phone]     Yu Ramon MD  Vascular Surgery  Phone: [phone]  Fax: [phone]

## 2024-06-14 ENCOUNTER — TELEPHONE (OUTPATIENT)
Age: 84
End: 2024-06-14

## 2024-06-14 NOTE — TELEPHONE ENCOUNTER
Tomeka from Dr Ramon's, vascular surgery office called.    Patient is scheduled for endarterectomy on Tuesday 6/18.    Surgeon asking if patient must complete the echo before final clearance for the surgery.    OV note states patient is at intermediate risk for the surgery.    Please advise.    Tomeka ph:  [phone]               Fax [phone]

## 2024-06-14 NOTE — TELEPHONE ENCOUNTER
Deepak called back, I spoke with dr. Grider to confirm pt is aware he is to continue with echo on Monday and we will call him on Monday and fax results to his surgeon. Will postpone task to Monday to followup

## 2024-06-14 NOTE — TELEPHONE ENCOUNTER
Called and lvm notifying them that I did speak with dr. Grider and notified pt emergency contact that he indeed does need the echo Monday and will call them with the results monday

## 2024-06-17 ENCOUNTER — TELEPHONE (OUTPATIENT)
Age: 84
End: 2024-06-17

## 2024-06-17 ENCOUNTER — TELEPHONE (OUTPATIENT)
Dept: CARDIOLOGY CLINIC | Facility: CLINIC | Age: 84
End: 2024-06-17

## 2024-06-17 ENCOUNTER — HOSPITAL ENCOUNTER (OUTPATIENT)
Dept: NON INVASIVE DIAGNOSTICS | Facility: HOSPITAL | Age: 84
Discharge: HOME | End: 2024-06-17
Payer: COMMERCIAL

## 2024-06-17 VITALS
SYSTOLIC BLOOD PRESSURE: 165 MMHG | BODY MASS INDEX: 21.33 KG/M2 | HEART RATE: 69 BPM | DIASTOLIC BLOOD PRESSURE: 79 MMHG | HEIGHT: 69 IN | WEIGHT: 144 LBS

## 2024-06-17 DIAGNOSIS — I35.0 CRITICAL AORTIC VALVE STENOSIS: Primary | ICD-10-CM

## 2024-06-17 DIAGNOSIS — I42.9 CARDIOMYOPATHY, UNSPECIFIED TYPE (HCC): ICD-10-CM

## 2024-06-17 LAB
BSA FOR ECHO PROCEDURE: 1.8 M2
SL CV LV EF: 45

## 2024-06-17 PROCEDURE — 93306 TTE W/DOPPLER COMPLETE: CPT | Performed by: INTERNAL MEDICINE

## 2024-06-17 PROCEDURE — 93306 TTE W/DOPPLER COMPLETE: CPT

## 2024-06-17 RX ORDER — AMOXICILLIN 500 MG/1
2000 CAPSULE ORAL EVERY 24 HOURS
Qty: 4 CAPSULE | Refills: 0 | Status: SHIPPED | OUTPATIENT
Start: 2024-06-17 | End: 2024-06-18

## 2024-06-17 NOTE — TELEPHONE ENCOUNTER
Spoke with Tomeka from Dr Zambrano office regarding cardiac clearance.    Patient had ECHO done today, needed for pending cardiac clearance for surgery tomorrow.    Advised documentation states patient is cleared for surgery but requires prophylactic antibiotics due to recent TAVR.    Note faxed to office at: [phone].    Phone #: [phone]

## 2024-06-17 NOTE — TELEPHONE ENCOUNTER
Pre. Op. Clearance note- Cardiology    Gilberto Vann   83 y.o.  male  1940      MD Gilberto Doshi :     Patient's chart was reviewed for preop clearance.   Patient was seen in our office on 6/12/2024.  Patient has past medical history significant for:  1. WU (dyspnea on exertion)    2. Critical aortic valve stenosis s/p TAVR at [place]    3. Primary hypertension    4. Cardiomyopathy, unspecified type ([facility])    5. Coronary artery disease, unspecified vessel or lesion type, unspecified whether angina present, unspecified whether native or transplanted heart    6. Pacemaker, Medtronic Micra    7. Centrilobular emphysema ([facility])    8. PVD (peripheral vascular disease) (HCC     Patient is now scheduled for  ENDARTERECTOMY ILIAC / FEMORAL WITH ANGIOPLASTY AND STENTING  6/18/2024.     Patient has no clinical evidence of  active heart failure or  active ischemia or active arrhythmia.  Patient's last cardiac workup including echo Doppler done June 17, 2023, previous cardiac catheterization reports and recent TAVR procedure reports were reviewed and it shows mildly decreased LV systolic function with aortic TAVR valve functioning normally..      In my opinion patient is in optimum condition for the procedure as planned.  Patient is low risk for the surgery as planned from cardiac point of view.  Continue current cardiac medications.  Patient will need antibiotic prophylaxis if procedure done risk for bacteremia.  Patient if not allergic to penicillin can have amoxicillin 2 g,  1 hour before the procedure if no contraindication.      Patient can hold  Aspirin for  5-7 days as required for surgery. Patient can hold Plavix for 5 days.  Patient can hold Eliquis/Xarelto/Pradaxa for 3 days before the procedure.  Please restart after the procedure  immediately or next day if no contraindication form surgical point of view and advise patient to contact our office.    If you have any question  "please do not hesitate to call us at our office of [facility].  Phone # [phone]        Lab Results   Component Value Date    WBC 6.63 02/22/2024    HGB 12.0 02/22/2024    HCT 36.4 (L) 02/22/2024    MCV 91 02/22/2024     02/22/2024     Lab Results   Component Value Date    CREATININE 0.70 02/22/2024     Lab Results   Component Value Date    GLUF 103 (H) 09/15/2023       Cardiac testing:   No results found for this or any previous visit.    No results found for this or any previous visit.    No results found for this or any previous visit.    No results found for this or any previous visit.    No results found for this or any previous visit.    No results found for this or any previous visit.    No results found for this or any previous visit.      DR.Narpinder Ren MD [facility]  6/17/2024  5:05 PM      \"This note was completed in part utilizing [employer] direct voice recognition software.   Grammatical errors, random word insertion, spelling mistakes, and incomplete sentences may be an occasional consequence of the system secondary to software limitations, ambient noise and hardware issues.    Please read the chart carefully and recognize, using context, where substitutions have occurred.  If you have any questions or concerns about the context, text or information contained within the body of this dictation, please contact myself, the provider, for further clarification.\"  "

## 2024-06-17 NOTE — TELEPHONE ENCOUNTER
Echo shows EF around 45%.  Please let know the patient that EF is stable around 45% he can be cleared for surgery he need antibiotic prophylaxis before procedure as he had recently TAVR valve.

## 2024-06-17 NOTE — TELEPHONE ENCOUNTER
Patient's echo reviewed.  Patient's EF has improved to 45% valve is functioning okay.  Patient can be cleared need antibiotic prophylaxis before his vascular procedure.  You can send me the clearance note or we can let the surgery know.

## 2024-06-17 NOTE — TELEPHONE ENCOUNTER
Pt.made aware of Echo results and cleared for surgery. Pt.asked, to inform his transportation contact: Shaun Root as surgery is tomorrow.  Spoke with Shaun;informed of the same.  Will fax cardiac clearance to Dr. Yu Ramon Fax: [phone]   Left a v/m with Tomeka at Dr. Ramon's office.

## 2024-06-17 NOTE — TELEPHONE ENCOUNTER
----- Message from Kaushal Mcclure MD sent at 6/17/2024  4:35 PM EDT -----  Patient's echo Doppler shows EF now improved around 45%.  He has borderline LVH.  TAVR valve is functioning normally.  He can be cleared for surgery.

## 2024-06-18 NOTE — TELEPHONE ENCOUNTER
Spoke with pt emergency contact anayeli he is aware pt is good to go and clearance was faxed. manuel

## 2024-06-20 ENCOUNTER — TELEPHONE (OUTPATIENT)
Age: 84
End: 2024-06-20

## 2024-06-20 NOTE — TELEPHONE ENCOUNTER
Endarterectomy, clearance note is in chart. See last note from Dr Mcclure in telephone encounter on 6/14/24.

## 2024-06-20 NOTE — TELEPHONE ENCOUNTER
Sister Lucy, who is POA, called stating they were not aware there pt was to take an antibiotic prior to procedure and he already had surgery on 6/18/24.  She is now asking for the next step...  She asked that we call her back because pt will be confused and upset if we call him back, but she is not on the communication consent.   Lucy# [phone]

## 2024-07-07 DIAGNOSIS — I25.10 CORONARY ARTERY DISEASE INVOLVING NATIVE CORONARY ARTERY OF NATIVE HEART WITHOUT ANGINA PECTORIS: ICD-10-CM

## 2024-07-08 RX ORDER — CLOPIDOGREL BISULFATE 75 MG/1
75 TABLET ORAL DAILY
Qty: 100 TABLET | Refills: 1 | Status: SHIPPED | OUTPATIENT
Start: 2024-07-08

## 2024-08-12 NOTE — PROGRESS NOTES
Progress Note - Cardiology Office  [facility]    Gilberto Vann 84 y.o. male MRN: [id number]  : 1940  Encounter: [id number]      ASSESSMENT:  S/p MIRCA pacemaker implantation for Mobitz type II AV block and LBBB     S/p TAVR, 2024 at [facility] with 29MM CE BOVINE PERICARDIAL TISSUE TRANSCATHETER HEART VALVE, for Critical aortic stenosis     History of syncope, probably due to critical aortic stenosis  S/p CPR with rib and sternal fracture  24 CT chest without contrast: Acute minimally displaced fractures of multiple bilateral anterior ribs due to CPR. Nondisplaced fracture of the anterior cortex of the superior body of the sternum      CAD involving circumflex 70% and LAD 60% stenosis     Chronic combined systolic and diastolic heart failure  EF 25%, now improved to 45%    TTE, 2024:  EF 45%, borderline LVH, G1 DD  TAVR, trace paravalvular leak, mean gradient 8-10 mmHg  Mild MR and TR, RSVP 30 mmHg    24 TTE:   LVEF 25%. There is severe global hypokinesis with regional variation. Diastolic function is moderately abnormal, consistent with grade II (pseudonormal) relaxation.  Left atrial filling pressure is elevated. Right Ventricle: Systolic function is low normal. Left Atrium: The atrium is severely dilated ( 60 mL/m2). Right Atrium: The atrium is moderately dilated. Aortic Valve: The aortic valve is trileaflet. The leaflets are moderately thickened. The leaflets are moderately calcified. There is severely reduced mobility. There is mild to moderate regurgitation. There is critical stenosis.  Peak gradient 108 mmHg.  Mean gradient 63.  LVOT velocity is measured to be 0.58.  LVOT diameter 2.2 and aortic valve area 0.5 cm 2 and dimensionless index 0.11.  Mitral Valve: There is mild to moderate regurgitation. Tricuspid Valve: There is mild to moderate regurgitation.  Pulmonary artery pressure around 50 mmHg. IVC/SVC: The inferior vena cava is mildly  dilated.  It has less than 50% collapse with inspiration consistent with elevated right atrial pressure about 10 to 15 mmHg. Pericardium: There is a trivial pericardial effusion.      Hypertension,   BP is 130/60 mmHg with heart rate of 83/mean     COPD  Prediabetes, hemoglobin A1c 5.7     RECOMMENDATIONS:  Continue current cardiac medications aspirin, Plavix, losartan and atorvastatin and Toprol XL 25 mg  Low-salt and low-cholesterol diet  Regular cardiovascular exercise as tolerated        Please call [phone] if any questions.    HPI :     Gilberto Vann is a 84 y.o. year old male who came for follow up.  He is asymptomatic from a cardiac standpoint.  He does have musculoskeletal pain due to arthritis including in his knees and right shoulder.  Patient's previous echocardiogram had shown EF of 25%.  Most recent echo done on June 17, 2024 shows that his LV function and ejection fraction has improved to 45%    REVIEW OF SYSTEMS:  Denies any new or acute cardiac symptoms.  Denies chest pain, unusual dyspnea, palpitations or syncope  Complains of musculoskeletal pain in his legs and right shoulder      Historical Information   Past Medical History:   Diagnosis Date    Arthritis     Asthma     Back pain     BPH (benign prostatic hypertrophy) with urinary obstruction     COPD (chronic obstructive pulmonary disease) (HCC)     Dizziness     Glaucoma     bilat    Insomnia     RLS (restless legs syndrome)     Sinusitis      Past Surgical History:   Procedure Laterality Date    CARDIAC CATHETERIZATION N/A 2/19/2024    Procedure: Cardiac catheterization;  Surgeon: Boston Bergman MD;  Location: [facility];  Service: Cardiology    CARDIAC CATHETERIZATION N/A 2/19/2024    Procedure: Cardiac Coronary Angiogram;  Surgeon: Boston Bergman MD;  Location: [facility];  Service: Cardiology    CATARACT EXTRACTION Left     EAR SURGERY      to wart    EPIDIDYMAL CYST EXCISION Right 05/04/2000    HEMORROIDECTOMY       SC EXCISION HYDROCELE UNILATERAL Left 2023    Procedure: HYDROCELECTOMY, sPERMATOCELECTOMY;  Surgeon: James Blunt MD;  Location: [facility];  Service: Urology    SC TRURL ELECTROSURG RESCJ PROSTATE BLEED COMPLETE N/A 2020    Procedure: CYSTOSCOPY, TRANSURETHRAL RESECTION OF PROSTATE (TURP);  Surgeon: Reji Meneses MD;  Location: [facility];  Service: Urology    PROSTATE BIOPSY Bilateral 2003    BPH with mild acute and chronic inflammation    PROSTATE BIOPSY Bilateral 2005    BPH with marked artropy and chronic inflammation     Social History     Substance and Sexual Activity   Alcohol Use Not Currently     Social History     Substance and Sexual Activity   Drug Use Never     Social History     Tobacco Use   Smoking Status Former    Current packs/day: 0.00    Average packs/day: 1 pack/day for 15.0 years (15.0 ttl pk-yrs)    Types: Cigarettes    Start date:     Quit date:     Years since quittin.6    Passive exposure: Never   Smokeless Tobacco Current    Types: Chew   Tobacco Comments    Chews cigars     Family History:   Family History   Problem Relation Age of Onset    Asthma Father        Meds/Allergies     Allergies   Allergen Reactions    Shellfish-Derived Products - Food Allergy Other (See Comments)     Listed by PCP patient denies    Iodine - Food Allergy Other (See Comments)       Current Outpatient Medications:     Aspirin 81 MG CAPS, Take by mouth, Disp: , Rfl:     atorvastatin (LIPITOR) 20 mg tablet, Take 1 tablet (20 mg total) by mouth daily with dinner, Disp: 90 tablet, Rfl: 1    clopidogrel (PLAVIX) 75 mg tablet, take 1 tablet by mouth once daily, Disp: 100 tablet, Rfl: 1    fluticasone (FLONASE) 50 mcg/act nasal spray, 1 spray into each nostril 2 (two) times a day, Disp: 15.8 mL, Rfl: 3    Fluticasone-Salmeterol (Advair Diskus) 250-50 mcg/dose inhaler, Inhale 1 puff 2 (two) times a day Rinse mouth after use. (Patient not taking: Reported on 2024), Disp:  "180 blister, Rfl: 5    losartan (COZAAR) 25 mg tablet, Take 1 tablet (25 mg total) by mouth daily, Disp: 90 tablet, Rfl: 1    metoprolol succinate (TOPROL-XL) 25 mg 24 hr tablet, Take 1 tablet (25 mg total) by mouth daily, Disp: 90 tablet, Rfl: 2    Vitals: Blood pressure 130/60, pulse 83, height 5' 9\" (1.753 m), weight 66.7 kg (147 lb), SpO2 95%.    Body mass index is 21.71 kg/m².  Vitals:    08/13/24 1412   Weight: 66.7 kg (147 lb)     BP Readings from Last 3 Encounters:   08/13/24 130/60   06/17/24 165/79   06/12/24 140/80       Physical Exam:  Physical Exam    Neurologic:  Alert & oriented x 3, no new focal deficits, Not in any acute distress,  Constitutional:  Well developed, well nourished, non-toxic appearance   Eyes:  Pupil equal and reacting to light, conjunctiva normal,   HENT:  Atraumatic, oropharynx moist, Neck- normal range of motion, no tenderness,  Neck supple, No JVP, No LNP   Respiratory:  Bilateral air entry, mostly clear to auscultation  Cardiovascular: S1-S2 regular with a I/VI systolic murmur   GI:  Soft, nondistended, normal bowel sounds, nontender, no hepatosplenomegaly appreciated.  Musculoskeletal:  No tenderness, no deformities.   Skin:  Well hydrated, no rash   Lymphatic:  No lymphadenopathy noted   Extremities:  No edema        Diagnostic Studies Review Cardio:    Cardiac testing:       Results for orders placed during the hospital encounter of 06/17/24    Echo complete w/ contrast if indicated    Interpretation Summary    Left Ventricle: Left ventricular cavity size is normal. Wall thickness is mildly increased. There is borderline concentric hypertrophy. The left ventricular ejection fraction is 45%. Systolic function is mildly reduced. Wall motion is normal. Diastolic function is mildly abnormal, consistent with grade I (abnormal) relaxation.  Left atrial filling pressure is elevated.    Right Ventricle: Systolic function is low normal.    Aortic Valve: There is a TAVR bioprosthetic " valve. The prosthetic valve appears well-seated and appears to be functioning normally. There is trace paravalvular regurgitation. There is trace transvalvular regurgitation.  Mean gradient is 8 to 10 mmHg.    Mitral Valve: There is mild annular calcification. There is mild regurgitation.    Tricuspid Valve: There is mild regurgitation.  Pulmonary artery pressure around 30 mmHg.    As compared to previous echo few months ago EF is improved to around 45%.  Grade 1 diastolic dysfunction.  Aortic valve which is TAVR bioprosthetic valve is functioning normally with trace regurgitation..      Imaging:  Chest X-Ray:   No Chest XR results available for this patient.    CT-scan of the chest:     CTA CHEST ABDOMEN PELVIS W WO CONTRAST TAVR    Result Date: 3/19/2024  Impression 1. Mild nonobstructive disease in the left main. 2. Evaluation of the LAD, LCX and RCA for stenosis is precluded by extensive coronary artery calcification.    CTA CHEST WO W CONTRAST    Result Date: 3/14/2024  Impression *  Mildly dilated aortic root. No significant focal vascular stenosis. *  6 mm groundglass right lung nodule. Recommend follow-up per Fleischner Society criteria. *  Small bilateral pleural effusions. *   Enlarged prostate. *      CTA CHEST ABDOMEN PELVIS W WO CONTRAST    Result Date: 3/14/2024  Impression *  Mildly dilated aortic root. No significant focal vascular stenosis. *  6 mm groundglass right lung nodule. Recommend follow-up per Fleischner Society criteria. *  Small bilateral pleural effusions. *   Enlarged prostate. *      Lab Review   Lab Results   Component Value Date    WBC 6.63 02/22/2024    HGB 12.0 02/22/2024    HCT 36.4 (L) 02/22/2024    MCV 91 02/22/2024    RDW 15.2 (H) 02/22/2024     02/22/2024     BMP:  Lab Results   Component Value Date    SODIUM 142 02/22/2024    K 4.2 02/22/2024     02/22/2024    CO2 29 02/22/2024    BUN 17 02/22/2024    CREATININE 0.70 02/22/2024    GLUC 91 02/22/2024    GLUF 103  "(H) 09/15/2023    CALCIUM 8.5 02/22/2024    CORRECTEDCA 8.6 02/18/2024    EGFR 86 06/26/2023    MG 1.9 02/22/2024     LFT:  Lab Results   Component Value Date    AST 28 02/22/2024    ALT 41 02/22/2024    ALKPHOS 79 02/22/2024    TP 5.4 (L) 02/22/2024    ALB 3.2 (L) 02/22/2024      No components found for: \"TSH3\"  Lab Results   Component Value Date    DBW4DSJQVEIN 2.691 02/17/2024     Lab Results   Component Value Date    HGBA1C 5.7 (H) 02/16/2024     Lipid Profile:   Lab Results   Component Value Date    CHOLESTEROL 144 02/17/2024    HDL 53 02/17/2024    LDLCALC 82 02/17/2024    TRIG 44 02/17/2024     Lab Results   Component Value Date    CHOLESTEROL 144 02/17/2024     No results found for: \"CKTOTAL\", \"CKMB\", \"CKMBINDEX\", \"TROPONINI\"  No results found for: \"NTBNP\"   No results found for this or any previous visit (from the past 672 hour(s)).          Dr. Betty Grider MD, FACC      \"This note has been constructed using a voice recognition system.Therefore there may be syntax, spelling, and/or grammatical errors. Please call if you have any questions. \"  "

## 2024-08-13 ENCOUNTER — OFFICE VISIT (OUTPATIENT)
Dept: CARDIOLOGY CLINIC | Facility: CLINIC | Age: 84
End: 2024-08-13
Payer: COMMERCIAL

## 2024-08-13 VITALS
HEIGHT: 69 IN | HEART RATE: 83 BPM | OXYGEN SATURATION: 95 % | BODY MASS INDEX: 21.77 KG/M2 | DIASTOLIC BLOOD PRESSURE: 60 MMHG | SYSTOLIC BLOOD PRESSURE: 130 MMHG | WEIGHT: 147 LBS

## 2024-08-13 DIAGNOSIS — I42.9 CARDIOMYOPATHY, UNSPECIFIED TYPE (HCC): Primary | ICD-10-CM

## 2024-08-13 DIAGNOSIS — Z95.0 STATUS POST PLACEMENT OF CARDIAC PACEMAKER: ICD-10-CM

## 2024-08-13 PROCEDURE — 99214 OFFICE O/P EST MOD 30 MIN: CPT | Performed by: INTERNAL MEDICINE

## 2024-08-23 ENCOUNTER — OFFICE VISIT (OUTPATIENT)
Dept: INTERNAL MEDICINE CLINIC | Facility: CLINIC | Age: 84
End: 2024-08-23
Payer: COMMERCIAL

## 2024-08-23 VITALS
DIASTOLIC BLOOD PRESSURE: 64 MMHG | OXYGEN SATURATION: 97 % | HEIGHT: 69 IN | SYSTOLIC BLOOD PRESSURE: 114 MMHG | HEART RATE: 67 BPM | BODY MASS INDEX: 21.51 KG/M2 | WEIGHT: 145.2 LBS

## 2024-08-23 DIAGNOSIS — M54.16 LUMBAR RADICULOPATHY: Primary | ICD-10-CM

## 2024-08-23 DIAGNOSIS — M72.2 PLANTAR FASCIITIS: ICD-10-CM

## 2024-08-23 DIAGNOSIS — I35.0 CRITICAL AORTIC VALVE STENOSIS: ICD-10-CM

## 2024-08-23 PROCEDURE — G2211 COMPLEX E/M VISIT ADD ON: HCPCS | Performed by: INTERNAL MEDICINE

## 2024-08-23 PROCEDURE — 99213 OFFICE O/P EST LOW 20 MIN: CPT | Performed by: INTERNAL MEDICINE

## 2024-08-23 RX ORDER — PREDNISONE 10 MG/1
10 TABLET ORAL DAILY
Qty: 30 TABLET | Refills: 0 | Status: SHIPPED | OUTPATIENT
Start: 2024-08-23

## 2024-08-23 RX ORDER — MELOXICAM 15 MG/1
TABLET ORAL
Qty: 90 TABLET | Refills: 1 | Status: SHIPPED | OUTPATIENT
Start: 2024-08-23 | End: 2024-08-23

## 2024-08-23 NOTE — ASSESSMENT & PLAN NOTE
Came in pain both feet especially while walking on the sole getting worse    Suspect plantar fasciitis    Cannot give any NSAID advised to use cushion awaiting to be seen by podiatry until then prednisone 10 mg daily

## 2024-08-23 NOTE — ASSESSMENT & PLAN NOTE
Lower back pain rating right lower extremity using Tylenol once a day some improvement    Cannot take give any NSAID patient is on Plavix    Recommended physical therapy patient reluctant    Will do prednisone 10 mg daily cannot handle muscle relaxant due to the side effects recommend to develop pain spine pain management patient reluctant

## 2024-08-23 NOTE — ASSESSMENT & PLAN NOTE
This is an 83-year-old male with a history of critical aortic stenosis status post TAVR, hypertension, cardiomyopathy, coronary artery disease, dyspnea on exertion, pacemaker placement, emphysema who was brought in for elective right femoral endarterectomy performed 6/18/2024. He did not have a Ledesma catheter placed. He had a subcuticular closure with Dermabond palpable DP pulse. Out of bed and ambulated on postop day 1 with plan for discharge home on postop day 1    Seen by cardiology as above and as follows    Continue current cardiac medications aspirin, Plavix, losartan and atorvastatin and Toprol XL 25 mg  Low-salt and low-cholesterol diet  Regular cardiovascular exercise as tolerated

## 2024-08-24 NOTE — PROGRESS NOTES
Dr. Lloyd's Office Visit Note  24     Gilberto Vann 84 y.o. male MRN: [id number]  : 1940    Assessment:     1. Lumbar radiculopathy  Assessment & Plan:  Lower back pain rating right lower extremity using Tylenol once a day some improvement    Cannot take give any NSAID patient is on Plavix    Recommended physical therapy patient reluctant    Will do prednisone 10 mg daily cannot handle muscle relaxant due to the side effects recommend to develop pain spine pain management patient reluctant  Orders:  -     predniSONE 10 mg tablet; Take 1 tablet (10 mg total) by mouth daily  2. Critical aortic valve stenosis  Assessment & Plan:  This is an 83-year-old male with a history of critical aortic stenosis status post TAVR, hypertension, cardiomyopathy, coronary artery disease, dyspnea on exertion, pacemaker placement, emphysema who was brought in for elective right femoral endarterectomy performed 2024. He did not have a Ledesma catheter placed. He had a subcuticular closure with Dermabond palpable DP pulse. Out of bed and ambulated on postop day 1 with plan for discharge home on postop day 1    Seen by cardiology as above and as follows    Continue current cardiac medications aspirin, Plavix, losartan and atorvastatin and Toprol XL 25 mg  Low-salt and low-cholesterol diet  Regular cardiovascular exercise as tolerated        3. Plantar fasciitis  Assessment & Plan:  Came in pain both feet especially while walking on the sole getting worse    Suspect plantar fasciitis    Cannot give any NSAID advised to use cushion awaiting to be seen by podiatry until then prednisone 10 mg daily  Orders:  -     Ambulatory Referral to Podiatry; Future  -     predniSONE 10 mg tablet; Take 1 tablet (10 mg total) by mouth daily        Discussion Summary and Plan:  Today's care plan and medications were reviewed with patient in detail and all their questions answered to their satisfaction.    Chief Complaint   Patient  presents with   • Follow-up     Having pain in legs mainly the right leg can hardly get out of bed in the morning started months ago.  Can't sleep at night because feet hurt not so much during the day.      Subjective:  Patient came in complaining of lower back pain bilateral radiating to both lower extremity going some difficulty walking as well as some improvement using over-the-counter Tylenol or ibuprofen no improvement and also complains of pain both feet with some burning sensation for last 1 month been using over-the-counter Tylenol ibuprofen no improvement for detail management refer to assessment plan visit diagnosis        The following portions of the patient's history were reviewed and updated as appropriate: allergies, current medications, past family history, past medical history, past social history, past surgical history and problem list.    Review of Systems   Constitutional:  Positive for activity change. Negative for appetite change, chills, diaphoresis, fatigue, fever and unexpected weight change.   HENT:  Negative for dental problem, drooling, ear discharge, ear pain, facial swelling, hearing loss, mouth sores, nosebleeds, postnasal drip, sinus pressure, sneezing, sore throat, tinnitus, trouble swallowing and voice change.    Eyes:  Negative for photophobia, pain, discharge, redness, itching and visual disturbance.   Respiratory:  Negative for apnea, choking, chest tightness and stridor.    Cardiovascular:  Negative for chest pain, palpitations and leg swelling.   Gastrointestinal:  Negative for abdominal distention, abdominal pain, anal bleeding, blood in stool, constipation, diarrhea, nausea, rectal pain and vomiting.   Endocrine: Negative for cold intolerance, heat intolerance, polydipsia, polyphagia and polyuria.   Genitourinary:  Negative for decreased urine volume, difficulty urinating, dysuria, enuresis, flank pain, frequency, genital sores, hematuria and urgency.   Musculoskeletal:   Positive for arthralgias, back pain, gait problem, joint swelling and myalgias. Negative for neck pain and neck stiffness.   Skin:  Negative for color change, pallor, rash and wound.   Allergic/Immunologic: Negative.  Negative for environmental allergies, food allergies and immunocompromised state.   Neurological:  Negative for dizziness, tremors, seizures, syncope, facial asymmetry, speech difficulty, weakness, light-headedness, numbness and headaches.   Psychiatric/Behavioral:  Negative for agitation, behavioral problems, confusion, decreased concentration, dysphoric mood, hallucinations, self-injury, sleep disturbance and suicidal ideas. The patient is not nervous/anxious and is not hyperactive.          Historical Information   Patient Active Problem List   Diagnosis   • Benign prostatic hyperplasia with urinary retention   • Hematuria, gross   • Asthma   • COPD (chronic obstructive pulmonary disease) ([facility])   • Primary osteoarthritis involving multiple joints   • Lumbar radiculopathy   • Hydrocele   • Encysted hydrocele   • Hypercalcemia   • Shortness of breath   • Chronic bronchitis ([facility])   • Hypokalemia   • Centrilobular emphysema ([facility])   • Syncope   • Glaucoma   • Elevated troponin   • Hyperglycemia   • Closed fracture of multiple ribs of both sides   • Closed fracture of body of sternum   • 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[facility])   • Pulmonary nodules   • Anxiety   • Anemia   • Critical aortic valve stenosis   • Cardiomyopathy ([facility])   • CAD (coronary artery disease)   • Hypertensive heart failure ([facility])   • Plantar fasciitis     Past Medical History:   Diagnosis Date   • Arthritis    • Asthma    • Back pain    • BPH (benign prostatic hypertrophy) with urinary obstruction    • COPD (chronic obstructive pulmonary disease) ([facility])    • Dizziness    • Glaucoma     bilat   • Insomnia    • RLS (restless legs syndrome)    • Sinusitis      Past Surgical History:   Procedure  Laterality Date   • CARDIAC CATHETERIZATION N/A 2024    Procedure: Cardiac catheterization;  Surgeon: Boston Bergman MD;  Location: [facility];  Service: Cardiology   • CARDIAC CATHETERIZATION N/A 2024    Procedure: Cardiac Coronary Angiogram;  Surgeon: Boston Bergman MD;  Location: [facility];  Service: Cardiology   • CATARACT EXTRACTION Left    • EAR SURGERY      to wart   • EPIDIDYMAL CYST EXCISION Right 2000   • HEMORROIDECTOMY     • SD EXCISION HYDROCELE UNILATERAL Left 2023    Procedure: HYDROCELECTOMY, sPERMATOCELECTOMY;  Surgeon: James Blunt MD;  Location: [facility];  Service: Urology   • SD TRURL ELECTROSURG RESCJ PROSTATE BLEED COMPLETE N/A 2020    Procedure: CYSTOSCOPY, TRANSURETHRAL RESECTION OF PROSTATE (TURP);  Surgeon: Reji Meneses MD;  Location: [facility];  Service: Urology   • PROSTATE BIOPSY Bilateral 2003    BPH with mild acute and chronic inflammation   • PROSTATE BIOPSY Bilateral 2005    BPH with marked artropy and chronic inflammation     Social History     Substance and Sexual Activity   Alcohol Use Not Currently     Social History     Substance and Sexual Activity   Drug Use Never     Social History     Tobacco Use   Smoking Status Former   • Current packs/day: 0.00   • Average packs/day: 1 pack/day for 15.0 years (15.0 ttl pk-yrs)   • Types: Cigarettes   • Start date:    • Quit date:    • Years since quittin.6   • Passive exposure: Never   Smokeless Tobacco Current   • Types: Chew   Tobacco Comments    Chews cigars     Family History   Problem Relation Age of Onset   • Asthma Father      Health Maintenance Due   Topic   • Hepatitis A Vaccine (1 of 2 - Risk 2-dose series)   • Zoster Vaccine (1 of 2)   • RSV Vaccine Age 60+ Years (1 - 1-dose 60+ series)   • COVID-19 Vaccine (3 - 2023-24 season)   • Influenza Vaccine (1)      Meds/Allergies       Current Outpatient Medications:   •  Aspirin 81 MG CAPS, Take by mouth,  "Disp: , Rfl:   •  atorvastatin (LIPITOR) 20 mg tablet, Take 1 tablet (20 mg total) by mouth daily with dinner, Disp: 90 tablet, Rfl: 1  •  clopidogrel (PLAVIX) 75 mg tablet, take 1 tablet by mouth once daily, Disp: 100 tablet, Rfl: 1  •  fluticasone (FLONASE) 50 mcg/act nasal spray, 1 spray into each nostril 2 (two) times a day, Disp: 15.8 mL, Rfl: 3  •  losartan (COZAAR) 25 mg tablet, Take 1 tablet (25 mg total) by mouth daily, Disp: 90 tablet, Rfl: 1  •  metoprolol succinate (TOPROL-XL) 25 mg 24 hr tablet, Take 1 tablet (25 mg total) by mouth daily, Disp: 90 tablet, Rfl: 2  •  predniSONE 10 mg tablet, Take 1 tablet (10 mg total) by mouth daily, Disp: 30 tablet, Rfl: 0      Objective:    Vitals:   /64   Pulse 67   Ht 5' 9\" (1.753 m)   Wt 65.9 kg (145 lb 3.2 oz)   SpO2 97%   BMI 21.44 kg/m²   Body mass index is 21.44 kg/m².  Vitals:    08/23/24 1135   Weight: 65.9 kg (145 lb 3.2 oz)       Physical Exam  Vitals and nursing note reviewed.   Constitutional:       General: He is not in acute distress.     Appearance: He is well-developed. He is not ill-appearing, toxic-appearing or diaphoretic.   HENT:      Head: Normocephalic and atraumatic.      Right Ear: External ear normal.      Left Ear: External ear normal.      Nose: Nose normal.      Mouth/Throat:      Pharynx: No oropharyngeal exudate.   Eyes:      General: Lids are normal. Lids are everted, no foreign bodies appreciated. No scleral icterus.        Right eye: No discharge.         Left eye: No discharge.      Conjunctiva/sclera: Conjunctivae normal.      Pupils: Pupils are equal, round, and reactive to light.   Neck:      Thyroid: No thyromegaly.      Vascular: Normal carotid pulses. No carotid bruit, hepatojugular reflux or JVD.      Trachea: No tracheal tenderness or tracheal deviation.   Cardiovascular:      Rate and Rhythm: Normal rate and regular rhythm.      Pulses: Normal pulses.      Heart sounds: Murmur heard.      No friction rub. No " gallop.   Pulmonary:      Effort: Pulmonary effort is normal. No respiratory distress.      Breath sounds: Normal breath sounds. No stridor. No wheezing or rales.      Comments: Decreased air entry bilateral  Chest:      Chest wall: No tenderness.   Abdominal:      General: Bowel sounds are normal. There is no distension.      Palpations: Abdomen is soft. There is no mass.      Tenderness: There is no abdominal tenderness. There is no guarding or rebound.   Musculoskeletal:         General: No tenderness or deformity. Normal range of motion.      Cervical back: Normal range of motion and neck supple. No edema, erythema or rigidity. No spinous process tenderness or muscular tenderness. Normal range of motion.   Lymphadenopathy:      Head:      Right side of head: No submental, submandibular, tonsillar, preauricular or posterior auricular adenopathy.      Left side of head: No submental, submandibular, tonsillar, preauricular, posterior auricular or occipital adenopathy.      Cervical: No cervical adenopathy.      Right cervical: No superficial, deep or posterior cervical adenopathy.     Left cervical: No superficial, deep or posterior cervical adenopathy.      Upper Body:      Right upper body: No pectoral adenopathy.      Left upper body: No pectoral adenopathy.   Skin:     General: Skin is warm and dry.      Coloration: Skin is not pale.      Findings: No erythema or rash.   Neurological:      General: No focal deficit present.      Mental Status: He is alert and oriented to person, place, and time.      Cranial Nerves: No cranial nerve deficit.      Sensory: No sensory deficit.      Motor: No tremor, abnormal muscle tone or seizure activity.      Coordination: Coordination normal.      Gait: Gait abnormal.      Deep Tendon Reflexes: Reflexes are normal and symmetric. Reflexes normal.   Psychiatric:         Behavior: Behavior normal.         Thought Content: Thought content normal.         Judgment: Judgment  "normal.         Lab Review   No visits with results within 2 Month(s) from this visit.   Latest known visit with results is:   Hospital Outpatient Visit on 06/17/2024   Component Date Value Ref Range Status   • BSA 06/17/2024 1.8  m2 Final   • LV EF 06/17/2024 45   Final         Patient Instructions   Pt is symptom free for this problem.  This diagnosis or problem is stable/well controlled  Patient is advised to continue same meds as outlined in medicine list  Pt is advised to continue to follow with specialist if they are following for this problme  Pt should get blood test or other testing as per specialist ( or myself) prior to your next visit.        Dima Lloyd MD        \"This note has been constructed using a voice recognition system.Therefore there may be syntax, spelling, and/or grammatical errors. Please call if you have any questions. \"  "

## 2024-08-28 DIAGNOSIS — I25.10 CORONARY ARTERY DISEASE INVOLVING NATIVE CORONARY ARTERY OF NATIVE HEART WITHOUT ANGINA PECTORIS: ICD-10-CM

## 2024-08-28 DIAGNOSIS — I42.9 CARDIOMYOPATHY, UNSPECIFIED TYPE (HCC): ICD-10-CM

## 2024-08-29 RX ORDER — ATORVASTATIN CALCIUM 20 MG/1
20 TABLET, FILM COATED ORAL
Qty: 90 TABLET | Refills: 1 | Status: SHIPPED | OUTPATIENT
Start: 2024-08-29

## 2024-08-29 RX ORDER — LOSARTAN POTASSIUM 25 MG/1
25 TABLET ORAL DAILY
Qty: 90 TABLET | Refills: 1 | Status: SHIPPED | OUTPATIENT
Start: 2024-08-29

## 2024-10-07 DIAGNOSIS — M54.16 LUMBAR RADICULOPATHY: ICD-10-CM

## 2024-10-07 DIAGNOSIS — M72.2 PLANTAR FASCIITIS: ICD-10-CM

## 2024-10-07 RX ORDER — PREDNISONE 10 MG/1
10 TABLET ORAL DAILY
Qty: 30 TABLET | Refills: 0 | Status: SHIPPED | OUTPATIENT
Start: 2024-10-07

## 2024-10-07 NOTE — TELEPHONE ENCOUNTER
Reason for call: Dima Lloyd, manage this medication!    [x] Refill   [] Prior Auth  [] Other:     Office:   [x] PCP/Provider -   [] Specialty/Provider -     Medication:     predniSONE 10 mg tablet       Dose/Frequency:  Take 1 tablet (10 mg total) by mouth daily,     Quantity: 30 tablet     Pharmacy: [id number] [address] ([facility]) [phone]     Does the patient have enough for 3 days?   [] Yes   [x] No - Send as HP to POD

## 2024-11-13 DIAGNOSIS — M72.2 PLANTAR FASCIITIS: ICD-10-CM

## 2024-11-13 DIAGNOSIS — M54.16 LUMBAR RADICULOPATHY: ICD-10-CM

## 2024-11-13 RX ORDER — PREDNISONE 10 MG/1
10 TABLET ORAL DAILY
Qty: 30 TABLET | Refills: 0 | Status: SHIPPED | OUTPATIENT
Start: 2024-11-13 | End: 2024-11-15

## 2024-11-13 NOTE — TELEPHONE ENCOUNTER
Pt came in requesting refill.  I moved up his 11/19 appt to this Friday (11/15) and told him I would request refill, but that the Doctor may want to see him first, which he was fine with.

## 2024-11-15 ENCOUNTER — OFFICE VISIT (OUTPATIENT)
Dept: INTERNAL MEDICINE CLINIC | Facility: CLINIC | Age: 84
End: 2024-11-15
Payer: COMMERCIAL

## 2024-11-15 ENCOUNTER — TELEPHONE (OUTPATIENT)
Dept: INTERNAL MEDICINE CLINIC | Facility: CLINIC | Age: 84
End: 2024-11-15

## 2024-11-15 VITALS
HEART RATE: 80 BPM | HEIGHT: 69 IN | SYSTOLIC BLOOD PRESSURE: 122 MMHG | WEIGHT: 148 LBS | BODY MASS INDEX: 21.92 KG/M2 | OXYGEN SATURATION: 95 % | DIASTOLIC BLOOD PRESSURE: 70 MMHG

## 2024-11-15 DIAGNOSIS — I25.5 ISCHEMIC CARDIOMYOPATHY: ICD-10-CM

## 2024-11-15 DIAGNOSIS — Z95.0 PACEMAKER: ICD-10-CM

## 2024-11-15 DIAGNOSIS — Z23 ENCOUNTER FOR IMMUNIZATION: ICD-10-CM

## 2024-11-15 DIAGNOSIS — I35.0 CRITICAL AORTIC VALVE STENOSIS: ICD-10-CM

## 2024-11-15 DIAGNOSIS — J41.0 SIMPLE CHRONIC BRONCHITIS (HCC): Primary | ICD-10-CM

## 2024-11-15 PROCEDURE — 90662 IIV NO PRSV INCREASED AG IM: CPT

## 2024-11-15 PROCEDURE — G0008 ADMIN INFLUENZA VIRUS VAC: HCPCS

## 2024-11-15 PROCEDURE — 99213 OFFICE O/P EST LOW 20 MIN: CPT | Performed by: INTERNAL MEDICINE

## 2024-11-15 RX ORDER — PREDNISONE 10 MG/1
10 TABLET ORAL DAILY
Qty: 30 TABLET | Refills: 2 | Status: SHIPPED | OUTPATIENT
Start: 2024-11-15

## 2024-11-15 NOTE — ASSESSMENT & PLAN NOTE
Echo showed EF of 25% with severe global hypokinesis with regional variations, grade 2 diastolic dysfunction with severely dilated left atrium with aortic valve area of 0.5 cm 2  Etiology not clear at the current time  Continue medical management with Toprol-XL, Aldactone, losartan  Cardiology follow-up    Patient euvolemic CHF compensated above reviewed followed by cardiology    Status post TAVR and pacemaker    Will continue main medication as follow    Continue Plavix 75 mg daily    Aspirin 81 mg daily follow the cardiology

## 2024-11-15 NOTE — ASSESSMENT & PLAN NOTE
S/p TAVR, 03/14/2024 at [facility] with 29MM CE BOVINE PERICARDIAL TISSUE TRANSCATHETER HEART VALVE, for Critical aortic stenosis    Above reviewed patient symptoms difficulty breathing chest pain improved awaiting to be seen by cardiology

## 2024-11-15 NOTE — PATIENT INSTRUCTIONS
Patient Education     Chronic Obstructive Pulmonary Disease (COPD) Discharge Instructions   About this topic   Chronic obstructive pulmonary disease is also called COPD. It is a lung illness. COPD is often caused by inflammation of the bronchial tubes that carry air into your lungs or by infection. Chronic bronchitis and emphysema are mostly caused by smoking. It can also be caused by breathing in toxic fumes or gases. With chronic bronchitis, your cough will bring up mucus and can last for months.     What care is needed at home?   Ask your doctor what you need to do when you go home. Make sure you ask questions if you do not understand what the doctor says.  If you smoke, try to quit. Your doctor or nurse can help you with this.  Stay away from smoke-filled places. Avoid other things that may cause breathing problems like fumes, pollution, dust, and other common allergens.  If you have an inhaler to take when you are feeling short of breath, be sure to carry it with you all the time. Then, you can take it when needed. Take all your other medicines as directed.  The doctor may have ordered antibiotics to treat an infection. It is important to take all of your antibiotics even if you start to feel better.  Work to get as healthy as possible.  Be active at home by:  Walking. Ask your doctor how far you can walk and how often you should walk.  Exercising your arms, shoulders, and legs. Ask your doctor or therapist about what exercises are best for you.  Do breathing exercises 2 to 3 times each day.  Wear a mask when you are around dust or pollution.  Protect yourself from getting sick.  Wash your hands often.  Ask visitors with a cold to wear a mask or reschedule their visit.  Save your energy.  Place the things you use within easy reach where you do not have to bend over or stretch to get them.  Use a cart with wheels to move things around your house.  Avoid heavy activities unless your doctor tells you it is  OK.  Use oxygen if you need it. Your doctor may give you oxygen to use at home. You will be taught how to use the oxygen at home by the staff that brings it to your home. Follow your doctor's advice on using it.  Never change the amount of oxygen flowing without talking to your doctor.  Always have a back-up oxygen supply at home or when you go out.  No candles, matches, cigarettes, or open flame should ever come near your oxygen.  Never smoke when using oxygen. This can cause severe burns to your face, mouth, throat, and lungs.       What follow-up care is needed?   Your doctor may ask you to make visits to the office to check on your progress. Be sure to keep these visits. You may be sent to a doctor who is specializes in lung illnesses. Your doctor may suggest you go to a lung rehab center to help improve your health.  Your doctor may order:  Breathing tests that check the amount and force of the air you breathe in and out. These are called pulmonary function tests or PFTs.  Chest x-rays  CT scan of your chest  Arterial blood gas (ABG) to measure the amount of oxygen in your blood that is taken directly from your artery  A breathing therapist to help you with your breathing exercises  Someone who can help you stop smoking, if you smoke  What drugs may be needed?   Take your drugs as ordered by your doctor. The doctor may order drugs to:  Make breathing easier  Help decrease coughing  Lower swelling in your airways  Prevent infection  Help you stop smoking  Will physical activity be limited?   Your physical activities may be limited as long as you have the signs of this health problem. Avoid heavy and tiring activities. Talk to your doctor about the right amount of activity for you.  What problems could happen?   Lung infections  High blood pressure  Heart problems  Anxiety and low mood  Lung collapse  Fluid in the lungs  What can be done to prevent this health problem?   If you have COPD you can take some steps  to keep it from getting worse.  Try to quit smoking.  If you have COPD already, continuing to smoke will make it worse more quickly.  Wear protective gear, like a mask and goggles, if exposed to irritants or toxins at work.  Stay away from crowded places.  Get a flu shot each year. Ask your doctor if you need a pneumonia shot.     When do I need to call the doctor?   Activate the emergency medical system right away if you have signs of a heart attack. Call 911 in the [place] or [place]. The sooner treatment begins, the better your chances for recovery. Call for emergency help right away if you have:  Signs of heart attack which may include:  Severe chest pain, pressure, or discomfort with:  Breathing trouble; sweating; upset stomach; or cold, clammy skin.  Pain in your arms, back, or jaw.  Worse pain with activity like walking up stairs.  Fast or irregular heartbeat.  Feeling dizzy, faint, or weak.  Call your regular doctor if:  You are having so much trouble breathing that you can only say one or two words at a time.  You need to sit upright at all times to be able to breathe and or cannot lie down.  You are very tired from working to catch your breath or you are sweating from trying to breathe.  You have trouble breathing when talking or sitting still.  You cough up blood.  You have a fever of 100.4°F (38°C) or higher or chills.  You are feeling weak when doing activities.  You have new or worsening cough.  You cough up more mucus.  You feel more short of breath.  Teach Back: Helping You Understand   The Teach Back Method helps you understand the information we are giving you. After you talk with the staff, tell them in your own words what you learned. This helps to make sure the staff has described each thing clearly. It also helps to explain things that may have been confusing. Before going home, make sure you can do these:  I can tell you about my condition.  I can tell you what I can do to help protect my  lungs and save my strength.  I can tell you what I will do if I have chest tightness, wheezing, a cough that does not go away, or trouble breathing.  Last Reviewed Date   2022-02-11  Consumer Information Use and Disclaimer   This generalized information is a limited summary of diagnosis, treatment, and/or medication information. It is not meant to be comprehensive and should be used as a tool to help the user understand and/or assess potential diagnostic and treatment options. It does NOT include all information about conditions, treatments, medications, side effects, or risks that may apply to a specific patient. It is not intended to be medical advice or a substitute for the medical advice, diagnosis, or treatment of a health care provider based on the health care provider's examination and assessment of a patient’s specific and unique circumstances. Patients must speak with a health care provider for complete information about their health, medical questions, and treatment options, including any risks or benefits regarding use of medications. This information does not endorse any treatments or medications as safe, effective, or approved for treating a specific patient. [employer] and its affiliates disclaim any warranty or liability relating to this information or the use thereof. The use of this information is governed by the Terms of Use, available at https://www.w[employer]tersDaily Secretuwer.com/en/know/clinical-effectiveness-terms   Copyright   Copyright © 2024 [employer] and its affiliates and/or licensors. All rights reserved.

## 2024-11-15 NOTE — PROGRESS NOTES
Dr. Lloyd's Office Visit Note  11/15/24     Gilberto Vann 84 y.o. male MRN: [id number]  : 1940    Assessment:     1. Encounter for immunization  -     influenza vaccine, high-dose, PF 0.5 mL (Fluzone High Dose)  2. Ischemic cardiomyopathy  Assessment & Plan:  Echo showed EF of 25% with severe global hypokinesis with regional variations, grade 2 diastolic dysfunction with severely dilated left atrium with aortic valve area of 0.5 cm 2  Etiology not clear at the current time  Continue medical management with Toprol-XL, Aldactone, losartan  Cardiology follow-up    Patient euvolemic CHF compensated above reviewed followed by cardiology    Status post TAVR and pacemaker    Will continue main medication as follow    Continue Plavix 75 mg daily    Aspirin 81 mg daily follow the cardiology  3. Critical aortic valve stenosis  Assessment & Plan:  S/p TAVR, 2024 at [facility] with 29MM CE BOVINE PERICARDIAL TISSUE TRANSCATHETER HEART VALVE, for Critical aortic stenosis   4. Pacemaker  Assessment & Plan:  S/p MIRCA pacemaker implantation for Mobitz type II AV block and LBBB         Discussion Summary and Plan:  Today's care plan and medications were reviewed with patient in detail and all their questions answered to their satisfaction.    Chief Complaint   Patient presents with    Follow-up     Back and leg pain. Since having surgery.      Subjective:  Patient complaining of right hip and the knee causing difficulty ambulating patient on Plavix aspirin cannot take any NSAID for now dyspnea on exertion at baseline does not use any inhaler awaiting to be seen by pulmonary for details refer to assessment plan visit diagnosis        The following portions of the patient's history were reviewed and updated as appropriate: allergies, current medications, past family history, past medical history, past social history, past surgical history and problem list.    Review of Systems   Constitutional:  Positive  for activity change. Negative for appetite change, chills, diaphoresis, fatigue, fever and unexpected weight change.   HENT:  Negative for dental problem, drooling, ear discharge, ear pain, facial swelling, hearing loss, mouth sores, nosebleeds, postnasal drip, sinus pressure, sneezing, sore throat, tinnitus, trouble swallowing and voice change.    Eyes:  Negative for photophobia, pain, discharge, redness, itching and visual disturbance.   Respiratory:  Positive for shortness of breath. Negative for apnea, choking, chest tightness and stridor.    Cardiovascular:  Negative for chest pain, palpitations and leg swelling.   Gastrointestinal:  Negative for abdominal distention, abdominal pain, anal bleeding, blood in stool, constipation, diarrhea, nausea, rectal pain and vomiting.   Endocrine: Negative for cold intolerance, heat intolerance, polydipsia, polyphagia and polyuria.   Genitourinary:  Negative for decreased urine volume, difficulty urinating, dysuria, enuresis, flank pain, frequency, genital sores, hematuria and urgency.   Musculoskeletal:  Negative for arthralgias, back pain, gait problem, joint swelling, myalgias, neck pain and neck stiffness.   Skin:  Negative for color change, pallor, rash and wound.   Allergic/Immunologic: Negative.  Negative for environmental allergies, food allergies and immunocompromised state.   Neurological:  Negative for dizziness, tremors, seizures, syncope, facial asymmetry, speech difficulty, weakness, light-headedness, numbness and headaches.   Psychiatric/Behavioral:  Negative for agitation, behavioral problems, confusion, decreased concentration, dysphoric mood, hallucinations, self-injury, sleep disturbance and suicidal ideas. The patient is not nervous/anxious and is not hyperactive.          Historical Information   Patient Active Problem List   Diagnosis    Benign prostatic hyperplasia with urinary retention    Hematuria, gross    Asthma    COPD (chronic obstructive  pulmonary disease) (HCC)    Primary osteoarthritis involving multiple joints    Lumbar radiculopathy    Hydrocele    Encysted hydrocele    Hypercalcemia    Shortness of breath    Chronic bronchitis (HCC)    Hypokalemia    Centrilobular emphysema (HCC)    Syncope    Glaucoma    Elevated troponin    Hyperglycemia    Closed fracture of multiple ribs of both sides    Closed fracture of body of sternum    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HCC)    Pulmonary nodules    Anxiety    Anemia    Critical aortic valve stenosis    Cardiomyopathy (HCC)    CAD (coronary artery disease)    Hypertensive heart failure (HCC)    Plantar fasciitis    Pacemaker     Past Medical History:   Diagnosis Date    Arthritis     Asthma     Back pain     BPH (benign prostatic hypertrophy) with urinary obstruction     COPD (chronic obstructive pulmonary disease) (HCC)     Dizziness     Glaucoma     bilat    Insomnia     RLS (restless legs syndrome)     Sinusitis      Past Surgical History:   Procedure Laterality Date    CARDIAC CATHETERIZATION N/A 2/19/2024    Procedure: Cardiac catheterization;  Surgeon: Boston Bergman MD;  Location: [facility];  Service: Cardiology    CARDIAC CATHETERIZATION N/A 2/19/2024    Procedure: Cardiac Coronary Angiogram;  Surgeon: Boston Bergman MD;  Location: [facility];  Service: Cardiology    CATARACT EXTRACTION Left     EAR SURGERY      to wart    EPIDIDYMAL CYST EXCISION Right 05/04/2000    HEMORROIDECTOMY      IA EXCISION HYDROCELE UNILATERAL Left 9/14/2023    Procedure: HYDROCELECTOMY, sPERMATOCELECTOMY;  Surgeon: James Blunt MD;  Location: [facility];  Service: Urology    IA TRURL ELECTROSURG RESCJ PROSTATE BLEED COMPLETE N/A 04/16/2020    Procedure: CYSTOSCOPY, TRANSURETHRAL RESECTION OF PROSTATE (TURP);  Surgeon: Reji Meneses MD;  Location: [facility];  Service: Urology    PROSTATE BIOPSY Bilateral 12/11/2003    BPH with mild acute and chronic  "inflammation    PROSTATE BIOPSY Bilateral 2005    BPH with marked artropy and chronic inflammation     Social History     Substance and Sexual Activity   Alcohol Use Not Currently     Social History     Substance and Sexual Activity   Drug Use Never     Social History     Tobacco Use   Smoking Status Former    Current packs/day: 0.00    Average packs/day: 1 pack/day for 15.0 years (15.0 ttl pk-yrs)    Types: Cigarettes    Start date:     Quit date:     Years since quittin.9    Passive exposure: Never   Smokeless Tobacco Current    Types: Chew   Tobacco Comments    Chews cigars     Family History   Problem Relation Age of Onset    Asthma Father      Health Maintenance Due   Topic    Hepatitis A Vaccine (1 of 2 - Risk 2-dose series)    Zoster Vaccine (1 of 2)    RSV Vaccine Age 60+ Years (1 - 1-dose 75+ series)    COVID-19 Vaccine (3 - 2024-25 season)    Depression Screening       Meds/Allergies       Current Outpatient Medications:     Aspirin 81 MG CAPS, Take by mouth, Disp: , Rfl:     atorvastatin (LIPITOR) 20 mg tablet, take 1 tablet by mouth EVERY EVENING WITH DINNER, Disp: 90 tablet, Rfl: 1    clopidogrel (PLAVIX) 75 mg tablet, take 1 tablet by mouth once daily, Disp: 100 tablet, Rfl: 1    fluticasone (FLONASE) 50 mcg/act nasal spray, 1 spray into each nostril 2 (two) times a day, Disp: 15.8 mL, Rfl: 3    losartan (COZAAR) 25 mg tablet, take 1 tablet by mouth once daily, Disp: 90 tablet, Rfl: 1    metoprolol succinate (TOPROL-XL) 25 mg 24 hr tablet, Take 1 tablet (25 mg total) by mouth daily, Disp: 90 tablet, Rfl: 2      Objective:    Vitals:   /70   Pulse 80   Ht 5' 9\" (1.753 m)   Wt 67.1 kg (148 lb)   SpO2 95%   BMI 21.86 kg/m²   Body mass index is 21.86 kg/m².  Vitals:    11/15/24 0959   Weight: 67.1 kg (148 lb)       Physical Exam  Vitals reviewed.   Constitutional:       General: He is not in acute distress.     Appearance: He is well-developed. He is not ill-appearing, " toxic-appearing or diaphoretic.   HENT:      Head: Normocephalic and atraumatic.      Right Ear: External ear normal.      Left Ear: External ear normal.      Nose: Nose normal.      Mouth/Throat:      Pharynx: No oropharyngeal exudate.   Eyes:      General: Lids are normal. Lids are everted, no foreign bodies appreciated. No scleral icterus.        Right eye: No discharge.         Left eye: No discharge.      Conjunctiva/sclera: Conjunctivae normal.      Pupils: Pupils are equal, round, and reactive to light.   Neck:      Thyroid: No thyromegaly.      Vascular: Normal carotid pulses. No carotid bruit, hepatojugular reflux or JVD.      Trachea: No tracheal tenderness or tracheal deviation.   Cardiovascular:      Rate and Rhythm: Normal rate and regular rhythm.      Pulses: Normal pulses.      Heart sounds: Murmur heard.      No friction rub. No gallop.   Pulmonary:      Effort: Pulmonary effort is normal. No respiratory distress.      Breath sounds: Normal breath sounds. No stridor. No wheezing or rales.      Comments: Decreased air entry bilateral  Chest:      Chest wall: No tenderness.   Abdominal:      General: Bowel sounds are normal. There is no distension.      Palpations: Abdomen is soft. There is no mass.      Tenderness: There is no abdominal tenderness. There is no guarding or rebound.   Musculoskeletal:         General: No tenderness or deformity. Normal range of motion.      Cervical back: Normal range of motion and neck supple. No edema, erythema or rigidity. No spinous process tenderness or muscular tenderness. Normal range of motion.   Lymphadenopathy:      Head:      Right side of head: No submental, submandibular, tonsillar, preauricular or posterior auricular adenopathy.      Left side of head: No submental, submandibular, tonsillar, preauricular, posterior auricular or occipital adenopathy.      Cervical: No cervical adenopathy.      Right cervical: No superficial, deep or posterior cervical  "adenopathy.     Left cervical: No superficial, deep or posterior cervical adenopathy.      Upper Body:      Right upper body: No pectoral adenopathy.      Left upper body: No pectoral adenopathy.   Skin:     General: Skin is warm and dry.      Coloration: Skin is not pale.      Findings: No erythema or rash.   Neurological:      General: No focal deficit present.      Mental Status: He is alert and oriented to person, place, and time.      Cranial Nerves: No cranial nerve deficit.      Sensory: No sensory deficit.      Motor: No tremor, abnormal muscle tone or seizure activity.      Coordination: Coordination normal.      Gait: Gait abnormal.      Deep Tendon Reflexes: Reflexes are normal and symmetric. Reflexes normal.   Psychiatric:         Behavior: Behavior normal.         Thought Content: Thought content normal.         Judgment: Judgment normal.       Lab Review   No visits with results within 2 Month(s) from this visit.   Latest known visit with results is:   Hospital Outpatient Visit on 06/17/2024   Component Date Value Ref Range Status    BSA 06/17/2024 1.8  m2 Final    LV EF 06/17/2024 45   Final         There are no Patient Instructions on file for this visit.     Dima Lloyd MD        \"This note has been constructed using a voice recognition system.Therefore there may be syntax, spelling, and/or grammatical errors. Please call if you have any questions. \"  "

## 2024-11-15 NOTE — TELEPHONE ENCOUNTER
Called patient to let him know the prednisone prescription was sent to his pharmacy and Dr Weinberg would like to know if he wants to go for X rays of his hip and knee.

## 2024-11-15 NOTE — ASSESSMENT & PLAN NOTE
Dyspnea on exertion at baseline no wheezing    Follow-up pulmonary    Not on any inhalers at present    Has a Proventil inhaler using as needed    Was on Trelegy in the past for now not on trilogy Breo Symbicort or any other inhaler patient claims not paid by the insurance and cannot afford due to the cost issue only on Proventil advised to be followed by pulmonary

## 2024-11-18 ENCOUNTER — APPOINTMENT (OUTPATIENT)
Dept: LAB | Facility: HOSPITAL | Age: 84
End: 2024-11-18
Payer: COMMERCIAL

## 2024-11-18 ENCOUNTER — HOSPITAL ENCOUNTER (OUTPATIENT)
Dept: RADIOLOGY | Facility: HOSPITAL | Age: 84
Discharge: HOME | End: 2024-11-18
Payer: COMMERCIAL

## 2024-11-18 DIAGNOSIS — M15.0 PRIMARY OSTEOARTHRITIS INVOLVING MULTIPLE JOINTS: ICD-10-CM

## 2024-11-18 DIAGNOSIS — M15.0 PRIMARY OSTEOARTHRITIS INVOLVING MULTIPLE JOINTS: Primary | ICD-10-CM

## 2024-11-18 PROCEDURE — 72110 X-RAY EXAM L-2 SPINE 4/>VWS: CPT

## 2024-11-18 PROCEDURE — 73030 X-RAY EXAM OF SHOULDER: CPT

## 2024-11-18 PROCEDURE — 73522 X-RAY EXAM HIPS BI 3-4 VIEWS: CPT

## 2024-11-21 ENCOUNTER — TELEPHONE (OUTPATIENT)
Dept: INTERNAL MEDICINE CLINIC | Facility: CLINIC | Age: 84
End: 2024-11-21

## 2024-11-21 ENCOUNTER — TELEPHONE (OUTPATIENT)
Age: 84
End: 2024-11-21

## 2024-11-21 DIAGNOSIS — M19.011 PRIMARY OSTEOARTHRITIS OF BOTH SHOULDERS: Primary | ICD-10-CM

## 2024-11-21 DIAGNOSIS — M16.0 PRIMARY OSTEOARTHRITIS OF BOTH HIPS: ICD-10-CM

## 2024-11-21 DIAGNOSIS — M19.012 PRIMARY OSTEOARTHRITIS OF BOTH SHOULDERS: Primary | ICD-10-CM

## 2024-11-21 NOTE — PROGRESS NOTES
Discussed the results of the x-rays hip shoulder shows severe osteoarthritis to be seen by orthopedic referral in place discussed results of the x-rays with the patient show severe osteoarthritis both shoulder hip to be seen by orthopedist

## 2024-11-21 NOTE — TELEPHONE ENCOUNTER
Pt stated he would like a referral to orthopedics and would like a referral to a good doctor here in [place]

## 2024-11-21 NOTE — TELEPHONE ENCOUNTER
[facility] is requesting an insurance referral for the following specialty:      Test Name / Order Name: Consult and treat    DX Code: M25.552    Date Of Service: 11/25    Location/Facility Name/Address/Phone #: [facility], [address], [address] [place], [phone]    Location / Facility NPI: [id number]        A separate referral is requested due to a separate body part:     Test Name / Order Name: consult and treat    DX Code: M25.519    Date Of Service: 11/26    Location/Facility Name/Address/Phone #: same as above    Location / Facility NPI: same as above

## 2024-11-25 ENCOUNTER — OFFICE VISIT (OUTPATIENT)
Dept: OBGYN CLINIC | Facility: CLINIC | Age: 84
End: 2024-11-25
Payer: COMMERCIAL

## 2024-11-25 VITALS
HEART RATE: 66 BPM | BODY MASS INDEX: 21.77 KG/M2 | SYSTOLIC BLOOD PRESSURE: 118 MMHG | HEIGHT: 69 IN | DIASTOLIC BLOOD PRESSURE: 74 MMHG | WEIGHT: 147 LBS

## 2024-11-25 DIAGNOSIS — M19.011 PRIMARY OSTEOARTHRITIS OF RIGHT SHOULDER: ICD-10-CM

## 2024-11-25 DIAGNOSIS — M19.012 PRIMARY OSTEOARTHRITIS OF LEFT SHOULDER: ICD-10-CM

## 2024-11-25 DIAGNOSIS — M47.896 OTHER OSTEOARTHRITIS OF SPINE, LUMBAR REGION: Primary | ICD-10-CM

## 2024-11-25 DIAGNOSIS — M79.89 SOFT TISSUE MASS: ICD-10-CM

## 2024-11-25 DIAGNOSIS — M16.0 PRIMARY OSTEOARTHRITIS OF BOTH HIPS: ICD-10-CM

## 2024-11-25 PROCEDURE — 99204 OFFICE O/P NEW MOD 45 MIN: CPT | Performed by: ORTHOPAEDIC SURGERY

## 2024-11-25 PROCEDURE — 20610 DRAIN/INJ JOINT/BURSA W/O US: CPT

## 2024-11-25 RX ORDER — LATANOPROST 50 UG/ML
1 SOLUTION/ DROPS OPHTHALMIC DAILY
COMMUNITY
Start: 2024-10-22

## 2024-11-25 RX ORDER — TRIAMCINOLONE ACETONIDE 40 MG/ML
40 INJECTION, SUSPENSION INTRA-ARTICULAR; INTRAMUSCULAR
Status: COMPLETED | OUTPATIENT
Start: 2024-11-25 | End: 2024-11-25

## 2024-11-25 RX ORDER — BUPIVACAINE HYDROCHLORIDE 5 MG/ML
3.5 INJECTION, SOLUTION PERINEURAL
Status: COMPLETED | OUTPATIENT
Start: 2024-11-25 | End: 2024-11-25

## 2024-11-25 RX ADMIN — BUPIVACAINE HYDROCHLORIDE 3.5 ML: 5 INJECTION, SOLUTION PERINEURAL at 11:45

## 2024-11-25 RX ADMIN — TRIAMCINOLONE ACETONIDE 40 MG: 40 INJECTION, SUSPENSION INTRA-ARTICULAR; INTRAMUSCULAR at 11:45

## 2024-11-25 NOTE — PROGRESS NOTES
[facility] [place] [id number] [facility] [id number]       Gilberto Vann  [id number]  1940    ORTHOPAEDIC SURGERY OUTPATIENT NOTE  11/25/2024      HISTORY:  84 y.o. male  ***    Past Medical History:   Diagnosis Date    Arthritis     Asthma     Back pain     BPH (benign prostatic hypertrophy) with urinary obstruction     COPD (chronic obstructive pulmonary disease) (HCC)     Dizziness     Glaucoma     bilat    Insomnia     RLS (restless legs syndrome)     Sinusitis        Past Surgical History:   Procedure Laterality Date    CARDIAC CATHETERIZATION N/A 2/19/2024    Procedure: Cardiac catheterization;  Surgeon: Boston Bergman MD;  Location: [facility];  Service: Cardiology    CARDIAC CATHETERIZATION N/A 2/19/2024    Procedure: Cardiac Coronary Angiogram;  Surgeon: Boston Bergman MD;  Location: [facility];  Service: Cardiology    CATARACT EXTRACTION Left     EAR SURGERY      to wart    EPIDIDYMAL CYST EXCISION Right 05/04/2000    HEMORROIDECTOMY      TN EXCISION HYDROCELE UNILATERAL Left 9/14/2023    Procedure: HYDROCELECTOMY, sPERMATOCELECTOMY;  Surgeon: James Blunt MD;  Location: [facility] OR;  Service: Urology    TN TRURL ELECTROSURG RESCJ PROSTATE BLEED COMPLETE N/A 04/16/2020    Procedure: CYSTOSCOPY, TRANSURETHRAL RESECTION OF PROSTATE (TURP);  Surgeon: Reji Meneses MD;  Location: [facility] OR;  Service: Urology    PROSTATE BIOPSY Bilateral 12/11/2003    BPH with mild acute and chronic inflammation    PROSTATE BIOPSY Bilateral 06/21/2005    BPH with marked artropy and chronic inflammation       Social History     Socioeconomic History    Marital status: Unknown     Spouse name: Not on file    Number of children: Not on file    Years of education: Not on file    Highest education level: Not on file   Occupational History    Not on file   Tobacco Use    Smoking status: Former     Current packs/day: 0.00     Average  packs/day: 1 pack/day for 15.0 years (15.0 ttl pk-yrs)     Types: Cigarettes     Start date:      Quit date:      Years since quittin.9     Passive exposure: Never    Smokeless tobacco: Current     Types: Chew    Tobacco comments:     Chews cigars   Vaping Use    Vaping status: Never Used   Substance and Sexual Activity    Alcohol use: Not Currently    Drug use: Never    Sexual activity: Not Currently   Other Topics Concern    Not on file   Social History Narrative    Not on file     Social Drivers of Health     Financial Resource Strain: Patient Declined (1/10/2023)    Overall Financial Resource Strain (CARDIA)     Difficulty of Paying Living Expenses: Patient declined   Food Insecurity: No Food Insecurity (2024)    Nursing - Inadequate Food Risk Classification     Worried About Running Out of Food in the Last Year: Never true     Ran Out of Food in the Last Year: Never true     Ran Out of Food in the Last Year: Not on file   Transportation Needs: No Transportation Needs (2024)    PRAPARE - Transportation     Lack of Transportation (Medical): No     Lack of Transportation (Non-Medical): No   Physical Activity: Not on file   Stress: Not on file   Social Connections: Feeling Somewhat Isolated (3/9/2024)    Received from [place] [facility]    OASIS : Social Isolation     Frequency of experiencing loneliness or isolation: Sometimes   Intimate Partner Violence: Not on file   Housing Stability: Low Risk  (2024)    Housing Stability Vital Sign     Unable to Pay for Housing in the Last Year: No     Number of Times Moved in the Last Year: 1     Homeless in the Last Year: No       Family History   Problem Relation Age of Onset    Asthma Father         Patient's Medications   New Prescriptions    No medications on file   Previous Medications    ASPIRIN 81 MG CAPS    Take by mouth    ATORVASTATIN (LIPITOR) 20 MG TABLET    take 1 tablet by mouth EVERY EVENING WITH DINNER     "CLOPIDOGREL (PLAVIX) 75 MG TABLET    take 1 tablet by mouth once daily    FLUTICASONE (FLONASE) 50 MCG/ACT NASAL SPRAY    1 spray into each nostril 2 (two) times a day    LATANOPROST (XALATAN) 0.005 % OPHTHALMIC SOLUTION    Administer 1 drop to both eyes daily    LOSARTAN (COZAAR) 25 MG TABLET    take 1 tablet by mouth once daily    METOPROLOL SUCCINATE (TOPROL-XL) 25 MG 24 HR TABLET    Take 1 tablet (25 mg total) by mouth daily    PREDNISONE 10 MG TABLET    Take 1 tablet (10 mg total) by mouth daily   Modified Medications    No medications on file   Discontinued Medications    No medications on file       Allergies   Allergen Reactions    Shellfish-Derived Products - Food Allergy Other (See Comments)     Listed by PCP patient denies    Iodine - Food Allergy Other (See Comments)        /74   Pulse 66   Ht 5' 9\" (1.753 m)   Wt 66.7 kg (147 lb)   BMI 21.71 kg/m²      REVIEW OF SYSTEMS:  Constitutional: Negative.    HEENT: Negative.    Respiratory: Negative.    Skin: Negative.    Neurological: Negative.    Psychiatric/Behavioral: Negative.  Musculoskeletal: Negative except for that mentioned in the HPI.    PHYSICAL EXAM:      R elbow:  Flexion: 140 degrees  Extension: 0 degrees  Pronation: 80 degrees  Supination: 80 degrees    TTP Lateral Epicondyle: negative  TTP Medial Epicondyle: negative  TTP Olecranon: negative  TTP Radial Head: negative  TTP Biceps Tendon: negative    Strength:  Flexion: 5/5  Extension: 5/5  Pronation: 5/5  Supination: 5/5    Pain with resisted wrist extension: negative  Pain with resisted 3rd finger extension: negative  Pain with resisted wrist flexion: negative    Varus laxity: negative  Valgus laxity: negative  Milking maneuver: negative  Moving valgus stress test: negative    Cubital tunnel Tinel's: negative    Radial/median/ulnar nerve intact    <2 sec cap refill      L elbow:  Flexion: 140 degrees  Extension: 0 degrees  Pronation: 80 degrees  Supination: 80 degrees    TTP Lateral " Epicondyle: negative  TTP Medial Epicondyle: negative  TTP Olecranon: negative  TTP Radial Head: negative  TTP Biceps Tendon: negative    Strength:  Flexion: 5/5  Extension: 5/5  Pronation: 5/5  Supination: 5/5    Pain with resisted wrist extension: negative  Pain with resisted 3rd finger extension: negative  Pain with resisted wrist flexion: negative    Varus laxity: negative  Valgus laxity: negative  Milking maneuver: negative  Moving valgus stress test: negative    Cubital tunnel Tinel's: negative    Radial/median/ulnar nerve intact    <2 sec cap refill    Neck:   Spurling's Maneuver: negative  FROM flexion, extension, rotation, sidebending    Reflexes:   Triceps: symmetric bilaterally  Biceps: symmetric bilaterally  Brachioradialis: symmetric bilaterally      Integumentary: -  Bruising: -  Abrasion: -   Rash -  Laceration: -      IMAGING:  ***    ASSESSMENT AND PLAN:  84 y.o. male  ***

## 2024-11-25 NOTE — PROGRESS NOTES
Assessment/Plan:  1. Other osteoarthritis of spine, lumbar region  Ambulatory Referral to Orthopedic Surgery    Ambulatory referral to Spine & Pain Management      2. Primary osteoarthritis of both hips  Ambulatory Referral to Orthopedic Surgery      3. Primary osteoarthritis of right shoulder        4. Primary osteoarthritis of left shoulder        5. Soft tissue mass, right thoracolumbar region          Scribe Attestation      I,:  Shashank Sheppard am acting as a scribe while in the presence of the attending physician.:       I,:  Ted Fisher MD personally performed the services described in this documentation    as scribed in my presence.:             Gilberto upon examination and review of the x-rays of the left and right shoulders does demonstrate severe glenohumeral joint osteoarthritis of the right shoulder and moderate to severe glenohumeral joint osteoarthritis of the left.  He can consider injection therapy for the right shoulder today as this is more symptomatic.  He was amenable to this treatment plan.  He tolerated the injection well without complication.  Postinjection structures were provided.  He may have a repeat injection no sooner than 3 months.  He may continue with activities of daily living as tolerated.  A steroid injection of the left shoulder can be considered if he becomes more symptomatic.  Failure of nonoperative care may indicate the need for a total shoulder arthroplasty in the future.  He verbalized understanding had no further questions.  He may follow-up on an as-needed basis with regards to his bilateral shoulder osteoarthritis.    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  He does have degenerative scoliosis observed on x-rays.  I do not believe that the right lower extremity pain is associated with his hip joint as this demonstrates mild arthritic changes on x-rays and his hip joint  moves quite well passively and actively.  As it is likely his lumbar spine osteoarthritis is contributing to his lower extremity discomfort. I do recommend a referral to pain management specialist for consultation and potential epidural injection.  Referral was placed in his chart today.  My office will facilitate having him seeing one of our pain management specialist.  Physical therapy in the form of core strengthening, back strengthening and stretching have also been recommended.  He did defer physical therapy at this time.  I did note that this may be required in the future prior to considering injection from pain management.  He did verbalize understanding.  I will see him back on an as-needed basis with regards to his lumbar spine osteoarthritis.    Trials also demonstrates a large soft tissue mass of his right thoracolumbar region.  It is superficial, nontender, mobile and soft.  As he has seen another provider for this I do defer to their judgment with further delineation of care in the form of monitoring for now.  If it does become more symptomatic I did recommend that he follow-up with prior physician for further delineation of care.    Large joint arthrocentesis: R glenohumeral  Universal Protocol:  procedure performed by consultantConsent: Verbal consent obtained.  Risks and benefits: risks, benefits and alternatives were discussed  Consent given by: patient  Timeout called at: 11/25/2024 12:25 PM.  Patient understanding: patient states understanding of the procedure being performed  Patient identity confirmed: verbally with patient  Supporting Documentation  Indications: pain   Procedure Details  Location: shoulder - R glenohumeral  Needle size: 22 G  Approach: posterior  Medications administered: 3.5 mL bupivacaine 0.5 %; 40 mg triamcinolone acetonide 40 mg/mL    Patient tolerance: patient tolerated the procedure well with no immediate complications  Dressing:  Sterile dressing  applied            Subjective:   Gilberto Vann is a 84 y.o. male who presents for initial evaluation of multiple body parts today.  He states his primary complaint is low back and right leg pain that has been ongoing for quite some time.  He denies any traumatic injury.  He states that prolonged standing or walking slowly will exacerbate pain into his back with extension into the buttock and thigh region.  He denies any groin pain.  He denies dense paresthesias of his lower extremity.  However does feel paresthesias into his feet bilaterally.  He notes that this back pain has been more noticeable following a heart surgery in March 2024.  He states that sitting can alleviate his pain.  However, prolonged laying down can result in stiffness particular when he attempts to get up.  He has not undergone any formal treatment for his back pain or leg pain recently.    Gilberto also has complaints of bilateral shoulder pain with the right being worse than the left.  He does have a history of severe osteoarthritis of the right and moderate to severe osteoarthritis of the left.  He states that overhead reaching activities are quite painful and is limited secondary to the pain.  Pain is typically better while at rest.  He describes pain as moderate to severe sharp and aching pain.  He denies any distal paresthesias of the upper extremities.  He states he does do a home exercise program for his cervical spine as he knows he does have recurrent stiffness episodes which make it hard for him to drive.  He has not undergone formal treatment such as physical therapy or injection therapy for his shoulders at this point.  Today he denies any distal paresthesias.    Gilberto also has a mass at the mid thoracic region to the right of the spine.  He states that it is not bothersome and has been evaluated by another provider who recommended that he observe the mass and if it becomes symptomatic with pain or has abnormal growth they can  remove it.  However, he states that he is doing well with simple observation.      Review of Systems   Constitutional:  Negative for chills, fever and unexpected weight change.   HENT:  Negative for hearing loss, nosebleeds and sore throat.    Eyes:  Negative for pain, redness and visual disturbance.   Respiratory:  Negative for cough, shortness of breath and wheezing.    Cardiovascular:  Negative for chest pain, palpitations and leg swelling.   Gastrointestinal:  Negative for abdominal pain, nausea and vomiting.   Endocrine: Negative for polyphagia and polyuria.   Genitourinary:  Negative for dysuria and hematuria.   Musculoskeletal:  Positive for back pain and myalgias. Negative for arthralgias.        See HPI   Skin:  Negative for rash and wound.   Neurological:  Negative for dizziness, numbness and headaches.   Psychiatric/Behavioral:  Negative for decreased concentration and suicidal ideas. The patient is not nervous/anxious.          Past Medical History:   Diagnosis Date    Arthritis     Asthma     Back pain     BPH (benign prostatic hypertrophy) with urinary obstruction     COPD (chronic obstructive pulmonary disease) (HCC)     Dizziness     Glaucoma     bilat    Insomnia     RLS (restless legs syndrome)     Sinusitis        Past Surgical History:   Procedure Laterality Date    CARDIAC CATHETERIZATION N/A 2/19/2024    Procedure: Cardiac catheterization;  Surgeon: Boston Bergman MD;  Location: [facility];  Service: Cardiology    CARDIAC CATHETERIZATION N/A 2/19/2024    Procedure: Cardiac Coronary Angiogram;  Surgeon: Boston Bergman MD;  Location: [facility];  Service: Cardiology    CATARACT EXTRACTION Left     EAR SURGERY      to wart    EPIDIDYMAL CYST EXCISION Right 05/04/2000    HEMORROIDECTOMY      KS EXCISION HYDROCELE UNILATERAL Left 9/14/2023    Procedure: HYDROCELECTOMY, sPERMATOCELECTOMY;  Surgeon: James Blunt MD;  Location: [facility];  Service: Urology    KS TRURL ELECTROSURG  RESCJ PROSTATE BLEED COMPLETE N/A 2020    Procedure: CYSTOSCOPY, TRANSURETHRAL RESECTION OF PROSTATE (TURP);  Surgeon: Reji Meneses MD;  Location: [facility];  Service: Urology    PROSTATE BIOPSY Bilateral 2003    BPH with mild acute and chronic inflammation    PROSTATE BIOPSY Bilateral 2005    BPH with marked artropy and chronic inflammation       Family History   Problem Relation Age of Onset    Asthma Father        Social History     Occupational History    Not on file   Tobacco Use    Smoking status: Former     Current packs/day: 0.00     Average packs/day: 1 pack/day for 15.0 years (15.0 ttl pk-yrs)     Types: Cigarettes     Start date:      Quit date:      Years since quittin.9     Passive exposure: Never    Smokeless tobacco: Current     Types: Chew    Tobacco comments:     Chews cigars   Vaping Use    Vaping status: Never Used   Substance and Sexual Activity    Alcohol use: Not Currently    Drug use: Never    Sexual activity: Not Currently         Current Outpatient Medications:     Aspirin 81 MG CAPS, Take by mouth, Disp: , Rfl:     atorvastatin (LIPITOR) 20 mg tablet, take 1 tablet by mouth EVERY EVENING WITH DINNER, Disp: 90 tablet, Rfl: 1    clopidogrel (PLAVIX) 75 mg tablet, take 1 tablet by mouth once daily, Disp: 100 tablet, Rfl: 1    fluticasone (FLONASE) 50 mcg/act nasal spray, 1 spray into each nostril 2 (two) times a day, Disp: 15.8 mL, Rfl: 3    latanoprost (XALATAN) 0.005 % ophthalmic solution, Administer 1 drop to both eyes daily, Disp: , Rfl:     losartan (COZAAR) 25 mg tablet, take 1 tablet by mouth once daily, Disp: 90 tablet, Rfl: 1    metoprolol succinate (TOPROL-XL) 25 mg 24 hr tablet, Take 1 tablet (25 mg total) by mouth daily, Disp: 90 tablet, Rfl: 2    predniSONE 10 mg tablet, Take 1 tablet (10 mg total) by mouth daily (Patient not taking: Reported on 2024), Disp: 30 tablet, Rfl: 2    Allergies   Allergen Reactions    Shellfish-Derived  Products - Food Allergy Other (See Comments)     Listed by PCP patient denies    Iodine - Food Allergy Other (See Comments)       Objective:  Vitals:    11/25/24 1135   BP: 118/74   Pulse: 66       Right Hip Exam     Tenderness   The patient is experiencing no tenderness.     Range of Motion   External rotation:  60 (no pain)   Internal rotation:  30 (no pain)     Muscle Strength   Flexion: 5/5     Other   Erythema: absent  Scars: absent  Sensation: normal  Pulse: present      Back Exam     Tenderness   The patient is experiencing no tenderness.     Range of Motion   Extension:  50   Flexion:  60     Muscle Strength   Right Quadriceps:  5/5   Left Quadriceps:  5/5   Right Hamstrings:  5/5   Left Hamstrings:  5/5     Other   Sensation: normal  Erythema: no back redness  Scars: absent      Right Shoulder Exam     Tenderness   The patient is experiencing no tenderness.    Range of Motion   Active abduction:  60   External rotation:  0   Internal rotation 0 degrees:  Sacrum     Muscle Strength   Abduction: 4/5   Internal rotation: 5/5   External rotation: 5/5             Physical Exam  Vitals reviewed.   HENT:      Head: Normocephalic and atraumatic.   Eyes:      General:         Right eye: No discharge.         Left eye: No discharge.      Conjunctiva/sclera: Conjunctivae normal.      Pupils: Pupils are equal, round, and reactive to light.   Cardiovascular:      Rate and Rhythm: Normal rate.   Pulmonary:      Effort: Pulmonary effort is normal. No respiratory distress.   Musculoskeletal:        Arms:       Cervical back: Normal range of motion and neck supple.      Comments: Soft mobile superficial mass measuring approximately 8 cm x 8 cm at the right lower thoracolumbar region.  It is not tender to palpation.  There is no pigmentation changes or abnormal color hues of the skin   Skin:     General: Skin is warm and dry.   Neurological:      Mental Status: He is alert and oriented to person, place, and time.    Psychiatric:         Mood and Affect: Mood normal.         Behavior: Behavior normal.         I have personally reviewed pertinent films in PACS and my interpretation is as follows:  X-rays of the lumbar spine obtained on November 18, 2024 demonstrates severe degenerative scoliosis with endplate sclerosis and osteophytosis.  No acute fractures observed.  No obvious lytic or blastic lesions.    X-rays of the bilateral hip and pelvis obtained on November 18, 2024 demonstrates mild osteoarthritis of both the left and right hips.  Severe arthritic changes of the lower lumbar spine also observed.    X-rays of the right shoulder obtained on November 18, 2024 demonstrate severe glenohumeral joint osteoarthritis with significant joint line narrowing, subchondral sclerosis and osteophytosis.  No acute fracture observed.  No obvious lytic or blastic lesions    X-rays of the left shoulder obtained on November 18, 2024 demonstrates moderate to severe glenohumeral joint osteoarthritis with joint line narrowing, subchondral sclerosis and osteophytosis.  No acute fracture observed.  No obvious lytic or blastic lesions.    This document was created using speech voice recognition software.   Grammatical errors, random word insertions, pronoun errors, and incomplete sentences are an occasional consequence of this system due to software limitations, ambient noise, and hardware issues.   Any formal questions or concerns about content, text, or information contained within the body of this dictation should be directly addressed to the provider for clarification.

## 2024-11-26 ENCOUNTER — HOSPITAL ENCOUNTER (INPATIENT)
Facility: HOSPITAL | Age: 84
LOS: 3 days | Discharge: HOME | DRG: 690 | End: 2024-11-30
Attending: EMERGENCY MEDICINE | Admitting: STUDENT IN AN ORGANIZED HEALTH CARE EDUCATION/TRAINING PROGRAM
Payer: COMMERCIAL

## 2024-11-26 ENCOUNTER — APPOINTMENT (EMERGENCY)
Dept: RADIOLOGY | Facility: HOSPITAL | Age: 84
DRG: 690 | End: 2024-11-26
Payer: COMMERCIAL

## 2024-11-26 DIAGNOSIS — R31.0 HEMATURIA, GROSS: Primary | ICD-10-CM

## 2024-11-26 PROBLEM — I10 PRIMARY HYPERTENSION: Status: ACTIVE | Noted: 2024-11-26

## 2024-11-26 PROBLEM — N40.1 BENIGN PROSTATIC HYPERPLASIA WITH URINARY RETENTION: Status: RESOLVED | Noted: 2020-04-14 | Resolved: 2024-11-26

## 2024-11-26 PROBLEM — R33.8 BENIGN PROSTATIC HYPERPLASIA WITH URINARY RETENTION: Status: RESOLVED | Noted: 2020-04-14 | Resolved: 2024-11-26

## 2024-11-26 PROBLEM — I50.40 COMBINED SYSTOLIC AND DIASTOLIC HEART FAILURE (HCC): Status: ACTIVE | Noted: 2024-11-26

## 2024-11-26 LAB
ALBUMIN SERPL BCG-MCNC: 3.8 G/DL (ref 3.5–5)
ALP SERPL-CCNC: 61 U/L (ref 34–104)
ALT SERPL W P-5'-P-CCNC: 12 U/L (ref 7–52)
ANION GAP SERPL CALCULATED.3IONS-SCNC: 5 MMOL/L (ref 4–13)
APTT PPP: 25 SECONDS (ref 23–34)
AST SERPL W P-5'-P-CCNC: 18 U/L (ref 13–39)
BACTERIA UR QL AUTO: ABNORMAL /HPF
BASOPHILS # BLD AUTO: 0.03 THOUSANDS/ΜL (ref 0–0.1)
BASOPHILS NFR BLD AUTO: 0 % (ref 0–1)
BILIRUB SERPL-MCNC: 0.52 MG/DL (ref 0.2–1)
BILIRUB UR QL STRIP: ABNORMAL
BUN SERPL-MCNC: 22 MG/DL (ref 5–25)
CALCIUM SERPL-MCNC: 8.5 MG/DL (ref 8.4–10.2)
CHLORIDE SERPL-SCNC: 104 MMOL/L (ref 96–108)
CLARITY UR: ABNORMAL
CO2 SERPL-SCNC: 28 MMOL/L (ref 21–32)
COLOR UR: ABNORMAL
CREAT SERPL-MCNC: 0.87 MG/DL (ref 0.6–1.3)
EOSINOPHIL # BLD AUTO: 0.03 THOUSAND/ΜL (ref 0–0.61)
EOSINOPHIL NFR BLD AUTO: 0 % (ref 0–6)
ERYTHROCYTE [DISTWIDTH] IN BLOOD BY AUTOMATED COUNT: 14.5 % (ref 11.6–15.1)
GFR SERPL CREATININE-BSD FRML MDRD: 78 ML/MIN/1.73SQ M
GLUCOSE SERPL-MCNC: 185 MG/DL (ref 65–140)
GLUCOSE UR STRIP-MCNC: NEGATIVE MG/DL
HCT VFR BLD AUTO: 37.3 % (ref 36.5–49.3)
HGB BLD-MCNC: 12 G/DL (ref 12–17)
HGB UR QL STRIP.AUTO: ABNORMAL
IMM GRANULOCYTES # BLD AUTO: 0.02 THOUSAND/UL (ref 0–0.2)
IMM GRANULOCYTES NFR BLD AUTO: 0 % (ref 0–2)
INR PPP: 1.1 (ref 0.85–1.19)
KETONES UR STRIP-MCNC: ABNORMAL MG/DL
LEUKOCYTE ESTERASE UR QL STRIP: ABNORMAL
LYMPHOCYTES # BLD AUTO: 1.06 THOUSANDS/ΜL (ref 0.6–4.47)
LYMPHOCYTES NFR BLD AUTO: 14 % (ref 14–44)
MCH RBC QN AUTO: 29.5 PG (ref 26.8–34.3)
MCHC RBC AUTO-ENTMCNC: 32.2 G/DL (ref 31.4–37.4)
MCV RBC AUTO: 92 FL (ref 82–98)
MONOCYTES # BLD AUTO: 0.62 THOUSAND/ΜL (ref 0.17–1.22)
MONOCYTES NFR BLD AUTO: 8 % (ref 4–12)
NEUTROPHILS # BLD AUTO: 5.76 THOUSANDS/ΜL (ref 1.85–7.62)
NEUTS SEG NFR BLD AUTO: 78 % (ref 43–75)
NITRITE UR QL STRIP: POSITIVE
NON-SQ EPI CELLS URNS QL MICRO: ABNORMAL /HPF
NRBC BLD AUTO-RTO: 0 /100 WBCS
PH UR STRIP.AUTO: 7 [PH]
PLATELET # BLD AUTO: 227 THOUSANDS/UL (ref 149–390)
PMV BLD AUTO: 9.5 FL (ref 8.9–12.7)
POTASSIUM SERPL-SCNC: 4.3 MMOL/L (ref 3.5–5.3)
PROT SERPL-MCNC: 5.8 G/DL (ref 6.4–8.4)
PROT UR STRIP-MCNC: ABNORMAL MG/DL
PROTHROMBIN TIME: 14.8 SECONDS (ref 12.3–15)
RBC # BLD AUTO: 4.07 MILLION/UL (ref 3.88–5.62)
RBC #/AREA URNS AUTO: ABNORMAL /HPF
SODIUM SERPL-SCNC: 137 MMOL/L (ref 135–147)
SP GR UR STRIP.AUTO: >=1.03 (ref 1–1.03)
UROBILINOGEN UR STRIP-ACNC: <2 MG/DL
WBC # BLD AUTO: 7.52 THOUSAND/UL (ref 4.31–10.16)
WBC #/AREA URNS AUTO: ABNORMAL /HPF

## 2024-11-26 PROCEDURE — 87077 CULTURE AEROBIC IDENTIFY: CPT | Performed by: EMERGENCY MEDICINE

## 2024-11-26 PROCEDURE — 81001 URINALYSIS AUTO W/SCOPE: CPT | Performed by: EMERGENCY MEDICINE

## 2024-11-26 PROCEDURE — 74176 CT ABD & PELVIS W/O CONTRAST: CPT

## 2024-11-26 PROCEDURE — 99285 EMERGENCY DEPT VISIT HI MDM: CPT | Performed by: EMERGENCY MEDICINE

## 2024-11-26 PROCEDURE — 96365 THER/PROPH/DIAG IV INF INIT: CPT

## 2024-11-26 PROCEDURE — 96361 HYDRATE IV INFUSION ADD-ON: CPT

## 2024-11-26 PROCEDURE — 87186 SC STD MICRODIL/AGAR DIL: CPT | Performed by: EMERGENCY MEDICINE

## 2024-11-26 PROCEDURE — 87150 DNA/RNA AMPLIFIED PROBE: CPT | Performed by: EMERGENCY MEDICINE

## 2024-11-26 PROCEDURE — 80053 COMPREHEN METABOLIC PANEL: CPT | Performed by: EMERGENCY MEDICINE

## 2024-11-26 PROCEDURE — 85025 COMPLETE CBC W/AUTO DIFF WBC: CPT | Performed by: EMERGENCY MEDICINE

## 2024-11-26 PROCEDURE — 99284 EMERGENCY DEPT VISIT MOD MDM: CPT

## 2024-11-26 PROCEDURE — 87086 URINE CULTURE/COLONY COUNT: CPT | Performed by: EMERGENCY MEDICINE

## 2024-11-26 PROCEDURE — 99223 1ST HOSP IP/OBS HIGH 75: CPT

## 2024-11-26 PROCEDURE — 85730 THROMBOPLASTIN TIME PARTIAL: CPT | Performed by: EMERGENCY MEDICINE

## 2024-11-26 PROCEDURE — 36415 COLL VENOUS BLD VENIPUNCTURE: CPT | Performed by: EMERGENCY MEDICINE

## 2024-11-26 PROCEDURE — 85610 PROTHROMBIN TIME: CPT | Performed by: EMERGENCY MEDICINE

## 2024-11-26 RX ORDER — DOCUSATE SODIUM 100 MG/1
100 CAPSULE, LIQUID FILLED ORAL 2 TIMES DAILY
Status: DISCONTINUED | OUTPATIENT
Start: 2024-11-27 | End: 2024-11-30 | Stop reason: HOSPADM

## 2024-11-26 RX ORDER — CLOPIDOGREL BISULFATE 75 MG/1
75 TABLET ORAL DAILY
Status: DISCONTINUED | OUTPATIENT
Start: 2024-11-27 | End: 2024-11-30 | Stop reason: HOSPADM

## 2024-11-26 RX ORDER — SODIUM CHLORIDE, SODIUM GLUCONATE, SODIUM ACETATE, POTASSIUM CHLORIDE, MAGNESIUM CHLORIDE, SODIUM PHOSPHATE, DIBASIC, AND POTASSIUM PHOSPHATE .53; .5; .37; .037; .03; .012; .00082 G/100ML; G/100ML; G/100ML; G/100ML; G/100ML; G/100ML; G/100ML
75 INJECTION, SOLUTION INTRAVENOUS CONTINUOUS
Status: DISCONTINUED | OUTPATIENT
Start: 2024-11-26 | End: 2024-11-29

## 2024-11-26 RX ORDER — CEFTRIAXONE 1 G/50ML
1000 INJECTION, SOLUTION INTRAVENOUS ONCE
Status: COMPLETED | OUTPATIENT
Start: 2024-11-26 | End: 2024-11-26

## 2024-11-26 RX ORDER — ACETAMINOPHEN 325 MG/1
650 TABLET ORAL EVERY 6 HOURS PRN
Status: DISCONTINUED | OUTPATIENT
Start: 2024-11-26 | End: 2024-11-30 | Stop reason: HOSPADM

## 2024-11-26 RX ORDER — ASPIRIN 81 MG/1
81 TABLET, CHEWABLE ORAL DAILY
Status: DISCONTINUED | OUTPATIENT
Start: 2024-11-27 | End: 2024-11-30 | Stop reason: HOSPADM

## 2024-11-26 RX ORDER — LATANOPROST 50 UG/ML
1 SOLUTION/ DROPS OPHTHALMIC DAILY
Status: DISCONTINUED | OUTPATIENT
Start: 2024-11-27 | End: 2024-11-30 | Stop reason: HOSPADM

## 2024-11-26 RX ADMIN — SODIUM CHLORIDE 1000 ML: 0.9 INJECTION, SOLUTION INTRAVENOUS at 19:43

## 2024-11-26 RX ADMIN — CEFTRIAXONE 1000 MG: 1 INJECTION, SOLUTION INTRAVENOUS at 20:35

## 2024-11-26 NOTE — Clinical Note
Case was discussed with  and the patient's admission status was agreed to be Admission Status: observation status to the service of Dr. Kapoor.

## 2024-11-27 PROBLEM — R31.9 URINARY TRACT INFECTION WITH HEMATURIA: Status: ACTIVE | Noted: 2024-11-27

## 2024-11-27 PROBLEM — N39.0 URINARY TRACT INFECTION WITH HEMATURIA: Status: ACTIVE | Noted: 2024-11-27

## 2024-11-27 PROBLEM — Z72.0 TOBACCO ABUSE: Status: ACTIVE | Noted: 2024-11-27

## 2024-11-27 LAB
ANION GAP SERPL CALCULATED.3IONS-SCNC: 4 MMOL/L (ref 4–13)
BASOPHILS # BLD AUTO: 0.03 THOUSANDS/ΜL (ref 0–0.1)
BASOPHILS NFR BLD AUTO: 0 % (ref 0–1)
BUN SERPL-MCNC: 17 MG/DL (ref 5–25)
CALCIUM SERPL-MCNC: 8.2 MG/DL (ref 8.4–10.2)
CHLORIDE SERPL-SCNC: 108 MMOL/L (ref 96–108)
CO2 SERPL-SCNC: 27 MMOL/L (ref 21–32)
CREAT SERPL-MCNC: 0.64 MG/DL (ref 0.6–1.3)
EOSINOPHIL # BLD AUTO: 0.05 THOUSAND/ΜL (ref 0–0.61)
EOSINOPHIL NFR BLD AUTO: 1 % (ref 0–6)
ERYTHROCYTE [DISTWIDTH] IN BLOOD BY AUTOMATED COUNT: 14.5 % (ref 11.6–15.1)
GFR SERPL CREATININE-BSD FRML MDRD: 89 ML/MIN/1.73SQ M
GLUCOSE P FAST SERPL-MCNC: 95 MG/DL (ref 65–99)
GLUCOSE SERPL-MCNC: 95 MG/DL (ref 65–140)
HCT VFR BLD AUTO: 34.8 % (ref 36.5–49.3)
HGB BLD-MCNC: 11.1 G/DL (ref 12–17)
IMM GRANULOCYTES # BLD AUTO: 0.03 THOUSAND/UL (ref 0–0.2)
IMM GRANULOCYTES NFR BLD AUTO: 0 % (ref 0–2)
LYMPHOCYTES # BLD AUTO: 1.42 THOUSANDS/ΜL (ref 0.6–4.47)
LYMPHOCYTES NFR BLD AUTO: 18 % (ref 14–44)
MCH RBC QN AUTO: 29.1 PG (ref 26.8–34.3)
MCHC RBC AUTO-ENTMCNC: 31.9 G/DL (ref 31.4–37.4)
MCV RBC AUTO: 91 FL (ref 82–98)
MONOCYTES # BLD AUTO: 0.97 THOUSAND/ΜL (ref 0.17–1.22)
MONOCYTES NFR BLD AUTO: 12 % (ref 4–12)
NEUTROPHILS # BLD AUTO: 5.43 THOUSANDS/ΜL (ref 1.85–7.62)
NEUTS SEG NFR BLD AUTO: 69 % (ref 43–75)
NRBC BLD AUTO-RTO: 0 /100 WBCS
PLATELET # BLD AUTO: 206 THOUSANDS/UL (ref 149–390)
PMV BLD AUTO: 9.6 FL (ref 8.9–12.7)
POTASSIUM SERPL-SCNC: 4.1 MMOL/L (ref 3.5–5.3)
RBC # BLD AUTO: 3.81 MILLION/UL (ref 3.88–5.62)
SODIUM SERPL-SCNC: 139 MMOL/L (ref 135–147)
WBC # BLD AUTO: 7.93 THOUSAND/UL (ref 4.31–10.16)

## 2024-11-27 PROCEDURE — 85025 COMPLETE CBC W/AUTO DIFF WBC: CPT

## 2024-11-27 PROCEDURE — 80048 BASIC METABOLIC PNL TOTAL CA: CPT

## 2024-11-27 PROCEDURE — 99232 SBSQ HOSP IP/OBS MODERATE 35: CPT | Performed by: STUDENT IN AN ORGANIZED HEALTH CARE EDUCATION/TRAINING PROGRAM

## 2024-11-27 RX ORDER — CEFTRIAXONE 1 G/50ML
1000 INJECTION, SOLUTION INTRAVENOUS EVERY 24 HOURS
Status: DISCONTINUED | OUTPATIENT
Start: 2024-11-27 | End: 2024-11-29

## 2024-11-27 RX ORDER — METOPROLOL SUCCINATE 25 MG/1
25 TABLET, EXTENDED RELEASE ORAL DAILY
Status: DISCONTINUED | OUTPATIENT
Start: 2024-11-27 | End: 2024-11-30 | Stop reason: HOSPADM

## 2024-11-27 RX ORDER — ATORVASTATIN CALCIUM 20 MG/1
20 TABLET, FILM COATED ORAL
Status: DISCONTINUED | OUTPATIENT
Start: 2024-11-27 | End: 2024-11-30 | Stop reason: HOSPADM

## 2024-11-27 RX ORDER — LOSARTAN POTASSIUM 25 MG/1
25 TABLET ORAL DAILY
Status: DISCONTINUED | OUTPATIENT
Start: 2024-11-27 | End: 2024-11-30 | Stop reason: HOSPADM

## 2024-11-27 RX ADMIN — LATANOPROST 1 DROP: 50 SOLUTION OPHTHALMIC at 19:30

## 2024-11-27 RX ADMIN — SODIUM CHLORIDE, SODIUM GLUCONATE, SODIUM ACETATE, POTASSIUM CHLORIDE, MAGNESIUM CHLORIDE, SODIUM PHOSPHATE, DIBASIC, AND POTASSIUM PHOSPHATE 75 ML/HR: .53; .5; .37; .037; .03; .012; .00082 INJECTION, SOLUTION INTRAVENOUS at 00:26

## 2024-11-27 RX ADMIN — LOSARTAN POTASSIUM 25 MG: 25 TABLET, FILM COATED ORAL at 08:45

## 2024-11-27 RX ADMIN — ATORVASTATIN CALCIUM 20 MG: 20 TABLET, FILM COATED ORAL at 17:14

## 2024-11-27 RX ADMIN — METOPROLOL SUCCINATE 25 MG: 25 TABLET, EXTENDED RELEASE ORAL at 08:45

## 2024-11-27 RX ADMIN — DOCUSATE SODIUM 100 MG: 100 CAPSULE, LIQUID FILLED ORAL at 08:45

## 2024-11-27 RX ADMIN — CEFTRIAXONE 1000 MG: 1 INJECTION, SOLUTION INTRAVENOUS at 21:04

## 2024-11-27 RX ADMIN — DOCUSATE SODIUM 100 MG: 100 CAPSULE, LIQUID FILLED ORAL at 17:14

## 2024-11-27 RX ADMIN — SODIUM CHLORIDE, SODIUM GLUCONATE, SODIUM ACETATE, POTASSIUM CHLORIDE, MAGNESIUM CHLORIDE, SODIUM PHOSPHATE, DIBASIC, AND POTASSIUM PHOSPHATE 75 ML/HR: .53; .5; .37; .037; .03; .012; .00082 INJECTION, SOLUTION INTRAVENOUS at 21:15

## 2024-11-27 NOTE — ASSESSMENT & PLAN NOTE
POA with complaint of gross hematuria x 2 days.  Patient states that today hematuria gotten  worse today while out shopping.  He noticed after urination is straight blood with clots.  Hx of BPH with frequency, hesitancy, and decreased flow.  Home medication plavix and ASA on hold  DVT prophylaxis SCD  CT renal stone study abdomen pelvis without contrast : Dense material in the dependent urinary bladder likely representing blood products. No calculi in the bladder. An underlying urothelial or less likely renal mass cannot be excluded. Recommend further evaluation with CT urography.   UA positive for nitrates, moderate leuks, and normal red blood cells, bacteria  In ED Urology consulted recommendation three-way catheter Ledesma placed, and initiate CBI  In Ed received IV antbx ceftriaxone, will continue Iv antibx ceftriaxone q 24 IV   Continue IV hydration isolate at 75 mL/hr   Patient made n.p.o. for midnight  Maintain three-way catheter and continue CBI  Follow up on urine cultures   Monitor I &O  Monitor H&H q 8 hrs

## 2024-11-27 NOTE — H&P
H&P - Hospitalist   Name: Gilberto Vann 84 y.o. male I MRN: [id number]  Unit/Bed#: [address] [place] [address] Date of Admission: 11/26/2024   Date of Service: 11/27/2024 I Hospital Day: 0     Assessment & Plan  Hematuria, gross  POA with complaint of gross hematuria x 2 days.  Patient states that today hematuria gotten  worse today while out shopping.  He noticed after urination is straight blood with clots.  Hx of BPH with frequency, hesitancy, and decreased flow.  Home medication plavix and ASA on hold  DVT prophylaxis SCD  CT renal stone study abdomen pelvis without contrast : Dense material in the dependent urinary bladder likely representing blood products. No calculi in the bladder. An underlying urothelial or less likely renal mass cannot be excluded. Recommend further evaluation with CT urography.   UA positive for nitrates, moderate leuks, and normal red blood cells, bacteria  In ED Urology consulted recommendation three-way catheter Ledesma placed, and initiate CBI  In Ed received IV antbx ceftriaxone, will continue Iv antibx ceftriaxone q 24 IV   Continue IV hydration isolate at 75 mL/hr   Patient made n.p.o. for midnight  Maintain three-way catheter and continue CBI  Follow up on urine cultures   Monitor I &O  Monitor H&H q 8 hrs   Urinary tract infection with hematuria  POA with gross hematuria for 2 days   No leukocytosis, per patient denies CVA tenderness or dysuria  UA positive for nitrates, moderate leuks, and normal red blood cells, bacteria  In Ed started on ceftriaxone, will continue IV antibx ceftriaxone q 24 hours  Follow up on UC adjust antibiotics once culture results obtained  Monitor WBC and fever curve   COPD (chronic obstructive pulmonary disease) ([facility])  Not in acute exacerbation  Encourage pulmonary hygiene  Continuous pulse ox  Cardiomyopathy ([facility])  Echo 6/17/24 EF 45% systolic function is mildly reduced.  Wall motion is normal.  Diastolic function is mildly abnormal consistent with grade 1  relaxation.    Hold home clopidogrel  and ASA in the setting of hematuria  Continue home metoprolol & losartan  CAD (coronary artery disease)  Denies chest pain  Continue home medication metoprolol & atorvastatin  Aspirin & Plavix at hold  Primary hypertension  Continue home losartan and metoprolol  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HCC)  Wt Readings from Last 3 Encounters:   11/27/24 69 kg (152 lb 1.9 oz)   11/25/24 66.7 kg (147 lb)   11/15/24 67.1 kg (148 lb)   Not in acute exacerbation appears euvolemic  Home medication metoprolol succinate continued  Monitor I's and O's and daily weights  Home medication metoprolol continued  Pacemaker  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  Tobacco abuse  Patient states he stopped smoking several years ago however he still continues to use oral tobacco  Refuses nicotine patch  Tobacco cessation provided      VTE Pharmacologic Prophylaxis:   High Risk (Score >/= 5) - Pharmacological DVT Prophylaxis Contraindicated. Sequential Compression Devices Ordered.  Code Status: Level 1 - Full Code with patinet  Discussion with family: Patient declined call to .     Anticipated Length of Stay: Patient will be admitted on an observation basis with an anticipated length of stay of less than 2 midnights secondary to hematuria requiring CBI, urology.    History of Present Illness   Chief Complaint: gross hematuria     Gilberto Vann is a 84 y.o. male with a PMH of arthritis asthma, BPH with urinary obstruction, COPD CHF hypertension CAD cardiomyopathy who presents with gross hematuria who presents with gross hematuria.  Patient stated he first noticed the bloody urine and blood clots in the urine last night.  Stated that the hematuria persisted and earlier today,  while out shopping using the restroom , he urinated straight blood with clots.  States he does have a history of taking Plavix and aspirin.  Denies any pelvic pain, dysuria, fever or  chills.     Review of Systems   Constitutional:  Negative for chills and fever.   HENT:  Negative for ear pain and sore throat.    Eyes:  Negative for pain and visual disturbance.   Respiratory:  Negative for cough and shortness of breath.    Cardiovascular:  Negative for chest pain and palpitations.   Gastrointestinal:  Negative for abdominal pain and vomiting.   Genitourinary:  Positive for hematuria. Negative for dysuria.   Musculoskeletal:  Negative for arthralgias and back pain.   Skin:  Negative for color change and rash.   Neurological:  Negative for seizures and syncope.   All other systems reviewed and are negative.      Historical Information   Past Medical History:   Diagnosis Date    Arthritis     Asthma     Back pain     BPH (benign prostatic hypertrophy) with urinary obstruction     COPD (chronic obstructive pulmonary disease) (HCC)     Dizziness     Glaucoma     bilat    Insomnia     RLS (restless legs syndrome)     Sinusitis      Past Surgical History:   Procedure Laterality Date    CARDIAC CATHETERIZATION N/A 2/19/2024    Procedure: Cardiac catheterization;  Surgeon: Boston Bergman MD;  Location: [facility];  Service: Cardiology    CARDIAC CATHETERIZATION N/A 2/19/2024    Procedure: Cardiac Coronary Angiogram;  Surgeon: Boston Bergman MD;  Location: [facility];  Service: Cardiology    CATARACT EXTRACTION Left     EAR SURGERY      to wart    EPIDIDYMAL CYST EXCISION Right 05/04/2000    HEMORROIDECTOMY      CA EXCISION HYDROCELE UNILATERAL Left 9/14/2023    Procedure: HYDROCELECTOMY, sPERMATOCELECTOMY;  Surgeon: James Blunt MD;  Location: [facility];  Service: Urology    CA TRURL ELECTROSURG RESCJ PROSTATE BLEED COMPLETE N/A 04/16/2020    Procedure: CYSTOSCOPY, TRANSURETHRAL RESECTION OF PROSTATE (TURP);  Surgeon: Reji Meneses MD;  Location: [facility];  Service: Urology    PROSTATE BIOPSY Bilateral 12/11/2003    BPH with mild acute and chronic inflammation    PROSTATE BIOPSY  Bilateral 2005    BPH with marked artropy and chronic inflammation     Social History     Tobacco Use    Smoking status: Former     Current packs/day: 0.00     Average packs/day: 1 pack/day for 15.0 years (15.0 ttl pk-yrs)     Types: Cigarettes     Start date:      Quit date:      Years since quittin.9     Passive exposure: Never    Smokeless tobacco: Current     Types: Chew    Tobacco comments:     Chews cigars   Vaping Use    Vaping status: Never Used   Substance and Sexual Activity    Alcohol use: Not Currently    Drug use: Never    Sexual activity: Not Currently     E-Cigarette/Vaping    E-Cigarette Use Never User      E-Cigarette/Vaping Substances    Nicotine No     THC No     CBD No     Flavoring No     Other No     Unknown No        Social History:  Marital Status: Unknown   Occupation: Retired  Patient Pre-hospital Living Situation: Home  Patient Pre-hospital Level of Mobility: walks  Patient Pre-hospital Diet Restrictions: none    Meds/Allergies   I have reviewed home medications with patient personally.  Prior to Admission medications    Medication Sig Start Date End Date Taking? Authorizing Provider   Aspirin 81 MG CAPS Take by mouth    Historical Provider, MD   atorvastatin (LIPITOR) 20 mg tablet take 1 tablet by mouth EVERY EVENING WITH DINNER 24   Dima Lloyd MD   clopidogrel (PLAVIX) 75 mg tablet take 1 tablet by mouth once daily 24   Dima Lloyd MD   fluticasone (FLONASE) 50 mcg/act nasal spray 1 spray into each nostril 2 (two) times a day 5/15/23   Dima Lloyd MD   latanoprost (XALATAN) 0.005 % ophthalmic solution Administer 1 drop to both eyes daily 10/22/24   Historical Provider, MD   losartan (COZAAR) 25 mg tablet take 1 tablet by mouth once daily 24   Dima Lloyd MD   metoprolol succinate (TOPROL-XL) 25 mg 24 hr tablet Take 1 tablet (25 mg total) by mouth daily 4/3/24   Betty Grider MD   predniSONE 10 mg tablet Take 1 tablet (10 mg  total) by mouth daily  Patient not taking: Reported on 11/25/2024 11/15/24   Dima Lloyd MD     Allergies   Allergen Reactions    Shellfish-Derived Products - Food Allergy Other (See Comments)     Listed by PCP patient denies    Iodine - Food Allergy Other (See Comments)       Objective :  Temp:  [97.8 °F (36.6 °C)-98.4 °F (36.9 °C)] 97.8 °F (36.6 °C)  HR:  [70-75] 70  BP: (140)/(63-78) 140/78  Resp:  [16-18] 16  SpO2:  [97 %-98 %] 98 %    Physical Exam  Vitals and nursing note reviewed.   Constitutional:       General: He is not in acute distress.     Appearance: He is well-developed.   HENT:      Head: Normocephalic and atraumatic.   Eyes:      Conjunctiva/sclera: Conjunctivae normal.   Cardiovascular:      Rate and Rhythm: Normal rate and regular rhythm.      Heart sounds: No murmur heard.  Pulmonary:      Effort: Pulmonary effort is normal. No respiratory distress.      Breath sounds: Normal breath sounds.   Abdominal:      Palpations: Abdomen is soft.      Tenderness: There is no abdominal tenderness.   Genitourinary:     Penis: No tenderness.       Comments: Three-way catheter present  Musculoskeletal:         General: No swelling.      Cervical back: Neck supple.   Skin:     General: Skin is warm and dry.      Capillary Refill: Capillary refill takes less than 2 seconds.   Neurological:      Mental Status: He is alert.   Psychiatric:         Mood and Affect: Mood normal.          Lines/Drains:  Lines/Drains/Airways       Active Status       Name Placement date Placement time Site Days    Urethral Catheter Three way 18 Fr. 11/26/24 2202  Three way  less than 1                  Urinary Catheter:  Goal for removal:  Urology requirement               Lab Results: I have reviewed the following results:  Results from last 7 days   Lab Units 11/27/24  0024   WBC Thousand/uL 7.93   HEMOGLOBIN g/dL 11.1*   HEMATOCRIT % 34.8*   PLATELETS Thousands/uL 206   SEGS PCT % 69   LYMPHO PCT % 18   MONO PCT % 12   EOS  PCT % 1     Results from last 7 days   Lab Units 11/26/24 1942   SODIUM mmol/L 137   POTASSIUM mmol/L 4.3   CHLORIDE mmol/L 104   CO2 mmol/L 28   BUN mg/dL 22   CREATININE mg/dL 0.87   ANION GAP mmol/L 5   CALCIUM mg/dL 8.5   ALBUMIN g/dL 3.8   TOTAL BILIRUBIN mg/dL 0.52   ALK PHOS U/L 61   ALT U/L 12   AST U/L 18   GLUCOSE RANDOM mg/dL 185*     Results from last 7 days   Lab Units 11/26/24 1942   INR  1.10         Lab Results   Component Value Date    HGBA1C 5.7 (H) 02/16/2024    HGBA1C 5.4 03/21/2023           Imaging Results Review: I reviewed radiology reports from this admission including: CT renal stone abdominal pelvis.  Other Study Results Review: No additional pertinent studies reviewed.    Administrative Statements       ** Please Note: This note has been constructed using a voice recognition system. **

## 2024-11-27 NOTE — ASSESSMENT & PLAN NOTE
POA with complaint of gross hematuria x 2 days.  Patient states that today hematuria gotten  worse today while out shopping.  He noticed after urination is straight blood with clots.  Hx of BPH with frequency, hesitancy, and decreased flow.  Home medication plavix and ASA on hold  DVT prophylaxis SCD  CT renal stone study abdomen pelvis without contrast : Dense material in the dependent urinary bladder likely representing blood products. No calculi in the bladder. An underlying urothelial or less likely renal mass cannot be excluded. Recommend further evaluation with CT urography.   UA positive for nitrates, moderate leuks, and normal red blood cells, bacteria  In ED Urology consulted recommendation three-way catheter Ledesma placed, and initiate CBI  In Ed received IV antbx ceftriaxone, will continue Iv antibx ceftriaxone q 24 IV   Continue IV hydration isolate at 75 mL/hr   CBI had been discontinued 11/27 however patient developed hematuria once again, urology recommended reinitiation of CBI, hold aspirin and Plavix at this time.  Follow up on urine cultures, continue ceftriaxone antibiotic therapy, urine culture still incubating at this time  Monitor I &O  CBC/CMP in the morning

## 2024-11-27 NOTE — UTILIZATION REVIEW
NOTIFICATION OF OBSERVATION ADMISSION   AUTHORIZATION REQUEST   SERVICING FACILITY:   [place] [address]  Tax ID: [id number]  NPI: [id number] ATTENDING PROVIDER:  Attending Name and NPI#: Wilmer Vogel Do [[id number]]  Address: [address] [place] [address]  Phone: [phone]   ADMISSION INFORMATION:  Place of Service: On [facility]  Place of Service Code: 22 CPT Code:   Admitting Diagnosis Code/Description:  Blood in urine [R31.9]  Hematuria, gross [R31.0]  Observation Admission Date/Time: 11/26/2024 2201  Discharge Date/Time: No discharge date for patient encounter.     UTILIZATION REVIEW CONTACT:  Lidia Cruz Utilization   Network Utilization Review Department  Phone: [phone]  Fax [phone]  Email: [email or web address]  Contact for approvals/pending authorizations, clinical reviews, and discharge.     PHYSICIAN ADVISORY SERVICES:  Medical Necessity Denial & Mkyr-zf-Jcoo Review  Phone: [phone]  Fax: [phone]  Email: [email or web address]     DISCHARGE SUPPORT TEAM:  For Patients Discharge Needs & Updates  Phone: [phone] opt. 2 Fax: [phone]  Email: [email or web address]

## 2024-11-27 NOTE — ASSESSMENT & PLAN NOTE
Wt Readings from Last 3 Encounters:   11/27/24 69 kg (152 lb 1.9 oz)   11/25/24 66.7 kg (147 lb)   11/15/24 67.1 kg (148 lb)   Not in acute exacerbation appears euvolemic  Home medication metoprolol succinate continued  Monitor I's and O's and daily weights  Home medication metoprolol continued

## 2024-11-27 NOTE — PLAN OF CARE
Problem: PAIN - ADULT  Goal: Verbalizes/displays adequate comfort level or baseline comfort level  Description: Interventions:  - Encourage patient to monitor pain and request assistance  - Assess pain using appropriate pain scale  - Administer analgesics based on type and severity of pain and evaluate response  - Implement non-pharmacological measures as appropriate and evaluate response  - Consider cultural and social influences on pain and pain management  - Notify physician/advanced practitioner if interventions unsuccessful or patient reports new pain  Outcome: Progressing     Problem: INFECTION - ADULT  Goal: Absence or prevention of progression during hospitalization  Description: INTERVENTIONS:  - Assess and monitor for signs and symptoms of infection  - Monitor lab/diagnostic results  - Monitor all insertion sites, i.e. indwelling lines, tubes, and drains  - Monitor endotracheal if appropriate and nasal secretions for changes in amount and color  - [place] appropriate cooling/warming therapies per order  - Administer medications as ordered  - Instruct and encourage patient and family to use good hand hygiene technique  - Identify and instruct in appropriate isolation precautions for identified infection/condition  Outcome: Progressing  Goal: Absence of fever/infection during neutropenic period  Description: INTERVENTIONS:  - Monitor WBC    Outcome: Progressing     Problem: SAFETY ADULT  Goal: Patient will remain free of falls  Description: INTERVENTIONS:  - Educate patient/family on patient safety including physical limitations  - Instruct patient to call for assistance with activity   - Consult OT/PT to assist with strengthening/mobility   - Keep Call bell within reach  - Keep bed low and locked with side rails adjusted as appropriate  - Keep care items and personal belongings within reach  - Initiate and maintain comfort rounds  - Make Fall Risk Sign visible to staff  - Offer Toileting every 2 Hours,  in advance of need  - Initiate/Maintain bed alarm  - Obtain necessary fall risk management equipment: alarm  - Apply yellow socks and bracelet for high fall risk patients  - Consider moving patient to room near nurses station  Outcome: Progressing  Goal: Maintain or return to baseline ADL function  Description: INTERVENTIONS:  -  Assess patient's ability to carry out ADLs; assess patient's baseline for ADL function and identify physical deficits which impact ability to perform ADLs (bathing, care of mouth/teeth, toileting, grooming, dressing, etc.)  - Assess/evaluate cause of self-care deficits   - Assess range of motion  - Assess patient's mobility; develop plan if impaired  - Assess patient's need for assistive devices and provide as appropriate  - Encourage maximum independence but intervene and supervise when necessary  - Involve family in performance of ADLs  - Assess for home care needs following discharge   - Consider OT consult to assist with ADL evaluation and planning for discharge  - Provide patient education as appropriate  Outcome: Progressing  Goal: Maintains/Returns to pre admission functional level  Description: INTERVENTIONS:  - Perform AM-PAC 6 Click Basic Mobility/ Daily Activity assessment daily.  - Set and communicate daily mobility goal to care team and patient/family/caregiver.   - Collaborate with rehabilitation services on mobility goals if consulted  - Perform Range of Motion 3 times a day.  - Reposition patient every 2 hours.  - Dangle patient 3 times a day  - Stand patient 3 times a day  - Ambulate patient 3 times a day  - Out of bed to chair 3 times a day   - Out of bed for meals 3 times a day  - Out of bed for toileting  - Record patient progress and toleration of activity level   Outcome: Progressing     Problem: DISCHARGE PLANNING  Goal: Discharge to home or other facility with appropriate resources  Description: INTERVENTIONS:  - Identify barriers to discharge w/patient and  caregiver  - Arrange for needed discharge resources and transportation as appropriate  - Identify discharge learning needs (meds, wound care, etc.)  - Arrange for interpretive services to assist at discharge as needed  - Refer to Case Management Department for coordinating discharge planning if the patient needs post-hospital services based on physician/advanced practitioner order or complex needs related to functional status, cognitive ability, or social support system  Outcome: Progressing     Problem: Knowledge Deficit  Goal: Patient/family/caregiver demonstrates understanding of disease process, treatment plan, medications, and discharge instructions  Description: Complete learning assessment and assess knowledge base.  Interventions:  - Provide teaching at level of understanding  - Provide teaching via preferred learning methods  Outcome: Progressing     Problem: GENITOURINARY - ADULT  Goal: Maintains or returns to baseline urinary function  Description: INTERVENTIONS:  - Assess urinary function  - Encourage oral fluids to ensure adequate hydration if ordered  - Administer IV fluids as ordered to ensure adequate hydration  - Administer ordered medications as needed  - Offer frequent toileting  - Follow urinary retention protocol if ordered  Outcome: Progressing  Goal: Absence of urinary retention  Description: INTERVENTIONS:  - Assess patient’s ability to void and empty bladder  - Monitor I/O  - Bladder scan as needed  - Discuss with physician/AP medications to alleviate retention as needed  - Discuss catheterization for long term situations as appropriate  Outcome: Progressing  Goal: Urinary catheter remains patent  Description: INTERVENTIONS:  - Assess patency of urinary catheter  - If patient has a chronic hickman, consider changing catheter if non-functioning  - Follow guidelines for intermittent irrigation of non-functioning urinary catheter  Outcome: Progressing     Problem: HEMATOLOGIC - ADULT  Goal:  Maintains hematologic stability  Description: INTERVENTIONS  - Assess for signs and symptoms of bleeding or hemorrhage  - Monitor labs  - Administer supportive blood products/factors as ordered and appropriate  Outcome: Progressing

## 2024-11-27 NOTE — PHYSICAL THERAPY NOTE
11/27/24 1102   Note Type   Note type Cancelled Session   Cancel Reasons Medical status   Additional Comments PT orders received and chart reviewed.  Patient with current ongoing CBI.  Spoke with RACHELE Bell.  Will hold patient's PT evaluation and follow when CBI DC.   Licensure   [place] License Number  Lisa Maynard, PT [id number]

## 2024-11-27 NOTE — PROGRESS NOTES
"Progress Note - Urology      Patient: Gilberto Vann   : 1940 Sex: male   MRN: [id number]     CSN: [id number]  Unit/Bed#: [address] [place] [address]     SUBJECTIVE:   Patient seen on afternoon rounds  CBI clear  Discussed sending home with catheter lives by himself in light of Thanksgiving will spend the night undergoing voiding trial tomorrow      Objective   Vitals: /64   Pulse 66   Temp 97.9 °F (36.6 °C)   Resp 14   Ht 5' 9\" (1.753 m)   Wt 69 kg (152 lb 1.9 oz)   SpO2 96%   BMI 22.46 kg/m²     I/O last 24 hours:  In: 1050 [IV Piggyback:1050]  Out: 02145 [Urine:64162]      Physical Exam:   General Alert awake   Normocephalic atraumatic PERRLA  Lungs clear bilaterally  Cardiac normal S1 normal S2  Abdomen soft, flank pain  CBI clear  Extremities no edema      Lab Results: CBC:   Lab Results   Component Value Date    WBC 7.93 2024    HGB 11.1 (L) 2024    HCT 34.8 (L) 2024    MCV 91 2024     2024    RBC 3.81 (L) 2024    MCH 29.1 2024    MCHC 31.9 2024    RDW 14.5 2024    MPV 9.6 2024    NRBC 0 2024     CMP:   Lab Results   Component Value Date     2024     2023    CO2 27 2024    CO2 26 2023    BUN 17 2024    BUN 20 2023    CREATININE 0.64 2024    CALCIUM 8.2 (L) 2024    AST 18 2024    AST 17 2023    ALT 12 2024    ALT 14 2023    ALKPHOS 61 2024    EGFR 89 2024     Urinalysis:   Lab Results   Component Value Date    COLORU Dark Red 2024    CLARITYU Turbid 2024    SPECGRAV >=1.030 2024    PHUR 7.0 2024    LEUKOCYTESUR Moderate (A) 2024    NITRITE Positive (A) 2024    GLUCOSEU Negative 2024    KETONESU 10 (1+) (A) 2024    BILIRUBINUR Moderate (A) 2024    BLOODU Large (A) 2024     Urine Culture:   Lab Results   Component Value Date    URINECX >100,000 cfu/ml Escherichia coli " 06/27/2016     PSA:   Lab Results   Component Value Date    PSA 6.98 (H) 05/22/2023         Assessment/ Plan:  Gross hematuria  Possible hemorrhagic cystitis  CBI crystal-clear  DC CBI  Ledesma catheter for today DC Ledesma in the morning voiding trial  Patient to be discharged home tomorrow if voiding on appropriate antibiotics pending culture          James Blunt MD

## 2024-11-27 NOTE — CONSULTS
H&P Exam - Urology       Patient: Gilberto Vann   : 1940 Sex: male   MRN: [id number]     CSN: [id number]      History of Present Illness   HPI:  Gilberto Vann is a 84 y.o. male   Known to me history of left hydrocele undergoing hydrocelectomy 14 months ago presenting to [facility] ER today with gross hematuria starting yesterday seen in the ER passing clots to be admitted on continuous bladder irrigation and urinalysis consistent with hemorrhagic cystitis      Review of Systems:   Constitutional:  Negative for activity change, fever, chills and diaphoresis.   HENT: Negative for hearing loss and trouble swallowing.   Eyes: Negative for itching and visual disturbance.   Respiratory: Negative for chest tightness and shortness of breath.   Cardiovascular: Negative for chest pain, edema.   Gastrointestinal: Negative for abdominal distention, na abdominal pain, constipation, diarrhea, Nausea and vomiting.   Genitourinary: Negative for decreased urine volume, difficulty urinating, dysuria, enuresis, frequency, hematuria and urgency.   Musculoskeletal: Negative for gait problem and myalgias.   Neurological: Negative for dizziness and headaches.   Hematological: Does not bruise/bleed easily.       Historical Information   Past Medical History:   Diagnosis Date    Arthritis     Asthma     Back pain     BPH (benign prostatic hypertrophy) with urinary obstruction     COPD (chronic obstructive pulmonary disease) (HCC)     Dizziness     Glaucoma     bilat    Insomnia     RLS (restless legs syndrome)     Sinusitis      Past Surgical History:   Procedure Laterality Date    CARDIAC CATHETERIZATION N/A 2024    Procedure: Cardiac catheterization;  Surgeon: Boston Bergman MD;  Location: [facility]ARD[facility];  Service: Cardiology    CARDIAC CATHETERIZATION N/A 2024    Procedure: Cardiac Coronary Angiogram;  Surgeon: Boston Bergman MD;  Location: [facility]ARDI[facility];  Service: Cardiology    CATARACT  "EXTRACTION Left     EAR SURGERY      to wart    EPIDIDYMAL CYST EXCISION Right 2000    HEMORROIDECTOMY      MI EXCISION HYDROCELE UNILATERAL Left 2023    Procedure: HYDROCELECTOMY, sPERMATOCELECTOMY;  Surgeon: James Blunt MD;  Location: [facility] MAIN OR;  Service: Urology    MI TRURL ELECTROSURG RESCJ PROSTATE BLEED COMPLETE N/A 2020    Procedure: CYSTOSCOPY, TRANSURETHRAL RESECTION OF PROSTATE (TURP);  Surgeon: Reji Meneses MD;  Location: [facility];  Service: Urology    PROSTATE BIOPSY Bilateral 2003    BPH with mild acute and chronic inflammation    PROSTATE BIOPSY Bilateral 2005    BPH with marked artropy and chronic inflammation     Social History   Social History     Substance and Sexual Activity   Alcohol Use Not Currently     Social History     Substance and Sexual Activity   Drug Use Never     Social History     Tobacco Use   Smoking Status Former    Current packs/day: 0.00    Average packs/day: 1 pack/day for 15.0 years (15.0 ttl pk-yrs)    Types: Cigarettes    Start date:     Quit date:     Years since quittin.9    Passive exposure: Never   Smokeless Tobacco Current    Types: Chew   Tobacco Comments    Chews cigars     Family History:   Family History   Problem Relation Age of Onset    Asthma Father        Meds/Allergies     Medications Prior to Admission:     Aspirin 81 MG CAPS    atorvastatin (LIPITOR) 20 mg tablet    clopidogrel (PLAVIX) 75 mg tablet    losartan (COZAAR) 25 mg tablet    metoprolol succinate (TOPROL-XL) 25 mg 24 hr tablet    fluticasone (FLONASE) 50 mcg/act nasal spray    latanoprost (XALATAN) 0.005 % ophthalmic solution    predniSONE 10 mg tablet  Allergies   Allergen Reactions    Shellfish-Derived Products - Food Allergy Other (See Comments)     Listed by PCP patient denies    Iodine - Food Allergy Other (See Comments)       Objective   Vitals: /64   Pulse 66   Temp 97.9 °F (36.6 °C)   Resp 14   Ht 5' 9\" (1.753 m)   Wt 69 kg " (152 lb 1.9 oz)   SpO2 96%   BMI 22.46 kg/m²     Physical Exam:  General Alert awake   Normocephalic atraumatic PERRLA  Lungs clear bilaterally  Cardiac normal S1 normal S2  Abdomen soft, flank pain  Extremities no edema    I/O last 24 hours:  In: 1050 [IV Piggyback:1050]  Out: 28061 [Urine:79170]    Invasive Devices       Peripheral Intravenous Line  Duration             Peripheral IV 11/26/24 Distal;Left;Upper;Ventral (anterior) Arm <1 day              Drain  Duration             Urethral Catheter Three way 18 Fr. <1 day                        Lab Results: CBC:   Lab Results   Component Value Date    WBC 7.93 11/27/2024    HGB 11.1 (L) 11/27/2024    HCT 34.8 (L) 11/27/2024    MCV 91 11/27/2024     11/27/2024    RBC 3.81 (L) 11/27/2024    MCH 29.1 11/27/2024    MCHC 31.9 11/27/2024    RDW 14.5 11/27/2024    MPV 9.6 11/27/2024    NRBC 0 11/27/2024     CMP:   Lab Results   Component Value Date     11/27/2024     06/26/2023    CO2 27 11/27/2024    CO2 26 06/26/2023    BUN 17 11/27/2024    BUN 20 06/26/2023    CREATININE 0.64 11/27/2024    CALCIUM 8.2 (L) 11/27/2024    AST 18 11/26/2024    AST 17 06/26/2023    ALT 12 11/26/2024    ALT 14 06/26/2023    ALKPHOS 61 11/26/2024    EGFR 89 11/27/2024     Urinalysis:   Lab Results   Component Value Date    COLORU Dark Red 11/26/2024    CLARITYU Turbid 11/26/2024    SPECGRAV >=1.030 11/26/2024    PHUR 7.0 11/26/2024    LEUKOCYTESUR Moderate (A) 11/26/2024    NITRITE Positive (A) 11/26/2024    GLUCOSEU Negative 11/26/2024    KETONESU 10 (1+) (A) 11/26/2024    BILIRUBINUR Moderate (A) 11/26/2024    BLOODU Large (A) 11/26/2024     Urine Culture:   Lab Results   Component Value Date    URINECX >100,000 cfu/ml Escherichia coli 06/27/2016     PSA:   Lab Results   Component Value Date    PSA 6.98 (H) 05/22/2023           Assessment/ Plan:  Gross hematuria  Possible hemorrhagic cystitis  Three-way Ledesma cath inserted  Start CBI  Antibiotics as per medical  team      James Blunt MD

## 2024-11-27 NOTE — ASSESSMENT & PLAN NOTE
Echo 6/17/24 EF 45% systolic function is mildly reduced.  Wall motion is normal.  Diastolic function is mildly abnormal consistent with grade 1 relaxation.    Hold home clopidogrel  and ASA in the setting of hematuria  Continue home metoprolol & losartan

## 2024-11-27 NOTE — ASSESSMENT & PLAN NOTE
POA with complaint of gross hematuria x 2 days.  Patient states that today hematuria gotten  worse today while out shopping.  He noticed after urination is straight blood with clots.  Hx of BPH with frequency, hesitancy, and decreased flow.  Home medication plavix and ASA on hold  DVT prophylaxis SCD  CT renal stone study abdomen pelvis without contrast : Dense material in the dependent urinary bladder likely representing blood products. No calculi in the bladder. An underlying urothelial or less likely renal mass cannot be excluded. Recommend further evaluation with CT urography.   UA positive for nitrates, moderate leuks, and normal red blood cells, bacteria  In ED Urology consulted recommendation three-way catheter Ledesma placed, and initiate CBI  In Ed received IV antbx ceftriaxone, will continue Iv antibx ceftriaxone q 24 IV   Continue IV hydration isolate at 75 mL/hr   Patient made n.p.o. for midnight  Maintain three-way catheter and continue CBI  Follow up on urine cultures  Monitor I &O  CBC/CMP in the morning  Void trial morning per urology recommendations

## 2024-11-27 NOTE — PROGRESS NOTES
Progress Note - Hospitalist   Name: Gilberto Vann 84 y.o. male I MRN: [id number]  Unit/Bed#: [address] [place] [address] I Date of Admission: 11/26/2024   Date of Service: 11/27/2024 I Hospital Day: 0     Assessment & Plan  Hematuria, gross  POA with complaint of gross hematuria x 2 days.  Patient states that today hematuria gotten  worse today while out shopping.  He noticed after urination is straight blood with clots.  Hx of BPH with frequency, hesitancy, and decreased flow.  Home medication plavix and ASA on hold  DVT prophylaxis SCD  CT renal stone study abdomen pelvis without contrast : Dense material in the dependent urinary bladder likely representing blood products. No calculi in the bladder. An underlying urothelial or less likely renal mass cannot be excluded. Recommend further evaluation with CT urography.   UA positive for nitrates, moderate leuks, and normal red blood cells, bacteria  In ED Urology consulted recommendation three-way catheter Ledesma placed, and initiate CBI  In Ed received IV antbx ceftriaxone, will continue Iv antibx ceftriaxone q 24 IV   Continue IV hydration isolate at 75 mL/hr   Patient made n.p.o. for midnight  Maintain three-way catheter and continue CBI  Follow up on urine cultures  Monitor I &O  CBC/CMP in the morning  Void trial morning per urology recommendations  Urinary tract infection with hematuria  POA with gross hematuria for 2 days   No leukocytosis, per patient denies CVA tenderness or dysuria  UA positive for nitrates, moderate leuks, and normal red blood cells, bacteria  In Ed started on ceftriaxone, will continue IV antibx ceftriaxone q 24 hours  Follow up on UC adjust antibiotics once culture results obtained  Monitor WBC and fever curve   COPD (chronic obstructive pulmonary disease) ([facility])  Not in acute exacerbation  Encourage pulmonary hygiene  Continuous pulse ox  Cardiomyopathy ([facility])  Echo 6/17/24 EF 45% systolic function is mildly reduced.  Wall motion is normal.   Diastolic function is mildly abnormal consistent with grade 1 relaxation.    Hold home clopidogrel  and ASA in the setting of hematuria  Continue home metoprolol & losartan  CAD (coronary artery disease)  Denies chest pain  Continue home medication metoprolol & atorvastatin  Aspirin & Plavix at hold  Primary hypertension  Continue home losartan and metoprolol  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HCC)  Wt Readings from Last 3 Encounters:   11/27/24 69 kg (152 lb 1.9 oz)   11/25/24 66.7 kg (147 lb)   11/15/24 67.1 kg (148 lb)   Not in acute exacerbation appears euvolemic  Home medication metoprolol succinate continued  Monitor I's and O's and daily weights  Home medication metoprolol continued  Pacemaker  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  Tobacco abuse  Patient states he stopped smoking several years ago however he still continues to use oral tobacco  Refuses nicotine patch  Tobacco cessation provided    VTE Pharmacologic Prophylaxis:   Moderate Risk (Score 3-4) - Pharmacological DVT Prophylaxis Contraindicated. Sequential Compression Devices Ordered.    Mobility:   Basic Mobility Inpatient Raw Score: 19  JH-HLM Goal: 6: Walk 10 steps or more  JH-HLM Achieved: 6: Walk 10 steps or more  JH-HLM Goal achieved. Continue to encourage appropriate mobility.    Patient Centered Rounds: I performed bedside rounds with nursing staff today.   Discussions with Specialists or Other Care Team Provider: Appreciate urology recommendations    Education and Discussions with Family / Patient: Discussed with patient at bedside    Current Length of Stay: 0 day(s)  Current Patient Status: Inpatient   Certification Statement: The patient will continue to require additional inpatient hospital stay due to gross hematuria requiring CBI  Discharge Plan: Anticipate discharge in 48 hrs to home.    Code Status: Level 1 - Full Code    Subjective   Patient seen and examined at bedside this morning, overall feeling well,  blood-tinged urine, CBI, follow-up with urology recommended.    Objective :  Temp:  [97.8 °F (36.6 °C)-98.4 °F (36.9 °C)] 97.8 °F (36.6 °C)  HR:  [65-75] 65  BP: (125-149)/(52-78) 143/63  Resp:  [14-18] 16  SpO2:  [95 %-99 %] 99 %  O2 Device: None (Room air)    Body mass index is 22.46 kg/m².     Input and Output Summary (last 24 hours):     Intake/Output Summary (Last 24 hours) at 11/27/2024 1706  Last data filed at 11/27/2024 1001  Gross per 24 hour   Intake 1050 ml   Output 95327 ml   Net -9975 ml       Physical Exam  Vitals and nursing note reviewed.   Constitutional:       General: He is not in acute distress.     Appearance: He is well-developed.   HENT:      Head: Normocephalic and atraumatic.   Eyes:      General: No scleral icterus.     Conjunctiva/sclera: Conjunctivae normal.   Cardiovascular:      Rate and Rhythm: Normal rate and regular rhythm.      Pulses: Normal pulses.      Heart sounds: No murmur heard.  Pulmonary:      Effort: Pulmonary effort is normal. No respiratory distress.      Breath sounds: Normal breath sounds.   Abdominal:      General: Bowel sounds are normal. There is no distension.      Palpations: Abdomen is soft.      Tenderness: There is no abdominal tenderness.   Musculoskeletal:         General: No swelling. Normal range of motion.      Cervical back: Neck supple.   Skin:     General: Skin is warm and dry.      Capillary Refill: Capillary refill takes less than 2 seconds.   Neurological:      General: No focal deficit present.      Mental Status: He is alert and oriented to person, place, and time. Mental status is at baseline.   Psychiatric:         Mood and Affect: Mood normal.         Behavior: Behavior normal.           Lines/Drains:  Lines/Drains/Airways       Active Status       Name Placement date Placement time Site Days    Urethral Catheter Three way 18 Fr. 11/26/24 2202  Three way  less than 1                  Urinary Catheter:  Goal for removal: Urology evaluation  pending                 Lab Results: I have reviewed the following results:   Results from last 7 days   Lab Units 11/27/24  0024   WBC Thousand/uL 7.93   HEMOGLOBIN g/dL 11.1*   HEMATOCRIT % 34.8*   PLATELETS Thousands/uL 206   SEGS PCT % 69   LYMPHO PCT % 18   MONO PCT % 12   EOS PCT % 1     Results from last 7 days   Lab Units 11/27/24  0611 11/26/24  1942   SODIUM mmol/L 139 137   POTASSIUM mmol/L 4.1 4.3   CHLORIDE mmol/L 108 104   CO2 mmol/L 27 28   BUN mg/dL 17 22   CREATININE mg/dL 0.64 0.87   ANION GAP mmol/L 4 5   CALCIUM mg/dL 8.2* 8.5   ALBUMIN g/dL  --  3.8   TOTAL BILIRUBIN mg/dL  --  0.52   ALK PHOS U/L  --  61   ALT U/L  --  12   AST U/L  --  18   GLUCOSE RANDOM mg/dL 95 185*     Results from last 7 days   Lab Units 11/26/24  1942   INR  1.10                   Recent Cultures (last 7 days):           Last 24 Hours Medication List:     Current Facility-Administered Medications:     acetaminophen (TYLENOL) tablet 650 mg, Q6H PRN    [Held by provider] aspirin chewable tablet 81 mg, Daily    atorvastatin (LIPITOR) tablet 20 mg, Daily With Dinner    cefTRIAXone (ROCEPHIN) IVPB (premix in dextrose) 1,000 mg 50 mL, Q24H    [Held by provider] clopidogrel (PLAVIX) tablet 75 mg, Daily    docusate sodium (COLACE) capsule 100 mg, BID    latanoprost (XALATAN) 0.005 % ophthalmic solution 1 drop, Daily    losartan (COZAAR) tablet 25 mg, Daily    metoprolol succinate (TOPROL-XL) 24 hr tablet 25 mg, Daily    multi-electrolyte (PLASMALYTE-A/ISOLYTE-S PH 7.4) IV solution, Continuous, Last Rate: 75 mL/hr (11/27/24 0026)    Administrative Statements   Today, Patient Was Seen By: Wilmer Vogel DO      **Please Note: This note may have been constructed using a voice recognition system.**

## 2024-11-27 NOTE — UTILIZATION REVIEW
Initial Clinical Review    Observation 11/26/24 @ 2201, converted to inpatient admission 11/27/24 @ 1704 for continued care & tx for gross hematuria.    Admission: Date/Time/Statement:   Admission Orders (From admission, onward)       Ordered        11/27/24 1704  INPATIENT ADMISSION  Once            11/26/24 2201  Place in Observation  Once                          Orders Placed This Encounter   Procedures    INPATIENT ADMISSION     Standing Status:   Standing     Number of Occurrences:   1     Level of Care:   Med Surg [16]     Estimated length of stay:   More than 2 Midnights     Certification:   I certify that inpatient services are medically necessary for this patient for a duration of greater than two midnights. See H&P and MD Progress Notes for additional information about the patient's course of treatment.     ED Arrival Information       Expected   -    Arrival   11/26/2024 18:57    Acuity   Urgent              Means of arrival   Walk-In    Escorted by   Friend    Service   Hospitalist    Admission type   Emergency              Arrival complaint   blood in urine             Chief Complaint   Patient presents with    Blood in Urine     States he got a cortisone shot in his right shoulder yesterday and now has blood in his urine all day       Initial Presentation:   84 yom to ER from home with gross hematuria, blood clots since last night. Hx arthritis asthma, BPH with urinary obstruction, COPD CHF hypertension CAD cardiomyopathy. Pt takes Plavix & ASA. Presents with hematuria as above. Admission CT renal stone study: Dense material in the dependent urinary bladder likely representing blood products. No calculi in the bladder. An underlying urothelial or less likely renal mass cannot be excluded. Recommend further evaluation with CT urography. Prostatomegaly and median lobe hypertrophy. Labs: u/a: dark red, turbid, +ketones, blood, prot, nitrite, bili, leuk, WBC, RBC, bacteria.  Placed in observation status  for gross hematuria. 3 way catheter placed started on CBI, urology consulted.     Observation to IP admission 11/27/24:  Gross hematuria POA, mgt per urology. Urine clear, CBI d/c'd, hickman cath maintained, monitor output. IVABT continued, cultures pending.  Per urology: Gross hematuria. Possible hemorrhagic cystitis  CBI crystal-clear. DC CBI. Hickman catheter for today DC Hickman in the morning voiding trial    Date: 11/28/24  Day 3: Has surpassed a 2nd midnight with active treatments and services.  Recurrent gross hematuria off CBI. Mgt per urology: restart CBI, continue IVABT, follow cultures, pending. Continue to monitor output, maintain CBI, monitor VS, follow labs/cultures.          ED Treatment-Medication Administration from 11/26/2024 1857 to 11/26/2024 2350         Date/Time Order Dose Route Action     11/26/2024 1943 sodium chloride 0.9 % bolus 1,000 mL 1,000 mL Intravenous New Bag     11/26/2024 2035 cefTRIAXone (ROCEPHIN) IVPB (premix in dextrose) 1,000 mg 50 mL 1,000 mg Intravenous New Bag            Scheduled Medications:  Medications 11/19 11/20 11/21 11/22 11/23 11/24 11/25 11/26 11/27 11/28   aspirin chewable tablet 81 mg  Dose: 81 mg  Freq: Daily Route: PO  Start: 11/27/24 0900           2305      (0900)      0900        atorvastatin (LIPITOR) tablet 20 mg  Dose: 20 mg  Freq: Daily with dinner Route: PO  Start: 11/27/24 1630            1714      1630        cefTRIAXone (ROCEPHIN) IVPB (premix in dextrose) 1,000 mg 50 mL  Dose: 1,000 mg  Freq: Every 24 hours Route: IV  Last Dose: 1,000 mg (11/27/24 2104)  Start: 11/27/24 2030   Admin Instructions:      Order specific questions:               2104 2030        cefTRIAXone (ROCEPHIN) IVPB (premix in dextrose) 1,000 mg 50 mL  Dose: 1,000 mg  Freq: Once Route: IV  Last Dose: Stopped (11/26/24 2154)  Start: 11/26/24 2030 End: 11/26/24 2154   Admin Instructions:      Order specific questions:              2035 2154          clopidogrel (PLAVIX) tablet  75 mg  Dose: 75 mg  Freq: Daily Route: PO  Start: 11/27/24 0900   Admin Instructions:              2305      (0900)      0900        docusate sodium (COLACE) capsule 100 mg  Dose: 100 mg  Freq: 2 times daily Route: PO  Indications of Use: CONSTIPATION  Start: 11/27/24 0900            0845     1714      0900     1800        latanoprost (XALATAN) 0.005 % ophthalmic solution 1 drop  Dose: 1 drop  Freq: Daily Route: Both Eyes  Start: 11/27/24 0900            (0846)     1930 [C]      0900     1930        losartan (COZAAR) tablet 25 mg  Dose: 25 mg  Freq: Daily Route: PO  Start: 11/27/24 0900   Order specific questions:               0845 0900        metoprolol succinate (TOPROL-XL) 24 hr tablet 25 mg  Dose: 25 mg  Freq: Daily Route: PO  Start: 11/27/24 0900   Admin Instructions:      Order specific questions:               0845 0900        sodium chloride 0.9 % bolus 1,000 mL  Dose: 1,000 mL  Freq: Once Route: IV  Last Dose: Stopped (11/26/24 2154)  Start: 11/26/24 1930 End: 11/26/24 2154 1943 2154          Legend:       [id number]/1911/2011/2111/2211/2311/2411/2511/2611/2711/28        Continuous Meds Sorted by Name  for Gilberto Vann as of 11/19/24 through 11/28/24  Legend:       Medications 11/19 11/20 11/21 11/22 11/23 11/24 11/25 11/26 11/27 11/28   multi-electrolyte (PLASMALYTE-A/ISOLYTE-S PH 7.4) IV solution  Rate: 75 mL/hr Dose: 75 mL/hr  Freq: Continuous Route: IV  Indications of Use: IV Hydration  Last Dose: 75 mL/hr (11/27/24 2115)  Start: 11/26/24 2315 0026 2115                     PRN Meds Sorted by Name  for Gilberto Vann as of 11/19/24 through 11/28/24  Legend:       Medications 11/19 11/20 11/21 11/22 11/23 11/24 11/25 11/26 11/27 11/28   acetaminophen (TYLENOL) tablet 650 mg  Dose: 650 mg  Freq: Every 6 hours PRN Route: PO  PRN Reasons: mild pain,headaches,fever  Indications of Use: FEVER,HEADACHE,MILD PAIN  Start: 11/26/24 7597                 bupivacaine (MARCAINE) 0.5 % injection 3.5 mL  Dose: 3.5 mL  Freq: One-time injection  Start: 11/25/24 1145 End: 11/25/24 1145          1145           triamcinolone acetonide (Kenalog-40) 40 mg/mL injection 40 mg  Dose: 40 mg  Freq: One-time injection Route: IX  Start: 11/25/24 1145 End: 11/25/24 1145          1145                           ED Triage Vitals   Temperature Pulse Respirations Blood Pressure SpO2 Pain Score   11/26/24 1902 11/26/24 1902 11/26/24 1902 11/26/24 1902 11/26/24 1902 11/26/24 1900   98.4 °F (36.9 °C) 75 18 140/63 97 % No Pain     Weight (last 2 days)       Date/Time Weight    11/27/24 0039 69 (152.12)    11/26/24 23:54:29 69 (152.12)    11/26/24 1900 68 (150)            Vital Signs (last 3 days)       Date/Time Temp Pulse Resp BP MAP (mmHg) SpO2 O2 Device Pain    11/28/24 08:09:03 97.9 °F (36.6 °C) 70 18 132/53 79 97 % None (Room air) No Pain    11/27/24 23:02:40 -- 70 18 135/64 88 97 % -- --    11/27/24 19:57:20 98.7 °F (37.1 °C) 74 20 137/64 88 97 % None (Room air) No Pain    11/27/24 15:49:45 97.8 °F (36.6 °C) 65 16 143/63 90 99 % -- --    11/27/24 0900 -- -- -- -- -- -- -- No Pain    11/27/24 07:26:37 97.9 °F (36.6 °C) 66 -- 149/64 92 96 % -- --    11/27/24 0540 -- -- -- -- -- 95 % None (Room air) --    11/27/24 02:45:13 98.1 °F (36.7 °C) 67 14 125/52 76 95 % -- --    11/27/24 0041 -- -- -- -- -- -- -- No Pain    11/27/24 0039 97.8 °F (36.6 °C) 70 16 140/78 99 98 % -- --    11/26/24 23:54:29 97.8 °F (36.6 °C) 70 16 140/78 99 98 % -- --    11/26/24 1902 98.4 °F (36.9 °C) 75 18 140/63 -- 97 % -- --    11/26/24 1900 -- -- -- -- -- -- -- No Pain              Pertinent Labs/Diagnostic Test Results:   Radiology:  CT renal stone study abdomen pelvis without contrast   Final Interpretation by Ivette Badillo MD (11/26 2037)      Dense material in the dependent urinary bladder likely representing blood products. No calculi in the bladder. An underlying urothelial or less likely renal mass  "cannot be excluded. Recommend further evaluation with CT urography.      Prostatomegaly and median lobe hypertrophy.      The study was marked in EPIC for immediate notification.            Workstation performed: [id number]           Cardiology:  No orders to display     GI:  No orders to display           Results from last 7 days   Lab Units 11/28/24  0554 11/27/24  0024 11/26/24 1942   WBC Thousand/uL 8.81 7.93 7.52   HEMOGLOBIN g/dL 12.3 11.1* 12.0   HEMATOCRIT % 37.4 34.8* 37.3   PLATELETS Thousands/uL 219 206 227   TOTAL NEUT ABS Thousands/µL 5.97 5.43 5.76         Results from last 7 days   Lab Units 11/28/24  0554 11/27/24  0611 11/26/24 1942   SODIUM mmol/L 139 139 137   POTASSIUM mmol/L 4.2 4.1 4.3   CHLORIDE mmol/L 108 108 104   CO2 mmol/L 28 27 28   ANION GAP mmol/L 3* 4 5   BUN mg/dL 16 17 22   CREATININE mg/dL 0.74 0.64 0.87   EGFR ml/min/1.73sq m 84 89 78   CALCIUM mg/dL 8.6 8.2* 8.5     Results from last 7 days   Lab Units 11/28/24  0554 11/26/24 1942   AST U/L 18 18   ALT U/L 11 12   ALK PHOS U/L 55 61   TOTAL PROTEIN g/dL 5.6* 5.8*   ALBUMIN g/dL 3.5 3.8   TOTAL BILIRUBIN mg/dL 0.54 0.52         Results from last 7 days   Lab Units 11/28/24  0554 11/27/24  0611 11/26/24 1942   GLUCOSE RANDOM mg/dL 99 95 185*             No results found for: \"BETA-HYDROXYBUTYRATE\"                           Results from last 7 days   Lab Units 11/26/24 1942   PROTIME seconds 14.8   INR  1.10   PTT seconds 25                                                             Results from last 7 days   Lab Units 11/26/24 1958   CLARITY UA  Turbid   COLOR UA  Dark Red   SPEC GRAV UA  >=1.030   PH UA  7.0   GLUCOSE UA mg/dl Negative   KETONES UA mg/dl 10 (1+)*   BLOOD UA  Large*   PROTEIN UA mg/dl 100 (2+)*   NITRITE UA  Positive*   BILIRUBIN UA  Moderate*   UROBILINOGEN UA (BE) mg/dl <2.0   LEUKOCYTES UA  Moderate*   WBC UA /hpf Field obscured, unable to enumerate*   RBC UA /hpf Innumerable*   BACTERIA UA /hpf Field " obscured, unable to enumerate*   EPITHELIAL CELLS WET PREP /hpf Field obscured, unable to enumerate*                                 Results from last 7 days   Lab Units 11/26/24 1958   URINE CULTURE  Culture too young- will reincubate                   Past Medical History:   Diagnosis Date    Arthritis     Asthma     Back pain     BPH (benign prostatic hypertrophy) with urinary obstruction     COPD (chronic obstructive pulmonary disease) (HCC)     Dizziness     Glaucoma     bilat    Insomnia     RLS (restless legs syndrome)     Sinusitis      Present on Admission:   Hematuria, gross   COPD (chronic obstructive pulmonary disease) (HCC)   Cardiomyopathy (HCC)   CAD (coronary artery disease)   (Resolved) Benign prostatic hyperplasia with urinary retention   Pacemaker   Tobacco abuse      Admitting Diagnosis: Blood in urine [R31.9]  Hematuria, gross [R31.0]  Age/Sex: 84 y.o. male    Network Utilization Review Department  ATTENTION: Please call with any questions or concerns to [phone] and carefully listen to the prompts so that you are directed to the right person. All voicemails are confidential.   For Discharge needs, contact Care Management DC Support Team at [phone] opt. [phone]  Send all requests for admission clinical reviews, approved or denied determinations and any other requests to dedicated fax number below belonging to the campus where the patient is receiving treatment. List of dedicated fax numbers for the Facilities:  FACILITY NAME UR FAX NUMBER   ADMISSION DENIALS (Administrative/Medical Necessity) [phone]   DISCHARGE SUPPORT TEAM (NETWORK) [phone]   PARENT CHILD HEALTH (Maternity/NICU/Pediatrics) [phone]   General acute hospital [phone]   [facility] [phone]   [facility] [phone]   [facility] [phone]   [facility] [phone]   [facility]  [facility] [phone]   [facility] [phone]   [facility] [phone]   [facility] [phone]   [facility] [phone]   [facility] [phone]   [facility] [phone]

## 2024-11-27 NOTE — ASSESSMENT & PLAN NOTE
Patient states he stopped smoking several years ago however he still continues to use oral tobacco  Refuses nicotine patch  Tobacco cessation provided

## 2024-11-27 NOTE — OCCUPATIONAL THERAPY NOTE
Occupational Therapy Cancellation     Patient Name: Gilberto Vann  Today's Date: 11/27/2024  Problem List  Principal Problem:    Hematuria, gross  Active Problems:    COPD (chronic obstructive pulmonary disease) (HCC)    Cardiomyopathy (HCC)    CAD (coronary artery disease)    Pacemaker    Primary hypertension    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HCC)    Tobacco abuse    Urinary tract infection with hematuria    Past Medical History  Past Medical History:   Diagnosis Date    Arthritis     Asthma     Back pain     BPH (benign prostatic hypertrophy) with urinary obstruction     COPD (chronic obstructive pulmonary disease) (HCC)     Dizziness     Glaucoma     bilat    Insomnia     RLS (restless legs syndrome)     Sinusitis      Past Surgical History  Past Surgical History:   Procedure Laterality Date    CARDIAC CATHETERIZATION N/A 2/19/2024    Procedure: Cardiac catheterization;  Surgeon: Boston Bergman MD;  Location: [facility];  Service: Cardiology    CARDIAC CATHETERIZATION N/A 2/19/2024    Procedure: Cardiac Coronary Angiogram;  Surgeon: Boston Bergman MD;  Location: [facility] CARDIAC [facility];  Service: Cardiology    CATARACT EXTRACTION Left     EAR SURGERY      to wart    EPIDIDYMAL CYST EXCISION Right 05/04/2000    HEMORROIDECTOMY      TX EXCISION HYDROCELE UNILATERAL Left 9/14/2023    Procedure: HYDROCELECTOMY, sPERMATOCELECTOMY;  Surgeon: James Blunt MD;  Location: [facility] MAIN [facility];  Service: Urology    TX TRURL ELECTROSURG RESCJ PROSTATE BLEED COMPLETE N/A 04/16/2020    Procedure: CYSTOSCOPY, TRANSURETHRAL RESECTION OF PROSTATE (TURP);  Surgeon: Reji Meneses MD;  Location: [facility];  Service: Urology    PROSTATE BIOPSY Bilateral 12/11/2003    BPH with mild acute and chronic inflammation    PROSTATE BIOPSY Bilateral 06/21/2005    BPH with marked artropy and chronic inflammation         11/27/24 1323   Note Type   Note type Cancelled Session  (Wed 11/27/24)   Cancel Reasons  Other  (urology at bedside)   Additional Comments Will continue to follow   Licensure   [place] License Number  Jazmin Angulo, OTR/L [id number]       Jazmin Angulo, OTR/L  [id number]  [id number]

## 2024-11-27 NOTE — ASSESSMENT & PLAN NOTE
POA with gross hematuria for 2 days   No leukocytosis, per patient denies CVA tenderness or dysuria  UA positive for nitrates, moderate leuks, and normal red blood cells, bacteria  In Ed started on ceftriaxone, will continue IV antibx ceftriaxone q 24 hours  Follow up on UC adjust antibiotics once culture results obtained  Monitor WBC and fever curve

## 2024-11-27 NOTE — H&P
H&P - Hospitalist   Name: Gilberto Vann 84 y.o. male I MRN: [id number]  Unit/Bed#: ED 09 I Date of Admission: 11/26/2024   Date of Service: 11/26/2024 I Hospital Day: 0   { ?Quick Links I Problem List I ART I Billing Tip:15829}  Assessment & Plan  Hematuria, gross  Patient presenting to ED with complaint of hematuria x 2 days. States that it began last night and has gotten better since then. Denies chest pain, SOB, changes in urination from baseline. Has Hx of BPH with frequency, hesitancy, and decreased flow.  Hold home plavix and ASA  Do not begin heparin DVT prophylaxis in the setting of hematuria  Give IVF  IVPB ceftriaxone given for prophylaxis  CBI started  Urine culture pending  Per urology consult, patient should get CBI and will get a cystoscopy tomorrow  NPO at midnight  COPD (chronic obstructive pulmonary disease) (HCC)  Continue home fluticasone and prednisone  Cardiomyopathy (HCC)  Hold home clopidogrel (plavix) and ASA in the setting of hematuria  Continue home metoprolol  CAD (coronary artery disease)  Continue home atorvastatin (lipitor)  Primary hypertension  Continue home losartan  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HCC)  History of sick sinus syndrome, Mobitz type II AV block and LBBB post Micra pacemaker 3/14/2024.  Continue home losartan and metoprolol  Monitor I&Os    Wt Readings from Last 3 Encounters:   11/26/24 68 kg (150 lb)   11/25/24 66.7 kg (147 lb)   11/15/24 67.1 kg (148 lb)                 VTE Pharmacologic Prophylaxis:   {VTE Risk:32161}  Code Status: Level 1 - Full Code ***  Discussion with family: {Family Communication:49854}    Anticipated Length of Stay: {Inpt Obs:98956}    History of Present Illness   Chief Complaint: Gross hematuria x 2 days    Gilberto Vann is a 84 y.o. male with a PMH of arthritis, asthma, BPH with urinary obstruction, COPD, systolic and diastolic heart failure, HTN, CAD, and cardiomyopathy who presents with complaint of gross  "hematuria. Patient states that he first noticed blood and clots in urine last night when he peed into the container by his bed. States that it has persisted since then and that it is now a bit better because he is no longer seeing clots. Patient received a triamcinolone shot in his shoulder 2 days ago and thinks that it may be correlated. Denies prior incidence of hematuria. Denies change in urination from baseline. States that he always has frequency, hesitancy, and difficulty urinating due to BPH. Denies chest pain, SOB, NVD, dysuria, and numbness or tingling in groin and lower extremities. States that he \"does not feel sick at all.\"    Review of Systems   Constitutional:  Negative for chills, fatigue and fever.   Respiratory:  Negative for chest tightness and shortness of breath.    Cardiovascular:  Negative for chest pain, palpitations and leg swelling.   Gastrointestinal:  Negative for abdominal pain, constipation, diarrhea, nausea and vomiting.   Genitourinary:  Positive for decreased urine volume, difficulty urinating, frequency and hematuria. Negative for dysuria and urgency.   Musculoskeletal:  Positive for back pain.        Patient states that he has an extensive history of arthritis but denies pain that began with the onset of hematuria   Neurological:  Negative for weakness, light-headedness and numbness.       Historical Information   Past Medical History:   Diagnosis Date    Arthritis     Asthma     Back pain     BPH (benign prostatic hypertrophy) with urinary obstruction     COPD (chronic obstructive pulmonary disease) (HCC)     Dizziness     Glaucoma     bilat    Insomnia     RLS (restless legs syndrome)     Sinusitis      Past Surgical History:   Procedure Laterality Date    CARDIAC CATHETERIZATION N/A 2/19/2024    Procedure: Cardiac catheterization;  Surgeon: Boston Bergman MD;  Location: [facility];  Service: Cardiology    CARDIAC CATHETERIZATION N/A 2/19/2024    Procedure: Cardiac Coronary " Angiogram;  Surgeon: Boston Bergman MD;  Location: [facility];  Service: Cardiology    CATARACT EXTRACTION Left     EAR SURGERY      to wart    EPIDIDYMAL CYST EXCISION Right 2000    HEMORROIDECTOMY      UT EXCISION HYDROCELE UNILATERAL Left 2023    Procedure: HYDROCELECTOMY, sPERMATOCELECTOMY;  Surgeon: James Blunt MD;  Location: [facility];  Service: Urology    UT TRURL ELECTROSURG RESCJ PROSTATE BLEED COMPLETE N/A 2020    Procedure: CYSTOSCOPY, TRANSURETHRAL RESECTION OF PROSTATE (TURP);  Surgeon: Reji Meneses MD;  Location: [facility];  Service: Urology    PROSTATE BIOPSY Bilateral 2003    BPH with mild acute and chronic inflammation    PROSTATE BIOPSY Bilateral 2005    BPH with marked artropy and chronic inflammation     Social History     Tobacco Use    Smoking status: Former     Current packs/day: 0.00     Average packs/day: 1 pack/day for 15.0 years (15.0 ttl pk-yrs)     Types: Cigarettes     Start date:      Quit date:      Years since quittin.9     Passive exposure: Never    Smokeless tobacco: Current     Types: Chew    Tobacco comments:     Chews cigars   Vaping Use    Vaping status: Never Used   Substance and Sexual Activity    Alcohol use: Not Currently    Drug use: Never    Sexual activity: Not Currently     E-Cigarette/Vaping    E-Cigarette Use Never User      E-Cigarette/Vaping Substances    Nicotine No     THC No     CBD No     Flavoring No     Other No     Unknown No      {SL IP FAM HISTORY SMARTLIST (Optional):168396540}  Social History:  Marital Status: Unknown   Occupation: Retired  Patient Pre-hospital Living Situation: Home  Patient Pre-hospital Level of Mobility: {Mobility:77235}  Patient Pre-hospital Diet Restrictions: ***    Meds/Allergies   {Home Meds:98246}  Prior to Admission medications    Medication Sig Start Date End Date Taking? Authorizing Provider   Aspirin 81 MG CAPS Take by mouth    Historical Provider, MD   atorvastatin  (LIPITOR) 20 mg tablet take 1 tablet by mouth EVERY EVENING WITH DINNER 8/29/24   Dima Lloyd MD   clopidogrel (PLAVIX) 75 mg tablet take 1 tablet by mouth once daily 7/8/24   Dima Lloyd MD   fluticasone (FLONASE) 50 mcg/act nasal spray 1 spray into each nostril 2 (two) times a day 5/15/23   Dima Lloyd MD   latanoprost (XALATAN) 0.005 % ophthalmic solution Administer 1 drop to both eyes daily 10/22/24   Historical Provider, MD   losartan (COZAAR) 25 mg tablet take 1 tablet by mouth once daily 8/29/24   Dima Lloyd MD   metoprolol succinate (TOPROL-XL) 25 mg 24 hr tablet Take 1 tablet (25 mg total) by mouth daily 4/3/24   Betty Grider MD   predniSONE 10 mg tablet Take 1 tablet (10 mg total) by mouth daily  Patient not taking: Reported on 11/25/2024 11/15/24   Dima Lloyd MD     Allergies   Allergen Reactions    Shellfish-Derived Products - Food Allergy Other (See Comments)     Listed by PCP patient denies    Iodine - Food Allergy Other (See Comments)       Objective :{?Quick Links I ICU Summary I Vitals I I/Os I LDAs I Mobility (PT/OT) I Code Status / ACP   ?Quick Links I Active Meds I Pain Meds I Antibiotics I Anticoagulants:28045}  Temp:  [98.4 °F (36.9 °C)] 98.4 °F (36.9 °C)  HR:  [75] 75  BP: (140)/(63) 140/63  Resp:  [18] 18  SpO2:  [97 %] 97 %    Physical Exam  Vitals and nursing note reviewed.   Constitutional:       General: He is not in acute distress.     Appearance: Normal appearance. He is well-developed. He is not ill-appearing.   Cardiovascular:      Rate and Rhythm: Normal rate and regular rhythm.      Heart sounds: Normal heart sounds. No murmur heard.  Pulmonary:      Effort: Pulmonary effort is normal. No respiratory distress.      Breath sounds: Normal breath sounds. No wheezing or rhonchi.   Chest:      Chest wall: No tenderness.   Abdominal:      General: Bowel sounds are normal.      Palpations: Abdomen is soft.      Tenderness: There is no abdominal  "tenderness. There is no right CVA tenderness or left CVA tenderness.   Musculoskeletal:         General: No swelling.   Skin:     General: Skin is warm and dry.   Neurological:      Mental Status: He is alert.   Psychiatric:         Mood and Affect: Mood normal.      ***    Lines/Drains:  Lines/Drains/Airways       Active Status       Name Placement date Placement time Site Days    Urethral Catheter Three way 18 Fr. 11/26/24 2202  Three way  less than 1                  Urinary Catheter:  Goal for removal: {[place] Removal Goal:96830}             {?Quick Links I Lab Review I Micro Results I Radiology I Cardiology:91200}  Lab Results: I have reviewed the following results:  Results from last 7 days   Lab Units 11/26/24 1942   WBC Thousand/uL 7.52   HEMOGLOBIN g/dL 12.0   HEMATOCRIT % 37.3   PLATELETS Thousands/uL 227   SEGS PCT % 78*   LYMPHO PCT % 14   MONO PCT % 8   EOS PCT % 0     Results from last 7 days   Lab Units 11/26/24 1942   SODIUM mmol/L 137   POTASSIUM mmol/L 4.3   CHLORIDE mmol/L 104   CO2 mmol/L 28   BUN mg/dL 22   CREATININE mg/dL 0.87   ANION GAP mmol/L 5   CALCIUM mg/dL 8.5   ALBUMIN g/dL 3.8   TOTAL BILIRUBIN mg/dL 0.52   ALK PHOS U/L 61   ALT U/L 12   AST U/L 18   GLUCOSE RANDOM mg/dL 185*     Results from last 7 days   Lab Units 11/26/24 1942   INR  1.10         Lab Results   Component Value Date    HGBA1C 5.7 (H) 02/16/2024    HGBA1C 5.4 03/21/2023           {Imaging Results Review:43249::\"No pertinent imaging studies reviewed.\"}  {Other Study Results Review:35626::\"No additional pertinent studies reviewed.\"}    Administrative Statements   {Time Spent (Optional):12386}    ** Please Note: This note has been constructed using a voice recognition system. **    "

## 2024-11-27 NOTE — ED PROVIDER NOTES
Time reflects when diagnosis was documented in both MDM as applicable and the Disposition within this note       Time User Action Codes Description Comment    11/26/2024  9:19 PM Pratik Lance Add [R31.0] Hematuria, gross           ED Disposition       ED Disposition   Admit    Condition   Stable    Date/Time   Tue Nov 26, 2024  9:30 PM    Comment   Case was discussed with AUGUSTO Chaudhry and the patient's admission status was agreed to be Admission Status: observation status to the service of Dr. Kapoor.               Assessment & Plan       Medical Decision Making  Pulse ox 96% on room air indicating adequate oxygenation.      Differential diagnose include but not limited to UTI, pyelonephritis, hemorrhagic cystitis    Patient urinating gross hematuria with clots.  Decision made to place a three-way catheter for bladder irrigation.  Case discussed with Dr. Blunt will see the patient tomorrow morning on consult.  Start antibiotics for possible urinary tract infection.      Amount and/or Complexity of Data Reviewed  Labs: ordered.  Radiology: ordered.    Risk  Prescription drug management.  Decision regarding hospitalization.             Medications   aspirin chewable tablet 81 mg ( Oral Held by provider 11/26/24 2305)   clopidogrel (PLAVIX) tablet 75 mg ( Oral Held by provider 11/26/24 2305)   latanoprost (XALATAN) 0.005 % ophthalmic solution 1 drop (has no administration in time range)   multi-electrolyte (PLASMALYTE-A/ISOLYTE-S PH 7.4) IV solution (has no administration in time range)   acetaminophen (TYLENOL) tablet 650 mg (has no administration in time range)   docusate sodium (COLACE) capsule 100 mg (has no administration in time range)   sodium chloride 0.9 % bolus 1,000 mL (0 mL Intravenous Stopped 11/26/24 2154)   cefTRIAXone (ROCEPHIN) IVPB (premix in dextrose) 1,000 mg 50 mL (0 mg Intravenous Stopped 11/26/24 2154)       ED Risk Strat Scores                           SBIRT 20yo+      Flowsheet Row  Most Recent Value   Initial Alcohol Screen: US AUDIT-C     1. How often do you have a drink containing alcohol? 0 Filed at: 11/26/2024 1900   2. How many drinks containing alcohol do you have on a typical day you are drinking?  0 Filed at: 11/26/2024 1900   3a. Male UNDER 65: How often do you have five or more drinks on one occasion? 0 Filed at: 11/26/2024 1900   3b. FEMALE Any Age, or MALE 65+: How often do you have 4 or more drinks on one occassion? 0 Filed at: 11/26/2024 1900   Audit-C Score 0 Filed at: 11/26/2024 1900   BUSTER: How many times in the past year have you...    Used an illegal drug or used a prescription medication for non-medical reasons? Never Filed at: 11/26/2024 1900                            History of Present Illness       Chief Complaint   Patient presents with    Blood in Urine     States he got a cortisone shot in his right shoulder yesterday and now has blood in his urine all day       Past Medical History:   Diagnosis Date    Arthritis     Asthma     Back pain     BPH (benign prostatic hypertrophy) with urinary obstruction     COPD (chronic obstructive pulmonary disease) (HCC)     Dizziness     Glaucoma     bilat    Insomnia     RLS (restless legs syndrome)     Sinusitis       Past Surgical History:   Procedure Laterality Date    CARDIAC CATHETERIZATION N/A 2/19/2024    Procedure: Cardiac catheterization;  Surgeon: Boston Bergman MD;  Location: [facility];  Service: Cardiology    CARDIAC CATHETERIZATION N/A 2/19/2024    Procedure: Cardiac Coronary Angiogram;  Surgeon: Boston Bergman MD;  Location: [facility];  Service: Cardiology    CATARACT EXTRACTION Left     EAR SURGERY      to wart    EPIDIDYMAL CYST EXCISION Right 05/04/2000    HEMORROIDECTOMY      NC EXCISION HYDROCELE UNILATERAL Left 9/14/2023    Procedure: HYDROCELECTOMY, sPERMATOCELECTOMY;  Surgeon: James Blunt MD;  Location: [facility];  Service: Urology    NC TRURL ELECTROSURG RESCJ PROSTATE BLEED COMPLETE N/A  2020    Procedure: CYSTOSCOPY, TRANSURETHRAL RESECTION OF PROSTATE (TURP);  Surgeon: Reji Meneses MD;  Location: [facility];  Service: Urology    PROSTATE BIOPSY Bilateral 2003    BPH with mild acute and chronic inflammation    PROSTATE BIOPSY Bilateral 2005    BPH with marked artropy and chronic inflammation      Family History   Problem Relation Age of Onset    Asthma Father       Social History     Tobacco Use    Smoking status: Former     Current packs/day: 0.00     Average packs/day: 1 pack/day for 15.0 years (15.0 ttl pk-yrs)     Types: Cigarettes     Start date:      Quit date:      Years since quittin.9     Passive exposure: Never    Smokeless tobacco: Current     Types: Chew    Tobacco comments:     Chews cigars   Vaping Use    Vaping status: Never Used   Substance Use Topics    Alcohol use: Not Currently    Drug use: Never      E-Cigarette/Vaping    E-Cigarette Use Never User       E-Cigarette/Vaping Substances    Nicotine No     THC No     CBD No     Flavoring No     Other No     Unknown No       I have reviewed and agree with the history as documented.     Patient presents for evaluation of hematuria and burning with urination.  States he got a cortisone shot in his right shoulder with orthopedics and unsure if this is related.  Denies any flank or abdominal pain.  Denies any difficulty urinating.      Blood in Urine        Review of Systems   Genitourinary:  Positive for hematuria.   All other systems reviewed and are negative.          Objective       ED Triage Vitals   Temperature Pulse Blood Pressure Respirations SpO2 Patient Position - Orthostatic VS   242 24 1902 24 1902 24 19024 190 --   98.4 °F (36.9 °C) 75 140/63 18 97 %       Temp Source Heart Rate Source BP Location FiO2 (%) Pain Score    24 0039 -- 24 0039 -- 24 1900    Oral  Left arm  No Pain      Vitals      Date and Time Temp Pulse SpO2 Resp BP Pain  Score FACES Pain Rating User   11/27/24 0900 -- -- -- -- -- No Pain -- KD   11/27/24 0726 97.9 °F (36.6 °C) 66 96 % -- 149/64 -- -- DII   11/27/24 0540 -- -- 95 % -- -- -- -- AL   11/27/24 0245 98.1 °F (36.7 °C) 67 95 % 14 125/52 -- -- DII   11/27/24 0041 -- -- -- -- -- No Pain -- AL   11/27/24 0039 97.8 °F (36.6 °C) 70 98 % 16 140/78 -- -- AL   11/26/24 2354 97.8 °F (36.6 °C) 70 98 % 16 140/78 -- -- DII   11/26/24 1902 98.4 °F (36.9 °C) 75 97 % 18 140/63 -- -- ALEXEY   11/26/24 1900 -- -- -- -- -- No Pain -- ALEXEY            Physical Exam  Vitals and nursing note reviewed.   Constitutional:       General: He is not in acute distress.  Cardiovascular:      Rate and Rhythm: Normal rate and regular rhythm.   Pulmonary:      Effort: Pulmonary effort is normal. No respiratory distress.      Breath sounds: Normal breath sounds.   Abdominal:      Tenderness: There is no abdominal tenderness.   Neurological:      General: No focal deficit present.      Mental Status: He is alert and oriented to person, place, and time.         Results Reviewed       Procedure Component Value Units Date/Time    CBC and differential [[id number]]     Lab Status: No result Specimen: Blood     Basic metabolic panel [[id number]]  (Abnormal) Collected: 11/27/24 0611    Lab Status: Final result Specimen: Blood from Arm, Right Updated: 11/27/24 0657     Sodium 139 mmol/L      Potassium 4.1 mmol/L      Chloride 108 mmol/L      CO2 27 mmol/L      ANION GAP 4 mmol/L      BUN 17 mg/dL      Creatinine 0.64 mg/dL      Glucose 95 mg/dL      Glucose, Fasting 95 mg/dL      Calcium 8.2 mg/dL      eGFR 89 ml/min/1.73sq m     Narrative:      National Kidney Disease Foundation guidelines for Chronic Kidney Disease (CKD):     Stage 1 with normal or high GFR (GFR > 90 mL/min/1.73 square meters)    Stage 2 Mild CKD (GFR = 60-89 mL/min/1.73 square meters)    Stage 3A Moderate CKD (GFR = 45-59 mL/min/1.73 square meters)    Stage 3B Moderate CKD (GFR = 30-44 mL/min/1.73  square meters)    Stage 4 Severe CKD (GFR = 15-29 mL/min/1.73 square meters)    Stage 5 End Stage CKD (GFR <15 mL/min/1.73 square meters)  Note: GFR calculation is accurate only with a steady state creatinine    CBC and differential [[id number]]     Lab Status: No result Specimen: Blood     CBC and differential [[id number]]  (Abnormal) Collected: 11/27/24 0024    Lab Status: Final result Specimen: Blood from Arm, Right Updated: 11/27/24 0030     WBC 7.93 Thousand/uL      RBC 3.81 Million/uL      Hemoglobin 11.1 g/dL      Hematocrit 34.8 %      MCV 91 fL      MCH 29.1 pg      MCHC 31.9 g/dL      RDW 14.5 %      MPV 9.6 fL      Platelets 206 Thousands/uL      nRBC 0 /100 WBCs      Segmented % 69 %      Immature Grans % 0 %      Lymphocytes % 18 %      Monocytes % 12 %      Eosinophils Relative 1 %      Basophils Relative 0 %      Absolute Neutrophils 5.43 Thousands/µL      Absolute Immature Grans 0.03 Thousand/uL      Absolute Lymphocytes 1.42 Thousands/µL      Absolute Monocytes 0.97 Thousand/µL      Eosinophils Absolute 0.05 Thousand/µL      Basophils Absolute 0.03 Thousands/µL     Urine Microscopic [[id number]]  (Abnormal) Collected: 11/26/24 1958    Lab Status: Final result Specimen: Urine, Clean Catch Updated: 11/26/24 2036     RBC, UA Innumerable /hpf      WBC, UA       Field obscured, unable to enumerate     /hpf     Epithelial Cells       Field obscured, unable to enumerate     /hpf     Bacteria, UA       Field obscured, unable to enumerate     /hpf    Comprehensive metabolic panel [[id number]]  (Abnormal) Collected: 11/26/24 1942    Lab Status: Final result Specimen: Blood from Arm, Left Updated: 11/26/24 2026     Sodium 137 mmol/L      Potassium 4.3 mmol/L      Chloride 104 mmol/L      CO2 28 mmol/L      ANION GAP 5 mmol/L      BUN 22 mg/dL      Creatinine 0.87 mg/dL      Glucose 185 mg/dL      Calcium 8.5 mg/dL      AST 18 U/L      ALT 12 U/L      Alkaline Phosphatase 61 U/L      Total Protein 5.8 g/dL       Albumin 3.8 g/dL      Total Bilirubin 0.52 mg/dL      eGFR 78 ml/min/1.73sq m     Narrative:      National Kidney Disease Foundation guidelines for Chronic Kidney Disease (CKD):     Stage 1 with normal or high GFR (GFR > 90 mL/min/1.73 square meters)    Stage 2 Mild CKD (GFR = 60-89 mL/min/1.73 square meters)    Stage 3A Moderate CKD (GFR = 45-59 mL/min/1.73 square meters)    Stage 3B Moderate CKD (GFR = 30-44 mL/min/1.73 square meters)    Stage 4 Severe CKD (GFR = 15-29 mL/min/1.73 square meters)    Stage 5 End Stage CKD (GFR <15 mL/min/1.73 square meters)  Note: GFR calculation is accurate only with a steady state creatinine    UA (URINE) with reflex to Scope [[id number]]  (Abnormal) Collected: 11/26/24 1958    Lab Status: Final result Specimen: Urine, Clean Catch Updated: 11/26/24 2021     Color, UA Dark Red     Clarity, UA Turbid     Specific Gravity, UA >=1.030     pH, UA 7.0     Leukocytes, UA Moderate     Nitrite, UA Positive     Protein,  (2+) mg/dl      Glucose, UA Negative mg/dl      Ketones, UA 10 (1+) mg/dl      Urobilinogen, UA <2.0 mg/dl      Bilirubin, UA Moderate     Occult Blood, UA Large    Protime-INR [[id number]]  (Normal) Collected: 11/26/24 1942    Lab Status: Final result Specimen: Blood from Arm, Left Updated: 11/26/24 2014     Protime 14.8 seconds      INR 1.10    Narrative:      INR Therapeutic Range    Indication                                             INR Range      Atrial Fibrillation                                               2.0-3.0  Hypercoagulable State                                    2.0.2.3  Left Ventricular Asist Device                            2.0-3.0  Mechanical Heart Valve                                  -    Aortic(with afib, MI, embolism, HF, LA enlargement,    and/or coagulopathy)                                     2.0-3.0 (2.5-3.5)     Mitral                                                             2.5-3.5  Prosthetic/Bioprosthetic Heart Valve                2.0-3.0  Venous thromboembolism (VTE: VT, PE        2.0-3.0    APTT [[id number]]  (Normal) Collected: 11/26/24 1942    Lab Status: Final result Specimen: Blood from Arm, Left Updated: 11/26/24 2014     PTT 25 seconds     CBC and differential [[id number]]  (Abnormal) Collected: 11/26/24 1942    Lab Status: Final result Specimen: Blood from Arm, Left Updated: 11/26/24 2003     WBC 7.52 Thousand/uL      RBC 4.07 Million/uL      Hemoglobin 12.0 g/dL      Hematocrit 37.3 %      MCV 92 fL      MCH 29.5 pg      MCHC 32.2 g/dL      RDW 14.5 %      MPV 9.5 fL      Platelets 227 Thousands/uL      nRBC 0 /100 WBCs      Segmented % 78 %      Immature Grans % 0 %      Lymphocytes % 14 %      Monocytes % 8 %      Eosinophils Relative 0 %      Basophils Relative 0 %      Absolute Neutrophils 5.76 Thousands/µL      Absolute Immature Grans 0.02 Thousand/uL      Absolute Lymphocytes 1.06 Thousands/µL      Absolute Monocytes 0.62 Thousand/µL      Eosinophils Absolute 0.03 Thousand/µL      Basophils Absolute 0.03 Thousands/µL     Urine culture [[id number]] Collected: 11/26/24 1958    Lab Status: In process Specimen: Urine, Clean Catch Updated: 11/26/24 2001            CT renal stone study abdomen pelvis without contrast   Final Interpretation by Ivette Badillo MD (11/26 2037)      Dense material in the dependent urinary bladder likely representing blood products. No calculi in the bladder. An underlying urothelial or less likely renal mass cannot be excluded. Recommend further evaluation with CT urography.      Prostatomegaly and median lobe hypertrophy.      The study was marked in EPIC for immediate notification.            Workstation performed: [id number]             Procedures    ED Medication and Procedure Management   Prior to Admission Medications   Prescriptions Last Dose Informant Patient Reported? Taking?   Aspirin 81 MG CAPS 11/26/2024 Morning Self Yes Yes   Sig: Take by mouth   atorvastatin (LIPITOR) 20 mg tablet  2024 Evening  No Yes   Sig: take 1 tablet by mouth EVERY EVENING WITH DINNER   clopidogrel (PLAVIX) 75 mg tablet 2024 Morning  No Yes   Sig: take 1 tablet by mouth once daily   fluticasone (FLONASE) 50 mcg/act nasal spray  Self No No   Si spray into each nostril 2 (two) times a day   latanoprost (XALATAN) 0.005 % ophthalmic solution   Yes No   Sig: Administer 1 drop to both eyes daily   losartan (COZAAR) 25 mg tablet 2024 Evening  No Yes   Sig: take 1 tablet by mouth once daily   metoprolol succinate (TOPROL-XL) 25 mg 24 hr tablet 2024 Morning Self No Yes   Sig: Take 1 tablet (25 mg total) by mouth daily   predniSONE 10 mg tablet Not Taking  No No   Sig: Take 1 tablet (10 mg total) by mouth daily   Patient not taking: Reported on 2024      Facility-Administered Medications: None     Current Discharge Medication List        CONTINUE these medications which have NOT CHANGED    Details   Aspirin 81 MG CAPS Take by mouth      atorvastatin (LIPITOR) 20 mg tablet take 1 tablet by mouth EVERY EVENING WITH DINNER  Qty: 90 tablet, Refills: 1    Associated Diagnoses: Coronary artery disease involving native coronary artery of native heart without angina pectoris      clopidogrel (PLAVIX) 75 mg tablet take 1 tablet by mouth once daily  Qty: 100 tablet, Refills: 1    Associated Diagnoses: Coronary artery disease involving native coronary artery of native heart without angina pectoris      losartan (COZAAR) 25 mg tablet take 1 tablet by mouth once daily  Qty: 90 tablet, Refills: 1    Associated Diagnoses: Cardiomyopathy, unspecified type (HCC)      metoprolol succinate (TOPROL-XL) 25 mg 24 hr tablet Take 1 tablet (25 mg total) by mouth daily  Qty: 90 tablet, Refills: 2    Associated Diagnoses: Primary hypertension; Cardiomyopathy, unspecified type (HCC)      fluticasone (FLONASE) 50 mcg/act nasal spray 1 spray into each nostril 2 (two) times a day  Qty: 15.8 mL, Refills: 3    Comments: Needs new   as current has been recalled.  Associated Diagnoses: Allergic rhinitis due to pollen, unspecified seasonality      latanoprost (XALATAN) 0.005 % ophthalmic solution Administer 1 drop to both eyes daily      predniSONE 10 mg tablet Take 1 tablet (10 mg total) by mouth daily  Qty: 30 tablet, Refills: 2    Associated Diagnoses: Simple chronic bronchitis (HCC)           No discharge procedures on file.  ED SEPSIS DOCUMENTATION   Time reflects when diagnosis was documented in both MDM as applicable and the Disposition within this note       Time User Action Codes Description Comment    11/26/2024  9:19 PM Pratik Lance Add [R31.0] Hematuria, gross                  Pratik Lance, DO  11/27/24 1314

## 2024-11-28 LAB
ALBUMIN SERPL BCG-MCNC: 3.5 G/DL (ref 3.5–5)
ALP SERPL-CCNC: 55 U/L (ref 34–104)
ALT SERPL W P-5'-P-CCNC: 11 U/L (ref 7–52)
ANION GAP SERPL CALCULATED.3IONS-SCNC: 3 MMOL/L (ref 4–13)
AST SERPL W P-5'-P-CCNC: 18 U/L (ref 13–39)
BASOPHILS # BLD AUTO: 0.07 THOUSANDS/ΜL (ref 0–0.1)
BASOPHILS NFR BLD AUTO: 1 % (ref 0–1)
BILIRUB SERPL-MCNC: 0.54 MG/DL (ref 0.2–1)
BUN SERPL-MCNC: 16 MG/DL (ref 5–25)
CALCIUM SERPL-MCNC: 8.6 MG/DL (ref 8.4–10.2)
CHLORIDE SERPL-SCNC: 108 MMOL/L (ref 96–108)
CO2 SERPL-SCNC: 28 MMOL/L (ref 21–32)
CREAT SERPL-MCNC: 0.74 MG/DL (ref 0.6–1.3)
EOSINOPHIL # BLD AUTO: 0.09 THOUSAND/ΜL (ref 0–0.61)
EOSINOPHIL NFR BLD AUTO: 1 % (ref 0–6)
ERYTHROCYTE [DISTWIDTH] IN BLOOD BY AUTOMATED COUNT: 14.4 % (ref 11.6–15.1)
GFR SERPL CREATININE-BSD FRML MDRD: 84 ML/MIN/1.73SQ M
GLUCOSE SERPL-MCNC: 99 MG/DL (ref 65–140)
HCT VFR BLD AUTO: 37.4 % (ref 36.5–49.3)
HGB BLD-MCNC: 12.3 G/DL (ref 12–17)
IMM GRANULOCYTES # BLD AUTO: 0.03 THOUSAND/UL (ref 0–0.2)
IMM GRANULOCYTES NFR BLD AUTO: 0 % (ref 0–2)
LYMPHOCYTES # BLD AUTO: 1.54 THOUSANDS/ΜL (ref 0.6–4.47)
LYMPHOCYTES NFR BLD AUTO: 18 % (ref 14–44)
MCH RBC QN AUTO: 29.9 PG (ref 26.8–34.3)
MCHC RBC AUTO-ENTMCNC: 32.9 G/DL (ref 31.4–37.4)
MCV RBC AUTO: 91 FL (ref 82–98)
MONOCYTES # BLD AUTO: 1.11 THOUSAND/ΜL (ref 0.17–1.22)
MONOCYTES NFR BLD AUTO: 13 % (ref 4–12)
NEUTROPHILS # BLD AUTO: 5.97 THOUSANDS/ΜL (ref 1.85–7.62)
NEUTS SEG NFR BLD AUTO: 67 % (ref 43–75)
NRBC BLD AUTO-RTO: 0 /100 WBCS
PLATELET # BLD AUTO: 219 THOUSANDS/UL (ref 149–390)
PMV BLD AUTO: 9.6 FL (ref 8.9–12.7)
POTASSIUM SERPL-SCNC: 4.2 MMOL/L (ref 3.5–5.3)
PROT SERPL-MCNC: 5.6 G/DL (ref 6.4–8.4)
RBC # BLD AUTO: 4.12 MILLION/UL (ref 3.88–5.62)
SODIUM SERPL-SCNC: 139 MMOL/L (ref 135–147)
WBC # BLD AUTO: 8.81 THOUSAND/UL (ref 4.31–10.16)

## 2024-11-28 PROCEDURE — 80053 COMPREHEN METABOLIC PANEL: CPT | Performed by: STUDENT IN AN ORGANIZED HEALTH CARE EDUCATION/TRAINING PROGRAM

## 2024-11-28 PROCEDURE — 85025 COMPLETE CBC W/AUTO DIFF WBC: CPT | Performed by: STUDENT IN AN ORGANIZED HEALTH CARE EDUCATION/TRAINING PROGRAM

## 2024-11-28 PROCEDURE — 99232 SBSQ HOSP IP/OBS MODERATE 35: CPT | Performed by: NURSE PRACTITIONER

## 2024-11-28 RX ORDER — OXYBUTYNIN CHLORIDE 5 MG/1
5 TABLET ORAL 2 TIMES DAILY
Status: DISCONTINUED | OUTPATIENT
Start: 2024-11-28 | End: 2024-11-30 | Stop reason: HOSPADM

## 2024-11-28 RX ADMIN — LOSARTAN POTASSIUM 25 MG: 25 TABLET, FILM COATED ORAL at 08:23

## 2024-11-28 RX ADMIN — DOCUSATE SODIUM 100 MG: 100 CAPSULE, LIQUID FILLED ORAL at 17:57

## 2024-11-28 RX ADMIN — METOPROLOL SUCCINATE 25 MG: 25 TABLET, EXTENDED RELEASE ORAL at 08:23

## 2024-11-28 RX ADMIN — SODIUM CHLORIDE, SODIUM GLUCONATE, SODIUM ACETATE, POTASSIUM CHLORIDE, MAGNESIUM CHLORIDE, SODIUM PHOSPHATE, DIBASIC, AND POTASSIUM PHOSPHATE 75 ML/HR: .53; .5; .37; .037; .03; .012; .00082 INJECTION, SOLUTION INTRAVENOUS at 11:20

## 2024-11-28 RX ADMIN — DOCUSATE SODIUM 100 MG: 100 CAPSULE, LIQUID FILLED ORAL at 08:22

## 2024-11-28 RX ADMIN — LATANOPROST 1 DROP: 50 SOLUTION OPHTHALMIC at 19:58

## 2024-11-28 RX ADMIN — OXYBUTYNIN CHLORIDE 5 MG: 5 TABLET ORAL at 17:57

## 2024-11-28 RX ADMIN — ATORVASTATIN CALCIUM 20 MG: 20 TABLET, FILM COATED ORAL at 17:57

## 2024-11-28 RX ADMIN — CEFTRIAXONE 1000 MG: 1 INJECTION, SOLUTION INTRAVENOUS at 20:49

## 2024-11-28 NOTE — PROGRESS NOTES
"Progress Note - Urology      Patient: Gilberto Vann   : 1940 Sex: male   MRN: [id number]     CSN: [id number]  Unit/Bed#: 2 [place] [address]     SUBJECTIVE:   Called by staff gross hematuria off cbi  Cultures pending      Objective   Vitals: /53 (BP Location: Left arm)   Pulse 70   Temp 97.9 °F (36.6 °C) (Oral)   Resp 18   Ht 5' 9\" (1.753 m)   Wt 69 kg (152 lb 1.9 oz)   SpO2 97%   BMI 22.46 kg/m²     I/O last 24 hours:  In: 400 [P.O.:400]  Out: 7275 [Urine:7275]      Physical Exam:   General Alert awake   Normocephalic atraumatic PERRLA  Lungs clear bilaterally  Cardiac normal S1 normal S2  Abdomen soft, flank pain  Extremities no edema      Lab Results: CBC:   Lab Results   Component Value Date    WBC 8.81 2024    HGB 12.3 2024    HCT 37.4 2024    MCV 91 2024     2024    RBC 4.12 2024    MCH 29.9 2024    MCHC 32.9 2024    RDW 14.4 2024    MPV 9.6 2024    NRBC 0 2024     CMP:   Lab Results   Component Value Date     2024     2023    CO2 28 2024    CO2 26 2023    BUN 16 2024    BUN 20 2023    CREATININE 0.74 2024    CALCIUM 8.6 2024    AST 18 2024    AST 17 2023    ALT 11 2024    ALT 14 2023    ALKPHOS 55 2024    EGFR 84 2024     Urinalysis:   Lab Results   Component Value Date    COLORU Dark Red 2024    CLARITYU Turbid 2024    SPECGRAV >=1.030 2024    PHUR 7.0 2024    LEUKOCYTESUR Moderate (A) 2024    NITRITE Positive (A) 2024    GLUCOSEU Negative 2024    KETONESU 10 (1+) (A) 2024    BILIRUBINUR Moderate (A) 2024    BLOODU Large (A) 2024     Urine Culture:   Lab Results   Component Value Date    URINECX Culture too young- will reincubate 2024     PSA:   Lab Results   Component Value Date    PSA 6.98 (H) 2023         Assessment/ Plan:  Hemorrhagic " cystitis  Restart cbi  Awaiting cultures        James Blunt MD

## 2024-11-28 NOTE — OCCUPATIONAL THERAPY NOTE
OT EVALUATION       11/28/24 1159   Note Type   Note type Cancelled Session   Cancel Reasons Medical status   Additional Comments pt with increased hematuria this AM, being placed back on CBI by RN. Will follow as medically appropriate.   Licensure   [place] License Number  Ivette Camacho MS OTR/L [id number]

## 2024-11-28 NOTE — ASSESSMENT & PLAN NOTE
Wt Readings from Last 3 Encounters:   11/27/24 69 kg (152 lb 1.9 oz)   11/25/24 66.7 kg (147 lb)   11/15/24 67.1 kg (148 lb)   Not in acute exacerbation appears euvolemic  Home medication metoprolol succinate continued  Monitor I's and O's and daily weights

## 2024-11-28 NOTE — PROGRESS NOTES
Progress Note - Hospitalist   Name: Gilberto Vann 84 y.o. male I MRN: [id number]  Unit/Bed#: 2 [address] Date of Admission: 11/26/2024   Date of Service: 11/28/2024 I Hospital Day: 1    Assessment & Plan  Hematuria, gross  POA with complaint of gross hematuria x 2 days.  Patient states that today hematuria gotten  worse today while out shopping.  He noticed after urination is straight blood with clots.  Hx of BPH with frequency, hesitancy, and decreased flow.  Home medication plavix and ASA on hold  DVT prophylaxis SCD  CT renal stone study abdomen pelvis without contrast : Dense material in the dependent urinary bladder likely representing blood products. No calculi in the bladder. An underlying urothelial or less likely renal mass cannot be excluded. Recommend further evaluation with CT urography.   UA positive for nitrates, moderate leuks, and normal red blood cells, bacteria  In ED Urology consulted recommendation three-way catheter Ledesma placed, and initiate CBI  In Ed received IV antbx ceftriaxone, will continue Iv antibx ceftriaxone q 24 IV   Continue IV hydration isolate at 75 mL/hr   CBI had been discontinued 11/27 however patient developed hematuria once again, urology recommended reinitiation of CBI, hold aspirin and Plavix at this time.  Follow up on urine cultures, continue ceftriaxone antibiotic therapy, urine culture still incubating at this time  Monitor I &O  CBC/CMP in the morning    Urinary tract infection with hematuria  POA with gross hematuria for 2 days   No leukocytosis, per patient denies CVA tenderness or dysuria  UA positive for nitrates, moderate leuks, and normal red blood cells, bacteria  In Ed started on ceftriaxone, will continue IV antibx ceftriaxone q 24 hours  Follow up on UC adjust antibiotics once culture results obtained  Monitor WBC and fever curve   COPD (chronic obstructive pulmonary disease) (HCC)  Not in acute exacerbation  Encourage pulmonary hygiene  Continuous  pulse ox  Cardiomyopathy (HCC)  Echo 6/17/24 EF 45% systolic function is mildly reduced.  Wall motion is normal.  Diastolic function is mildly abnormal consistent with grade 1 relaxation.    Hold home clopidogrel  and ASA in the setting of hematuria  Continue home metoprolol & losartan  CAD (coronary artery disease)  Denies chest pain  Continue home medication metoprolol & atorvastatin  Aspirin & Plavix on hold  Primary hypertension  Continue home losartan and metoprolol  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HCC)  Wt Readings from Last 3 Encounters:   11/27/24 69 kg (152 lb 1.9 oz)   11/25/24 66.7 kg (147 lb)   11/15/24 67.1 kg (148 lb)   Not in acute exacerbation appears euvolemic  Home medication metoprolol succinate continued  Monitor I's and O's and daily weights    Pacemaker  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  Tobacco abuse  Patient states he stopped smoking several years ago however he still continues to use oral tobacco  Refuses nicotine patch  Tobacco cessation provided    VTE Pharmacologic Prophylaxis:   Moderate Risk (Score 3-4) - Pharmacological DVT Prophylaxis Contraindicated. Sequential Compression Devices Ordered.    Mobility:   Basic Mobility Inpatient Raw Score: 19  JH-HLM Goal: 6: Walk 10 steps or more  JH-HLM Achieved: 7: Walk 25 feet or more  JH-HLM Goal achieved. Continue to encourage appropriate mobility.    Patient Centered Rounds: I performed bedside rounds with nursing staff today.   Discussions with Specialists or Other Care Team Provider: Multidisciplinary team    Education and Discussions with Family / Patient: Patient declined call to .     Current Length of Stay: 1 day(s)  Current Patient Status: Inpatient   Certification Statement: The patient will continue to require additional inpatient hospital stay due to hematuria, continued IV antibiotics, follow-up on urine culture  Discharge Plan: Anticipate discharge in 24-48 hrs to home.    Code  Status: Level 1 - Full Code    Subjective   Patient seen sitting up in bed resting comfortably.  Gross hematuria noted in tubing for Ledesma catheter.  Patient denies any pain at this time.  Good appetite for breakfast no nausea or vomiting.  Did get some sleep last night.    Objective :  Temp:  [97.8 °F (36.6 °C)-98.7 °F (37.1 °C)] 97.9 °F (36.6 °C)  HR:  [65-74] 70  BP: (132-143)/(53-64) 132/53  Resp:  [16-20] 18  SpO2:  [97 %-99 %] 97 %  O2 Device: None (Room air)    Body mass index is 22.46 kg/m².     Input and Output Summary (last 24 hours):     Intake/Output Summary (Last 24 hours) at 11/28/2024 1132  Last data filed at 11/28/2024 1120  Gross per 24 hour   Intake 400 ml   Output 70266 ml   Net -63825 ml       Physical Exam  Vitals and nursing note reviewed.   Constitutional:       Appearance: Normal appearance.   HENT:      Head: Normocephalic.      Nose: Nose normal.      Mouth/Throat:      Mouth: Mucous membranes are moist.   Eyes:      Extraocular Movements: Extraocular movements intact.      Conjunctiva/sclera: Conjunctivae normal.      Pupils: Pupils are equal, round, and reactive to light.   Cardiovascular:      Rate and Rhythm: Normal rate and regular rhythm.      Pulses: Normal pulses.      Heart sounds: Murmur heard.   Pulmonary:      Effort: Pulmonary effort is normal.      Breath sounds: Normal breath sounds.   Abdominal:      General: Bowel sounds are normal. There is no distension.      Palpations: Abdomen is soft.      Tenderness: There is no abdominal tenderness.   Genitourinary:     Comments: Ledesma catheter present, gross hematuria noted in tubing and collection bag  Musculoskeletal:         General: Normal range of motion.      Cervical back: Normal range of motion.      Right lower leg: No edema.      Left lower leg: No edema.   Skin:     General: Skin is warm and dry.      Capillary Refill: Capillary refill takes less than 2 seconds.      Comments: Small abrasion noted on right inner aspect  knee   Neurological:      General: No focal deficit present.      Mental Status: He is alert and oriented to person, place, and time.   Psychiatric:         Mood and Affect: Mood normal.         Behavior: Behavior normal.         Thought Content: Thought content normal.         Judgment: Judgment normal.           Lines/Drains:  Lines/Drains/Airways       Active Status       Name Placement date Placement time Site Days    Urethral Catheter Three way 18 Fr. 11/26/24 2202  Three way  1                  Urinary Catheter:  Goal for removal:  Patient has gross hematuria, we we will be restarting his CBI as per recommendations from urology                 Lab Results: I have reviewed the following results:   Results from last 7 days   Lab Units 11/28/24  0554   WBC Thousand/uL 8.81   HEMOGLOBIN g/dL 12.3   HEMATOCRIT % 37.4   PLATELETS Thousands/uL 219   SEGS PCT % 67   LYMPHO PCT % 18   MONO PCT % 13*   EOS PCT % 1     Results from last 7 days   Lab Units 11/28/24  0554   SODIUM mmol/L 139   POTASSIUM mmol/L 4.2   CHLORIDE mmol/L 108   CO2 mmol/L 28   BUN mg/dL 16   CREATININE mg/dL 0.74   ANION GAP mmol/L 3*   CALCIUM mg/dL 8.6   ALBUMIN g/dL 3.5   TOTAL BILIRUBIN mg/dL 0.54   ALK PHOS U/L 55   ALT U/L 11   AST U/L 18   GLUCOSE RANDOM mg/dL 99     Results from last 7 days   Lab Units 11/26/24  1942   INR  1.10                   Recent Cultures (last 7 days):   Results from last 7 days   Lab Units 11/26/24 1958   URINE CULTURE  Culture too young- will reincubate       Imaging Results Review: I reviewed radiology reports from this admission including: CT abdomen/pelvis.  Other Study Results Review: No additional pertinent studies reviewed.    Last 24 Hours Medication List:     Current Facility-Administered Medications:     acetaminophen (TYLENOL) tablet 650 mg, Q6H PRN    [Held by provider] aspirin chewable tablet 81 mg, Daily    atorvastatin (LIPITOR) tablet 20 mg, Daily With Dinner    cefTRIAXone (ROCEPHIN) IVPB  (premix in dextrose) 1,000 mg 50 mL, Q24H, Last Rate: 1,000 mg (11/27/24 2104)    [Held by provider] clopidogrel (PLAVIX) tablet 75 mg, Daily    docusate sodium (COLACE) capsule 100 mg, BID    latanoprost (XALATAN) 0.005 % ophthalmic solution 1 drop, Daily    losartan (COZAAR) tablet 25 mg, Daily    metoprolol succinate (TOPROL-XL) 24 hr tablet 25 mg, Daily    multi-electrolyte (PLASMALYTE-A/ISOLYTE-S PH 7.4) IV solution, Continuous, Last Rate: 75 mL/hr (11/28/24 1120)    Administrative Statements   Today, Patient Was Seen By: MICHELLE Alberts  I have spent a total time of greater than 45 minutes in caring for this patient on the day of the visit/encounter including Diagnostic results, Documenting in the medical record, Reviewing / ordering tests, medicine, procedures  , Obtaining or reviewing history  , and Communicating with other healthcare professionals .    **Please Note: This note may have been constructed using a voice recognition system.**   Cephalexin Pregnancy And Lactation Text: This medication is Pregnancy Category B and considered safe during pregnancy.  It is also excreted in breast milk but can be used safely for shorter doses.

## 2024-11-28 NOTE — PLAN OF CARE
Problem: PAIN - ADULT  Goal: Verbalizes/displays adequate comfort level or baseline comfort level  Description: Interventions:  - Encourage patient to monitor pain and request assistance  - Assess pain using appropriate pain scale  - Administer analgesics based on type and severity of pain and evaluate response  - Implement non-pharmacological measures as appropriate and evaluate response  - Consider cultural and social influences on pain and pain management  - Notify physician/advanced practitioner if interventions unsuccessful or patient reports new pain  Outcome: Progressing     Problem: INFECTION - ADULT  Goal: Absence or prevention of progression during hospitalization  Description: INTERVENTIONS:  - Assess and monitor for signs and symptoms of infection  - Monitor lab/diagnostic results  - Monitor all insertion sites, i.e. indwelling lines, tubes, and drains  - Monitor endotracheal if appropriate and nasal secretions for changes in amount and color  - [place] appropriate cooling/warming therapies per order  - Administer medications as ordered  - Instruct and encourage patient and family to use good hand hygiene technique  - Identify and instruct in appropriate isolation precautions for identified infection/condition  Outcome: Progressing  Goal: Absence of fever/infection during neutropenic period  Description: INTERVENTIONS:  - Monitor WBC    Outcome: Progressing     Problem: SAFETY ADULT  Goal: Patient will remain free of falls  Description: INTERVENTIONS:  - Educate patient/family on patient safety including physical limitations  - Instruct patient to call for assistance with activity   - Consult OT/PT to assist with strengthening/mobility   - Keep Call bell within reach  - Keep bed low and locked with side rails adjusted as appropriate  - Keep care items and personal belongings within reach  - Initiate and maintain comfort rounds  - Make Fall Risk Sign visible to staff  - Apply yellow socks and bracelet  for high fall risk patients  - Consider moving patient to room near nurses station  Outcome: Progressing  Goal: Maintain or return to baseline ADL function  Description: INTERVENTIONS:  -  Assess patient's ability to carry out ADLs; assess patient's baseline for ADL function and identify physical deficits which impact ability to perform ADLs (bathing, care of mouth/teeth, toileting, grooming, dressing, etc.)  - Assess/evaluate cause of self-care deficits   - Assess range of motion  - Assess patient's mobility; develop plan if impaired  - Assess patient's need for assistive devices and provide as appropriate  - Encourage maximum independence but intervene and supervise when necessary  - Involve family in performance of ADLs  - Assess for home care needs following discharge   - Consider OT consult to assist with ADL evaluation and planning for discharge  - Provide patient education as appropriate  Outcome: Progressing  Goal: Maintains/Returns to pre admission functional level  Description: INTERVENTIONS:  - Perform AM-PAC 6 Click Basic Mobility/ Daily Activity assessment daily.  - Set and communicate daily mobility goal to care team and patient/family/caregiver.   - Collaborate with rehabilitation services on mobility goals if consulted  - Perform Range of Motion 3 times a day.  - Reposition patient every 3 hours.  - Dangle patient 3 times a day  - Stand patient 3 times a day  - Ambulate patient 3 times a day  - Out of bed to chair 3 times a day   - Out of bed for meals 3 times a day  - Out of bed for toileting  - Record patient progress and toleration of activity level   Outcome: Progressing     Problem: DISCHARGE PLANNING  Goal: Discharge to home or other facility with appropriate resources  Description: INTERVENTIONS:  - Identify barriers to discharge w/patient and caregiver  - Arrange for needed discharge resources and transportation as appropriate  - Identify discharge learning needs (meds, wound care, etc.)  -  Arrange for interpretive services to assist at discharge as needed  - Refer to Case Management Department for coordinating discharge planning if the patient needs post-hospital services based on physician/advanced practitioner order or complex needs related to functional status, cognitive ability, or social support system  Outcome: Progressing     Problem: Knowledge Deficit  Goal: Patient/family/caregiver demonstrates understanding of disease process, treatment plan, medications, and discharge instructions  Description: Complete learning assessment and assess knowledge base.  Interventions:  - Provide teaching at level of understanding  - Provide teaching via preferred learning methods  Outcome: Progressing     Problem: GENITOURINARY - ADULT  Goal: Maintains or returns to baseline urinary function  Description: INTERVENTIONS:  - Assess urinary function  - Encourage oral fluids to ensure adequate hydration if ordered  - Administer IV fluids as ordered to ensure adequate hydration  - Administer ordered medications as needed  - Offer frequent toileting  - Follow urinary retention protocol if ordered  Outcome: Progressing  Goal: Absence of urinary retention  Description: INTERVENTIONS:  - Assess patient’s ability to void and empty bladder  - Monitor I/O  - Bladder scan as needed  - Discuss with physician/AP medications to alleviate retention as needed  - Discuss catheterization for long term situations as appropriate  Outcome: Progressing  Goal: Urinary catheter remains patent  Description: INTERVENTIONS:  - Assess patency of urinary catheter  - If patient has a chronic hickman, consider changing catheter if non-functioning  - Follow guidelines for intermittent irrigation of non-functioning urinary catheter  Outcome: Progressing     Problem: HEMATOLOGIC - ADULT  Goal: Maintains hematologic stability  Description: INTERVENTIONS  - Assess for signs and symptoms of bleeding or hemorrhage  - Monitor labs  - Administer  supportive blood products/factors as ordered and appropriate  Outcome: Progressing

## 2024-11-28 NOTE — PHYSICAL THERAPY NOTE
PT Cancellation Note       11/28/24 0848   Note Type   Note type Cancelled Session   Cancel Reasons Other   Additional Comments Per discussion with RN pt with increased hematuria this AM - currently being placed back on CBI by RN. Will follow-up as tolerated/appropriate.   Licensure   [place] License Number  Ivette Perez [id number]

## 2024-11-29 LAB
ANION GAP SERPL CALCULATED.3IONS-SCNC: 5 MMOL/L (ref 4–13)
BACTERIA UR CULT: ABNORMAL
BLAIMP ISLT/SPM QL: NOT DETECTED
BLAKPC ISLT/SPM QL: NOT DETECTED
BLAOXA-48 ISLT/SPM QL: NOT DETECTED
BLAVIM ISLT/SPM QL: NOT DETECTED
BUN SERPL-MCNC: 18 MG/DL (ref 5–25)
CALCIUM SERPL-MCNC: 8.2 MG/DL (ref 8.4–10.2)
CHLORIDE SERPL-SCNC: 105 MMOL/L (ref 96–108)
CO2 SERPL-SCNC: 28 MMOL/L (ref 21–32)
CREAT SERPL-MCNC: 0.72 MG/DL (ref 0.6–1.3)
ERYTHROCYTE [DISTWIDTH] IN BLOOD BY AUTOMATED COUNT: 14.5 % (ref 11.6–15.1)
GFR SERPL CREATININE-BSD FRML MDRD: 85 ML/MIN/1.73SQ M
GLUCOSE SERPL-MCNC: 100 MG/DL (ref 65–140)
HCT VFR BLD AUTO: 36 % (ref 36.5–49.3)
HGB BLD-MCNC: 11.6 G/DL (ref 12–17)
MCH RBC QN AUTO: 29.4 PG (ref 26.8–34.3)
MCHC RBC AUTO-ENTMCNC: 32.2 G/DL (ref 31.4–37.4)
MCV RBC AUTO: 91 FL (ref 82–98)
NDM CEPHEID: NOT DETECTED
PLATELET # BLD AUTO: 214 THOUSANDS/UL (ref 149–390)
PMV BLD AUTO: 10.9 FL (ref 8.9–12.7)
POTASSIUM SERPL-SCNC: 3.9 MMOL/L (ref 3.5–5.3)
RBC # BLD AUTO: 3.95 MILLION/UL (ref 3.88–5.62)
SODIUM SERPL-SCNC: 138 MMOL/L (ref 135–147)
WBC # BLD AUTO: 8.01 THOUSAND/UL (ref 4.31–10.16)

## 2024-11-29 PROCEDURE — 97162 PT EVAL MOD COMPLEX 30 MIN: CPT

## 2024-11-29 PROCEDURE — 99232 SBSQ HOSP IP/OBS MODERATE 35: CPT | Performed by: NURSE PRACTITIONER

## 2024-11-29 PROCEDURE — 80048 BASIC METABOLIC PNL TOTAL CA: CPT | Performed by: NURSE PRACTITIONER

## 2024-11-29 PROCEDURE — 85027 COMPLETE CBC AUTOMATED: CPT | Performed by: NURSE PRACTITIONER

## 2024-11-29 RX ADMIN — LATANOPROST 1 DROP: 50 SOLUTION OPHTHALMIC at 18:38

## 2024-11-29 RX ADMIN — METOPROLOL SUCCINATE 25 MG: 25 TABLET, EXTENDED RELEASE ORAL at 09:08

## 2024-11-29 RX ADMIN — SODIUM CHLORIDE, SODIUM GLUCONATE, SODIUM ACETATE, POTASSIUM CHLORIDE, MAGNESIUM CHLORIDE, SODIUM PHOSPHATE, DIBASIC, AND POTASSIUM PHOSPHATE 75 ML/HR: .53; .5; .37; .037; .03; .012; .00082 INJECTION, SOLUTION INTRAVENOUS at 02:05

## 2024-11-29 RX ADMIN — OXYBUTYNIN CHLORIDE 5 MG: 5 TABLET ORAL at 17:33

## 2024-11-29 RX ADMIN — DOCUSATE SODIUM 100 MG: 100 CAPSULE, LIQUID FILLED ORAL at 09:08

## 2024-11-29 RX ADMIN — DOCUSATE SODIUM 100 MG: 100 CAPSULE, LIQUID FILLED ORAL at 17:33

## 2024-11-29 RX ADMIN — OXYBUTYNIN CHLORIDE 5 MG: 5 TABLET ORAL at 09:08

## 2024-11-29 RX ADMIN — ATORVASTATIN CALCIUM 20 MG: 20 TABLET, FILM COATED ORAL at 16:17

## 2024-11-29 RX ADMIN — LOSARTAN POTASSIUM 25 MG: 25 TABLET, FILM COATED ORAL at 09:08

## 2024-11-29 NOTE — PROGRESS NOTES
Progress Note - Hospitalist   Name: Gilberto Vann 84 y.o. male I MRN: [id number]  Unit/Bed#: 2 [address] Date of Admission: 11/26/2024   Date of Service: 11/29/2024 I Hospital Day: 2    Assessment & Plan  Hematuria, gross  POA with complaint of gross hematuria x 2 days.  Patient states that today hematuria gotten  worse today while out shopping.  He noticed after urination is straight blood with clots.  Hx of BPH with frequency, hesitancy, and decreased flow.  Home medication plavix and ASA on hold  DVT prophylaxis SCD  CT renal stone study abdomen pelvis without contrast : Dense material in the dependent urinary bladder likely representing blood products. No calculi in the bladder. An underlying urothelial or less likely renal mass cannot be excluded. Recommend further evaluation with CT urography.   UA positive for nitrates, moderate leuks, and normal red blood cells, bacteria  In ED Urology consulted recommendation three-way catheter Ledesma placed, and initiate CBI  In Ed received IV antbx ceftriaxone, will continue Iv antibx ceftriaxone q 24 IV   Oral intake good will stop IVF at this time   CBI had been discontinued 11/27 however patient developed hematuria once again, urology recommended reinitiation of CBI, hold aspirin and Plavix at this time.  CBI has cleared, stopping and will do voiding trial a.m. of 11/30  Follow up on urine cultures, showing greater than 100,000 CFU/mL Enterobacter Cloacae, will upgrade to meropenem and follow-up on final sensitivities  Monitor I &O  CBC/CMP in the morning    Urinary tract infection with hematuria  POA with gross hematuria for 2 days   No leukocytosis, per patient denies CVA tenderness or dysuria  UA positive for nitrates, moderate leuks, and normal red blood cells, bacteria  In Ed started on ceftriaxone, Enterobacter cloacae identified, changed to meropenem until final sensitivities are back  Monitor WBC and fever curve   COPD (chronic obstructive pulmonary  disease) (HCC)  Not in acute exacerbation  Encourage pulmonary hygiene  Continuous pulse ox  Cardiomyopathy (HCC)  Echo 6/17/24 EF 45% systolic function is mildly reduced.  Wall motion is normal.  Diastolic function is mildly abnormal consistent with grade 1 relaxation.    Hold home clopidogrel  and ASA in the setting of hematuria  Continue home metoprolol & losartan  Good oral intake, will stop IV hydration  CAD (coronary artery disease)  Denies chest pain  Continue home medication metoprolol & atorvastatin  Aspirin & Plavix on hold  Primary hypertension  Continue home losartan and metoprolol  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HCC)  Wt Readings from Last 3 Encounters:   11/27/24 69 kg (152 lb 1.9 oz)   11/25/24 66.7 kg (147 lb)   11/15/24 67.1 kg (148 lb)   Not in acute exacerbation appears euvolemic  Home medication metoprolol succinate continued  Monitor I's and O's and daily weights    Pacemaker  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  Tobacco abuse  Patient states he stopped smoking several years ago however he still continues to use oral tobacco  Refuses nicotine patch  Tobacco cessation provided    VTE Pharmacologic Prophylaxis:   Moderate Risk (Score 3-4) - Pharmacological DVT Prophylaxis Contraindicated. Sequential Compression Devices Ordered.    Mobility:   Basic Mobility Inpatient Raw Score: 21  JH-HLM Goal: 6: Walk 10 steps or more  JH-HLM Achieved: 7: Walk 25 feet or more  JH-HLM Goal achieved. Continue to encourage appropriate mobility.    Patient Centered Rounds: I performed bedside rounds with nursing staff today.   Discussions with Specialists or Other Care Team Provider: multidisciplinary team     Education and Discussions with Family / Patient:  Patient indicated he would call sister with update.     Current Length of Stay: 2 day(s)  Current Patient Status: Inpatient   Certification Statement: The patient will continue to require additional inpatient hospital stay due to  IV abx, follow up final culture/sensitivities, voiding trial in am   Discharge Plan: Anticipate discharge in 24-48 hrs to home.    Code Status: Level 1 - Full Code    Subjective   Patient seen sitting up in bed resting, reports that the Ledesma catheter has been leaking overnight.  CBI running much clearer, denies any fever or chills overnight.  Good appetite for breakfast no nausea or vomiting.    Objective :  Temp:  [97.9 °F (36.6 °C)-98.3 °F (36.8 °C)] 98.1 °F (36.7 °C)  HR:  [62-70] 63  BP: (126-159)/(54-74) 134/64  Resp:  [17-18] 17  SpO2:  [96 %-98 %] 98 %  O2 Device: None (Room air)    Body mass index is 22.46 kg/m².     Input and Output Summary (last 24 hours):     Intake/Output Summary (Last 24 hours) at 11/29/2024 1454  Last data filed at 11/29/2024 0830  Gross per 24 hour   Intake --   Output 34976 ml   Net -63608 ml       Physical Exam  Vitals and nursing note reviewed.   Constitutional:       General: He is not in acute distress.  HENT:      Head: Normocephalic.      Nose: Nose normal.      Mouth/Throat:      Mouth: Mucous membranes are moist.   Eyes:      Extraocular Movements: Extraocular movements intact.      Conjunctiva/sclera: Conjunctivae normal.      Pupils: Pupils are equal, round, and reactive to light.   Cardiovascular:      Rate and Rhythm: Normal rate.      Heart sounds: Murmur heard.   Pulmonary:      Effort: Pulmonary effort is normal.      Breath sounds: Normal breath sounds.   Abdominal:      General: Bowel sounds are normal. There is no distension.      Palpations: Abdomen is soft.      Tenderness: There is no abdominal tenderness.   Genitourinary:     Comments: Ledesma cath present, CBI running, much clearer   Musculoskeletal:         General: Normal range of motion.      Cervical back: Normal range of motion.   Skin:     General: Skin is warm and dry.      Capillary Refill: Capillary refill takes less than 2 seconds.   Neurological:      General: No focal deficit present.      Mental  Status: He is alert and oriented to person, place, and time.   Psychiatric:         Mood and Affect: Mood normal.         Behavior: Behavior normal.         Thought Content: Thought content normal.         Judgment: Judgment normal.           Lines/Drains:  Lines/Drains/Airways       Active Status       Name Placement date Placement time Site Days    Urethral Catheter Three way 18 Fr. 11/26/24 2202  Three way  2                  Urinary Catheter:  Goal for removal:  Voiding trial in a.m. as per discussion with urology                 Lab Results: I have reviewed the following results:   Results from last 7 days   Lab Units 11/29/24 0459 11/28/24  0554   WBC Thousand/uL 8.01 8.81   HEMOGLOBIN g/dL 11.6* 12.3   HEMATOCRIT % 36.0* 37.4   PLATELETS Thousands/uL 214 219   SEGS PCT %  --  67   LYMPHO PCT %  --  18   MONO PCT %  --  13*   EOS PCT %  --  1     Results from last 7 days   Lab Units 11/29/24 0459 11/28/24  0554   SODIUM mmol/L 138 139   POTASSIUM mmol/L 3.9 4.2   CHLORIDE mmol/L 105 108   CO2 mmol/L 28 28   BUN mg/dL 18 16   CREATININE mg/dL 0.72 0.74   ANION GAP mmol/L 5 3*   CALCIUM mg/dL 8.2* 8.6   ALBUMIN g/dL  --  3.5   TOTAL BILIRUBIN mg/dL  --  0.54   ALK PHOS U/L  --  55   ALT U/L  --  11   AST U/L  --  18   GLUCOSE RANDOM mg/dL 100 99     Results from last 7 days   Lab Units 11/26/24  1942   INR  1.10                   Recent Cultures (last 7 days):   Results from last 7 days   Lab Units 11/26/24 1958   URINE CULTURE  >100,000 cfu/ml Enterobacter cloacae*       Imaging Results Review: No pertinent imaging studies reviewed.  Other Study Results Review: No additional pertinent studies reviewed.    Last 24 Hours Medication List:     Current Facility-Administered Medications:     acetaminophen (TYLENOL) tablet 650 mg, Q6H PRN    [Held by provider] aspirin chewable tablet 81 mg, Daily    atorvastatin (LIPITOR) tablet 20 mg, Daily With Dinner    [Held by provider] clopidogrel (PLAVIX) tablet 75 mg,  Daily    docusate sodium (COLACE) capsule 100 mg, BID    latanoprost (XALATAN) 0.005 % ophthalmic solution 1 drop, Daily    losartan (COZAAR) tablet 25 mg, Daily    meropenem (MERREM) 1,000 mg in sodium chloride 0.9 % 100 mL IVPB, Q8H    metoprolol succinate (TOPROL-XL) 24 hr tablet 25 mg, Daily    oxybutynin (DITROPAN) tablet 5 mg, BID    Administrative Statements   Today, Patient Was Seen By: MICHELLE Alberts  I have spent a total time of rater than 45 minutes in caring for this patient on the day of the visit/encounter including Diagnostic results, Counseling / Coordination of care, Documenting in the medical record, Reviewing / ordering tests, medicine, procedures  , and Communicating with other healthcare professionals .    **Please Note: This note may have been constructed using a voice recognition system.**

## 2024-11-29 NOTE — PLAN OF CARE
Problem: PAIN - ADULT  Goal: Verbalizes/displays adequate comfort level or baseline comfort level  Description: Interventions:  - Encourage patient to monitor pain and request assistance  - Assess pain using appropriate pain scale  - Administer analgesics based on type and severity of pain and evaluate response  - Implement non-pharmacological measures as appropriate and evaluate response  - Consider cultural and social influences on pain and pain management  - Notify physician/advanced practitioner if interventions unsuccessful or patient reports new pain  Outcome: Progressing     Problem: INFECTION - ADULT  Goal: Absence or prevention of progression during hospitalization  Description: INTERVENTIONS:  - Assess and monitor for signs and symptoms of infection  - Monitor lab/diagnostic results  - Monitor all insertion sites, i.e. indwelling lines, tubes, and drains  - Monitor endotracheal if appropriate and nasal secretions for changes in amount and color  - [place] appropriate cooling/warming therapies per order  - Administer medications as ordered  - Instruct and encourage patient and family to use good hand hygiene technique  - Identify and instruct in appropriate isolation precautions for identified infection/condition  Outcome: Progressing  Goal: Absence of fever/infection during neutropenic period  Description: INTERVENTIONS:  - Monitor WBC    Outcome: Progressing     Problem: SAFETY ADULT  Goal: Patient will remain free of falls  Description: INTERVENTIONS:  - Educate patient/family on patient safety including physical limitations  - Instruct patient to call for assistance with activity   - Consult OT/PT to assist with strengthening/mobility   - Keep Call bell within reach  - Keep bed low and locked with side rails adjusted as appropriate  - Keep care items and personal belongings within reach  - Initiate and maintain comfort rounds  - Make Fall Risk Sign visible to staff  - Initiate/Maintain bed alarm  -  Apply yellow socks and bracelet for high fall risk patients  - Consider moving patient to room near nurses station  Outcome: Progressing  Goal: Maintain or return to baseline ADL function  Description: INTERVENTIONS:  -  Assess patient's ability to carry out ADLs; assess patient's baseline for ADL function and identify physical deficits which impact ability to perform ADLs (bathing, care of mouth/teeth, toileting, grooming, dressing, etc.)  - Assess/evaluate cause of self-care deficits   - Assess range of motion  - Assess patient's mobility; develop plan if impaired  - Assess patient's need for assistive devices and provide as appropriate  - Encourage maximum independence but intervene and supervise when necessary  - Involve family in performance of ADLs  - Assess for home care needs following discharge   - Consider OT consult to assist with ADL evaluation and planning for discharge  - Provide patient education as appropriate  Outcome: Progressing  Goal: Maintains/Returns to pre admission functional level  Description: INTERVENTIONS:  - Perform AM-PAC 6 Click Basic Mobility/ Daily Activity assessment daily.  - Set and communicate daily mobility goal to care team and patient/family/caregiver.   - Collaborate with rehabilitation services on mobility goals if consulted  - Perform Range of Motion 2 times a day.  - Reposition patient every 2 hours.  - Dangle patient 2 times a day  - Stand patient 2 times a day  - Ambulate patient 2 times a day  - Out of bed to chair 2 times a day   - Out of bed for meals 2 times a day  - Out of bed for toileting  - Record patient progress and toleration of activity level   Outcome: Progressing     Problem: DISCHARGE PLANNING  Goal: Discharge to home or other facility with appropriate resources  Description: INTERVENTIONS:  - Identify barriers to discharge w/patient and caregiver  - Arrange for needed discharge resources and transportation as appropriate  - Identify discharge learning  needs (meds, wound care, etc.)  - Arrange for interpretive services to assist at discharge as needed  - Refer to Case Management Department for coordinating discharge planning if the patient needs post-hospital services based on physician/advanced practitioner order or complex needs related to functional status, cognitive ability, or social support system  Outcome: Progressing     Problem: Knowledge Deficit  Goal: Patient/family/caregiver demonstrates understanding of disease process, treatment plan, medications, and discharge instructions  Description: Complete learning assessment and assess knowledge base.  Interventions:  - Provide teaching at level of understanding  - Provide teaching via preferred learning methods  Outcome: Progressing     Problem: GENITOURINARY - ADULT  Goal: Maintains or returns to baseline urinary function  Description: INTERVENTIONS:  - Assess urinary function  - Encourage oral fluids to ensure adequate hydration if ordered  - Administer IV fluids as ordered to ensure adequate hydration  - Administer ordered medications as needed  - Offer frequent toileting  - Follow urinary retention protocol if ordered  Outcome: Progressing  Goal: Absence of urinary retention  Description: INTERVENTIONS:  - Assess patient’s ability to void and empty bladder  - Monitor I/O  - Bladder scan as needed  - Discuss with physician/AP medications to alleviate retention as needed  - Discuss catheterization for long term situations as appropriate  Outcome: Progressing  Goal: Urinary catheter remains patent  Description: INTERVENTIONS:  - Assess patency of urinary catheter  - If patient has a chronic hickman, consider changing catheter if non-functioning  - Follow guidelines for intermittent irrigation of non-functioning urinary catheter  Outcome: Progressing     Problem: HEMATOLOGIC - ADULT  Goal: Maintains hematologic stability  Description: INTERVENTIONS  - Assess for signs and symptoms of bleeding or hemorrhage  -  Monitor labs  - Administer supportive blood products/factors as ordered and appropriate  Outcome: Progressing

## 2024-11-29 NOTE — PROGRESS NOTES
"Progress Note - Urology      Patient: Gilberto Vann   : 1940 Sex: male   MRN: [id number]     CSN: [id number]  Unit/Bed#: [address] [place] [address]     SUBJECTIVE:   Patient seen on afternoon rounds  CBI clear  Hemorrhagic cystitis clearing      Objective   Vitals: /63 (BP Location: Left arm)   Pulse 62   Temp 98.2 °F (36.8 °C) (Oral)   Resp 18   Ht 5' 9\" (1.753 m)   Wt 69 kg (152 lb 1.9 oz)   SpO2 97%   BMI 22.46 kg/m²     I/O last 24 hours:  In: -   Out: 40419 [Urine:45056]      Physical Exam:   General Alert awake   Normocephalic atraumatic PERRLA  Lungs clear bilaterally  Cardiac normal S1 normal S2  Abdomen soft, flank pain  Extremities no edema      Lab Results: CBC:   Lab Results   Component Value Date    WBC 8.01 2024    HGB 11.6 (L) 2024    HCT 36.0 (L) 2024    MCV 91 2024     2024    RBC 3.95 2024    MCH 29.4 2024    MCHC 32.2 2024    RDW 14.5 2024    MPV 10.9 2024    NRBC 0 2024     CMP:   Lab Results   Component Value Date     2024     2023    CO2 28 2024    CO2 26 2023    BUN 18 2024    BUN 20 2023    CREATININE 0.72 2024    CALCIUM 8.2 (L) 2024    AST 18 2024    AST 17 2023    ALT 11 2024    ALT 14 2023    ALKPHOS 55 2024    EGFR 85 2024     Urinalysis:   Lab Results   Component Value Date    COLORU Dark Red 2024    CLARITYU Turbid 2024    SPECGRAV >=1.030 2024    PHUR 7.0 2024    LEUKOCYTESUR Moderate (A) 2024    NITRITE Positive (A) 2024    GLUCOSEU Negative 2024    KETONESU 10 (1+) (A) 2024    BILIRUBINUR Moderate (A) 2024    BLOODU Large (A) 2024     Urine Culture:   Lab Results   Component Value Date    URINECX >100,000 cfu/ml Enterobacter cloacae (A) 2024     PSA:   Lab Results   Component Value Date    PSA 6.98 (H) 2023         Assessment/ " Plan:  Hemorrhagic cystitis  DC CBI  Ledesma to bag drainage  If urine clear DC Ledesma 10 AM tomorrow          James Blunt MD

## 2024-11-29 NOTE — PHYSICAL THERAPY NOTE
PT EVALUATION       11/29/24 0941   PT Last Visit   PT Visit Date 11/29/24   Note Type   Note type Evaluation   Pain Assessment   Pain Assessment Tool 0-10   Pain Score No Pain   Patient's Stated Pain Goal No pain   Multiple Pain Sites No   Restrictions/Precautions   Weight Bearing Precautions Per Order No   Other Precautions Fall Risk;Pain  (CBI)   Home Living   Type of Home House   Home Layout Multi-level;Stairs to enter with rails  (3 [address] ; bedroom on 2nd floor, bathroom on 1st floor)   Bathroom Toilet Standard   Additional Comments no DME, IND PTA   Prior Function   Level of [place] Independent with ADLs;Independent with functional mobility;Independent with IADLS   Lives With Alone   Receives Help From Friend(s)  (Pt reports he has supportive friend who lives nearby ; family lives in [facility])   IADLs Independent with driving;Independent with meal prep;Independent with medication management   Falls in the last 6 months 1 to 4  (pt reports he had fall at home prior to admission but does not remember it)   Vocational Retired   General   Additional Pertinent History Pt is an 84 year-old male who was admitted to the hospital on 11/26/24 due to hematuria ; currently on CBI.   Family/Caregiver Present No   Cognition   Arousal/Participation Cooperative   Orientation Level Oriented X4   Following Commands Follows multistep commands with increased time or repetition   RLE Assessment   RLE Assessment WFL   LLE Assessment   LLE Assessment WFL   Bed Mobility   Supine to Sit 7  Independent   Sit to Supine 7  Independent   Transfers   Sit to Stand 7  Independent   Stand to Sit 7  Independent   Ambulation/Elevation   Gait pattern Short stride;Step through pattern   Gait Assistance 5  Supervision   Assistive Device None   Distance 150 feet with multiple changes of direction   Stair Management Assistance 5  Supervision  (close supervision)   Additional items Assist x 1;Verbal cues   Stair Management Technique No  rails;Reciprocal   Number of Stairs 4  (mild unsteadiness when descending ; improved balance with use of rail)   Ambulation/Elevation Additional Comments Pt with intermittent unsteadiness when first ambulating ; improved as session progressed with mild fatigue per pt ; mildly impulsive/unsteady on steps as mentioned above with improved balance/stability with verbal cues to hold rail   Balance   Static Sitting Good   Dynamic Sitting Fair +   Static Standing Fair   Dynamic Standing Fair   Ambulatory Fair   Activity Tolerance   Activity Tolerance Patient tolerated treatment well   Nurse Made Aware yes [place]   Assessment   Prognosis Good   Problem List Impaired balance;Decreased mobility;Decreased endurance;Pain   Assessment Patient seen for Physical Therapy evaluation. Patient admitted with Hematuria, gross.  Comorbidities affecting patient's physical performance include: anxiety, arthritis, asthma, CAD, cardiac disease, chronic back pain, chronic pain, CHF, COPD, and osteoarthritis.  Personal factors affecting patient at time of initial evaluation include: lives in multi story house and stairs to enter home. Prior to admission, patient was independent with functional mobility without assistive device, independent with ADLS, independent with IADLS, living alone in multi story home with 3 steps to enter, ambulating household distance, and ambulating community distances.  Please find objective findings from Physical Therapy assessment regarding body systems outlined above with impairments and limitations including impaired balance, decreased endurance, gait deviations, decreased activity tolerance, decreased functional mobility tolerance, and fall risk.  The Barthel Index was used as a functional outcome tool presenting with a score of Barthel Index Score: 75 today indicating moderate limitations of functional mobility and ADLS.  Patient's clinical presentation is currently evolving as seen in patient's presentation  of changing level of pain, increased fall risk, and decreased endurance. Pt would benefit from continued Physical Therapy treatment to address deficits as defined above and maximize level of functional mobility. As demonstrated by objective findings, the assigned level of complexity for this evaluation is moderate.The patient's [facility] Basic Mobility Inpatient Short Form Raw Score is 21. A Raw score of greater than 16 suggests the patient may benefit from discharge to home. Please also refer to the recommendation of the Physical Therapist for safe discharge planning.   Goals   Patient Goals to feel better and return home   STG Expiration Date 12/06/24   Short Term Goal #1 Pt will ambulate x 150 feet IND ; Pt will negotiate x 3 steps IND to enter/exit home ; Standing balance will improve to fair+ to decrease risk of falls ; AMPAC score will improve >22/24 to demonstrate improved functional independence   LT Expiration Date 12/13/24   Long Term Goal #1 Pt will ambulate 500 feet IND ; Standing balance will improve to good to decrease risk of falls   Plan   Treatment/Interventions Functional transfer training;LE strengthening/ROM;Therapeutic exercise;Endurance training;Bed mobility;Gait training;Spoke to nursing   PT Frequency 2-3x/wk   Discharge Recommendation   Rehab Resource Intensity Level, PT III (Minimum Resource Intensity)   Additional Comments Pt observed IND leaving bathroom + IND donning socks/shoes ; pt reports no concerns for managing IADLs ; no skilled OT needs identified   AM-[facility] Basic Mobility Inpatient   Turning in Flat Bed Without Bedrails 4   Lying on Back to Sitting on Edge of Flat Bed Without Bedrails 4   Moving Bed to Chair 3   Standing Up From Chair Using Arms 4   Walk in Room 3   Climb 3-5 Stairs With Railing 3   Basic Mobility Inpatient Raw Score 21   Basic Mobility Standardized Score 45.55   [facility] Highest Level Of Mobility   -HL Goal 6: Walk 10 steps or more   -HLM Achieved 7: Walk  25 feet or more   Barthel Index   Feeding 10   Bathing 5   Grooming Score 5   Dressing Score 10   Bladder Score 0   Bowels Score 10   Toilet Use Score 10   Transfers (Bed/Chair) Score 10   Mobility (Level Surface) Score 10   Stairs Score 5   Barthel Index Score 75   End of Consult   Patient Position at End of Consult Seated edge of bed;All needs within reach   Licensure   [place] License Number  Ivette Perez [id number]

## 2024-11-29 NOTE — OCCUPATIONAL THERAPY NOTE
Occupational Therapy Screen       11/29/24 4031   Note Type   Note type Screen   Additional Comments OT orders received. Chart reviewed. Per review and per PT, patient currently independent in all ADLs at this time. No skilled inpatient OT services indicated. OT orders to be discharged.   Licensure   [place] License Number  Viktoriya Norberto, OTR/L [id number]

## 2024-11-29 NOTE — PLAN OF CARE
Problem: INFECTION - ADULT  Goal: Absence or prevention of progression during hospitalization  Description: INTERVENTIONS:  - Assess and monitor for signs and symptoms of infection  - Monitor lab/diagnostic results  - Monitor all insertion sites, i.e. indwelling lines, tubes, and drains  - Monitor endotracheal if appropriate and nasal secretions for changes in amount and color  - [place] appropriate cooling/warming therapies per order  - Administer medications as ordered  - Instruct and encourage patient and family to use good hand hygiene technique  - Identify and instruct in appropriate isolation precautions for identified infection/condition  Outcome: Progressing  Goal: Absence of fever/infection during neutropenic period  Description: INTERVENTIONS:  - Monitor WBC    Outcome: Progressing     Problem: GENITOURINARY - ADULT  Goal: Maintains or returns to baseline urinary function  Description: INTERVENTIONS:  - Assess urinary function  - Encourage oral fluids to ensure adequate hydration if ordered  - Administer IV fluids as ordered to ensure adequate hydration  - Administer ordered medications as needed  - Offer frequent toileting  - Follow urinary retention protocol if ordered  Outcome: Progressing  Goal: Absence of urinary retention  Description: INTERVENTIONS:  - Assess patient’s ability to void and empty bladder  - Monitor I/O  - Bladder scan as needed  - Discuss with physician/AP medications to alleviate retention as needed  - Discuss catheterization for long term situations as appropriate  Outcome: Progressing  Goal: Urinary catheter remains patent  Description: INTERVENTIONS:  - Assess patency of urinary catheter  - If patient has a chronic hickman, consider changing catheter if non-functioning  - Follow guidelines for intermittent irrigation of non-functioning urinary catheter  Outcome: Progressing

## 2024-11-29 NOTE — CASE MANAGEMENT
Case Management Discharge Planning Note    Patient name Gilberto Vann  Location [address] 201/[address] 201 MRN [id number]  : 1940 Date 2024       Current Admission Date: 2024  Current Admission Diagnosis:Hematuria, gross   Patient Active Problem List    Diagnosis Date Noted Date Diagnosed    Tobacco abuse 2024     Urinary tract infection with hematuria 2024     Primary hypertension 2024     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HCC) 2024     Pacemaker 11/15/2024     Plantar fasciitis 2024     CAD (coronary artery disease) 2024     Critical aortic valve stenosis 2024     Cardiomyopathy (HCC) 2024     Syncope 2024     Glaucoma 2024     Elevated troponin 2024     Hyperglycemia 2024     Closed fracture of multiple ribs of both sides 2024     Closed fracture of body of sternum 2024     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HCC) 2024     Pulmonary nodules 2024     Anxiety 2024     Anemia 2024     Centrilobular emphysema (HCC) 2023     Hypercalcemia 2023     Shortness of breath 2023     Chronic bronchitis (HCC) 2023     Hypokalemia 2023     Encysted hydrocele 2023     Hypertensive heart failure (HCC) 2023     Hydrocele 01/10/2023     Primary osteoarthritis involving multiple joints 2022     Lumbar radiculopathy 2022     Hematuria, gross 2020     COPD (chronic obstructive pulmonary disease) (HCC) 2020     Asthma 2013       LOS (days): 2  Geometric Mean LOS (GMLOS) (days): 2.8  Days to GMLOS:0.9     OBJECTIVE:  Risk of Unplanned Readmission Score: 13.92     Current admission status: Inpatient   Preferred Pharmacy:   [id number] - [place], [place] - [address] ( [address]  [address] ( [address]  [facility] [address]  Phone: [phone] Fax:  [phone]    [id number] - [place], [place] - [address]  [address] [facility] [address]  Phone: [phone] Fax: [phone]    [facility] [place] [facility] [address]  [address]  [facility] [address]  Phone: [phone] Fax: [phone]    Primary Care Provider: Dima Lloyd MD    Primary Insurance: [employer]  Secondary Insurance:     DISCHARGE DETAILS:    IMM Given (Date):: 11/29/24  IMM Given to:: Patient (IMM #2 reviewed with pt. Pt verbalized understanding. Pt signed IMM and copy given. Copy also placed in scan bin for chart.)

## 2024-11-29 NOTE — ASSESSMENT & PLAN NOTE
Echo 6/17/24 EF 45% systolic function is mildly reduced.  Wall motion is normal.  Diastolic function is mildly abnormal consistent with grade 1 relaxation.    Hold home clopidogrel  and ASA in the setting of hematuria  Continue home metoprolol & losartan  Good oral intake, will stop IV hydration

## 2024-11-29 NOTE — ASSESSMENT & PLAN NOTE
POA with gross hematuria for 2 days   No leukocytosis, per patient denies CVA tenderness or dysuria  UA positive for nitrates, moderate leuks, and normal red blood cells, bacteria  In Ed started on ceftriaxone, Enterobacter cloacae identified, changed to meropenem until final sensitivities are back  Monitor WBC and fever curve

## 2024-11-29 NOTE — PLAN OF CARE
Problem: PHYSICAL THERAPY ADULT  Goal: Performs mobility at highest level of function for planned discharge setting.  See evaluation for individualized goals.  Description: Treatment/Interventions: Functional transfer training, LE strengthening/ROM, Therapeutic exercise, Endurance training, Bed mobility, Gait training, Spoke to nursing          See flowsheet documentation for full assessment, interventions and recommendations.  Note: Prognosis: Good  Problem List: Impaired balance, Decreased mobility, Decreased endurance, Pain  Assessment: Patient seen for Physical Therapy evaluation. Patient admitted with Hematuria, gross.  Comorbidities affecting patient's physical performance include: anxiety, arthritis, asthma, CAD, cardiac disease, chronic back pain, chronic pain, CHF, COPD, and osteoarthritis.  Personal factors affecting patient at time of initial evaluation include: lives in multi story house and stairs to enter home. Prior to admission, patient was independent with functional mobility without assistive device, independent with ADLS, independent with IADLS, living alone in multi story home with 3 steps to enter, ambulating household distance, and ambulating community distances.  Please find objective findings from Physical Therapy assessment regarding body systems outlined above with impairments and limitations including impaired balance, decreased endurance, gait deviations, decreased activity tolerance, decreased functional mobility tolerance, and fall risk.  The Barthel Index was used as a functional outcome tool presenting with a score of Barthel Index Score: 75 today indicating moderate limitations of functional mobility and ADLS.  Patient's clinical presentation is currently evolving as seen in patient's presentation of changing level of pain, increased fall risk, and decreased endurance. Pt would benefit from continued Physical Therapy treatment to address deficits as defined above and maximize level  of functional mobility. As demonstrated by objective findings, the assigned level of complexity for this evaluation is moderate.The patient's AM-PAC Basic Mobility Inpatient Short Form Raw Score is 21. A Raw score of greater than 16 suggests the patient may benefit from discharge to home. Please also refer to the recommendation of the Physical Therapist for safe discharge planning.        Rehab Resource Intensity Level, PT: III (Minimum Resource Intensity)    See flowsheet documentation for full assessment.

## 2024-11-29 NOTE — ASSESSMENT & PLAN NOTE
POA with complaint of gross hematuria x 2 days.  Patient states that today hematuria gotten  worse today while out shopping.  He noticed after urination is straight blood with clots.  Hx of BPH with frequency, hesitancy, and decreased flow.  Home medication plavix and ASA on hold  DVT prophylaxis SCD  CT renal stone study abdomen pelvis without contrast : Dense material in the dependent urinary bladder likely representing blood products. No calculi in the bladder. An underlying urothelial or less likely renal mass cannot be excluded. Recommend further evaluation with CT urography.   UA positive for nitrates, moderate leuks, and normal red blood cells, bacteria  In ED Urology consulted recommendation three-way catheter Ledesma placed, and initiate CBI  In Ed received IV antbx ceftriaxone, will continue Iv antibx ceftriaxone q 24 IV   Oral intake good will stop IVF at this time   CBI had been discontinued 11/27 however patient developed hematuria once again, urology recommended reinitiation of CBI, hold aspirin and Plavix at this time.  CBI has cleared, stopping and will do voiding trial a.m. of 11/30  Follow up on urine cultures, showing greater than 100,000 CFU/mL Enterobacter Cloacae, will upgrade to meropenem and follow-up on final sensitivities  Monitor I &O  CBC/CMP in the morning

## 2024-11-30 VITALS
TEMPERATURE: 97.9 F | OXYGEN SATURATION: 99 % | RESPIRATION RATE: 18 BRPM | DIASTOLIC BLOOD PRESSURE: 53 MMHG | HEART RATE: 51 BPM | SYSTOLIC BLOOD PRESSURE: 124 MMHG | HEIGHT: 69 IN | WEIGHT: 152.12 LBS | BODY MASS INDEX: 22.53 KG/M2

## 2024-11-30 LAB
ANION GAP SERPL CALCULATED.3IONS-SCNC: 4 MMOL/L (ref 4–13)
BUN SERPL-MCNC: 21 MG/DL (ref 5–25)
CALCIUM SERPL-MCNC: 9 MG/DL (ref 8.4–10.2)
CHLORIDE SERPL-SCNC: 107 MMOL/L (ref 96–108)
CO2 SERPL-SCNC: 27 MMOL/L (ref 21–32)
CREAT SERPL-MCNC: 0.75 MG/DL (ref 0.6–1.3)
ERYTHROCYTE [DISTWIDTH] IN BLOOD BY AUTOMATED COUNT: 14.3 % (ref 11.6–15.1)
GFR SERPL CREATININE-BSD FRML MDRD: 83 ML/MIN/1.73SQ M
GLUCOSE SERPL-MCNC: 97 MG/DL (ref 65–140)
HCT VFR BLD AUTO: 37.5 % (ref 36.5–49.3)
HGB BLD-MCNC: 12.4 G/DL (ref 12–17)
MCH RBC QN AUTO: 30.1 PG (ref 26.8–34.3)
MCHC RBC AUTO-ENTMCNC: 33.1 G/DL (ref 31.4–37.4)
MCV RBC AUTO: 91 FL (ref 82–98)
PLATELET # BLD AUTO: 218 THOUSANDS/UL (ref 149–390)
PMV BLD AUTO: 10.4 FL (ref 8.9–12.7)
POTASSIUM SERPL-SCNC: 3.9 MMOL/L (ref 3.5–5.3)
RBC # BLD AUTO: 4.12 MILLION/UL (ref 3.88–5.62)
SODIUM SERPL-SCNC: 138 MMOL/L (ref 135–147)
WBC # BLD AUTO: 8.69 THOUSAND/UL (ref 4.31–10.16)

## 2024-11-30 PROCEDURE — 85027 COMPLETE CBC AUTOMATED: CPT | Performed by: NURSE PRACTITIONER

## 2024-11-30 PROCEDURE — 80048 BASIC METABOLIC PNL TOTAL CA: CPT | Performed by: NURSE PRACTITIONER

## 2024-11-30 PROCEDURE — 99239 HOSP IP/OBS DSCHRG MGMT >30: CPT | Performed by: NURSE PRACTITIONER

## 2024-11-30 RX ORDER — DOCUSATE SODIUM 100 MG/1
100 CAPSULE, LIQUID FILLED ORAL 2 TIMES DAILY
Qty: 60 CAPSULE | Refills: 0 | Status: SHIPPED | OUTPATIENT
Start: 2024-11-30

## 2024-11-30 RX ORDER — OXYBUTYNIN CHLORIDE 5 MG/1
5 TABLET ORAL 2 TIMES DAILY
Qty: 60 TABLET | Refills: 0 | Status: SHIPPED | OUTPATIENT
Start: 2024-11-30

## 2024-11-30 RX ADMIN — OXYBUTYNIN CHLORIDE 5 MG: 5 TABLET ORAL at 08:23

## 2024-11-30 RX ADMIN — METOPROLOL SUCCINATE 25 MG: 25 TABLET, EXTENDED RELEASE ORAL at 08:24

## 2024-11-30 RX ADMIN — LOSARTAN POTASSIUM 25 MG: 25 TABLET, FILM COATED ORAL at 08:23

## 2024-11-30 RX ADMIN — DOCUSATE SODIUM 100 MG: 100 CAPSULE, LIQUID FILLED ORAL at 08:23

## 2024-11-30 NOTE — ASSESSMENT & PLAN NOTE
Continue home losartan and metoprolol  BP has been acceptable.  Follow-up outpatient PCP 1 to 2 weeks postdischarge

## 2024-11-30 NOTE — DISCHARGE SUMMARY
Discharge Summary - Hospitalist   Name: Gilberto Vann 84 y.o. male I MRN: [id number]  Unit/Bed#: 2 [address] Date of Admission: 11/26/2024   Date of Service: 11/30/2024 I Hospital Day: 3     Assessment & Plan  Hematuria, gross  POA with complaint of gross hematuria x 2 days.  Patient states that today hematuria gotten  worse today while out shopping.  He noticed after urination is straight blood with clots.  Hx of BPH with frequency, hesitancy, and decreased flow.  Home medication plavix and ASA on hold  DVT prophylaxis SCD  CT renal stone study abdomen pelvis without contrast : Dense material in the dependent urinary bladder likely representing blood products. No calculi in the bladder. An underlying urothelial or less likely renal mass cannot be excluded. Recommend further evaluation with CT urography, follow up outpatient urology    UA positive for nitrates, moderate leuks, and normal red blood cells, bacteria  In ED Urology consulted recommendation three-way catheter Ledesma placed, and initiate CBI  In Ed received IV antbx ceftriaxone, will continue Iv antibx ceftriaxone q 24 IV   Oral intake good will stop IVF at this time   CBI had been discontinued 11/27 however patient developed hematuria once again, urology recommended reinitiation of CBI, hold aspirin and Plavix at this time.  CBI has cleared, stopping and will do voiding trial a.m. 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  Monitor off antibiotics  Patient remains afebrile, WBC normal.  Ledesma catheter removed, patient successfully passed voiding trial and will discharged home today.  Follow-up outpatient urology 1 to 2 weeks postdischarge.  Follow-up with PCP 1 to 2 weeks postdischarge    Urinary tract  infection with hematuria  POA with gross hematuria for 2 days   No leukocytosis, per patient denies CVA tenderness or dysuria  UA positive for nitrates, moderate leuks, and normal red blood cells, bacteria  In Ed started on ceftriaxone, Enterobacter cloacae identified, discussed with ID as noted above, monitored off of antibiotics  Afebrile and WBCs normal  Follow-up CBC in 1 week.  Discharge to home today  COPD (chronic obstructive pulmonary disease) ([facility])  Not in acute exacerbation  Continue to follow with outpatient pulmonary  Cardiomyopathy ([facility])  Echo 6/17/24 EF 45% systolic function is mildly reduced.  Wall motion is normal.  Diastolic function is mildly abnormal consistent with grade 1 relaxation.    Held home clopidogrel  and ASA in the setting of hematuria  Continue home metoprolol & losartan  Hematuria has resolved, resume aspirin and Plavix and get repeat CBC in 1 week.  Patient advised to monitor for hematuria or inability to urinate and to call MD if this occurs.  Patient is discharged to home today  CAD (coronary artery disease)  Denies chest pain  Continue home medication metoprolol & atorvastatin  Aspirin & Plavix on hold during hospitalization, will resume and patient is to monitor for signs and symptoms of hematuria  Discharged home  CBC in 1 week  Primary hypertension  Continue home losartan and metoprolol  BP has been acceptable.  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  Tobacco abuse  Patient states he stopped smoking several years ago however he still continues to use  oral tobacco  Refuses nicotine patch  Tobacco cessation encouraged     Medical Problems       Resolved Problems  Date Reviewed: 11/30/2024          Resolved    Benign prostatic hyperplasia with urinary retention 11/26/2024     Resolved by  Alyssa Delacruz    Overview Signed 4/22/2020 12:40 PM by Shaun Castro   replacing diagnoses that were inactivated after the 04/01 regulatory import             Discharging Physician / Practitioner: MICHELLE Alberts  PCP: Dima Lloyd MD  Admission Date:   Admission Orders (From admission, onward)       Ordered        11/27/24 1704  INPATIENT ADMISSION  Once            11/26/24 2201  Place in Observation  Once                          Discharge Date: 11/30/24    Consultations During Hospital Stay:  Urology    Procedures Performed:   Continuous bladder irrigation    Significant Findings / Test Results:   CT renal stone abdomen pelvis performed 11/26/2024 showed dense material in the dependent urinary bladder likely representing blood products.  No calculi in the bladder.  An underlying urothelial or less likely renal mass cannot be excluded.  Recommend further evaluation CT urography.  Prostatomegaly and median lobe hypertrophy as per radiology report.    Incidental Findings:   See above  I reviewed the above mentioned incidental findings with the patient and/or family and they expressed understanding.    Test Results Pending at Discharge (will require follow up):   None     Outpatient Tests Requested:  CBC in 1 week    Complications: None    Reason for Admission: Hematuria    Hospital Course:   Gilberto Vann is a 84 y.o. male patient past medical history of arthritis, asthma, BPH with urinary obstruction, COPD, CHF, HTN, CAD, cardiomyopathy who originally presented to the hospital on 11/26/2024 due to gross hematuria.  Patient indicated he first noticed bloody urine and blood clots in the urine evening prior to admission.  Patient reported that he was out  shopping using the restroom and he urinated straight blood with clots.  He presented to the emergency department for further evaluation and treatment.  UA was obtained which demonstrated moderate leukocytes, positive nitrates, large blood, moderate bilirubin.  Patient was placed on ceftriaxone which she completed for 3 days.  Urine culture demonstrated greater than 100,000 CFU/mL Enterobacter Cloacae, discussed with infectious disease who indicated that as patient got better on ceftriaxone felt that patient had been colonized and to monitor patient off of antibiotics.  Patient remained afebrile and WBCs remain normal.  Urology was consulted, three-way Ledesma was placed and CBI was initiated.  Urine had been appearing clear, CBI was discontinued however patient once again developed hematuria.  Continued with CBI for additional day, clearing.  Morning of 11/30 Ledesma catheter was removed, patient successfully underwent voiding trial with only residual of 35 mL.  Urine was clear no clots.  The patient will be discharged to home today as PT/OT indicated no needs were identified during evaluation.  Patient is to follow-up with outpatient urology in the next 1 to 2 weeks.  He is also to follow-up with his PCP 1 to 2 weeks postdischarge.  He will be resumed on his home medications of aspirin and Plavix, and will get a CBC in 1 week.  He is advised to observe for signs of hematuria or inability to pass his urine and to call MD immediately if this occurs.  Patient verbalized understanding and is agreeable to the plan.  Patient is discharged to home today          Please see above list of diagnoses and related plan for additional information.     Condition at Discharge: good    Discharge Day Visit / Exam:   Subjective: Patient seen sitting up on side of the bed, resting comfortably.  Ledesma catheter taken out, was able to void without difficulty, voided 200 mL and had a residual of 37 mL.  Denies any blood or clots with  "urination.  Denies any fevers or chills.  Is looking forward to going home today.    Vitals: Blood Pressure: 124/53 (11/30/24 0806)  Pulse: (!) 51 (11/30/24 0806)  Temperature: 97.9 °F (36.6 °C) (11/30/24 0806)  Temp Source: Oral (11/29/24 0758)  Respirations: 18 (11/29/24 2321)  Height: 5' 9\" (175.3 cm) (11/27/24 0039)  Weight - Scale: 69 kg (152 lb 1.9 oz) (11/27/24 0039)  SpO2: 99 % (11/30/24 0806)    Physical Exam  Vitals and nursing note reviewed.   HENT:      Head: Normocephalic.      Nose: Nose normal.      Mouth/Throat:      Mouth: Mucous membranes are moist.   Eyes:      Extraocular Movements: Extraocular movements intact.      Conjunctiva/sclera: Conjunctivae normal.      Pupils: Pupils are equal, round, and reactive to light.   Cardiovascular:      Rate and Rhythm: Normal rate and regular rhythm.      Pulses: Normal pulses.      Heart sounds: Murmur heard.   Pulmonary:      Effort: Pulmonary effort is normal.      Breath sounds: Normal breath sounds.   Abdominal:      General: Bowel sounds are normal. There is no distension.      Palpations: Abdomen is soft.      Tenderness: There is no abdominal tenderness.   Genitourinary:     Comments: Voiding spontaneously; no blood   Musculoskeletal:         General: Normal range of motion.      Cervical back: Normal range of motion.      Right lower leg: No edema.      Left lower leg: No edema.   Skin:     General: Skin is warm and dry.      Capillary Refill: Capillary refill takes less than 2 seconds.   Neurological:      General: No focal deficit present.      Mental Status: He is alert and oriented to person, place, and time.   Psychiatric:         Mood and Affect: Mood normal.         Behavior: Behavior normal.         Thought Content: Thought content normal.         Judgment: Judgment normal.          Discussion with Family:  patient to call family .     Discharge instructions/Information to patient and family:   See after visit summary for information provided " to patient and family.      Provisions for Follow-Up Care:  See after visit summary for information related to follow-up care and any pertinent home health orders.      Mobility at time of Discharge:   Basic Mobility Inpatient Raw Score: 22  JH-HLM Goal: 7: Walk 25 feet or more  JH-HLM Achieved: 7: Walk 25 feet or more  HLM Goal achieved. Continue to encourage appropriate mobility.     Disposition:   Home    Planned Readmission: No     Discharge Medications:  See after visit summary for reconciled discharge medications provided to patient and/or family.      Administrative Statements   Discharge Statement:  I have spent a total time of greater than 45 minutes in caring for this patient on the day of the visit/encounter. >30 minutes of time was spent on: Diagnostic results, Impressions, Counseling / Coordination of care, Documenting in the medical record, Reviewing / ordering tests, medicine, procedures  , and Communicating with other healthcare professionals .    **Please Note: This note may have been constructed using a voice recognition system**

## 2024-11-30 NOTE — INCIDENTAL FINDINGS
The following findings require follow up:  Radiographic finding   Finding: CT of abdomen and pelvis performed on 11/26/2024 demonstrated dense material in dependent urinary bladder likely representing blood products, no calculi in the bladder.  An underlying urothelial or less likely renal mass cannot be excluded.  Recommend further evaluation with CT urography.  Prostatic megaly and median lobe hypertrophy as per radiology report.   Follow up required: CT urography    Follow up should be done within one  month(s)    Please notify the following clinician to assist with the follow up:   Dr. Blunt    Incidental finding results were discussed with the Patient by MICHELLE Alberts on 11/30/24.   They expressed understanding and all questions answered.

## 2024-11-30 NOTE — ASSESSMENT & PLAN NOTE
Patient states he stopped smoking several years ago however he still continues to use oral tobacco  Refuses nicotine patch  Tobacco cessation encouraged

## 2024-11-30 NOTE — ASSESSMENT & PLAN NOTE
POA with complaint of gross hematuria x 2 days.  Patient states that today hematuria gotten  worse today while out shopping.  He noticed after urination is straight blood with clots.  Hx of BPH with frequency, hesitancy, and decreased flow.  Home medication plavix and ASA on hold  DVT prophylaxis SCD  CT renal stone study abdomen pelvis without contrast : Dense material in the dependent urinary bladder likely representing blood products. No calculi in the bladder. An underlying urothelial or less likely renal mass cannot be excluded. Recommend further evaluation with CT urography, follow up outpatient urology    UA positive for nitrates, moderate leuks, and normal red blood cells, bacteria  In ED Urology consulted recommendation three-way catheter Ledesma placed, and initiate CBI  In Ed received IV antbx ceftriaxone, will continue Iv antibx ceftriaxone q 24 IV   Oral intake good will stop IVF at this time   CBI had been discontinued 11/27 however patient developed hematuria once again, urology recommended reinitiation of CBI, hold aspirin and Plavix at this time.  CBI has cleared, stopping and will do voiding trial a.m. 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  Monitor off antibiotics  Patient remains afebrile, WBC normal.  Ledesma catheter removed, patient successfully passed voiding trial and will discharged home today.  Follow-up outpatient urology 1 to 2 weeks postdischarge.  Follow-up with PCP 1 to 2 weeks postdischarge

## 2024-11-30 NOTE — ASSESSMENT & PLAN NOTE
Denies chest pain  Continue home medication metoprolol & atorvastatin  Aspirin & Plavix on hold during hospitalization, will resume and patient is to monitor for signs and symptoms of hematuria  Discharged home  CBC in 1 week

## 2024-11-30 NOTE — NURSING NOTE
AVS reviewed with patient education provided. Belongings checked and in patient's  possession. Iv removed patient escorted to [place] with friend by patient care tech

## 2024-11-30 NOTE — PLAN OF CARE
Problem: PAIN - ADULT  Goal: Verbalizes/displays adequate comfort level or baseline comfort level  Description: Interventions:  - Encourage patient to monitor pain and request assistance  - Assess pain using appropriate pain scale  - Administer analgesics based on type and severity of pain and evaluate response  - Implement non-pharmacological measures as appropriate and evaluate response  - Consider cultural and social influences on pain and pain management  - Notify physician/advanced practitioner if interventions unsuccessful or patient reports new pain  Outcome: Progressing     Problem: INFECTION - ADULT  Goal: Absence or prevention of progression during hospitalization  Description: INTERVENTIONS:  - Assess and monitor for signs and symptoms of infection  - Monitor lab/diagnostic results  - Monitor all insertion sites, i.e. indwelling lines, tubes, and drains  - Monitor endotracheal if appropriate and nasal secretions for changes in amount and color  - Ollie appropriate cooling/warming therapies per order  - Administer medications as ordered  - Instruct and encourage patient and family to use good hand hygiene technique  - Identify and instruct in appropriate isolation precautions for identified infection/condition  Outcome: Progressing       Problem: SAFETY ADULT  Goal: Patient will remain free of falls  Description: INTERVENTIONS:  - Educate patient/family on patient safety including physical limitations  - Instruct patient to call for assistance with activity   - Consult OT/PT to assist with strengthening/mobility   - Keep Call bell within reach  - Keep bed low and locked with side rails adjusted as appropriate  - Keep care items and personal belongings within reach  - Initiate and maintain comfort rounds  - Make Fall Risk Sign visible to staff  - Apply yellow socks and bracelet for high fall risk patients  - Consider moving patient to room near nurses station  Outcome: Progressing  Goal: Maintain or  return to baseline ADL function  Description: INTERVENTIONS:  -  Assess patient's ability to carry out ADLs; assess patient's baseline for ADL function and identify physical deficits which impact ability to perform ADLs (bathing, care of mouth/teeth, toileting, grooming, dressing, etc.)  - Assess/evaluate cause of self-care deficits   - Assess range of motion  - Assess patient's mobility; develop plan if impaired  - Assess patient's need for assistive devices and provide as appropriate  - Encourage maximum independence but intervene and supervise when necessary  - Involve family in performance of ADLs  - Assess for home care needs following discharge   - Consider OT consult to assist with ADL evaluation and planning for discharge  - Provide patient education as appropriate  Outcome: Progressing  Goal: Maintains/Returns to pre admission functional level  Description: INTERVENTIONS:  - Perform AM-PAC 6 Click Basic Mobility/ Daily Activity assessment daily.  - Set and communicate daily mobility goal to care team and patient/family/caregiver.   - Collaborate with rehabilitation services on mobility goals if consulted  - Perform Range of Motion 2 times a day.  - Reposition patient every 2 hours.  - Dangle patient 2 times a day  - Stand patient 2 times a day  - Ambulate patient 2 times a day  - Out of bed to chair 2 times a day   - Out of bed for meals 2 times a day  - Out of bed for toileting  - Record patient progress and toleration of activity level   Outcome: Progressing     Problem: DISCHARGE PLANNING  Goal: Discharge to home or other facility with appropriate resources  Description: INTERVENTIONS:  - Identify barriers to discharge w/patient and caregiver  - Arrange for needed discharge resources and transportation as appropriate  - Identify discharge learning needs (meds, wound care, etc.)  - Arrange for interpretive services to assist at discharge as needed  - Refer to Case Management Department for coordinating  discharge planning if the patient needs post-hospital services based on physician/advanced practitioner order or complex needs related to functional status, cognitive ability, or social support system  Outcome: Progressing     Problem: Knowledge Deficit  Goal: Patient/family/caregiver demonstrates understanding of disease process, treatment plan, medications, and discharge instructions  Description: Complete learning assessment and assess knowledge base.  Interventions:  - Provide teaching at level of understanding  - Provide teaching via preferred learning methods  Outcome: Progressing     Problem: GENITOURINARY - ADULT  Goal: Maintains or returns to baseline urinary function  Description: INTERVENTIONS:  - Assess urinary function  - Encourage oral fluids to ensure adequate hydration if ordered  - Administer IV fluids as ordered to ensure adequate hydration  - Administer ordered medications as needed  - Offer frequent toileting  - Follow urinary retention protocol if ordered  Outcome: Progressing  Goal: Absence of urinary retention  Description: INTERVENTIONS:  - Assess patient’s ability to void and empty bladder  - Monitor I/O  - Bladder scan as needed  - Discuss with physician/AP medications to alleviate retention as needed  - Discuss catheterization for long term situations as appropriate  Outcome: Progressing  Goal: Urinary catheter remains patent  Description: INTERVENTIONS:  - Assess patency of urinary catheter  - If patient has a chronic hickman, consider changing catheter if non-functioning  - Follow guidelines for intermittent irrigation of non-functioning urinary catheter  Outcome: Progressing     Problem: HEMATOLOGIC - ADULT  Goal: Maintains hematologic stability  Description: INTERVENTIONS  - Assess for signs and symptoms of bleeding or hemorrhage  - Monitor labs  - Administer supportive blood products/factors as ordered and appropriate  Outcome: Progressing

## 2024-11-30 NOTE — PROGRESS NOTES
"Progress Note - Urology      Patient: Gilberto Vann   : 1940 Sex: male   MRN: [id number]     CSN: [id number]  Unit/Bed#: [address] [place] [address]     SUBJECTIVE:   Ledesma out  Voiding  Wants to go home  Urine clear      Objective   Vitals: /53   Pulse (!) 51   Temp 97.9 °F (36.6 °C)   Resp 18   Ht 5' 9\" (1.753 m)   Wt 69 kg (152 lb 1.9 oz)   SpO2 99%   BMI 22.46 kg/m²     I/O last 24 hours:  In: 400 [P.O.:400]  Out: 34193 [Urine:09451]      Physical Exam:   General Alert awake   Normocephalic atraumatic PERRLA  Lungs clear bilaterally  Cardiac normal S1 normal S2  Abdomen soft, flank pain  Extremities no edema      Lab Results: CBC:   Lab Results   Component Value Date    WBC 8.69 2024    HGB 12.4 2024    HCT 37.5 2024    MCV 91 2024     2024    RBC 4.12 2024    MCH 30.1 2024    MCHC 33.1 2024    RDW 14.3 2024    MPV 10.4 2024    NRBC 0 2024     CMP:   Lab Results   Component Value Date     2024     2023    CO2 27 2024    CO2 26 2023    BUN 21 2024    BUN 20 2023    CREATININE 0.75 2024    CALCIUM 9.0 2024    AST 18 2024    AST 17 2023    ALT 11 2024    ALT 14 2023    ALKPHOS 55 2024    EGFR 83 2024     Urinalysis:   Lab Results   Component Value Date    COLORU Dark Red 2024    CLARITYU Turbid 2024    SPECGRAV >=1.030 2024    PHUR 7.0 2024    LEUKOCYTESUR Moderate (A) 2024    NITRITE Positive (A) 2024    GLUCOSEU Negative 2024    KETONESU 10 (1+) (A) 2024    BILIRUBINUR Moderate (A) 2024    BLOODU Large (A) 2024     Urine Culture:   Lab Results   Component Value Date    URINECX >100,000 cfu/ml Enterobacter cloacae (A) 2024     PSA:   Lab Results   Component Value Date    PSA 6.98 (H) 2023         Assessment/ Plan:  Hemmorragic cystitis  D/c home after " lunch          James Blunt MD

## 2024-11-30 NOTE — ASSESSMENT & PLAN NOTE
POA with gross hematuria for 2 days   No leukocytosis, per patient denies CVA tenderness or dysuria  UA positive for nitrates, moderate leuks, and normal red blood cells, bacteria  In Ed started on ceftriaxone, Enterobacter cloacae identified, discussed with ID as noted above, monitored off of antibiotics  Afebrile and WBCs normal  Follow-up CBC in 1 week.  Discharge to home today

## 2024-11-30 NOTE — ASSESSMENT & PLAN NOTE
Wt Readings from Last 3 Encounters:   11/27/24 69 kg (152 lb 1.9 oz)   11/25/24 66.7 kg (147 lb)   11/15/24 67.1 kg (148 lb)   Not in acute exacerbation appears euvolemic  Home medication metoprolol succinate continued  Continue to follow with outpatient PCP

## 2024-11-30 NOTE — ASSESSMENT & PLAN NOTE
Echo 6/17/24 EF 45% systolic function is mildly reduced.  Wall motion is normal.  Diastolic function is mildly abnormal consistent with grade 1 relaxation.    Held home clopidogrel  and ASA in the setting of hematuria  Continue home metoprolol & losartan  Hematuria has resolved, resume aspirin and Plavix and get repeat CBC in 1 week.  Patient advised to monitor for hematuria or inability to urinate and to call MD if this occurs.  Patient is discharged to home today

## 2024-12-02 DIAGNOSIS — Z71.89 COMPLEX CARE COORDINATION: Primary | ICD-10-CM

## 2024-12-03 ENCOUNTER — PATIENT OUTREACH (OUTPATIENT)
Dept: CASE MANAGEMENT | Facility: OTHER | Age: 84
End: 2024-12-03

## 2024-12-03 ENCOUNTER — TRANSITIONAL CARE MANAGEMENT (OUTPATIENT)
Dept: INTERNAL MEDICINE CLINIC | Facility: CLINIC | Age: 84
End: 2024-12-03

## 2024-12-03 NOTE — PROGRESS NOTES
Received referral via in basket message as covering RN Care Manager. Chart reviewed. Pt was admitted 11/26-11/30/24 with gross hematuria.  Past medical history of BPH, CAD, COPD, Cardiomyopathy, TAVR, Pacemaker, HTN.  Telephone call to patient, introduced self and role of RN CM.  Pt drives himself but has assistance from family if needed for transportation.   He is scheduled to see PCP 12/12/24 and Urology 12/4/24 at 2:30pm with Dr. Blunt.  Ambulates without an assistive device.  Denies difficulty performing ADLs, meal prep.   He picked up medications from pharmacy and taking as directed.  Denies further episodes of hematuria at this time.   Provided telephone number for RACHELE Tan, agrees to follow up call next week.

## 2024-12-11 ENCOUNTER — PATIENT OUTREACH (OUTPATIENT)
Dept: CASE MANAGEMENT | Facility: HOSPITAL | Age: 84
End: 2024-12-11

## 2024-12-11 NOTE — PROGRESS NOTES
Chart reviewed.   Patient has PCP [facility] scheduled for tomorrow.  Call placed to patient. He says he is doing okay. His breathing is fine. He states frustration with his health insurance and needing to go to other healthcare institutes for his care at times. He says he has all of his medications and he takes them as directed. He declined reviewing. Confimred he will have transport to his apt tomorrow. He declined CCM services or follow up. He declined my contact information and says he will call his PCP if any issues come up.

## 2024-12-12 ENCOUNTER — OFFICE VISIT (OUTPATIENT)
Dept: INTERNAL MEDICINE CLINIC | Facility: CLINIC | Age: 84
End: 2024-12-12
Payer: COMMERCIAL

## 2024-12-12 VITALS
DIASTOLIC BLOOD PRESSURE: 60 MMHG | HEIGHT: 69 IN | WEIGHT: 148 LBS | SYSTOLIC BLOOD PRESSURE: 120 MMHG | BODY MASS INDEX: 21.92 KG/M2 | OXYGEN SATURATION: 90 % | HEART RATE: 68 BPM

## 2024-12-12 DIAGNOSIS — N30.01 ACUTE CYSTITIS WITH HEMATURIA: ICD-10-CM

## 2024-12-12 DIAGNOSIS — R31.0 GROSS HEMATURIA: Primary | ICD-10-CM

## 2024-12-12 DIAGNOSIS — I25.5 ISCHEMIC CARDIOMYOPATHY: ICD-10-CM

## 2024-12-12 DIAGNOSIS — M15.0 PRIMARY OSTEOARTHRITIS INVOLVING MULTIPLE JOINTS: ICD-10-CM

## 2024-12-12 PROCEDURE — 99495 TRANSJ CARE MGMT MOD F2F 14D: CPT | Performed by: INTERNAL MEDICINE

## 2024-12-12 NOTE — PROGRESS NOTES
Dr. Lloyd's Office Visit Note  24     Gilberto Vann 84 y.o. male MRN: [id number]  : 1940    Assessment:     1. Gross hematuria  Assessment & Plan:  Hospital record reviewed as follows    Hematuria resolved    Being treated for UTI at present time with antibiotic as recommended by urologist after hospitalization    Symptoms controlled    Hospital records reviewed      Last CBC BMP reviewed normal  POA with complaint of gross hematuria x 2 days.  Patient states that today hematuria gotten  worse today while out shopping.  He noticed after urination is straight blood with clots.  Hx of BPH with frequency, hesitancy, and decreased flow.  Home medication plavix and ASA on hold  DVT prophylaxis SCD  CT renal stone study abdomen pelvis without contrast : Dense material in the dependent urinary bladder likely representing blood products. No calculi in the bladder. An underlying urothelial or less likely renal mass cannot be excluded. Recommend further evaluation with CT urography, follow up outpatient urology    UA positive for nitrates, moderate leuks, and normal red blood cells, bacteria  In ED Urology consulted recommendation three-way catheter Ledesma placed, and initiate CBI  In Ed received IV antbx ceftriaxone, will continue Iv antibx ceftriaxone q 24 IV   Oral intake good will stop IVF at this time   CBI had been discontinued  however patient developed hematuria once again, urology recommended reinitiation of CBI, hold aspirin and Plavix at this time.  CBI has cleared, stopping and will do voiding trial a.m. 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  Monitor off antibiotics  Patient remains afebrile, WBC normal.  Ledesma catheter removed, patient successfully  passed  2. Primary osteoarthritis involving multiple joints  Assessment & Plan:  Pain multiple joints bilateral both hip knee neck shoulder lumbar spine due to the osteoarthritis x-ray reviewed    Cannot give NSAID patient is on Plavix    Patient was on prednisone dose some relief    Previous for now Tylenol as needed awaiting to be seen by spine pain management Dr. Fisher  3. Acute cystitis with hematuria  Assessment & Plan:  Hematuria be seen by urologist and possibly due to the UTI cystitis associated with BPH    Urine culture E. coli sensitive to Cipro    On Cipro for now    Seen by urologist awaiting to be seen by urologist for BPH    Previous CBC reviewed normal    No fever chills or flank pain or abdominal pain    BMP reviewed normal    Awaiting repeat CBC  4. Ischemic cardiomyopathy  Assessment & Plan:  Echo 6/17/24 EF 45% systolic function is mildly reduced.  Wall motion is normal.  Diastolic function is mildly abnormal consistent with grade 1 relaxation.    Held home clopidogrel  and ASA in the setting of hematuria  Continue home metoprolol & losartan  Hematuria has resolved, resume aspirin and Plavix and get repeat CBC in 1 week.    CBC normal    BMP normal    For now dyspnea on exertion at baseline no chest pain    Patient euvolemic CHF complaints of    Awaiting to be seen by cardiology        Discussion Summary and Plan:  Today's care plan and medications were reviewed with patient in detail and all their questions answered to their satisfaction.    Chief Complaint   Patient presents with   • Transition of Care Management     Had a dry throat last night.      Subjective:    TCM Call     Date and time call was made  12/3/2024  2:01 PM    Hospital care reviewed  Records reviewed    Patient was hospitialized at  [facility]    Date of Admission  11/26/24    Date of discharge  11/30/24    Diagnosis  Hematuria, gross    Disposition  Home    Were the patients medications reviewed and updated  Yes     Current Symptoms  None      TCM Call     Post hospital issues  None    Should patient be enrolled in anticoag monitoring?  No    Scheduled for follow up?  Yes    Did you obtain your prescribed medications  Yes    Do you need help managing your prescriptions or medications  No    Is transportation to your appointment needed  No    I have advised the patient to call PCP with any new or worsening symptoms    SERENE Kilgore    Living Arrangements  Family members    Support System  Family    The type of support provided  Emotional    Do you have social support  Yes, as much as I need    Are you recieving any outpatient services  No    Are you recieving home care services  No    Are you using any community resources  No    Current waiver services  No    Interperter language line needed  No    Counseling  Patient; Family    Counseling topics  Risk factor reduction; Importance of RX compliance       Above reviewed    Hospital records reviewed as follows    For now hematuria resolved on antibiotic for UTI symptoms controlled seen by urologist CBC BMP reviewed normal  Denies any chest pain dyspnea on exertion at baseline no abdominal pain flank pain Hospital record reviewed as follows    Patient went to the restroom and he urinated straight blood with clots.  He presented to the emergency department for further evaluation and treatment.  UA was obtained which demonstrated moderate leukocytes, positive nitrates, large blood, moderate bilirubin.  Patient was placed on ceftriaxone which she completed for 3 days.  Urine culture demonstrated greater than 100,000 CFU/mL Enterobacter Cloacae, discussed with infectious disease who indicated that as patient got better on ceftriaxone felt that patient had been colonized and to monitor patient off of antibiotics.  Patient remained afebrile and WBCs remain normal.  Urology was consulted, three-way Ledesma was placed and CBI was initiated.  Urine had been appearing clear, CBI was discontinued  however patient once again developed hematuria.  Continued with CBI for additional day, clearing.  Morning of 11/30 Ledesma catheter was removed, patient successfully underwent voiding trial with only residual of 35 mL.  Urine was clear no clots.  The patient will be discharged to home today as PT/OT indicated no needs were identified during evaluation.  Patient is to follow-up with outpatient urology in the next 1 to 2 weeks.  He is also to follow-up with his PCP 1 to 2 weeks postdischarge.  He will be resumed on his home medications of aspirin and Plavix, and will get a CBC in 1 week.  He is advised to observe for signs of hematuria or inability to pass his urine and to call MD immediately if this occurs.  Patient verbalized understanding and is agreeable to the plan.  Patient is discharged to home today      The following portions of the patient's history were reviewed and updated as appropriate: allergies, current medications, past family history, past medical history, past social history, past surgical history and problem list.    Review of Systems   Constitutional:  Positive for activity change. Negative for appetite change, chills, diaphoresis, fatigue, fever and unexpected weight change.   HENT:  Negative for dental problem, drooling, ear discharge, ear pain, facial swelling, hearing loss, mouth sores, nosebleeds, postnasal drip, sinus pressure, sneezing, sore throat, tinnitus, trouble swallowing and voice change.    Eyes:  Negative for photophobia, pain, discharge, redness, itching and visual disturbance.   Respiratory:  Negative for apnea, choking, chest tightness and stridor.    Cardiovascular:  Negative for chest pain, palpitations and leg swelling.   Gastrointestinal:  Negative for abdominal distention, abdominal pain, anal bleeding, blood in stool, constipation, diarrhea, nausea, rectal pain and vomiting.   Endocrine: Negative for cold intolerance, heat intolerance, polydipsia, polyphagia and polyuria.    Genitourinary:  Negative for decreased urine volume, difficulty urinating, dysuria, enuresis, flank pain, frequency, genital sores, hematuria and urgency.   Musculoskeletal:  Positive for arthralgias, back pain, gait problem, joint swelling and myalgias. Negative for neck pain and neck stiffness.   Skin:  Negative for color change, pallor, rash and wound.   Allergic/Immunologic: Negative.  Negative for environmental allergies, food allergies and immunocompromised state.   Neurological:  Negative for dizziness, tremors, seizures, syncope, facial asymmetry, speech difficulty, weakness, light-headedness, numbness and headaches.   Psychiatric/Behavioral:  Negative for agitation, behavioral problems, confusion, decreased concentration, dysphoric mood, hallucinations, self-injury, sleep disturbance and suicidal ideas. The patient is not nervous/anxious and is not hyperactive.          Historical Information   Patient Active Problem List   Diagnosis   • Gross hematuria   • Asthma   • COPD (chronic obstructive pulmonary disease) ([facility])   • Primary osteoarthritis involving multiple joints   • Lumbar radiculopathy   • Hydrocele   • Encysted hydrocele   • Hypercalcemia   • Shortness of breath   • Chronic bronchitis ([facility])   • Hypokalemia   • Centrilobular emphysema ([facility])   • Syncope   • Glaucoma   • Elevated troponin   • Hyperglycemia   • Closed fracture of multiple ribs of both sides   • Closed fracture of body of sternum   • 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[facility])   • Pulmonary nodules   • Anxiety   • Anemia   • Critical aortic valve stenosis   • Cardiomyopathy ([facility])   • CAD (coronary artery disease)   • Hypertensive heart failure ([facility])   • Plantar fasciitis   • Pacemaker   • Primary hypertension   • 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[facility])   • Tobacco abuse   • Urinary tract infection with hematuria   • Acute cystitis with hematuria   • Ischemic cardiomyopathy     Past  Medical History:   Diagnosis Date   • Arthritis    • Asthma    • Back pain    • BPH (benign prostatic hypertrophy) with urinary obstruction    • COPD (chronic obstructive pulmonary disease) ([facility])    • Dizziness    • Glaucoma     bilat   • Insomnia    • RLS (restless legs syndrome)    • Sinusitis      Past Surgical History:   Procedure Laterality Date   • CARDIAC CATHETERIZATION N/A 2024    Procedure: Cardiac catheterization;  Surgeon: Boston Bergman MD;  Location: [facility] CARDIAC [facility];  Service: Cardiology   • CARDIAC CATHETERIZATION N/A 2024    Procedure: Cardiac Coronary Angiogram;  Surgeon: Boston Bergman MD;  Location: [facility] CARDIAC CATH [facility];  Service: Cardiology   • CATARACT EXTRACTION Left    • EAR SURGERY      to wart   • EPIDIDYMAL CYST EXCISION Right 2000   • HEMORROIDECTOMY     • TX EXCISION HYDROCELE UNILATERAL Left 2023    Procedure: HYDROCELECTOMY, sPERMATOCELECTOMY;  Surgeon: James Blunt MD;  Location: [facility] MAIN OR;  Service: Urology   • TX TRURL ELECTROSURG RESCJ PROSTATE BLEED COMPLETE N/A 2020    Procedure: CYSTOSCOPY, TRANSURETHRAL RESECTION OF PROSTATE (TURP);  Surgeon: Reji Meneses MD;  Location: [facility];  Service: Urology   • PROSTATE BIOPSY Bilateral 2003    BPH with mild acute and chronic inflammation   • PROSTATE BIOPSY Bilateral 2005    BPH with marked artropy and chronic inflammation     Social History     Substance and Sexual Activity   Alcohol Use Not Currently     Social History     Substance and Sexual Activity   Drug Use Never     Social History     Tobacco Use   Smoking Status Former   • Current packs/day: 0.00   • Average packs/day: 1 pack/day for 15.0 years (15.0 ttl pk-yrs)   • Types: Cigarettes   • Start date:    • Quit date:    • Years since quittin.9   • Passive exposure: Never   Smokeless Tobacco Current   • Types: Chew   Tobacco Comments    Chews cigars     Family History   Problem Relation Age of Onset   • Asthma Father   "    Health Maintenance Due   Topic   • Hepatitis A Vaccine (1 of 2 - Risk 2-dose series)   • Zoster Vaccine (1 of 2)   • RSV Vaccine Age 60+ Years (1 - 1-dose 75+ series)   • COVID-19 Vaccine (3 - 2024-25 season)   • Depression Screening       Meds/Allergies       Current Outpatient Medications:   •  atorvastatin (LIPITOR) 20 mg tablet, take 1 tablet by mouth EVERY EVENING WITH DINNER, Disp: 90 tablet, Rfl: 1  •  clopidogrel (PLAVIX) 75 mg tablet, take 1 tablet by mouth once daily, Disp: 100 tablet, Rfl: 1  •  docusate sodium (COLACE) 100 mg capsule, Take 1 capsule (100 mg total) by mouth 2 (two) times a day, Disp: 60 capsule, Rfl: 0  •  fluticasone (FLONASE) 50 mcg/act nasal spray, 1 spray into each nostril 2 (two) times a day, Disp: 15.8 mL, Rfl: 3  •  latanoprost (XALATAN) 0.005 % ophthalmic solution, Administer 1 drop to both eyes daily, Disp: , Rfl:   •  losartan (COZAAR) 25 mg tablet, take 1 tablet by mouth once daily, Disp: 90 tablet, Rfl: 1  •  metoprolol succinate (TOPROL-XL) 25 mg 24 hr tablet, Take 1 tablet (25 mg total) by mouth daily, Disp: 90 tablet, Rfl: 2  •  oxybutynin (DITROPAN) 5 mg tablet, Take 1 tablet (5 mg total) by mouth 2 (two) times a day, Disp: 60 tablet, Rfl: 0  •  Aspirin 81 MG CAPS, Take by mouth (Patient not taking: Reported on 12/12/2024), Disp: , Rfl:       Objective:    Vitals:   /60   Pulse 68   Ht 5' 9\" (1.753 m)   Wt 67.1 kg (148 lb)   SpO2 90%   BMI 21.86 kg/m²   Body mass index is 21.86 kg/m².  Vitals:    12/12/24 1049   Weight: 67.1 kg (148 lb)       Physical Exam  Vitals and nursing note reviewed.   Constitutional:       General: He is not in acute distress.     Appearance: He is well-developed. He is not ill-appearing, toxic-appearing or diaphoretic.   HENT:      Head: Normocephalic and atraumatic.      Right Ear: External ear normal.      Left Ear: External ear normal.      Nose: Nose normal.      Mouth/Throat:      Pharynx: No oropharyngeal exudate.   Eyes:      " General: Lids are normal. Lids are everted, no foreign bodies appreciated. No scleral icterus.        Right eye: No discharge.         Left eye: No discharge.      Conjunctiva/sclera: Conjunctivae normal.      Pupils: Pupils are equal, round, and reactive to light.   Neck:      Thyroid: No thyromegaly.      Vascular: Normal carotid pulses. No carotid bruit, hepatojugular reflux or JVD.      Trachea: No tracheal tenderness or tracheal deviation.   Cardiovascular:      Rate and Rhythm: Normal rate and regular rhythm.      Pulses: Normal pulses.      Heart sounds: Murmur heard.      No friction rub. No gallop.   Pulmonary:      Effort: Pulmonary effort is normal. No respiratory distress.      Breath sounds: Normal breath sounds. No stridor. No wheezing or rales.      Comments: Decreased air entry bilateral  Chest:      Chest wall: No tenderness.   Abdominal:      General: Bowel sounds are normal. There is no distension.      Palpations: Abdomen is soft. There is no mass.      Tenderness: There is no abdominal tenderness. There is no guarding or rebound.   Musculoskeletal:         General: No tenderness or deformity. Normal range of motion.      Cervical back: Normal range of motion and neck supple. No edema, erythema or rigidity. No spinous process tenderness or muscular tenderness. Normal range of motion.   Lymphadenopathy:      Head:      Right side of head: No submental, submandibular, tonsillar, preauricular or posterior auricular adenopathy.      Left side of head: No submental, submandibular, tonsillar, preauricular, posterior auricular or occipital adenopathy.      Cervical: No cervical adenopathy.      Right cervical: No superficial, deep or posterior cervical adenopathy.     Left cervical: No superficial, deep or posterior cervical adenopathy.      Upper Body:      Right upper body: No pectoral adenopathy.      Left upper body: No pectoral adenopathy.   Skin:     General: Skin is warm and dry.      Coloration:  Skin is not pale.      Findings: No erythema or rash.   Neurological:      General: No focal deficit present.      Mental Status: He is alert and oriented to person, place, and time.      Cranial Nerves: No cranial nerve deficit.      Sensory: No sensory deficit.      Motor: No tremor, abnormal muscle tone or seizure activity.      Coordination: Coordination normal.      Gait: Gait abnormal.      Deep Tendon Reflexes: Reflexes are normal and symmetric. Reflexes normal.   Psychiatric:         Behavior: Behavior normal.         Thought Content: Thought content normal.         Judgment: Judgment normal.       Lab Review   Admission on 11/26/2024, Discharged on 11/30/2024   Component Date Value Ref Range Status   • WBC 11/26/2024 7.52  4.31 - 10.16 Thousand/uL Final   • RBC 11/26/2024 4.07  3.88 - 5.62 Million/uL Final   • Hemoglobin 11/26/2024 12.0  12.0 - 17.0 g/dL Final   • Hematocrit 11/26/2024 37.3  36.5 - 49.3 % Final   • MCV 11/26/2024 92  82 - 98 fL Final   • MCH 11/26/2024 29.5  26.8 - 34.3 pg Final   • MCHC 11/26/2024 32.2  31.4 - 37.4 g/dL Final   • RDW 11/26/2024 14.5  11.6 - 15.1 % Final   • MPV 11/26/2024 9.5  8.9 - 12.7 fL Final   • Platelets 11/26/2024 227  149 - 390 Thousands/uL Final   • nRBC 11/26/2024 0  /100 WBCs Final   • Segmented % 11/26/2024 78 (H)  43 - 75 % Final   • Immature Grans % 11/26/2024 0  0 - 2 % Final   • Lymphocytes % 11/26/2024 14  14 - 44 % Final   • Monocytes % 11/26/2024 8  4 - 12 % Final   • Eosinophils Relative 11/26/2024 0  0 - 6 % Final   • Basophils Relative 11/26/2024 0  0 - 1 % Final   • Absolute Neutrophils 11/26/2024 5.76  1.85 - 7.62 Thousands/µL Final   • Absolute Immature Grans 11/26/2024 0.02  0.00 - 0.20 Thousand/uL Final   • Absolute Lymphocytes 11/26/2024 1.06  0.60 - 4.47 Thousands/µL Final   • Absolute Monocytes 11/26/2024 0.62  0.17 - 1.22 Thousand/µL Final   • Eosinophils Absolute 11/26/2024 0.03  0.00 - 0.61 Thousand/µL Final   • Basophils Absolute  11/26/2024 0.03  0.00 - 0.10 Thousands/µL Final   • Sodium 11/26/2024 137  135 - 147 mmol/L Final   • Potassium 11/26/2024 4.3  3.5 - 5.3 mmol/L Final   • Chloride 11/26/2024 104  96 - 108 mmol/L Final   • CO2 11/26/2024 28  21 - 32 mmol/L Final   • ANION GAP 11/26/2024 5  4 - 13 mmol/L Final   • BUN 11/26/2024 22  5 - 25 mg/dL Final   • Creatinine 11/26/2024 0.87  0.60 - 1.30 mg/dL Final    Standardized to IDMS reference method   • Glucose 11/26/2024 185 (H)  65 - 140 mg/dL Final    If the patient is fasting, the ADA then defines impaired fasting glucose as > 100 mg/dL and diabetes as > or equal to 123 mg/dL.   • Calcium 11/26/2024 8.5  8.4 - 10.2 mg/dL Final   • AST 11/26/2024 18  13 - 39 U/L Final   • ALT 11/26/2024 12  7 - 52 U/L Final    Specimen collection should occur prior to Sulfasalazine administration due to the potential for falsely depressed results.    • Alkaline Phosphatase 11/26/2024 61  34 - 104 U/L Final   • Total Protein 11/26/2024 5.8 (L)  6.4 - 8.4 g/dL Final   • Albumin 11/26/2024 3.8  3.5 - 5.0 g/dL Final   • Total Bilirubin 11/26/2024 0.52  0.20 - 1.00 mg/dL Final    Use of this assay is not recommended for patients undergoing treatment with eltrombopag due to the potential for falsely elevated results.  N-acetyl-p-benzoquinone imine (metabolite of Acetaminophen) will generate erroneously low results in samples for patients that have taken an overdose of Acetaminophen.   • eGFR 11/26/2024 78  ml/min/1.73sq m Final   • Protime 11/26/2024 14.8  12.3 - 15.0 seconds Final   • INR 11/26/2024 1.10  0.85 - 1.19 Final   • PTT 11/26/2024 25  23 - 34 seconds Final    Therapeutic Heparin Range =  60-90 seconds   • Color, UA 11/26/2024 Dark Red   Final   • Clarity, UA 11/26/2024 Turbid   Final   • Specific Gravity, UA 11/26/2024 >=1.030  1.005 - 1.030 Final   • pH, UA 11/26/2024 7.0  4.5, 5.0, 5.5, 6.0, 6.5, 7.0, 7.5, 8.0 Final   • Leukocytes, UA 11/26/2024 Moderate (A)  Negative Final   • Nitrite, UA  11/26/2024 Positive (A)  Negative Final   • Protein, UA 11/26/2024 100 (2+) (A)  Negative mg/dl Final   • Glucose, UA 11/26/2024 Negative  Negative mg/dl Final   • Ketones, UA 11/26/2024 10 (1+) (A)  Negative mg/dl Final   • Urobilinogen, UA 11/26/2024 <2.0  <2.0 mg/dl mg/dl Final   • Bilirubin, UA 11/26/2024 Moderate (A)  Negative Final   • Occult Blood, UA 11/26/2024 Large (A)  Negative Final   • Urine Culture 11/26/2024 >100,000 cfu/ml Enterobacter cloacae (A)   Final    Comment: Carbapenemase not detected by confirmatory testing  Resistance to at least one carbapenem present, but no carbapenemase production detected.  Consider use of high-dose extended-infusion meropenem for treatment.                             For non-cystitis avoid ceftriaxone, lower generation cephalosporins, penicillins and aztreonam even when susceptible, as organism may develop resistance on therapy   • RBC, UA 11/26/2024 Innumerable (A)  None Seen, 0-1, 1-2, 2-4, 0-5 /hpf Final   • WBC, UA 11/26/2024 Field obscured, unable to enumerate (A)  None Seen, 0-1, 1-2, 0-5, 2-4 /hpf Final   • Epithelial Cells 11/26/2024 Field obscured, unable to enumerate (A)  None Seen, Occasional /hpf Final   • Bacteria, UA 11/26/2024 Field obscured, unable to enumerate (A)  None Seen, Occasional /hpf Final   • WBC 11/27/2024 7.93  4.31 - 10.16 Thousand/uL Final   • RBC 11/27/2024 3.81 (L)  3.88 - 5.62 Million/uL Final   • Hemoglobin 11/27/2024 11.1 (L)  12.0 - 17.0 g/dL Final   • Hematocrit 11/27/2024 34.8 (L)  36.5 - 49.3 % Final   • MCV 11/27/2024 91  82 - 98 fL Final   • MCH 11/27/2024 29.1  26.8 - 34.3 pg Final   • MCHC 11/27/2024 31.9  31.4 - 37.4 g/dL Final   • RDW 11/27/2024 14.5  11.6 - 15.1 % Final   • MPV 11/27/2024 9.6  8.9 - 12.7 fL Final   • Platelets 11/27/2024 206  149 - 390 Thousands/uL Final   • nRBC 11/27/2024 0  /100 WBCs Final   • Segmented % 11/27/2024 69  43 - 75 % Final   • Immature Grans % 11/27/2024 0  0 - 2 % Final   • Lymphocytes %  11/27/2024 18  14 - 44 % Final   • Monocytes % 11/27/2024 12  4 - 12 % Final   • Eosinophils Relative 11/27/2024 1  0 - 6 % Final   • Basophils Relative 11/27/2024 0  0 - 1 % Final   • Absolute Neutrophils 11/27/2024 5.43  1.85 - 7.62 Thousands/µL Final   • Absolute Immature Grans 11/27/2024 0.03  0.00 - 0.20 Thousand/uL Final   • Absolute Lymphocytes 11/27/2024 1.42  0.60 - 4.47 Thousands/µL Final   • Absolute Monocytes 11/27/2024 0.97  0.17 - 1.22 Thousand/µL Final   • Eosinophils Absolute 11/27/2024 0.05  0.00 - 0.61 Thousand/µL Final   • Basophils Absolute 11/27/2024 0.03  0.00 - 0.10 Thousands/µL Final   • Sodium 11/27/2024 139  135 - 147 mmol/L Final   • Potassium 11/27/2024 4.1  3.5 - 5.3 mmol/L Final   • Chloride 11/27/2024 108  96 - 108 mmol/L Final   • CO2 11/27/2024 27  21 - 32 mmol/L Final   • ANION GAP 11/27/2024 4  4 - 13 mmol/L Final   • BUN 11/27/2024 17  5 - 25 mg/dL Final   • Creatinine 11/27/2024 0.64  0.60 - 1.30 mg/dL Final    Standardized to IDMS reference method   • Glucose 11/27/2024 95  65 - 140 mg/dL Final    If the patient is fasting, the ADA then defines impaired fasting glucose as > 100 mg/dL and diabetes as > or equal to 123 mg/dL.   • Glucose, Fasting 11/27/2024 95  65 - 99 mg/dL Final   • Calcium 11/27/2024 8.2 (L)  8.4 - 10.2 mg/dL Final   • eGFR 11/27/2024 89  ml/min/1.73sq m Final   • Sodium 11/28/2024 139  135 - 147 mmol/L Final   • Potassium 11/28/2024 4.2  3.5 - 5.3 mmol/L Final   • Chloride 11/28/2024 108  96 - 108 mmol/L Final   • CO2 11/28/2024 28  21 - 32 mmol/L Final   • ANION GAP 11/28/2024 3 (L)  4 - 13 mmol/L Final   • BUN 11/28/2024 16  5 - 25 mg/dL Final   • Creatinine 11/28/2024 0.74  0.60 - 1.30 mg/dL Final    Standardized to IDMS reference method   • Glucose 11/28/2024 99  65 - 140 mg/dL Final    If the patient is fasting, the ADA then defines impaired fasting glucose as > 100 mg/dL and diabetes as > or equal to 123 mg/dL.   • Calcium 11/28/2024 8.6  8.4 - 10.2  mg/dL Final   • AST 11/28/2024 18  13 - 39 U/L Final   • ALT 11/28/2024 11  7 - 52 U/L Final    Specimen collection should occur prior to Sulfasalazine administration due to the potential for falsely depressed results.    • Alkaline Phosphatase 11/28/2024 55  34 - 104 U/L Final   • Total Protein 11/28/2024 5.6 (L)  6.4 - 8.4 g/dL Final   • Albumin 11/28/2024 3.5  3.5 - 5.0 g/dL Final   • Total Bilirubin 11/28/2024 0.54  0.20 - 1.00 mg/dL Final    Use of this assay is not recommended for patients undergoing treatment with eltrombopag due to the potential for falsely elevated results.  N-acetyl-p-benzoquinone imine (metabolite of Acetaminophen) will generate erroneously low results in samples for patients that have taken an overdose of Acetaminophen.   • eGFR 11/28/2024 84  ml/min/1.73sq m Final   • WBC 11/28/2024 8.81  4.31 - 10.16 Thousand/uL Final   • RBC 11/28/2024 4.12  3.88 - 5.62 Million/uL Final   • Hemoglobin 11/28/2024 12.3  12.0 - 17.0 g/dL Final   • Hematocrit 11/28/2024 37.4  36.5 - 49.3 % Final   • MCV 11/28/2024 91  82 - 98 fL Final   • MCH 11/28/2024 29.9  26.8 - 34.3 pg Final   • MCHC 11/28/2024 32.9  31.4 - 37.4 g/dL Final   • RDW 11/28/2024 14.4  11.6 - 15.1 % Final   • MPV 11/28/2024 9.6  8.9 - 12.7 fL Final   • Platelets 11/28/2024 219  149 - 390 Thousands/uL Final   • nRBC 11/28/2024 0  /100 WBCs Final   • Segmented % 11/28/2024 67  43 - 75 % Final   • Immature Grans % 11/28/2024 0  0 - 2 % Final   • Lymphocytes % 11/28/2024 18  14 - 44 % Final   • Monocytes % 11/28/2024 13 (H)  4 - 12 % Final   • Eosinophils Relative 11/28/2024 1  0 - 6 % Final   • Basophils Relative 11/28/2024 1  0 - 1 % Final   • Absolute Neutrophils 11/28/2024 5.97  1.85 - 7.62 Thousands/µL Final   • Absolute Immature Grans 11/28/2024 0.03  0.00 - 0.20 Thousand/uL Final   • Absolute Lymphocytes 11/28/2024 1.54  0.60 - 4.47 Thousands/µL Final   • Absolute Monocytes 11/28/2024 1.11  0.17 - 1.22 Thousand/µL Final   • Eosinophils  Absolute 11/28/2024 0.09  0.00 - 0.61 Thousand/µL Final   • Basophils Absolute 11/28/2024 0.07  0.00 - 0.10 Thousands/µL Final   • Sodium 11/29/2024 138  135 - 147 mmol/L Final   • Potassium 11/29/2024 3.9  3.5 - 5.3 mmol/L Final   • Chloride 11/29/2024 105  96 - 108 mmol/L Final   • CO2 11/29/2024 28  21 - 32 mmol/L Final   • ANION GAP 11/29/2024 5  4 - 13 mmol/L Final   • BUN 11/29/2024 18  5 - 25 mg/dL Final   • Creatinine 11/29/2024 0.72  0.60 - 1.30 mg/dL Final    Standardized to IDMS reference method   • Glucose 11/29/2024 100  65 - 140 mg/dL Final    If the patient is fasting, the ADA then defines impaired fasting glucose as > 100 mg/dL and diabetes as > or equal to 123 mg/dL.   • Calcium 11/29/2024 8.2 (L)  8.4 - 10.2 mg/dL Final   • eGFR 11/29/2024 85  ml/min/1.73sq m Final   • WBC 11/29/2024 8.01  4.31 - 10.16 Thousand/uL Final   • RBC 11/29/2024 3.95  3.88 - 5.62 Million/uL Final   • Hemoglobin 11/29/2024 11.6 (L)  12.0 - 17.0 g/dL Final   • Hematocrit 11/29/2024 36.0 (L)  36.5 - 49.3 % Final   • MCV 11/29/2024 91  82 - 98 fL Final   • MCH 11/29/2024 29.4  26.8 - 34.3 pg Final   • MCHC 11/29/2024 32.2  31.4 - 37.4 g/dL Final   • RDW 11/29/2024 14.5  11.6 - 15.1 % Final   • Platelets 11/29/2024 214  149 - 390 Thousands/uL Final   • MPV 11/29/2024 10.9  8.9 - 12.7 fL Final   • IMP 11/26/2024 Not Detected  Not Detected Final        • VIM 11/26/2024 Not Detected  Not Detected Final        • KPC 11/26/2024 Not Detected  Not Detected Final        • OXA-48 11/26/2024 Not Detected  Not Detected Final        • NDM 11/26/2024 Not Detected  Not Detected Final        • WBC 11/30/2024 8.69  4.31 - 10.16 Thousand/uL Final   • RBC 11/30/2024 4.12  3.88 - 5.62 Million/uL Final   • Hemoglobin 11/30/2024 12.4  12.0 - 17.0 g/dL Final   • Hematocrit 11/30/2024 37.5  36.5 - 49.3 % Final   • MCV 11/30/2024 91  82 - 98 fL Final   • MCH 11/30/2024 30.1  26.8 - 34.3 pg Final   • MCHC 11/30/2024 33.1  31.4 - 37.4 g/dL Final   •  "RDW 11/30/2024 14.3  11.6 - 15.1 % Final   • Platelets 11/30/2024 218  149 - 390 Thousands/uL Final   • MPV 11/30/2024 10.4  8.9 - 12.7 fL Final   • Sodium 11/30/2024 138  135 - 147 mmol/L Final   • Potassium 11/30/2024 3.9  3.5 - 5.3 mmol/L Final   • Chloride 11/30/2024 107  96 - 108 mmol/L Final   • CO2 11/30/2024 27  21 - 32 mmol/L Final   • ANION GAP 11/30/2024 4  4 - 13 mmol/L Final   • BUN 11/30/2024 21  5 - 25 mg/dL Final   • Creatinine 11/30/2024 0.75  0.60 - 1.30 mg/dL Final    Standardized to IDMS reference method   • Glucose 11/30/2024 97  65 - 140 mg/dL Final    If the patient is fasting, the ADA then defines impaired fasting glucose as > 100 mg/dL and diabetes as > or equal to 123 mg/dL.   • Calcium 11/30/2024 9.0  8.4 - 10.2 mg/dL Final   • eGFR 11/30/2024 83  ml/min/1.73sq m Final         Patient Instructions   Pt is symptom free for this problem.  This diagnosis or problem is stable/well controlled  Patient is advised to continue same meds as outlined in medicine list  Pt is advised to continue to follow with specialist if they are following for this problme  Pt should get blood test or other testing as per specialist ( or myself) prior to your next visit.        Dima Lloyd MD        \"This note has been constructed using a voice recognition system.Therefore there may be syntax, spelling, and/or grammatical errors. Please call if you have any questions. \"  "

## 2024-12-12 NOTE — ASSESSMENT & PLAN NOTE
Pain multiple joints bilateral both hip knee neck shoulder lumbar spine due to the osteoarthritis x-ray reviewed    Cannot give NSAID patient is on Plavix    Patient was on prednisone dose some relief    Previous for now Tylenol as needed awaiting to be seen by spine pain management Dr. Fisher

## 2024-12-12 NOTE — ASSESSMENT & PLAN NOTE
Echo 6/17/24 EF 45% systolic function is mildly reduced.  Wall motion is normal.  Diastolic function is mildly abnormal consistent with grade 1 relaxation.    Held home clopidogrel  and ASA in the setting of hematuria  Continue home metoprolol & losartan  Hematuria has resolved, resume aspirin and Plavix and get repeat CBC in 1 week.    CBC normal    BMP normal    For now dyspnea on exertion at baseline no chest pain    Patient euvolemic CHF complaints of    Awaiting to be seen by cardiology

## 2024-12-12 NOTE — ASSESSMENT & PLAN NOTE
Hospital record reviewed as follows    Hematuria resolved    Being treated for UTI at present time with antibiotic as recommended by urologist after hospitalization    Symptoms controlled    Hospital records reviewed      Last CBC BMP reviewed normal  POA with complaint of gross hematuria x 2 days.  Patient states that today hematuria gotten  worse today while out shopping.  He noticed after urination is straight blood with clots.  Hx of BPH with frequency, hesitancy, and decreased flow.  Home medication plavix and ASA on hold  DVT prophylaxis SCD  CT renal stone study abdomen pelvis without contrast : Dense material in the dependent urinary bladder likely representing blood products. No calculi in the bladder. An underlying urothelial or less likely renal mass cannot be excluded. Recommend further evaluation with CT urography, follow up outpatient urology    UA positive for nitrates, moderate leuks, and normal red blood cells, bacteria  In ED Urology consulted recommendation three-way catheter Ledesma placed, and initiate CBI  In Ed received IV antbx ceftriaxone, will continue Iv antibx ceftriaxone q 24 IV   Oral intake good will stop IVF at this time   CBI had been discontinued 11/27 however patient developed hematuria once again, urology recommended reinitiation of CBI, hold aspirin and Plavix at this time.  CBI has cleared, stopping and will do voiding trial a.m. 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  Monitor off antibiotics  Patient remains afebrile, WBC normal.  Ledesma catheter removed, patient successfully passed

## 2024-12-12 NOTE — ASSESSMENT & PLAN NOTE
Hematuria be seen by urologist and possibly due to the UTI cystitis associated with BPH    Urine culture E. coli sensitive to Cipro    On Cipro for now    Seen by urologist awaiting to be seen by urologist for BPH    Previous CBC reviewed normal    No fever chills or flank pain or abdominal pain    BMP reviewed normal    Awaiting repeat CBC

## 2024-12-16 ENCOUNTER — CONSULT (OUTPATIENT)
Dept: PAIN MEDICINE | Facility: CLINIC | Age: 84
End: 2024-12-16
Payer: COMMERCIAL

## 2024-12-16 VITALS
SYSTOLIC BLOOD PRESSURE: 108 MMHG | WEIGHT: 148 LBS | HEIGHT: 69 IN | DIASTOLIC BLOOD PRESSURE: 60 MMHG | BODY MASS INDEX: 21.92 KG/M2 | HEART RATE: 83 BPM

## 2024-12-16 DIAGNOSIS — M47.896 OTHER OSTEOARTHRITIS OF SPINE, LUMBAR REGION: ICD-10-CM

## 2024-12-16 DIAGNOSIS — M47.816 LUMBAR SPONDYLOSIS: ICD-10-CM

## 2024-12-16 DIAGNOSIS — G89.4 CHRONIC PAIN SYNDROME: ICD-10-CM

## 2024-12-16 DIAGNOSIS — M54.16 LUMBAR RADICULOPATHY: Primary | ICD-10-CM

## 2024-12-16 PROCEDURE — 99204 OFFICE O/P NEW MOD 45 MIN: CPT | Performed by: STUDENT IN AN ORGANIZED HEALTH CARE EDUCATION/TRAINING PROGRAM

## 2024-12-16 RX ORDER — ALFUZOSIN HYDROCHLORIDE 10 MG/1
1 TABLET, EXTENDED RELEASE ORAL DAILY
COMMUNITY
Start: 2024-12-06

## 2024-12-16 NOTE — PROGRESS NOTES
Assessment:  1. Lumbar radiculopathy    2. Other osteoarthritis of spine, lumbar region    3. Chronic pain syndrome    4. Lumbar spondylosis      Patient is a pleasant 84-year-old male presenting with 1 month of low back pain with right-sided radicular symptoms.  Over the past month the intensity of the pains been moderate to severe and rates pain currently 7 out of 10 on numeric rating scale.  The pain does interfere with his daily activities.  He states the pain occurs in her right leg nearly constantly when he standing or walking for any period of time.  Symptoms are worse throughout the day and the pain is a cramping, dull aching sensation.  He does report some weakness in upper extremities and reports dropping objects.  Activities increases pain include standing, bending, walking.  Patient has tried exercise therapy with moderate relief.  Patient is on chronic anticoagulation So NSAIDs are contraindicated he has trialed Tylenol with minimal benefit.  Patient with a x-ray of the lumbar spine from 11/18/2024 with does show moderate facet hypertrophy at multiple levels.    Extensive discussion with patient regards to treatment options.  Discussed with patient I would first like him to trial physical therapy prior to any injection therapy.  Patient is amenable to this plan.  For symptomatic relief patient counseled to increase Tylenol to 1 g 3 times daily as needed.  Plan:  Ambulatory referral to PT  Increase apap 1g TID prn   Follow up after course of PT for consideration of MRI lumbar spine and injection therapy   Orders Placed This Encounter   Procedures   • Ambulatory referral to Physical Therapy     Standing Status:   Future     Expiration Date:   12/16/2025     Referral Priority:   Routine     Referral Type:   Physical Therapy     Referral Reason:   Specialty Services Required     Requested Specialty:   Physical Therapy     Number of Visits Requested:   1     Expiration Date:   12/16/2025       New  Medications Ordered This Visit   Medications   • alfuzosin (UROXATRAL) 10 mg 24 hr tablet     Sig: Take 1 tablet by mouth in the morning       My impressions and treatment recommendations were discussed in detail with the patient, who verbalized understanding and had no further questions.      Follow-up is planned in six weeks time or sooner as warranted.  Discharge instructions were provided. I personally saw and examined the patient and I agree with the above discussed plan of care.    History of Present Illness:    Gilberto Vann is a 84 y.o. male who presents to [facility] for initial evaluation of the above stated pain complaints. The patient has a past medical and chronic pain history as outlined in the assessment section. He was referred by Ted Fisher MD  [address] [place] [id number].    Patient is a pleasant 84-year-old male presenting with 1 month of low back pain with right-sided radicular symptoms.  Over the past month the intensity of the pains been moderate to severe and rates pain currently 7 out of 10 on numeric rating scale.  The pain does interfere with his daily activities.  He states the pain occurs in her right leg nearly constantly when he standing or walking for any period of time.  Symptoms are worse throughout the day and the pain is a cramping, dull aching sensation.  He does report some weakness in upper extremities and reports dropping objects.  Activities increases pain include standing, bending, walking.  Patient has tried exercise therapy with moderate relief.  Patient is on chronic anticoagulation So NSAIDs are contraindicated he has trialed Tylenol with minimal benefit.    Review of Systems:    Review of Systems        Past Medical History:   Diagnosis Date   • Arthritis    • Asthma    • Back pain    • BPH (benign prostatic hypertrophy) with urinary obstruction    • COPD (chronic obstructive pulmonary disease) (HCC)    • Dizziness     • Glaucoma     bilat   • Insomnia    • RLS (restless legs syndrome)    • Sinusitis        Past Surgical History:   Procedure Laterality Date   • CARDIAC CATHETERIZATION N/A 2024    Procedure: Cardiac catheterization;  Surgeon: Boston Bergman MD;  Location: [facility];  Service: Cardiology   • CARDIAC CATHETERIZATION N/A 2024    Procedure: Cardiac Coronary Angiogram;  Surgeon: Boston Bergman MD;  Location: [facility];  Service: Cardiology   • CATARACT EXTRACTION Left    • EAR SURGERY      to wart   • EPIDIDYMAL CYST EXCISION Right 2000   • HEMORROIDECTOMY     • ME EXCISION HYDROCELE UNILATERAL Left 2023    Procedure: HYDROCELECTOMY, sPERMATOCELECTOMY;  Surgeon: James Blunt MD;  Location: [facility];  Service: Urology   • ME TRURL ELECTROSURG RESCJ PROSTATE BLEED COMPLETE N/A 2020    Procedure: CYSTOSCOPY, TRANSURETHRAL RESECTION OF PROSTATE (TURP);  Surgeon: Reji Meneses MD;  Location: [facility];  Service: Urology   • PROSTATE BIOPSY Bilateral 2003    BPH with mild acute and chronic inflammation   • PROSTATE BIOPSY Bilateral 2005    BPH with marked artropy and chronic inflammation       Family History   Problem Relation Age of Onset   • Asthma Father        Social History     Occupational History   • Not on file   Tobacco Use   • Smoking status: Former     Current packs/day: 0.00     Average packs/day: 1 pack/day for 15.0 years (15.0 ttl pk-yrs)     Types: Cigarettes     Start date:      Quit date:      Years since quittin.0     Passive exposure: Never   • Smokeless tobacco: Current     Types: Chew   • Tobacco comments:     Chews cigars   Vaping Use   • Vaping status: Never Used   Substance and Sexual Activity   • Alcohol use: Not Currently   • Drug use: Never   • Sexual activity: Not Currently         Current Outpatient Medications:   •  alfuzosin (UROXATRAL) 10 mg 24 hr tablet, Take 1 tablet by mouth in the morning, Disp: , Rfl:   •   "Aspirin 81 MG CAPS, Take by mouth (Patient not taking: Reported on 12/12/2024), Disp: , Rfl:   •  atorvastatin (LIPITOR) 20 mg tablet, take 1 tablet by mouth EVERY EVENING WITH DINNER, Disp: 90 tablet, Rfl: 1  •  clopidogrel (PLAVIX) 75 mg tablet, take 1 tablet by mouth once daily, Disp: 100 tablet, Rfl: 1  •  docusate sodium (COLACE) 100 mg capsule, Take 1 capsule (100 mg total) by mouth 2 (two) times a day, Disp: 60 capsule, Rfl: 0  •  fluticasone (FLONASE) 50 mcg/act nasal spray, 1 spray into each nostril 2 (two) times a day, Disp: 15.8 mL, Rfl: 3  •  latanoprost (XALATAN) 0.005 % ophthalmic solution, Administer 1 drop to both eyes daily, Disp: , Rfl:   •  losartan (COZAAR) 25 mg tablet, take 1 tablet by mouth once daily, Disp: 90 tablet, Rfl: 1  •  metoprolol succinate (TOPROL-XL) 25 mg 24 hr tablet, Take 1 tablet (25 mg total) by mouth daily, Disp: 90 tablet, Rfl: 2  •  oxybutynin (DITROPAN) 5 mg tablet, Take 1 tablet (5 mg total) by mouth 2 (two) times a day, Disp: 60 tablet, Rfl: 0    Allergies   Allergen Reactions   • Shellfish-Derived Products - Food Allergy Other (See Comments)     Listed by PCP patient denies   • Iodine - Food Allergy Other (See Comments)       Physical Exam:    /60   Pulse 83   Ht 5' 9\" (1.753 m)   Wt 67.1 kg (148 lb)   BMI 21.86 kg/m²     Constitutional: normal, well developed, well nourished, alert, in no distress and non-toxic and no overt pain behavior.  Eyes: anicteric  HEENT: grossly intact  Neck: supple, symmetric, trachea midline and no masses   Pulmonary:even and unlabored  Cardiovascular:No edema or pitting edema present  Skin:Normal without rashes or lesions and well hydrated  Psychiatric:Mood and affect appropriate  Neurologic:Cranial Nerves II-XII grossly intact  Musculoskeletal:antalgic gait. Paresthesias in right lower extremity.     Imaging     XR spine lumbar minimum 4 views non injury  Status: Final result     PACS Images     Show images for XR spine lumbar " minimum 4 views non injury  Study Result    Narrative & Impression         LUMBAR SPINE     INDICATION:   Primary generalized (osteo)arthritis.      COMPARISON:  None.     VIEWS:  XR SPINE LUMBAR MINIMUM 4 VIEWS NON INJURY  Images: 5     FINDINGS:     There are 5 non rib bearing lumbar vertebral bodies.      There is no evidence of acute fracture or destructive osseous lesion.     There is mild anterolisthesis of L5 on S1. There is mild retrolisthesis of L2 on L3 and L1 on L2.      There is moderate facet hypertrophy and screws in the lower lumbar spine. There is mild multilevel disc space narrowing with osteophyte summation throughout the lumbar spine worse at L2-L3.         Soft tissues are unremarkable.     IMPRESSION:        Moderate facet disease lower lumbar spine  Mild multilevel spondylosis worse at L2-L3. Mild retrolisthesis of L2 on L3 and anterolisthesis of L5 on S1     Electronically signed: 11/19/2024 08:14 AM Capo Hardin MD     Imaging    XR spine lumbar minimum 4 views non injury (Order: [id number]) - 11/18/2024    Result History    XR spine lumbar minimum 4 views non injury (Order #[id number]) on 11/19/2024 - Order Result History Report    Order Report     Order Details  Result Information    Status Priority Source   Final result (11/19/2024  8:14 AM) Routine      Related Results     XR shoulder 2+ vw right Final result 11/18/2024      Impression       Severe glenohumeral osteoarthritis    Electronically signed: 11/19/2024 08:05 AM Capo Hardin MD          XR hips bilateral 3-4 vw w pelvis if performed Final result 11/18/2024      Impression       Mild degenerative change in the hips. No acute osseous abnormality.      Electronically signed: 11/19/2024 08:04 AM Capo Hardin MD          XR shoulder 2+ vw left Final result 11/18/2024      Impression       No acute osseous abnormality.    Electronically signed: 11/19/2024 06:51 AM Capo Hardin MD           Reason for Exam    Dx: Primary  "osteoarthritis involving multiple joints [M15.0 (ICD-10-CM)]     All Reviewers List    Dima Lloyd MD on 11/22/2024 11:33 AM       XR spine lumbar minimum 4 views non injury: Patient Communication     Add Comments   Not seen     Signed by    Signed Date/Time  Phone Pager   MARIELA BURGOS 11/19/2024 08:14 [phone]      Exam Information    Status Exam Begun  Exam Ended  Performing Tech   Final [99] 11/18/2024 13:51 11/18/2024 14:21 Mahesh Capellan     Questionnaire        Question Answer Comment   1. When should the test be performed?           End Exam      IMAGING END EXAM XRAY    Question Answer Comment   1. Script Info/History/Comments: Primary osteoarthritis involving multiple joints    2. Any additional comments the Radiologist needs to know?     3. Was the patient shielded? No    4. Was fluoro used? No    5. Fluoro time? Please type \"MINUTES\" or \"SECONDS\" following your time.     6. Were all the images in this exam within the acceptable exposure index range as posted? Yes    7. If No, provide additional information why (ie which view or position, fixed or AEC, wall/table/tabletop, etc)     8. Number of images sent to PACS: 5    9. Was jewelry removed from the patient? No    10. Jewelry removed:           PATIENT EDUCATION    Question Answer Comment   1. Was the patient educated? Yes    2. Why was the patient not educated?          External Results Report    Open External Results Report    Encounter    View Encounter             Sedation Documentation Timeline (11/18/2024 00:00 to 11/18/2024 14:22)    An end event has not been filed for the most recent intervention. Due to this intervention’s length, all data may not appear below. To see all data, file an end event.  11/18/2024 11/18/2024 Event By   13:18:15 Discharge Orders Placed DB   Imaging  - XR shoulder 2+ vw left; XR shoulder 2+ vw right; XR spine lumbar minimum 4 views non injury; XR hip/pelv 2-3 vws right if performed; XR " hip/pelv 2-3 vws left if performed         13:38:56 Orders Placed ET   Imaging  - XR shoulder 2+ vw left; XR shoulder 2+ vw right; XR spine lumbar minimum 4 views non injury; XR hip/pelv 2-3 vws right if performed; XR hip/pelv 2-3 vws left if performed         13:38:56 Order Performed    XR shoulder 2+ vw left  - ID: [id number]  XR shoulder 2+ vw right  - ID: [id number]  XR spine lumbar minimum 4 views non injury  - ID: [id number]  XR hips bilateral 3-4 vw w pelvis if performed  - ID: [id number]         13:50:25 Orders Discontinued RW   XR hip/pelv 2-3 vws right if performed (11/18/24 1338)         13:50:25 Discharge Orders Modified RW   Order Modified  - XR hips bilateral 3-4 vw w pelvis if performed (Comment: Modified from XR hip/pelv 2-3 vws right if performed)         13:50:25 Orders Placed RW   Imaging  - XR hips bilateral 3-4 vw w pelvis if performed           Pre-op Summary    Pre-op           Recovery Summary    Recovery             Routing History    None       Orders Placed This Encounter   Procedures   • Ambulatory referral to Physical Therapy      XR hips bilateral 3-4 vw w pelvis if performed  Status: Final result     PACS Images     Show images for XR hips bilateral 3-4 vw w pelvis if performed  Study Result    Narrative & Impression         BILATERAL HIPS AND PELVIS     INDICATION:   Primary generalized (osteo)arthritis.      COMPARISON: None.     VIEWS:  XR HIPS BILATERAL 3-4 VW W PELVIS IF PERFORMED  Images: 5     FINDINGS:  The bony pelvis appears intact.  Visualized lower lumbar spine demonstrates mild spondylosis.     LEFT HIP:  There is no acute fracture or dislocation.  Mild osteophytosis without joint space narrowing.  No lytic or blastic osseous lesion.  Soft tissues are unremarkable.     RIGHT HIP:  There is no acute fracture or dislocation.  Mild osteophytosis without joint space narrowing.  No lytic or blastic osseous lesion.  Soft tissues are unremarkable.     IMPRESSION:        Mild degenerative  change in the hips. No acute osseous abnormality.        Electronically signed: 11/19/2024 08:04 AM Mariela Burgos MD     Imaging    XR hips bilateral 3-4 vw w pelvis if performed (Order: [id number]) - 11/18/2024    Result History    XR hips bilateral 3-4 vw w pelvis if performed (Order #[id number]) on 11/19/2024 - Order Result History Report    Order Report     Order Details  Result Information    Status Priority Source   Final result (11/19/2024  8:04 AM) Routine      Related Results     XR spine lumbar minimum 4 views non injury Final result 11/18/2024      Impression       Moderate facet disease lower lumbar spine  Mild multilevel spondylosis worse at L2-L3. Mild retrolisthesis of L2 on L3 and anterolisthesis of L5 on S1    Electronically signed: 11/19/2024 08:14 AM Mariela Burgos MD          XR shoulder 2+ vw right Final result 11/18/2024      Impression       Severe glenohumeral osteoarthritis    Electronically signed: 11/19/2024 08:05 AM Mariela Burgos MD          XR shoulder 2+ vw left Final result 11/18/2024      Impression       No acute osseous abnormality.    Electronically signed: 11/19/2024 06:51 AM Mariela Burgos MD           Reason for Exam    Dx: Primary osteoarthritis involving multiple joints [M15.0 (ICD-10-CM)]     All Reviewers List    Dima Lloyd MD on 11/22/2024 11:33 AM       XR hips bilateral 3-4 vw w pelvis if performed: Patient Communication     Add Comments   Not seen     Signed by    Signed Date/Time  Phone Pager   MARIELA BURGOS 11/19/2024 08:04 [phone]      Exam Information    Status Exam Begun  Exam Ended  Performing Tech   Final [99] 11/18/2024 13:51 11/18/2024 14:21 Mahesh Capellan     Questionnaire        Question Answer Comment   1. When should the test be performed?           End Exam      IMAGING END EXAM XRAY    Question Answer Comment   1. Script Info/History/Comments: Primary osteoarthritis involving multiple joints    2. Any additional  "comments the Radiologist needs to know?     3. Was the patient shielded? No    4. Was fluoro used? No    5. Fluoro time? Please type \"MINUTES\" or \"SECONDS\" following your time.     6. Were all the images in this exam within the acceptable exposure index range as posted? Yes    7. If No, provide additional information why (ie which view or position, fixed or AEC, wall/table/tabletop, etc)     8. Number of images sent to PACS: 5    9. Was jewelry removed from the patient? No    10. Jewelry removed:           PATIENT EDUCATION    Question Answer Comment   1. Was the patient educated? Yes    2. Why was the patient not educated?          External Results Report    Open External Results Report    Encounter    View Encounter             Sedation Documentation Timeline (11/18/2024 00:00 to 11/18/2024 14:22)    An end event has not been filed for the most recent intervention. Due to this intervention’s length, all data may not appear below. To see all data, file an end event.  11/18/2024 11/18/2024 Event By   13:18:15 Discharge Orders Placed DB   Imaging  - XR shoulder 2+ vw left; XR shoulder 2+ vw right; XR spine lumbar minimum 4 views non injury; XR hip/pelv 2-3 vws right if performed; XR hip/pelv 2-3 vws left if performed         13:38:56 Orders Placed ET   Imaging  - XR shoulder 2+ vw left; XR shoulder 2+ vw right; XR spine lumbar minimum 4 views non injury; XR hip/pelv 2-3 vws right if performed; XR hip/pelv 2-3 vws left if performed         13:38:56 Order Performed    XR shoulder 2+ vw left  - ID: [id number]  XR shoulder 2+ vw right  - ID: [id number]  XR spine lumbar minimum 4 views non injury  - ID: [id number]  XR hips bilateral 3-4 vw w pelvis if performed  - ID: [id number]         13:50:25 Orders Discontinued RW   XR hip/pelv 2-3 vws right if performed (11/18/24 1338)         13:50:25 Discharge Orders Modified RW   Order Modified  - XR hips bilateral 3-4 vw w pelvis if performed (Comment: Modified from XR hip/pelv 2-3 vws " right if performed)         13:50:25 Orders Placed RW   Imaging  - XR hips bilateral 3-4 vw w pelvis if performed           Pre-op Summary    Pre-op           Recovery Summary    Recovery             Routing History    None

## 2024-12-19 ENCOUNTER — EVALUATION (OUTPATIENT)
Dept: PHYSICAL THERAPY | Facility: CLINIC | Age: 84
End: 2024-12-19
Payer: COMMERCIAL

## 2024-12-19 DIAGNOSIS — M47.896 OTHER OSTEOARTHRITIS OF SPINE, LUMBAR REGION: ICD-10-CM

## 2024-12-19 DIAGNOSIS — M54.16 LUMBAR RADICULOPATHY: Primary | ICD-10-CM

## 2024-12-19 PROCEDURE — 97161 PT EVAL LOW COMPLEX 20 MIN: CPT | Performed by: PHYSICAL THERAPIST

## 2024-12-19 NOTE — PROGRESS NOTES
PT Evaluation     Today's date: 2024  Patient name: Gilberto Vann  : 1940  MRN: [id number]  Referring provider: Mike Fisher MD  Dx:   Encounter Diagnosis     ICD-10-CM    1. Lumbar radiculopathy  M54.16 Ambulatory referral to Physical Therapy      2. Other osteoarthritis of spine, lumbar region  M47.896 Ambulatory referral to Physical Therapy          Start Time: 1500  Stop Time: 1540  Total time in clinic (min): 40 minutes    Assessment  Impairments: abnormal muscle firing, abnormal or restricted ROM, abnormal movement, impaired physical strength, lacks appropriate home exercise program, pain with function and poor posture   Functional limitations: limited bending tolerance  Symptom irritability: low    Assessment details: Gilberto Vann is a 84 y.o. male who presents with complaints of Lumbar radiculopathy  (primary encounter diagnosis), Other osteoarthritis of spine, lumbar region.  No further referral appears necessary at this time based upon examination results.  Patient is presenting with likely extension preference as flexion based movements irritated leg. Current presentation leading to limitations with sitting, transfers, and walking for periods of time. Prognosis is good given HEP compliance and PT 1-2x/wk.  Positive prognostic indicators include positive attitude toward recovery.   Please contact me if you have any questions or recommendations.  Thank you for the opportunity to share in Gilberto's care.       Understanding of Dx/Px/POC: good     Prognosis: good    Plan  Patient would benefit from: skilled physical therapy    Planned therapy interventions: joint mobilization, manual therapy, patient education, postural training, strengthening, stretching, therapeutic activities, therapeutic exercise, home exercise program, neuromuscular re-education, flexibility and functional ROM exercises    Frequency: 1-2x week  Duration in weeks: 8  Treatment plan discussed with:  patient        Subjective Evaluation    History of Present Illness  Mechanism of injury: Patient reporting right leg discomfort that started after his second heart operation that started in March. Symptoms described as cramping in nature. Feels better moving and able to walk it off but if walking too far symptoms will return. Bending forward to perform work is worsening. Sitting is not too bad but with feet up in recliner it will bother him. Occasionally low back pain will bother him when exiting car. Patient denies bowel/bladder dysfunction, saddle paraesthesias, nor cough/sneeze provocation. Does get numbness/tingling in upper right leg. 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               Recurrent probem    Quality of life: good    Patient Goals  Patient goals for therapy: decreased pain, increased motion, increased strength and independence with ADLs/IADLs  Patient goal: improving walking tolerance  Pain  Current pain ratin  At best pain ratin  At worst pain ratin  Relieving factors: change in position        Short Term:  Pt will report decreased levels of pain by at least 2 subjective ratings in 4 weeks  Pt will demonstrate improved ROM by at least 25% in 4 weeks  Pt will be able to ambulate> 15 minutes without pain in 4 weeks  Long Term:   Pt will be independent in their HEP in 8 weeks  Pt will be pain free with IADL's in 8 weeks.  Pt will be able to perform transitions without pain in 8 weeks     Objective    GAIT: forward trunk lean  Squat assess: WNL  Heel toe walk: -    Lumbar  % of normal   Flex. 100p!   Extn. 25   SB Left 100   SB Right 75p!   ROT Left 100   ROT Right 100p!   Repetitive testing: extension in standing= better  extension in lying= unable  flexion standing= worse flexion in lying= no effect          MMT          PROM    Hip       L       R      L     R   Flex. 5 5     Extn. 4 4     IR. 5 5     ER. 5 5                   MMT    Knee         L        R   Flex. 5 5   Extn. 5  5              MMT    Ankle       L        R   PF 5 5   DF. 5 5   EHL 5 5   Inv. 5 5   Ever. 5 5     Posture: poor posture, correction no effect  Palpation: no TTP  Dermatome: (pinprick- L/R):  intact  Slump test: L=  -   R=  -     Straight leg raise:   L=   -   R=    -          Transverse abdominis:  supine march=Fair     Hip/SI joint:   scour=  -     musa =  -  Active SLR=  -    [place] scan=   -  Multifidus activation = -     Joint mobility = decreased lumbar spine     Insurance:  AMA/CMS Eval/ Re-eval POC expires Auth #/ Referral # Total    Start date  Expiration date Extension  Visit limitation?  PT only or  PT+OT? Co-Insurance   CMS 12.19 2.13.25 needed                         AUTH #:  Date 12.19              Authed: Used 1               Remaining                    Precautions: cardiac, HTN, COPD, hx of rib fracture, pacemaker, fall risk  Patient provided verbal consent to treatment plan and recommended interventions.    Manuals 12.19        visit 1                                   Neuro Re-Ed                                                                        Ther Ex         JOANN with table OP 2*10        SOC 2*10                                                              Ther Activity         Pt edu FB

## 2024-12-23 ENCOUNTER — OFFICE VISIT (OUTPATIENT)
Dept: PHYSICAL THERAPY | Facility: CLINIC | Age: 84
End: 2024-12-23
Payer: COMMERCIAL

## 2024-12-23 DIAGNOSIS — M54.16 LUMBAR RADICULOPATHY: ICD-10-CM

## 2024-12-23 DIAGNOSIS — I10 PRIMARY HYPERTENSION: ICD-10-CM

## 2024-12-23 DIAGNOSIS — M47.896 OTHER OSTEOARTHRITIS OF SPINE, LUMBAR REGION: Primary | ICD-10-CM

## 2024-12-23 DIAGNOSIS — I42.9 CARDIOMYOPATHY, UNSPECIFIED TYPE (HCC): ICD-10-CM

## 2024-12-23 PROCEDURE — 97110 THERAPEUTIC EXERCISES: CPT | Performed by: PHYSICAL THERAPIST

## 2024-12-23 PROCEDURE — 97530 THERAPEUTIC ACTIVITIES: CPT | Performed by: PHYSICAL THERAPIST

## 2024-12-23 NOTE — PROGRESS NOTES
Daily Note     Today's date: 2024  Patient name: Gilberto Vann  : 1940  MRN: [id number]  Referring provider: Mike Fisher MD  Dx:   Encounter Diagnosis     ICD-10-CM    1. Other osteoarthritis of spine, lumbar region  M47.896       2. Lumbar radiculopathy  M54.16           Start Time: 1455  Stop Time: 1540  Total time in clinic (min): 45 minutes    Subjective: Patient reports most of his discomfort comes from sitting in recliner with legs up and getting out of this position or first thing in the morning.     Objective: See treatment diary below    Assessment: Tolerated treatment well. Patient  extensively educated on body positioning and role of this with regards to symptoms. Correction needed for proper exercise performance today particularly with achieving extension compared to moving into flexion.     Plan: Continue per plan of care.        Insurance:  AMA/CMS Eval/ Re-eval POC expires Auth #/ Referral # Total    Start date  Expiration date Extension  Visit limitation?  PT only or  PT+OT? Co-Insurance   CMS  2.13.25 needed                         AUTH #:  Date              Authed: Used 1 2              Remaining  11 10                 Precautions: cardiac, HTN, COPD, hx of rib fracture, pacemaker, fall risk  Patient provided verbal consent to treatment plan and recommended interventions.    Manuals        visit 1 2                                  Neuro Re-Ed                                                                        Ther Ex         JOANN with table OP 2*10 2*10       SOC 2*10 2*10                                                             Ther Activity         Pt edu FB FB

## 2024-12-24 RX ORDER — METOPROLOL SUCCINATE 25 MG/1
25 TABLET, EXTENDED RELEASE ORAL DAILY
Qty: 90 TABLET | Refills: 1 | Status: SHIPPED | OUTPATIENT
Start: 2024-12-24

## 2024-12-27 PROBLEM — N39.0 URINARY TRACT INFECTION WITH HEMATURIA: Status: RESOLVED | Noted: 2024-11-27 | Resolved: 2024-12-27

## 2024-12-27 PROBLEM — R31.9 URINARY TRACT INFECTION WITH HEMATURIA: Status: RESOLVED | Noted: 2024-11-27 | Resolved: 2024-12-27

## 2025-01-02 ENCOUNTER — OFFICE VISIT (OUTPATIENT)
Dept: PHYSICAL THERAPY | Facility: CLINIC | Age: 85
End: 2025-01-02
Payer: COMMERCIAL

## 2025-01-02 DIAGNOSIS — M54.16 LUMBAR RADICULOPATHY: ICD-10-CM

## 2025-01-02 DIAGNOSIS — M47.896 OTHER OSTEOARTHRITIS OF SPINE, LUMBAR REGION: Primary | ICD-10-CM

## 2025-01-09 ENCOUNTER — OFFICE VISIT (OUTPATIENT)
Dept: PHYSICAL THERAPY | Facility: CLINIC | Age: 85
End: 2025-01-09
Payer: COMMERCIAL

## 2025-01-09 DIAGNOSIS — M54.16 LUMBAR RADICULOPATHY: ICD-10-CM

## 2025-01-09 DIAGNOSIS — M47.896 OTHER OSTEOARTHRITIS OF SPINE, LUMBAR REGION: Primary | ICD-10-CM

## 2025-01-09 PROCEDURE — 97530 THERAPEUTIC ACTIVITIES: CPT | Performed by: PHYSICAL THERAPIST

## 2025-01-09 NOTE — PROGRESS NOTES
Daily Note     Today's date: 2025  Patient name: Gilberto Vann  : 1940  MRN: [id number]  Referring provider: Mike Fisher MD  Dx:   Encounter Diagnosis     ICD-10-CM    1. Other osteoarthritis of spine, lumbar region  M47.896       2. Lumbar radiculopathy  M54.16                Subjective: Patient reports being sick over the holidays. Reports that he will have right anterior thigh discomfort when getting out of the recliner (legs are still elevated) for the first 2-3 steps and improves after 8-10 steps. Reports no trouble sleeping nor numbness/tinging. No back pain reported.      Objective: See treatment diary below    Assessment: Tolerated treatment well. Patient  educated on sitting posture and role of surface he is sitting on with regards to his symptoms. Patient educated to avoid sitting with legs elevated for 2 days and be mindful of his sitting posture and report back on Monday.     Plan: Continue per plan of care.        Insurance:  AMA/CMS Eval/ Re-eval POC expires Auth #/ Referral # Total    Start date  Expiration date Extension  Visit limitation?  PT only or  PT+OT? Co-Insurance   CMS 25 needed                         AUTH #:  Date 25            Authed: Used 1 2 3             Remaining  11 10                 Precautions: cardiac, HTN, COPD, hx of rib fracture, pacemaker, fall risk  Patient provided verbal consent to treatment plan and recommended interventions.    Manuals 25      visit 1 2 3                                 Neuro Re-Ed                                                                        Ther Ex         JOANN with table OP 2*10 2*10       SOC 2*10 2*10                                                             Ther Activity         Pt edu FB FB FB

## 2025-01-11 PROBLEM — N30.01 ACUTE CYSTITIS WITH HEMATURIA: Status: RESOLVED | Noted: 2024-12-12 | Resolved: 2025-01-11

## 2025-01-19 DIAGNOSIS — I25.10 CORONARY ARTERY DISEASE INVOLVING NATIVE CORONARY ARTERY OF NATIVE HEART WITHOUT ANGINA PECTORIS: ICD-10-CM

## 2025-01-20 RX ORDER — CLOPIDOGREL BISULFATE 75 MG/1
75 TABLET ORAL DAILY
Qty: 100 TABLET | Refills: 1 | Status: SHIPPED | OUTPATIENT
Start: 2025-01-20

## 2025-01-27 ENCOUNTER — TELEPHONE (OUTPATIENT)
Facility: MEDICAL CENTER | Age: 85
End: 2025-01-27

## 2025-01-27 ENCOUNTER — OFFICE VISIT (OUTPATIENT)
Dept: PAIN MEDICINE | Facility: CLINIC | Age: 85
End: 2025-01-27
Payer: COMMERCIAL

## 2025-01-27 VITALS — BODY MASS INDEX: 21.48 KG/M2 | HEIGHT: 69 IN | WEIGHT: 145 LBS

## 2025-01-27 DIAGNOSIS — M54.16 LUMBAR RADICULOPATHY: Primary | ICD-10-CM

## 2025-01-27 PROCEDURE — 99214 OFFICE O/P EST MOD 30 MIN: CPT | Performed by: STUDENT IN AN ORGANIZED HEALTH CARE EDUCATION/TRAINING PROGRAM

## 2025-01-27 PROCEDURE — G2211 COMPLEX E/M VISIT ADD ON: HCPCS | Performed by: STUDENT IN AN ORGANIZED HEALTH CARE EDUCATION/TRAINING PROGRAM

## 2025-01-27 NOTE — PROGRESS NOTES
Pain Medicine Follow-Up Note    Assessment:  1. Lumbar radiculopathy      Patient is a pleasant 84-year-old male who presents as a follow-up visit after last being seen In early December for low back pain with right-sided radicular symptoms.  Patient rates pain is the same rating pain 7 out of 10 numeric rating scale.  Patient states symptoms are worse in the morning and the pain is constant.  The quality pain is a dull aching, shooting pain.     On further discussion with patient he states PT was not too helpful and continues to have low back pain radiating down his right leg to the level of his knee.  Given patient has failed 6 weeks of conservative management think is reasonable order MRI of the lumbar spine to further evaluate.  Pending MRI results can consider injection therapy.  For symptomatic relief patient counseled to continue with use of APAP 1 g 3 times daily as needed.  Patient also counseled continue with home exercise program.  Plan:  MRI Lumbar spine without contrast  Pending MRI results consider LESI   Continue apap 1g TID prn   Continue HEP   Orders Placed This Encounter   Procedures   • MRI lumbar spine wo contrast     Standing Status:   Future     Expected Date:   1/27/2025     Expiration Date:   1/27/2029     Scheduling Instructions:      There is no preparation for this test. Please leave your jewelry and valuables at home, wedding rings are the exception. All patients will be required to change into a hospital gown and pants.  Street clothes are not permitted in the MRI.  Magnetic nail polish must be removed prior to arrival for your test. Please bring your insurance cards, a form of photo ID and a list of your medications with you. Arrive 15 minutes prior to your appointment time in order to register. Please bring any prior CT or MRI studies of this area that were not performed at a [facility].            To schedule this appointment, please contact Central Scheduling at [phone]  [phone].            Prior to your appointment, please make sure you complete the MRI Screening Form when you e-Check in for your appointment. This will be available starting 7 days before your appointment in [employer]. You may receive an e-mail with an activation code if you do not have a [employer] account. If you do not have access to a device, we will complete your screening at your appointment.     Reason for Exam:   Lumbar radiculopathy with failure of six weeks of conservative management     For OP exams needed \"URGENT\", choose the appropriate timeframe below and call Central Scheduling at [phone]. No need to speak with a Radiologist.:   Not URGENT     What is the patient's sedation requirement?:   No Sedation     Does the patient need medication for Claustrophobia? If yes, order medication at this point.:   No     Does the patient wear a life vest, have an implanted cardiac device, a stimulation device, a sleep apnea stimulator, or a breast tissue expansion device?:   No     Release to patient through [facility]:   Immediate       No orders of the defined types were placed in this encounter.      My impressions and treatment recommendations were discussed in detail with the patient who verbalized understanding and had no further questions.        Complete risks and benefits including bleeding, infection, tissue reaction, nerve injury and allergic reaction were discussed. The approach was demonstrated using models and literature was provided. Verbal and written consent was obtained.      Follow-up is planned in four weeks time or sooner as warranted.  Discharge instructions were provided. I personally saw and examined the patient and I agree with the above discussed plan of care.    History of Present Illness:    Gilberto Vann is a 84 y.o. male who presents to [facility] for interval re-evaluation of the above stated pain complaints. The patient has a past medical and chronic pain " history as outlined in the assessment section. He was last seen on 12/2024.    Patient is a pleasant 84-year-old male who presents as a follow-up visit after last being seen In early December for low back pain with right-sided radicular symptoms.  Patient rates pain is the same rating pain 7 out of 10 numeric rating scale.  Patient states symptoms are worse in the morning and the pain is constant.  The quality pain is a dull aching, shooting pain.      Other than as stated above, the patient denies any interval changes in medications, medical condition, mental condition, symptoms, or allergies since the last office visit.         Review of Systems:    Review of Systems   Constitutional:  Negative for unexpected weight change.   HENT:  Negative for ear pain.    Eyes:  Negative for visual disturbance.   Respiratory:  Negative for shortness of breath and wheezing.    Gastrointestinal:  Negative for abdominal pain.   Musculoskeletal:  Positive for back pain and gait problem.        Decreased ROM, joint and muscle pain   Neurological:  Positive for weakness and numbness.   Psychiatric/Behavioral:  Positive for decreased concentration, dysphoric mood and sleep disturbance. The patient is nervous/anxious.          Past Medical History:   Diagnosis Date   • Arthritis    • Asthma    • Back pain    • BPH (benign prostatic hypertrophy) with urinary obstruction    • COPD (chronic obstructive pulmonary disease) ([facility])    • Dizziness    • Glaucoma     bilat   • Insomnia    • RLS (restless legs syndrome)    • Sinusitis        Past Surgical History:   Procedure Laterality Date   • CARDIAC CATHETERIZATION N/A 2/19/2024    Procedure: Cardiac catheterization;  Surgeon: Boston Bergman MD;  Location: [facility] CARDIAC CATH LAB;  Service: Cardiology   • CARDIAC CATHETERIZATION N/A 2/19/2024    Procedure: Cardiac Coronary Angiogram;  Surgeon: Boston Bergman MD;  Location: [facility] CARDIAC CATH LAB;  Service: Cardiology   • CATARACT EXTRACTION Left    • EAR  SURGERY      to wart   • EPIDIDYMAL CYST EXCISION Right 2000   • HEMORROIDECTOMY     • AZ EXCISION HYDROCELE UNILATERAL Left 2023    Procedure: HYDROCELECTOMY, sPERMATOCELECTOMY;  Surgeon: James Blunt MD;  Location: [facility] MAIN OR;  Service: Urology   • AZ TRURL ELECTROSURG RESCJ PROSTATE BLEED COMPLETE N/A 2020    Procedure: CYSTOSCOPY, TRANSURETHRAL RESECTION OF PROSTATE (TURP);  Surgeon: Reji Meneses MD;  Location: [facility] MAIN [facility];  Service: Urology   • PROSTATE BIOPSY Bilateral 2003    BPH with mild acute and chronic inflammation   • PROSTATE BIOPSY Bilateral 2005    BPH with marked artropy and chronic inflammation       Family History   Problem Relation Age of Onset   • Asthma Father        Social History     Occupational History   • Not on file   Tobacco Use   • Smoking status: Former     Current packs/day: 0.00     Average packs/day: 1 pack/day for 15.0 years (15.0 ttl pk-yrs)     Types: Cigarettes     Start date:      Quit date:      Years since quittin.1     Passive exposure: Never   • Smokeless tobacco: Current     Types: Chew   • Tobacco comments:     Chews cigars   Vaping Use   • Vaping status: Never Used   Substance and Sexual Activity   • Alcohol use: Not Currently   • Drug use: Never   • Sexual activity: Not Currently         Current Outpatient Medications:   •  alfuzosin (UROXATRAL) 10 mg 24 hr tablet, Take 1 tablet by mouth in the morning, Disp: , Rfl:   •  Aspirin 81 MG CAPS, Take by mouth, Disp: , Rfl:   •  atorvastatin (LIPITOR) 20 mg tablet, take 1 tablet by mouth EVERY EVENING WITH DINNER, Disp: 90 tablet, Rfl: 1  •  clopidogrel (PLAVIX) 75 mg tablet, take 1 tablet by mouth once daily, Disp: 100 tablet, Rfl: 1  •  docusate sodium (COLACE) 100 mg capsule, Take 1 capsule (100 mg total) by mouth 2 (two) times a day, Disp: 60 capsule, Rfl: 0  •  fluticasone (FLONASE) 50 mcg/act nasal spray, 1 spray into each nostril 2 (two) times a day, Disp: 15.8 mL, Rfl:  "3  •  latanoprost (XALATAN) 0.005 % ophthalmic solution, Administer 1 drop to both eyes daily, Disp: , Rfl:   •  losartan (COZAAR) 25 mg tablet, take 1 tablet by mouth once daily, Disp: 90 tablet, Rfl: 1  •  metoprolol succinate (TOPROL-XL) 25 mg 24 hr tablet, Take 1 tablet (25 mg total) by mouth daily, Disp: 90 tablet, Rfl: 1  •  oxybutynin (DITROPAN) 5 mg tablet, Take 1 tablet (5 mg total) by mouth 2 (two) times a day, Disp: 60 tablet, Rfl: 0    Allergies   Allergen Reactions   • Shellfish-Derived Products - Food Allergy Other (See Comments)     Listed by PCP patient denies   • Iodine - Food Allergy Other (See Comments)     Pt denies allergies       Physical Exam:    Ht 5' 9\" (1.753 m)   Wt 65.8 kg (145 lb)   BMI 21.41 kg/m²     Constitutional:normal, well developed, well nourished, alert, in no distress and non-toxic and no overt pain behavior.  Eyes:anicteric  HEENT:grossly intact  Neck:supple, symmetric, trachea midline and no masses   Pulmonary:even and unlabored  Cardiovascular:No edema or pitting edema present  Skin:Normal without rashes or lesions and well hydrated  Psychiatric:Mood and affect appropriate  Neurologic:Cranial Nerves II-XII grossly intact  Musculoskeletal:antalgic gait. Paresthesias in RLE. +modified SLR.       Imaging    MRI lumbar spine wo contrast    (Results Pending)         Orders Placed This Encounter   Procedures   • MRI lumbar spine wo contrast     "

## 2025-01-27 NOTE — TELEPHONE ENCOUNTER
Note in chart after leaving a message for someone to schedule there MRI appointment:  I left a message today to call me at [phone] to schedule their MRI appointment.

## 2025-01-30 DIAGNOSIS — I25.10 CORONARY ARTERY DISEASE INVOLVING NATIVE CORONARY ARTERY OF NATIVE HEART WITHOUT ANGINA PECTORIS: ICD-10-CM

## 2025-01-30 RX ORDER — ATORVASTATIN CALCIUM 20 MG/1
20 TABLET, FILM COATED ORAL
Qty: 90 TABLET | Refills: 1 | Status: SHIPPED | OUTPATIENT
Start: 2025-01-30

## 2025-01-31 ENCOUNTER — TELEPHONE (OUTPATIENT)
Facility: MEDICAL CENTER | Age: 85
End: 2025-01-31

## 2025-01-31 NOTE — TELEPHONE ENCOUNTER
2-14-25 AT 100PM AT [facility]   Note in chart after scheduling pacemaker:  Topics covered in our conversation:  MRI scheduled on above date and time.  Reminded to get chest x-ray follow-up prior to MRI appt.

## 2025-01-31 NOTE — QUICK NOTE
Note for Medtronic pacer implant:  Device investigated:   Medtronic pacer micra            Method investigated (surgical report, imaging report, vendor contacted, website used etc):  MRI Verify website    Time spent investigating in minutes: 15    Investigation findings:  MRI Conditional implant    Risk vs Benefit performed.  If so, list physician and outcome:  No

## 2025-02-10 ENCOUNTER — HOSPITAL ENCOUNTER (INPATIENT)
Facility: HOSPITAL | Age: 85
LOS: 3 days | Discharge: HOME | DRG: 669 | End: 2025-02-14
Attending: EMERGENCY MEDICINE | Admitting: INTERNAL MEDICINE
Payer: COMMERCIAL

## 2025-02-10 DIAGNOSIS — Z72.0 TOBACCO ABUSE: ICD-10-CM

## 2025-02-10 DIAGNOSIS — R31.9 HEMATURIA: Primary | ICD-10-CM

## 2025-02-10 DIAGNOSIS — R31.0 GROSS HEMATURIA: ICD-10-CM

## 2025-02-10 DIAGNOSIS — M54.16 LUMBAR RADICULOPATHY: ICD-10-CM

## 2025-02-10 LAB
ALBUMIN SERPL BCG-MCNC: 3.7 G/DL (ref 3.5–5)
ALP SERPL-CCNC: 70 U/L (ref 34–104)
ALT SERPL W P-5'-P-CCNC: 13 U/L (ref 7–52)
ANION GAP SERPL CALCULATED.3IONS-SCNC: 7 MMOL/L (ref 4–13)
APTT PPP: 26 SECONDS (ref 23–34)
AST SERPL W P-5'-P-CCNC: 20 U/L (ref 13–39)
BACTERIA UR QL AUTO: ABNORMAL /HPF
BASOPHILS # BLD AUTO: 0.05 THOUSANDS/ÂΜL (ref 0–0.1)
BASOPHILS NFR BLD AUTO: 1 % (ref 0–1)
BILIRUB SERPL-MCNC: 0.47 MG/DL (ref 0.2–1)
BILIRUB UR QL STRIP: NEGATIVE
BUN SERPL-MCNC: 25 MG/DL (ref 5–25)
CALCIUM SERPL-MCNC: 8.6 MG/DL (ref 8.4–10.2)
CHLORIDE SERPL-SCNC: 109 MMOL/L (ref 96–108)
CLARITY UR: ABNORMAL
CO2 SERPL-SCNC: 23 MMOL/L (ref 21–32)
COLOR UR: YELLOW
CREAT SERPL-MCNC: 0.86 MG/DL (ref 0.6–1.3)
EOSINOPHIL # BLD AUTO: 0.11 THOUSAND/ÂΜL (ref 0–0.61)
EOSINOPHIL NFR BLD AUTO: 2 % (ref 0–6)
ERYTHROCYTE [DISTWIDTH] IN BLOOD BY AUTOMATED COUNT: 14.3 % (ref 11.6–15.1)
GFR SERPL CREATININE-BSD FRML MDRD: 79 ML/MIN/1.73SQ M
GLUCOSE SERPL-MCNC: 97 MG/DL (ref 65–140)
GLUCOSE UR STRIP-MCNC: NEGATIVE MG/DL
HCT VFR BLD AUTO: 35.7 % (ref 36.5–49.3)
HGB BLD-MCNC: 11.4 G/DL (ref 12–17)
HGB UR QL STRIP.AUTO: ABNORMAL
IMM GRANULOCYTES # BLD AUTO: 0.02 THOUSAND/UL (ref 0–0.2)
IMM GRANULOCYTES NFR BLD AUTO: 0 % (ref 0–2)
INR PPP: 1.06 (ref 0.85–1.19)
KETONES UR STRIP-MCNC: NEGATIVE MG/DL
LEUKOCYTE ESTERASE UR QL STRIP: NEGATIVE
LYMPHOCYTES # BLD AUTO: 1.03 THOUSANDS/ÂΜL (ref 0.6–4.47)
LYMPHOCYTES NFR BLD AUTO: 18 % (ref 14–44)
MCH RBC QN AUTO: 29 PG (ref 26.8–34.3)
MCHC RBC AUTO-ENTMCNC: 31.9 G/DL (ref 31.4–37.4)
MCV RBC AUTO: 91 FL (ref 82–98)
MONOCYTES # BLD AUTO: 0.69 THOUSAND/ÂΜL (ref 0.17–1.22)
MONOCYTES NFR BLD AUTO: 12 % (ref 4–12)
NEUTROPHILS # BLD AUTO: 4 THOUSANDS/ÂΜL (ref 1.85–7.62)
NEUTS SEG NFR BLD AUTO: 67 % (ref 43–75)
NITRITE UR QL STRIP: NEGATIVE
NON-SQ EPI CELLS URNS QL MICRO: ABNORMAL /HPF
NRBC BLD AUTO-RTO: 0 /100 WBCS
PH UR STRIP.AUTO: 6 [PH]
PLATELET # BLD AUTO: 186 THOUSANDS/UL (ref 149–390)
PMV BLD AUTO: 10.4 FL (ref 8.9–12.7)
POTASSIUM SERPL-SCNC: 4.1 MMOL/L (ref 3.5–5.3)
PROT SERPL-MCNC: 5.8 G/DL (ref 6.4–8.4)
PROT UR STRIP-MCNC: ABNORMAL MG/DL
PROTHROMBIN TIME: 14.3 SECONDS (ref 12.3–15)
RBC # BLD AUTO: 3.93 MILLION/UL (ref 3.88–5.62)
RBC #/AREA URNS AUTO: ABNORMAL /HPF
SODIUM SERPL-SCNC: 139 MMOL/L (ref 135–147)
SP GR UR STRIP.AUTO: <1.005 (ref 1–1.03)
UROBILINOGEN UR STRIP-ACNC: <2 MG/DL
WBC # BLD AUTO: 5.9 THOUSAND/UL (ref 4.31–10.16)
WBC #/AREA URNS AUTO: ABNORMAL /HPF

## 2025-02-10 PROCEDURE — 87077 CULTURE AEROBIC IDENTIFY: CPT | Performed by: EMERGENCY MEDICINE

## 2025-02-10 PROCEDURE — 87086 URINE CULTURE/COLONY COUNT: CPT | Performed by: EMERGENCY MEDICINE

## 2025-02-10 PROCEDURE — 99285 EMERGENCY DEPT VISIT HI MDM: CPT | Performed by: EMERGENCY MEDICINE

## 2025-02-10 PROCEDURE — 99284 EMERGENCY DEPT VISIT MOD MDM: CPT

## 2025-02-10 PROCEDURE — 36415 COLL VENOUS BLD VENIPUNCTURE: CPT | Performed by: EMERGENCY MEDICINE

## 2025-02-10 PROCEDURE — 85610 PROTHROMBIN TIME: CPT | Performed by: EMERGENCY MEDICINE

## 2025-02-10 PROCEDURE — 81001 URINALYSIS AUTO W/SCOPE: CPT | Performed by: EMERGENCY MEDICINE

## 2025-02-10 PROCEDURE — 80053 COMPREHEN METABOLIC PANEL: CPT | Performed by: EMERGENCY MEDICINE

## 2025-02-10 PROCEDURE — 87186 SC STD MICRODIL/AGAR DIL: CPT | Performed by: EMERGENCY MEDICINE

## 2025-02-10 PROCEDURE — 85730 THROMBOPLASTIN TIME PARTIAL: CPT | Performed by: EMERGENCY MEDICINE

## 2025-02-10 PROCEDURE — 85025 COMPLETE CBC W/AUTO DIFF WBC: CPT | Performed by: EMERGENCY MEDICINE

## 2025-02-10 RX ORDER — LIDOCAINE HYDROCHLORIDE 20 MG/ML
1 JELLY TOPICAL ONCE
Status: COMPLETED | OUTPATIENT
Start: 2025-02-10 | End: 2025-02-10

## 2025-02-10 RX ORDER — FINASTERIDE 5 MG/1
5 TABLET, FILM COATED ORAL DAILY
COMMUNITY
End: 2025-02-14

## 2025-02-10 RX ADMIN — LIDOCAINE HYDROCHLORIDE 1 APPLICATION: 20 JELLY TOPICAL at 21:21

## 2025-02-11 LAB
ANION GAP SERPL CALCULATED.3IONS-SCNC: 9 MMOL/L (ref 4–13)
BASOPHILS # BLD AUTO: 0.04 THOUSANDS/ÂΜL (ref 0–0.1)
BASOPHILS NFR BLD AUTO: 1 % (ref 0–1)
BUN SERPL-MCNC: 22 MG/DL (ref 5–25)
CALCIUM SERPL-MCNC: 8.4 MG/DL (ref 8.4–10.2)
CHLORIDE SERPL-SCNC: 110 MMOL/L (ref 96–108)
CO2 SERPL-SCNC: 20 MMOL/L (ref 21–32)
CREAT SERPL-MCNC: 0.72 MG/DL (ref 0.6–1.3)
EOSINOPHIL # BLD AUTO: 0.21 THOUSAND/ÂΜL (ref 0–0.61)
EOSINOPHIL NFR BLD AUTO: 3 % (ref 0–6)
ERYTHROCYTE [DISTWIDTH] IN BLOOD BY AUTOMATED COUNT: 14.4 % (ref 11.6–15.1)
GFR SERPL CREATININE-BSD FRML MDRD: 85 ML/MIN/1.73SQ M
GLUCOSE P FAST SERPL-MCNC: 74 MG/DL (ref 65–99)
GLUCOSE SERPL-MCNC: 74 MG/DL (ref 65–140)
HCT VFR BLD AUTO: 34.8 % (ref 36.5–49.3)
HGB BLD-MCNC: 11 G/DL (ref 12–17)
IMM GRANULOCYTES # BLD AUTO: 0.02 THOUSAND/UL (ref 0–0.2)
IMM GRANULOCYTES NFR BLD AUTO: 0 % (ref 0–2)
LYMPHOCYTES # BLD AUTO: 1.27 THOUSANDS/ÂΜL (ref 0.6–4.47)
LYMPHOCYTES NFR BLD AUTO: 20 % (ref 14–44)
MCH RBC QN AUTO: 29 PG (ref 26.8–34.3)
MCHC RBC AUTO-ENTMCNC: 31.6 G/DL (ref 31.4–37.4)
MCV RBC AUTO: 92 FL (ref 82–98)
MONOCYTES # BLD AUTO: 0.79 THOUSAND/ÂΜL (ref 0.17–1.22)
MONOCYTES NFR BLD AUTO: 13 % (ref 4–12)
NEUTROPHILS # BLD AUTO: 3.89 THOUSANDS/ÂΜL (ref 1.85–7.62)
NEUTS SEG NFR BLD AUTO: 63 % (ref 43–75)
NRBC BLD AUTO-RTO: 0 /100 WBCS
PLATELET # BLD AUTO: 168 THOUSANDS/UL (ref 149–390)
PMV BLD AUTO: 10.5 FL (ref 8.9–12.7)
POTASSIUM SERPL-SCNC: 4.1 MMOL/L (ref 3.5–5.3)
RBC # BLD AUTO: 3.79 MILLION/UL (ref 3.88–5.62)
SODIUM SERPL-SCNC: 139 MMOL/L (ref 135–147)
WBC # BLD AUTO: 6.22 THOUSAND/UL (ref 4.31–10.16)

## 2025-02-11 PROCEDURE — 99222 1ST HOSP IP/OBS MODERATE 55: CPT | Performed by: INTERNAL MEDICINE

## 2025-02-11 PROCEDURE — 85025 COMPLETE CBC W/AUTO DIFF WBC: CPT

## 2025-02-11 PROCEDURE — 80048 BASIC METABOLIC PNL TOTAL CA: CPT

## 2025-02-11 RX ORDER — OXYBUTYNIN CHLORIDE 5 MG/1
5 TABLET ORAL 2 TIMES DAILY
Status: DISCONTINUED | OUTPATIENT
Start: 2025-02-11 | End: 2025-02-14 | Stop reason: HOSPADM

## 2025-02-11 RX ORDER — LATANOPROST 50 UG/ML
1 SOLUTION/ DROPS OPHTHALMIC DAILY
Status: DISCONTINUED | OUTPATIENT
Start: 2025-02-11 | End: 2025-02-12

## 2025-02-11 RX ORDER — FLUTICASONE PROPIONATE 50 MCG
1 SPRAY, SUSPENSION (ML) NASAL 2 TIMES DAILY
Status: DISCONTINUED | OUTPATIENT
Start: 2025-02-11 | End: 2025-02-14 | Stop reason: HOSPADM

## 2025-02-11 RX ORDER — ACETAMINOPHEN 325 MG/1
650 TABLET ORAL EVERY 6 HOURS PRN
Status: DISCONTINUED | OUTPATIENT
Start: 2025-02-11 | End: 2025-02-14 | Stop reason: HOSPADM

## 2025-02-11 RX ORDER — ATORVASTATIN CALCIUM 20 MG/1
20 TABLET, FILM COATED ORAL
Status: DISCONTINUED | OUTPATIENT
Start: 2025-02-11 | End: 2025-02-14 | Stop reason: HOSPADM

## 2025-02-11 RX ORDER — ASPIRIN 81 MG/1
81 TABLET, CHEWABLE ORAL DAILY
Status: DISCONTINUED | OUTPATIENT
Start: 2025-02-11 | End: 2025-02-14 | Stop reason: HOSPADM

## 2025-02-11 RX ORDER — LOSARTAN POTASSIUM 25 MG/1
25 TABLET ORAL DAILY
Status: DISCONTINUED | OUTPATIENT
Start: 2025-02-11 | End: 2025-02-14 | Stop reason: HOSPADM

## 2025-02-11 RX ORDER — CLOPIDOGREL BISULFATE 75 MG/1
75 TABLET ORAL DAILY
Status: DISCONTINUED | OUTPATIENT
Start: 2025-02-11 | End: 2025-02-14 | Stop reason: HOSPADM

## 2025-02-11 RX ORDER — TAMSULOSIN HYDROCHLORIDE 0.4 MG/1
0.4 CAPSULE ORAL
Status: DISCONTINUED | OUTPATIENT
Start: 2025-02-11 | End: 2025-02-14 | Stop reason: HOSPADM

## 2025-02-11 RX ORDER — SODIUM CHLORIDE, SODIUM LACTATE, POTASSIUM CHLORIDE, CALCIUM CHLORIDE 600; 310; 30; 20 MG/100ML; MG/100ML; MG/100ML; MG/100ML
75 INJECTION, SOLUTION INTRAVENOUS CONTINUOUS
Status: DISCONTINUED | OUTPATIENT
Start: 2025-02-11 | End: 2025-02-11

## 2025-02-11 RX ORDER — METOPROLOL SUCCINATE 25 MG/1
25 TABLET, EXTENDED RELEASE ORAL DAILY
Status: DISCONTINUED | OUTPATIENT
Start: 2025-02-11 | End: 2025-02-14 | Stop reason: HOSPADM

## 2025-02-11 RX ADMIN — FLUTICASONE PROPIONATE 1 SPRAY: 50 SPRAY, METERED NASAL at 18:27

## 2025-02-11 RX ADMIN — LATANOPROST 1 DROP: 50 SOLUTION OPHTHALMIC at 22:01

## 2025-02-11 RX ADMIN — TAMSULOSIN HYDROCHLORIDE 0.4 MG: 0.4 CAPSULE ORAL at 18:26

## 2025-02-11 RX ADMIN — SODIUM CHLORIDE, SODIUM LACTATE, POTASSIUM CHLORIDE, AND CALCIUM CHLORIDE 75 ML/HR: .6; .31; .03; .02 INJECTION, SOLUTION INTRAVENOUS at 02:03

## 2025-02-11 RX ADMIN — LOSARTAN POTASSIUM 25 MG: 25 TABLET, FILM COATED ORAL at 12:11

## 2025-02-11 RX ADMIN — ATORVASTATIN CALCIUM 20 MG: 20 TABLET, FILM COATED ORAL at 18:26

## 2025-02-11 RX ADMIN — METOPROLOL SUCCINATE 25 MG: 25 TABLET, FILM COATED, EXTENDED RELEASE ORAL at 12:11

## 2025-02-11 RX ADMIN — OXYBUTYNIN CHLORIDE 5 MG: 5 TABLET ORAL at 18:26

## 2025-02-11 RX ADMIN — FLUTICASONE PROPIONATE 1 SPRAY: 50 SPRAY, METERED NASAL at 12:11

## 2025-02-11 RX ADMIN — OXYBUTYNIN CHLORIDE 5 MG: 5 TABLET ORAL at 12:11

## 2025-02-11 NOTE — ASSESSMENT & PLAN NOTE
History of centrilobular emphysema, COPD with asthma component.  Former smoker.  Stable room air, no evidence of acute exacerbation.  Not taking any inhalers or nebulizers at home.

## 2025-02-11 NOTE — CONSULTS
H&P Exam - Urology       Patient: Gilberto Vann   : 1940 Sex: male   MRN: [id number]     CSN: [id number]      History of Present Illness   HPI:  Gilberto Vann is a 84 y.o. male   Well-known to me history of left hydrocelectomy 16 months ago recent history of hemorrhagic cystitis admitted some 3 months ago developing gross hematuria some 3 days ago presenting to [facility] late last night started on continuous bladder irrigation seen at the bedside patient states he denies fevers chills CBI mild hematuria noted at bedside      Review of Systems:   Constitutional:  Negative for activity change, fever, chills and diaphoresis.   HENT: Negative for hearing loss and trouble swallowing.   Eyes: Negative for itching and visual disturbance.   Respiratory: Negative for chest tightness and shortness of breath.   Cardiovascular: Negative for chest pain, edema.   Gastrointestinal: Negative for abdominal distention, na abdominal pain, constipation, diarrhea, Nausea and vomiting.   Genitourinary: Negative for decreased urine volume, difficulty urinating, dysuria, enuresis, frequency, hematuria and urgency.   Musculoskeletal: Negative for gait problem and myalgias.   Neurological: Negative for dizziness and headaches.   Hematological: Does not bruise/bleed easily.       Historical Information   Past Medical History:   Diagnosis Date    Arthritis     Asthma     Back pain     BPH (benign prostatic hypertrophy) with urinary obstruction     COPD (chronic obstructive pulmonary disease) ([facility])     Dizziness     Glaucoma     bilat    Insomnia     RLS (restless legs syndrome)     Sinusitis      Past Surgical History:   Procedure Laterality Date    CARDIAC CATHETERIZATION N/A 2024    Procedure: Cardiac catheterization;  Surgeon: Boston Bergman MD;  Location: [facility] CARD[facility] C[facility];  Service: Cardiology    CARDIAC CATHETERIZATION N/A 2024    Procedure: Cardiac Coronary Angiogram;  Surgeon: Boston Bergman MD;   Location: [facility];  Service: Cardiology    CATARACT EXTRACTION Left     EAR SURGERY      to wart    EPIDIDYMAL CYST EXCISION Right 2000    HEMORROIDECTOMY      HI EXCISION HYDROCELE UNILATERAL Left 2023    Procedure: HYDROCELECTOMY, sPERMATOCELECTOMY;  Surgeon: James Blunt MD;  Location: [facility];  Service: Urology    HI TRURL ELECTROSURG RESCJ PROSTATE BLEED COMPLETE N/A 2020    Procedure: CYSTOSCOPY, TRANSURETHRAL RESECTION OF PROSTATE (TURP);  Surgeon: Reji Meneses MD;  Location: [facility];  Service: Urology    PROSTATE BIOPSY Bilateral 2003    BPH with mild acute and chronic inflammation    PROSTATE BIOPSY Bilateral 2005    BPH with marked artropy and chronic inflammation     Social History   Social History     Substance and Sexual Activity   Alcohol Use Not Currently     Social History     Substance and Sexual Activity   Drug Use Never     Social History     Tobacco Use   Smoking Status Former    Current packs/day: 0.00    Average packs/day: 1 pack/day for 15.0 years (15.0 ttl pk-yrs)    Types: Cigarettes    Start date:     Quit date:     Years since quittin.1    Passive exposure: Never   Smokeless Tobacco Current    Types: Chew   Tobacco Comments    Chews cigars     Family History:   Family History   Problem Relation Age of Onset    Asthma Father        Meds/Allergies     Medications Prior to Admission:     alfuzosin (UROXATRAL) 10 mg 24 hr tablet    Aspirin 81 MG CAPS    atorvastatin (LIPITOR) 20 mg tablet    clopidogrel (PLAVIX) 75 mg tablet    fluticasone (FLONASE) 50 mcg/act nasal spray    latanoprost (XALATAN) 0.005 % ophthalmic solution    losartan (COZAAR) 25 mg tablet    metoprolol succinate (TOPROL-XL) 25 mg 24 hr tablet    oxybutynin (DITROPAN) 5 mg tablet    docusate sodium (COLACE) 100 mg capsule    finasteride (PROSCAR) 5 mg tablet  Allergies   Allergen Reactions    Shellfish-Derived Products - Food Allergy Other (See Comments)      "Listed by PCP patient denies    Iodine - Food Allergy Other (See Comments)     Pt denies allergies       Objective   Vitals: /59   Pulse 68   Temp 97.9 °F (36.6 °C)   Resp 19   Ht 5' 9\" (1.753 m)   Wt 62.1 kg (137 lb)   SpO2 97%   BMI 20.23 kg/m²     Physical Exam:  General Alert awake   Normocephalic atraumatic PERRLA  Lungs clear bilaterally  Cardiac normal S1 normal S2  Abdomen soft, flank pain  Extremities no edema    I/O last 24 hours:  In: 4300 [Other:4300]  Out: 78691 [Urine:30806]    Invasive Devices       Peripheral Intravenous Line  Duration             Peripheral IV 02/10/25 Left;Proximal;Ventral (anterior) Forearm <1 day              Drain  Duration             Urethral Catheter Three way <1 day                        Lab Results: CBC:   Lab Results   Component Value Date    WBC 6.22 02/11/2025    HGB 11.0 (L) 02/11/2025    HCT 34.8 (L) 02/11/2025    MCV 92 02/11/2025     02/11/2025    RBC 3.79 (L) 02/11/2025    MCH 29.0 02/11/2025    MCHC 31.6 02/11/2025    RDW 14.4 02/11/2025    MPV 10.5 02/11/2025    NRBC 0 02/11/2025     CMP:   Lab Results   Component Value Date     (H) 02/11/2025     06/26/2023    CO2 20 (L) 02/11/2025    CO2 26 06/26/2023    BUN 22 02/11/2025    BUN 20 06/26/2023    CREATININE 0.72 02/11/2025    CALCIUM 8.4 02/11/2025    AST 20 02/10/2025    AST 17 06/26/2023    ALT 13 02/10/2025    ALT 14 06/26/2023    ALKPHOS 70 02/10/2025    EGFR 85 02/11/2025     Urinalysis:   Lab Results   Component Value Date    COLORU Yellow 02/10/2025    CLARITYU Cloudy 02/10/2025    SPECGRAV <1.005 (L) 02/10/2025    PHUR 6.0 02/10/2025    LEUKOCYTESUR Negative 02/10/2025    NITRITE Negative 02/10/2025    GLUCOSEU Negative 02/10/2025    KETONESU Negative 02/10/2025    BILIRUBINUR Negative 02/10/2025    BLOODU Large (A) 02/10/2025     Urine Culture:   Lab Results   Component Value Date    URINECX >100,000 cfu/ml Enterobacter cloacae (A) 11/26/2024     PSA:   Lab Results "   Component Value Date    PSA 6.98 (H) 05/22/2023           Assessment/ Plan:  Gross hematuria  Recent history of hemorrhagic cystitis  Urine cultures pending  Patient undergoing hand irrigation at the bedside with 2 L of normal saline removing multiple clots  CBI now mild hematuria  Hold aspirin  Antibiotics as per medical team  Will continue to watch if bleeds into Thursday will consider cystoscopy fulguration pending urine cultures can feed tomorrow      James Blunt MD

## 2025-02-11 NOTE — ASSESSMENT & PLAN NOTE
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  Patient understands that they will need regular blood tests to check lipid profile, liver function tests, white blood cell count, thyroid function tests and pregnancy test if applicable.

## 2025-02-11 NOTE — ED PROVIDER NOTES
Time reflects when diagnosis was documented in both MDM as applicable and the Disposition within this note       Time User Action Codes Description Comment    2/10/2025 11:42 PM Renetta Mendoza Add [R31.9] Hematuria           ED Disposition       ED Disposition   Admit    Condition   Stable    Date/Time   Mon Feb 10, 2025 11:42 PM    Comment   Case was discussed with [facility] and the patient's admission status was agreed to be Admission Status: observation status to the service of Dr. Gudino.               Assessment & Plan       Medical Decision Making  Pt is a 85yo M who presents with hematuria.     Will plan for labs and urinalysis.  Will also plan for Ledesma placement for CBI.  See ED course for results and details.    Plan to admit pt to [facility]. Pt discussed with admitting team and admission orders placed. Pt admitted without incident.         Amount and/or Complexity of Data Reviewed  Labs: ordered. Decision-making details documented in ED Course.    Risk  Prescription drug management.  Decision regarding hospitalization.        ED Course as of 02/10/25 2343   Mon Feb 10, 2025   2120 Hemoglobin(!): 11.4  Mild anemia.    2120 CBC and differential(!)  Reviewed and without actionable derangement.    2142 POCT INR: 1.06  WNL   2142 PTT: 26  WNL   2148 Comprehensive metabolic panel(!)  Reviewed and without actionable derangement.    2203 Per nursing, irrigated out a large amount of clots and started CBI. Urine almost clear. Will reassess after CBI completed.    2236 CBI paused. Urine almost clear at this time. Will reassess at interval.   2243 Leukocytes, UA: Negative   2243 Nitrite, UA: Negative   2243 Blood, UA(!): Large   2316 RBC Urine(!): Innumerable   2316 WBC, UA: 0-1   2316 Bacteria, UA: Occasional  Not convincing for infection. Culture in process.    2339 Gross hematuria returned. Will restart CBI and plan for admission. Pt agreeable.        Medications   lidocaine (URO-JET) 2 % urethral/mucosal gel 1  Application (1 Application Urethral Given 2/10/25 2121)       ED Risk Strat Scores                                              History of Present Illness       Chief Complaint   Patient presents with    Blood in Urine     Started with hematuria  a couple of days ago. Last few times extremely bloody, no pain slight burning, here in Nov for similar  thing       Past Medical History:   Diagnosis Date    Arthritis     Asthma     Back pain     BPH (benign prostatic hypertrophy) with urinary obstruction     COPD (chronic obstructive pulmonary disease) (HCC)     Dizziness     Glaucoma     bilat    Insomnia     RLS (restless legs syndrome)     Sinusitis       Past Surgical History:   Procedure Laterality Date    CARDIAC CATHETERIZATION N/A 2/19/2024    Procedure: Cardiac catheterization;  Surgeon: Boston Bergman MD;  Location: [facility] CARDIAC CATH [facility];  Service: Cardiology    CARDIAC CATHETERIZATION N/A 2/19/2024    Procedure: Cardiac Coronary Angiogram;  Surgeon: Boston Bergman MD;  Location: [facility] CARDIAC CATH [facility];  Service: Cardiology    CATARACT EXTRACTION Left     EAR SURGERY      to wart    EPIDIDYMAL CYST EXCISION Right 05/04/2000    HEMORROIDECTOMY      VA EXCISION HYDROCELE UNILATERAL Left 9/14/2023    Procedure: HYDROCELECTOMY, sPERMATOCELECTOMY;  Surgeon: James Blunt MD;  Location: [facility] MAIN [facility];  Service: Urology    VA TRURL ELECTROSURG RESCJ PROSTATE BLEED COMPLETE N/A 04/16/2020    Procedure: CYSTOSCOPY, TRANSURETHRAL RESECTION OF PROSTATE (TURP);  Surgeon: Reji Meneses MD;  Location: [facility];  Service: Urology    PROSTATE BIOPSY Bilateral 12/11/2003    BPH with mild acute and chronic inflammation    PROSTATE BIOPSY Bilateral 06/21/2005    BPH with marked artropy and chronic inflammation      Family History   Problem Relation Age of Onset    Asthma Father       Social History     Tobacco Use    Smoking status: Former     Current packs/day: 0.00     Average packs/day: 1 pack/day for 15.0 years (15.0 ttl pk-yrs)  "    Types: Cigarettes     Start date:      Quit date:      Years since quittin.1     Passive exposure: Never    Smokeless tobacco: Current     Types: Chew    Tobacco comments:     Chews cigars   Vaping Use    Vaping status: Never Used   Substance Use Topics    Alcohol use: Not Currently    Drug use: Never      E-Cigarette/Vaping    E-Cigarette Use Never User       E-Cigarette/Vaping Substances    Nicotine No     THC No     CBD No     Flavoring No     Other No     Unknown No       I have reviewed and agree with the history as documented.     Pt is an 85yo M who presents for hematuria.  Patient reports that for the past several days he has been having gross hematuria.  Patient arrives with friend who states that patient is urinating \"pure blood\".  Patient does have history of similar approximately 3 months ago for which she required multiple days of CBI.  Patient is on aspirin and blood thinners.  Patient reports mild dysuria but denies any other urinary complaints.  Patient states no trauma or injury to the area.  Patient denies any history of kidney stones.        Objective       ED Triage Vitals [02/10/25 2032]   Temperature Pulse Blood Pressure Respirations SpO2 Patient Position - Orthostatic VS   97.8 °F (36.6 °C) 81 127/60 20 98 % Sitting      Temp Source Heart Rate Source BP Location FiO2 (%) Pain Score    Tympanic Monitor Right arm -- No Pain      Vitals      Date and Time Temp Pulse SpO2 Resp BP Pain Score FACES Pain Rating User   02/10/25 2032 97.8 °F (36.6 °C) 81 98 % 20 127/60 No Pain -- LS            Physical Exam  Vitals reviewed.   Constitutional:       General: He is not in acute distress.     Appearance: He is well-developed. He is not toxic-appearing or diaphoretic.   HENT:      Head: Normocephalic and atraumatic.      Right Ear: External ear normal.      Left Ear: External ear normal.      Nose: Nose normal.      Mouth/Throat:      Pharynx: Oropharynx is clear.   Eyes:      Extraocular " Movements: Extraocular movements intact.      Pupils: Pupils are equal, round, and reactive to light.   Cardiovascular:      Rate and Rhythm: Normal rate and regular rhythm.      Heart sounds: Normal heart sounds.   Pulmonary:      Effort: Pulmonary effort is normal. No respiratory distress.      Breath sounds: Normal breath sounds.   Abdominal:      General: Bowel sounds are normal. There is no distension.      Palpations: Abdomen is soft.      Tenderness: There is no abdominal tenderness. There is no guarding.   Musculoskeletal:         General: Normal range of motion.      Cervical back: Neck supple.   Skin:     General: Skin is warm and dry.      Capillary Refill: Capillary refill takes less than 2 seconds.      Coloration: Skin is not pale.   Neurological:      General: No focal deficit present.      Mental Status: He is alert and oriented to person, place, and time.   Psychiatric:         Speech: Speech normal.         Behavior: Behavior is cooperative.         Results Reviewed       Procedure Component Value Units Date/Time    Urine Microscopic [[id number]]  (Abnormal) Collected: 02/10/25 2147    Lab Status: Final result Specimen: Urine, Indwelling Ledesma Catheter Updated: 02/10/25 2314     RBC, UA Innumerable /hpf      WBC, UA 0-1 /hpf      Epithelial Cells None Seen /hpf      Bacteria, UA Occasional /hpf     UA (URINE) with reflex to Scope [[id number]]  (Abnormal) Collected: 02/10/25 2147    Lab Status: Final result Specimen: Urine, Indwelling Ledesma Catheter Updated: 02/10/25 2242     Color, UA Yellow     Clarity, UA Cloudy     Specific Gravity, UA <1.005     pH, UA 6.0     Leukocytes, UA Negative     Nitrite, UA Negative     Protein, UA Trace mg/dl      Glucose, UA Negative mg/dl      Ketones, UA Negative mg/dl      Urobilinogen, UA <2.0 mg/dl      Bilirubin, UA Negative     Occult Blood, UA Large    Comprehensive metabolic panel [[id number]]  (Abnormal) Collected: 02/10/25 2101    Lab Status: Final result  Specimen: Blood from Arm, Left Updated: 02/10/25 2148     Sodium 139 mmol/L      Potassium 4.1 mmol/L      Chloride 109 mmol/L      CO2 23 mmol/L      ANION GAP 7 mmol/L      BUN 25 mg/dL      Creatinine 0.86 mg/dL      Glucose 97 mg/dL      Calcium 8.6 mg/dL      AST 20 U/L      ALT 13 U/L      Alkaline Phosphatase 70 U/L      Total Protein 5.8 g/dL      Albumin 3.7 g/dL      Total Bilirubin 0.47 mg/dL      eGFR 79 ml/min/1.73sq m     Narrative:      National Kidney Disease Foundation guidelines for Chronic Kidney Disease (CKD):     Stage 1 with normal or high GFR (GFR > 90 mL/min/1.73 square meters)    Stage 2 Mild CKD (GFR = 60-89 mL/min/1.73 square meters)    Stage 3A Moderate CKD (GFR = 45-59 mL/min/1.73 square meters)    Stage 3B Moderate CKD (GFR = 30-44 mL/min/1.73 square meters)    Stage 4 Severe CKD (GFR = 15-29 mL/min/1.73 square meters)    Stage 5 End Stage CKD (GFR <15 mL/min/1.73 square meters)  Note: GFR calculation is accurate only with a steady state creatinine    Urine culture [[id number]] Collected: 02/10/25 2148    Lab Status: No result Specimen: Urine, Indwelling Ledesma Catheter     Protime-INR [[id number]]  (Normal) Collected: 02/10/25 2101    Lab Status: Final result Specimen: Blood from Arm, Left Updated: 02/10/25 2140     Protime 14.3 seconds      INR 1.06    Narrative:      INR Therapeutic Range    Indication                                             INR Range      Atrial Fibrillation                                               2.0-3.0  Hypercoagulable State                                    2.0.2.3  Left Ventricular Asist Device                            2.0-3.0  Mechanical Heart Valve                                  -    Aortic(with afib, MI, embolism, HF, LA enlargement,    and/or coagulopathy)                                     2.0-3.0 (2.5-3.5)     Mitral                                                             2.5-3.5  Prosthetic/Bioprosthetic Heart Valve                2.0-3.0  Venous thromboembolism (VTE: VT, PE        2.0-3.0    APTT [[id number]]  (Normal) Collected: 02/10/25 2101    Lab Status: Final result Specimen: Blood from Arm, Left Updated: 02/10/25 2140     PTT 26 seconds     CBC and differential [[id number]]  (Abnormal) Collected: 02/10/25 2101    Lab Status: Final result Specimen: Blood from Arm, Left Updated: 02/10/25 2119     WBC 5.90 Thousand/uL      RBC 3.93 Million/uL      Hemoglobin 11.4 g/dL      Hematocrit 35.7 %      MCV 91 fL      MCH 29.0 pg      MCHC 31.9 g/dL      RDW 14.3 %      MPV 10.4 fL      Platelets 186 Thousands/uL      nRBC 0 /100 WBCs      Segmented % 67 %      Immature Grans % 0 %      Lymphocytes % 18 %      Monocytes % 12 %      Eosinophils Relative 2 %      Basophils Relative 1 %      Absolute Neutrophils 4.00 Thousands/µL      Absolute Immature Grans 0.02 Thousand/uL      Absolute Lymphocytes 1.03 Thousands/µL      Absolute Monocytes 0.69 Thousand/µL      Eosinophils Absolute 0.11 Thousand/µL      Basophils Absolute 0.05 Thousands/µL             No orders to display       Procedures    ED Medication and Procedure Management   Prior to Admission Medications   Prescriptions Last Dose Informant Patient Reported? Taking?   Aspirin 81 MG CAPS  Self Yes No   Sig: Take by mouth   alfuzosin (UROXATRAL) 10 mg 24 hr tablet   Yes No   Sig: Take 1 tablet by mouth in the morning   atorvastatin (LIPITOR) 20 mg tablet   No No   Sig: Take 1 tablet (20 mg total) by mouth daily with dinner   clopidogrel (PLAVIX) 75 mg tablet   No No   Sig: take 1 tablet by mouth once daily   docusate sodium (COLACE) 100 mg capsule   No No   Sig: Take 1 capsule (100 mg total) by mouth 2 (two) times a day   finasteride (PROSCAR) 5 mg tablet Not Taking  Yes No   Sig: Take 5 mg by mouth daily   Patient not taking: Reported on 2/10/2025   fluticasone (FLONASE) 50 mcg/act nasal spray  Self No No   Si spray into each nostril 2 (two) times a day   latanoprost (XALATAN) 0.005 %  ophthalmic solution   Yes No   Sig: Administer 1 drop to both eyes daily   losartan (COZAAR) 25 mg tablet   No No   Sig: take 1 tablet by mouth once daily   metoprolol succinate (TOPROL-XL) 25 mg 24 hr tablet   No No   Sig: Take 1 tablet (25 mg total) by mouth daily   oxybutynin (DITROPAN) 5 mg tablet   No No   Sig: Take 1 tablet (5 mg total) by mouth 2 (two) times a day      Facility-Administered Medications: None     Patient's Medications   Discharge Prescriptions    No medications on file     No discharge procedures on file.  ED SEPSIS DOCUMENTATION   Time reflects when diagnosis was documented in both MDM as applicable and the Disposition within this note       Time User Action Codes Description Comment    2/10/2025 11:42 PM Renetta Mendoza Add [R31.9] Hematuria                  Renetta Mendoza MD  02/10/25 5346

## 2025-02-11 NOTE — ED NOTES
3700 ML out put after 3000 mL of NS through CBI.     Urine noted to be pink after CBI  first bag. Held second bag per MD amador.  Will monitor to  see if second bag needs to be ran.     Mago Cramer RN  02/10/25 4258

## 2025-02-11 NOTE — ASSESSMENT & PLAN NOTE
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  I's and O's, daily weights

## 2025-02-11 NOTE — CASE MANAGEMENT
Case Management Assessment & Discharge Planning Note    Patient name Gilberto Vann  Location [address] [address] [address] MRN [id number]  : 1940 Date 2025       Current Admission Date: 2/10/2025  Current Admission Diagnosis:Gross hematuria   Patient Active Problem List    Diagnosis Date Noted Date Diagnosed    Ischemic cardiomyopathy 2024     Tobacco abuse 2024     Primary hypertension 2024     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HCC) 2024     Pacemaker 11/15/2024     Plantar fasciitis 2024     CAD (coronary artery disease) 2024     Critical aortic valve stenosis 2024     Cardiomyopathy (HCC) 2024     Syncope 2024     Glaucoma 2024     Elevated troponin 2024     Hyperglycemia 2024     Closed fracture of multiple ribs of both sides 2024     Closed fracture of body of sternum 2024     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HCC) 2024     Pulmonary nodules 2024     Anxiety 2024     Anemia 2024     Centrilobular emphysema (HCC) 2023     Hypercalcemia 2023     Shortness of breath 2023     Chronic bronchitis (HCC) 2023     Hypokalemia 2023     Encysted hydrocele 2023     Hypertensive heart failure (HCC) 2023     Hydrocele 01/10/2023     Primary osteoarthritis involving multiple joints 2022     Lumbar radiculopathy 2022     Gross hematuria 2020     COPD (chronic obstructive pulmonary disease) (HCC) 2020     Asthma 2013       LOS (days): 0  Geometric Mean LOS (GMLOS) (days):   Days to GMLOS:     OBJECTIVE:         Current admission status: Observation  Referral Reason: Other (Discharge planning)    Preferred Pharmacy:   [id number] - [place], [place] - [address]  [facility] [address]  Phone: [phone] Fax:  [phone]    Primary Care Provider: Dima Lloyd MD    Primary Insurance: [employer]  Secondary Insurance:     ASSESSMENT:  Active Health Care Proxies       Lucy Adames Health Care Agent - Sister   Primary Phone: [phone] (Mobile)  Home Phone: [phone]                  Obs Notice Signed: 02/11/25    Readmission Root Cause  30 Day Readmission: No    Patient Information  Admitted from:: Home  Mental Status: Alert  During Assessment patient was accompanied by: Not accompanied during assessment  Assessment information provided by:: Patient  Primary Caregiver: Self  Support Systems: Family members, Friend  County of Residence: [place]  [place] [facility] do you live in?: [place]  Home entry access options. Select all that apply.: Stairs  Number of steps to enter home.: 2  Type of Current Residence: 2 story home  Upon entering residence, is there a bedroom on the main floor (no further steps)?: No  A bedroom is located on the following floor levels of residence (select all that apply):: 2nd Floor  Upon entering residence, is there a bathroom on the main floor (no further steps)?: Yes (BA on 1st floor, no BA on 2nd floor)  Number of steps to 2nd floor from main floor: One Flight  Living Arrangements: Lives Alone  Is patient a ?: No    Activities of Daily Living Prior to Admission  Functional Status: Independent  Completes ADLs independently?: Yes  Ambulates independently?: Yes  Does patient use assisted devices?: No  Does patient currently own DME?: No  Does the patient have a history of Short-Term Rehab?: Yes ([facility])  Does patient have a history of HHC?: Yes ([facility])  Does patient currently have HHC?: No    Patient Information Continued  Income Source: Pension/long term  Does patient have prescription coverage?: Yes  Does patient receive dialysis treatments?: No    Means of Transportation  Means of Transport to Hospitals in [place]:: Drives Self      DISCHARGE DETAILS:    Discharge planning  discussed with:: Patient  Freedom of Choice: Yes    Comments - Freedom of Choice: SW spoke with patient at bedside to introduce role of CM, conduct assessment and discuss discharge planning.  Patient lives alone, is independent at baseline and drives.  He uses no assistive devices.  His preference is to return home when medically cleared and a friend will transport him home at discharge.  At this time there are no anticipated rehabilitation or CM needs for discharge and SW will continue to follow.      CM contacted family/caregiver?: No- see comments (Patient prefers to update his sister himself)  Were Treatment Team discharge recommendations reviewed with patient/caregiver?: Yes  Did patient/caregiver verbalize understanding of patient care needs?: Yes  Were patient/caregiver advised of the risks associated with not following Treatment Team discharge recommendations?: Yes    Contacts  Patient Contacts: Lucy Adames (sister)  Relationship to Patient:: Family  Contact Method: Phone  Phone Number: [phone]    Requested Home Health Care         Is the patient interested in HHC at discharge?: No    DME Referral Provided  Referral made for DME?: No    Other Referral/Resources/Interventions Provided:  Interventions: None Indicated    Would you like to participate in our [facility] service program?  : No - Declined    Treatment Team Recommendation: Home  Discharge Destination Plan:: Home  Transport at Discharge : Other (Comment) (Friend)

## 2025-02-11 NOTE — ED NOTES
Second bag started CBI due to pts urine becoming more red     Mago Cramer RN  02/11/25 0002       Mago Cramer RN  02/11/25 0002

## 2025-02-11 NOTE — H&P
H&P - Hospitalist   Name: Gilberto Vann 84 y.o. male I MRN: [id number]  Unit/Bed#: ED 01 I Date of Admission: 2/10/2025   Date of Service: 2/11/2025 I Hospital Day: 0     Assessment & Plan  Gross hematuria  Presents to ED from home with gross hematuria since noon on Monday.  Chart reviewed, previously hospitalized in November 2024 with hematuria secondary to hemorrhagic cystitis.  Initially treated in ED with hand irrigation of large clots, three-way Ledesma was placed and CBI initiated which cleared hematuria; however, once CBI was discontinued hematuria and clots returned.  CBI was then restarted.  On admission CBC with hemoglobin 11.4, no SIRS criteria, CMP unremarkable, UA not convincing of cystitis, however given history of hemorrhagic cystitis will send for urine culture.  Afebrile, vitally stable.  Hemodynamically stable.  Continue CBI  NPO  Hold aspirin and Plavix  Follow-up hemoglobin with CBC in the a.m.  Appreciate urology consultation, patient is known to Dr. Blunt's service.  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  I's and O's, daily weights  Pacemaker  S/p Micra pacemaker implantation for Mobitz type II AV block and LBBB  COPD (chronic obstructive pulmonary disease) (HCC)  History of centrilobular emphysema, COPD with asthma component.  Former smoker.  Stable room air, no evidence of acute exacerbation.  Not taking any inhalers or  nebulizers at home.      VTE Pharmacologic Prophylaxis: VTE Score: 4 Moderate Risk (Score 3-4) - Pharmacological DVT Prophylaxis Contraindicated. Sequential Compression Devices Ordered.  Code Status: Level 1 - Full Code   Discussion with family:  No.     Anticipated Length of Stay: Patient will be admitted on an observation basis with an anticipated length of stay of less than 2 midnights secondary to gross hematuria.    History of Present Illness   Chief Complaint: gross hematuria since 2/10 noon.    Gilberto Vann is a 84 y.o. male with a PMH of cardiomyopathy, anemia, COPD, BPH, anxiety, HTN, CAD, prediabetes, history of syncope 2/2 critical aortic stenosis s/p CPR 2/2024 and subsequent s/p TAVR 3/2024, who presents with gross hematuria since 2/10 noon. Presents to ED from home with gross hematuria since noon on Monday. Chart reviewed, previously hospitalized in November 2024 with hematuria secondary to hemorrhagic cystitis. Initially treated in ED with hand irrigation of large clots, three-way Ledesma was placed and CBI initiated which cleared hematuria; however, once CBI was discontinued hematuria and clots returned.  CBI was then restarted. On admission CBC with hemoglobin 11.4, no SIRS criteria, CMP unremarkable, UA not convincing of cystitis, however given history of hemorrhagic cystitis will send for urine culture.  Afebrile, vitally stable.  Hemodynamically stable.  Patient denies chest pain, SOB, WU, lightheadedness dizziness or palpitations.  Patient denies any N/V/D abdominal pain or constipation.  Patient denies any fever or chills at home.  Endorses fatigue.    Review of Systems   Constitutional:  Positive for fatigue. Negative for appetite change, chills and fever.   HENT:  Negative for ear pain and sore throat.    Eyes:  Negative for pain and visual disturbance.   Respiratory:  Negative for cough, shortness of breath, wheezing and stridor.    Cardiovascular:  Negative for chest pain, palpitations  and leg swelling.   Gastrointestinal:  Negative for abdominal pain, blood in stool, diarrhea, nausea and vomiting.   Genitourinary:  Negative for difficulty urinating, dysuria, frequency, hematuria, penile pain and urgency.   Musculoskeletal:  Negative for arthralgias and back pain.   Skin:  Negative for color change and rash.   Neurological:  Negative for seizures, syncope and weakness.   All other systems reviewed and are negative.      Historical Information   Past Medical History:   Diagnosis Date    Arthritis     Asthma     Back pain     BPH (benign prostatic hypertrophy) with urinary obstruction     COPD (chronic obstructive pulmonary disease) (HCC)     Dizziness     Glaucoma     bilat    Insomnia     RLS (restless legs syndrome)     Sinusitis      Past Surgical History:   Procedure Laterality Date    CARDIAC CATHETERIZATION N/A 2/19/2024    Procedure: Cardiac catheterization;  Surgeon: Boston Bergman MD;  Location: [facility];  Service: Cardiology    CARDIAC CATHETERIZATION N/A 2/19/2024    Procedure: Cardiac Coronary Angiogram;  Surgeon: Boston Bergman MD;  Location: [facility];  Service: Cardiology    CATARACT EXTRACTION Left     EAR SURGERY      to wart    EPIDIDYMAL CYST EXCISION Right 05/04/2000    HEMORROIDECTOMY      NV EXCISION HYDROCELE UNILATERAL Left 9/14/2023    Procedure: HYDROCELECTOMY, sPERMATOCELECTOMY;  Surgeon: James Blunt MD;  Location: [facility];  Service: Urology    NV TRURL ELECTROSURG RESCJ PROSTATE BLEED COMPLETE N/A 04/16/2020    Procedure: CYSTOSCOPY, TRANSURETHRAL RESECTION OF PROSTATE (TURP);  Surgeon: Reji Meneses MD;  Location: [facility] OR;  Service: Urology    PROSTATE BIOPSY Bilateral 12/11/2003    BPH with mild acute and chronic inflammation    PROSTATE BIOPSY Bilateral 06/21/2005    BPH with marked artropy and chronic inflammation     Social History     Tobacco Use    Smoking status: Former     Current packs/day: 0.00     Average packs/day: 1 pack/day  for 15.0 years (15.0 ttl pk-yrs)     Types: Cigarettes     Start date:      Quit date:      Years since quittin.1     Passive exposure: Never    Smokeless tobacco: Current     Types: Chew    Tobacco comments:     Chews cigars   Vaping Use    Vaping status: Never Used   Substance and Sexual Activity    Alcohol use: Not Currently    Drug use: Never    Sexual activity: Not Currently     E-Cigarette/Vaping    E-Cigarette Use Never User      E-Cigarette/Vaping Substances    Nicotine No     THC No     CBD No     Flavoring No     Other No     Unknown No      Family History   Problem Relation Age of Onset    Asthma Father      Social History:  Marital Status: Unknown   Occupation: Not addressed  Patient Pre-hospital Living Situation: Home  Patient Pre-hospital Level of Mobility: walks  Patient Pre-hospital Diet Restrictions: none    Meds/Allergies   I have reviewed home medications with patient personally.  Prior to Admission medications    Medication Sig Start Date End Date Taking? Authorizing Provider   alfuzosin (UROXATRAL) 10 mg 24 hr tablet Take 1 tablet by mouth in the morning 24  Yes Historical Provider, MD   Aspirin 81 MG CAPS Take by mouth   Yes Historical Provider, MD   atorvastatin (LIPITOR) 20 mg tablet Take 1 tablet (20 mg total) by mouth daily with dinner 25  Yes Dima Lloyd MD   clopidogrel (PLAVIX) 75 mg tablet take 1 tablet by mouth once daily 25  Yes Dima Lloyd MD   fluticasone (FLONASE) 50 mcg/act nasal spray 1 spray into each nostril 2 (two) times a day 5/15/23  Yes Dima Lloyd MD   latanoprost (XALATAN) 0.005 % ophthalmic solution Administer 1 drop to both eyes daily 10/22/24  Yes Historical Provider, MD   losartan (COZAAR) 25 mg tablet take 1 tablet by mouth once daily 24  Yes Dima Lloyd MD   metoprolol succinate (TOPROL-XL) 25 mg 24 hr tablet Take 1 tablet (25 mg total) by mouth daily 24  Yes Betty Grider MD   oxybutynin (DITROPAN)  5 mg tablet Take 1 tablet (5 mg total) by mouth 2 (two) times a day 11/30/24  Yes MICHELLE Alberts   docusate sodium (COLACE) 100 mg capsule Take 1 capsule (100 mg total) by mouth 2 (two) times a day  Patient not taking: Reported on 2/10/2025 11/30/24   MICHELLE Alberts   finasteride (PROSCAR) 5 mg tablet Take 5 mg by mouth daily  Patient not taking: Reported on 2/10/2025    Historical Provider, MD     Allergies   Allergen Reactions    Shellfish-Derived Products - Food Allergy Other (See Comments)     Listed by PCP patient denies    Iodine - Food Allergy Other (See Comments)     Pt denies allergies       Objective :  Temp:  [97.8 °F (36.6 °C)] 97.8 °F (36.6 °C)  HR:  [65-81] 65  BP: (127-166)/(60-77) 166/77  Resp:  [18-20] 18  SpO2:  [98 %] 98 %  O2 Device: None (Room air)    Physical Exam  Vitals and nursing note reviewed.   Constitutional:       General: He is not in acute distress.     Appearance: He is well-developed. He is not toxic-appearing.   HENT:      Head: Normocephalic and atraumatic.      Mouth/Throat:      Mouth: Mucous membranes are moist.      Pharynx: Oropharynx is clear.   Eyes:      Conjunctiva/sclera: Conjunctivae normal.   Cardiovascular:      Rate and Rhythm: Normal rate and regular rhythm.      Pulses: Normal pulses.      Heart sounds: Murmur heard.   Pulmonary:      Effort: Pulmonary effort is normal. No respiratory distress.      Breath sounds: Normal breath sounds. No wheezing, rhonchi or rales.   Abdominal:      General: Abdomen is flat. Bowel sounds are normal. There is no distension.      Palpations: Abdomen is soft.      Tenderness: There is no abdominal tenderness. There is no guarding.   Musculoskeletal:         General: No swelling.      Cervical back: Neck supple.      Right lower leg: No edema.      Left lower leg: No edema.   Skin:     General: Skin is warm and dry.   Neurological:      General: No focal deficit present.      Mental Status: He is alert  and oriented to person, place, and time.   Psychiatric:         Mood and Affect: Mood normal.          Lines/Drains:  Lines/Drains/Airways       Active Status       Name Placement date Placement time Site Days    Urethral Catheter Three way 02/10/25  2159  Three way  less than 1                  Urinary Catheter:  Goal for removal: Voiding trial when ambulation improves               Lab Results: I have reviewed the following results:  Results from last 7 days   Lab Units 02/10/25  2101   WBC Thousand/uL 5.90   HEMOGLOBIN g/dL 11.4*   HEMATOCRIT % 35.7*   PLATELETS Thousands/uL 186   SEGS PCT % 67   LYMPHO PCT % 18   MONO PCT % 12   EOS PCT % 2     Results from last 7 days   Lab Units 02/10/25  2101   SODIUM mmol/L 139   POTASSIUM mmol/L 4.1   CHLORIDE mmol/L 109*   CO2 mmol/L 23   BUN mg/dL 25   CREATININE mg/dL 0.86   ANION GAP mmol/L 7   CALCIUM mg/dL 8.6   ALBUMIN g/dL 3.7   TOTAL BILIRUBIN mg/dL 0.47   ALK PHOS U/L 70   ALT U/L 13   AST U/L 20   GLUCOSE RANDOM mg/dL 97     Results from last 7 days   Lab Units 02/10/25  2101   INR  1.06         Lab Results   Component Value Date    HGBA1C 5.7 (H) 02/16/2024    HGBA1C 5.4 03/21/2023           Imaging Results Review: No pertinent imaging studies reviewed.  Other Study Results Review: No additional pertinent studies reviewed.    Administrative Statements       ** Please Note: This note has been constructed using a voice recognition system. **

## 2025-02-11 NOTE — UTILIZATION REVIEW
Initial Clinical Review    Observation 2/10/25 @ 2343 converted to inpatient admission 2/11/25 @ 1648 for continued care & tx for gross hematuria.     Admission: Date/Time/Statement:   Admission Orders (From admission, onward)       Ordered        02/11/25 1648  INPATIENT ADMISSION  Once            02/10/25 2343  Place in Observation  Once                          Orders Placed This Encounter   Procedures    INPATIENT ADMISSION     Standing Status:   Standing     Number of Occurrences:   1     Level of Care:   Med Surg [16]     Estimated length of stay:   More than 2 Midnights     Certification:   I certify that inpatient services are medically necessary for this patient for a duration of greater than two midnights. See H&P and MD Progress Notes for additional information about the patient's course of treatment.     ED Arrival Information       Expected   -    Arrival   2/10/2025 20:23    Acuity   Urgent              Means of arrival   Walk-In    Escorted by   Friend    Service   Hospitalist    Admission type   Emergency              Arrival complaint   urinating blood             Chief Complaint   Patient presents with    Blood in Urine     Started with hematuria  a couple of days ago. Last few times extremely bloody, no pain slight burning, here in Nov for similar  thing       Initial Presentation:   84 yom to ER from home for hematuria for past several days with associated mild dysuria. Hx cardiomyopathy, anemia, COPD, BPH, anxiety, HTN, CAD, prediabetes, history of syncope 2/2 critical aortic stenosis s/p CPR 2/2024 and subsequent s/p TAVR 3/2024; on ASA & Plavix. Presents with bloody urine. Admission labs: Hgb 11.4, Cl 109, tprot 5.8, u/a:cloudy, spec grav <1.005, +blood, prot, RBC.  Placed in observation status for hematuria. Plan: hickman/CBI, IVF, Flomax, urology consult, hold ASA & Plavix.     Observation to IP admission 2/11/25:  IVABT continued for hemorrhagic cystitis POA. CBI in progress pending urology  input. Hgb 11, monitor.     Per urology: Gross hematuria  Recent history of hemorrhagic cystitis. Urine cultures pending. Patient undergoing hand irrigation at the bedside with 2 L of normal saline removing multiple clots. CBI now mild hematuria. Hold aspirin. Continue antibiotics. Continue to watch if bleeds into Thursday will consider cystoscopy fulguration pending urine cultures.    Date: 2/12/25  Day 3: Has surpassed a 2nd midnight with active treatments and services.  Gross hematuria POA. ASA & Plavix remain on hold. Hgb10.6. Urine clear yellow. CBI on hold per urology. Plan cysto tomorrow if hematuria persists. Continue to monitor hickman output, VS, follow labs/H&H.  Per urology: Gross hematuria. Multiple clots. CBI to be held. Cystoscopy evacuation of clots retrogrades if hematuria continues tomorrow      ED Treatment-Medication Administration from 02/10/2025 2023 to 02/11/2025 0120         Date/Time Order Dose Route Action     02/10/2025 2121 lidocaine (URO-JET) 2 % urethral/mucosal gel 1 Application 1 Application Urethral Given            Scheduled Medications:  Medications 02/03 02/04 02/05 02/06 02/07 02/08 02/09 02/10 02/11 02/12   aspirin chewable tablet 81 mg  Dose: 81 mg  Freq: Daily Route: PO  Start: 02/11/25 0900 0003 0900 0900        atorvastatin (LIPITOR) tablet 20 mg  Dose: 20 mg  Freq: Daily with dinner Route: PO  Start: 02/11/25 1630            1826      1630        clopidogrel (PLAVIX) tablet 75 mg  Dose: 75 mg  Freq: Daily Route: PO  Start: 02/11/25 0900   Admin Instructions:               0003     0900 0900        fluticasone (FLONASE) 50 mcg/act nasal spray 1 spray  Dose: 1 spray  Freq: 2 times daily Route: NA  Start: 02/11/25 0900   Admin Instructions:               1211     1827      0915     1800        latanoprost (XALATAN) 0.005 % ophthalmic solution 1 drop  Dose: 1 drop  Freq: Daily at bedtime Route: Both Eyes  Start: 02/12/25 1930             1930         latanoprost (XALATAN) 0.005 % ophthalmic solution 1 drop  Dose: 1 drop  Freq: Daily Route: Both Eyes  Start: 02/11/25 0900 End: 02/12/25 0916            (1220)     2201      0916)     0916-D/C'd      lidocaine (URO-JET) 2 % urethral/mucosal gel 1 Application  Dose: 1 Application  Freq: Once Route: UR  Start: 02/10/25 2100 End: 02/10/25 2121   Admin Instructions:              2121          losartan (COZAAR) tablet 25 mg  Dose: 25 mg  Freq: Daily Route: PO  Start: 02/11/25 0900   Order specific questions:               1211 0915 [C]        metoprolol succinate (TOPROL-XL) 24 hr tablet 25 mg  Dose: 25 mg  Freq: Daily Route: PO  Start: 02/11/25 0900   Admin Instructions:      Order specific questions:               1211      0916 [C]        oxybutynin (DITROPAN) tablet 5 mg  Dose: 5 mg  Freq: 2 times daily Route: PO  Start: 02/11/25 0900   Admin Instructions:               1211     1826      0915     1800        senna (SENOKOT) tablet 17.2 mg  Dose: 2 tablet  Freq: Daily at bedtime Route: PO  Start: 02/12/25 0030             0036     2200        tamsulosin (FLOMAX) capsule 0.4 mg  Dose: 0.4 mg  Freq: Daily with dinner Route: PO  Start: 02/11/25 1630   Admin Instructions:               1826      1630                    Continuous Meds Sorted by Name  for Gilberto Vann as of 02/03/25 through 2/12/25  Legend:       Medications 02/03 02/04 02/05 02/06 02/07 02/08 02/09 02/10 02/11 02/12   lactated ringers infusion  Rate: 75 mL/hr Dose: 75 mL/hr  Freq: Continuous Route: IV  Indications of Use: IV Hydration  Last Dose: 75 mL/hr (02/11/25 0203)  Start: 02/11/25 0015 End: 02/11/25 0907            0203     0907 [C]     0907-D/C'd  (1220) [C]     0907-D/C'd  (1243) [C]         Legend:       [id number]/0302/0402/0502/0602/0702/0802/0902/1002/1102/12        PRN Meds Sorted by Name  for Gilberto Vann as of 02/03/25 through 2/12/25  Legend:       Medications 02/03 02/04 02/05 02/06 02/07 02/08 02/09 02/10 02/11  02/12   acetaminophen (TYLENOL) tablet 650 mg  Dose: 650 mg  Freq: Every 6 hours PRN Route: PO  PRN Reasons: mild pain,headaches,fever  Indications of Use: FEVER,HEADACHE,MILD PAIN  Start: 02/11/25 0003                polyethylene glycol (MIRALAX) packet 17 g  Dose: 17 g  Freq: Daily PRN Route: PO  PRN Reason: constipation  Start: 02/12/25 0018   Admin Instructions:                                   ED Triage Vitals [02/10/25 2032]   Temperature Pulse Respirations Blood Pressure SpO2 Pain Score   97.8 °F (36.6 °C) 81 20 127/60 98 % No Pain     Weight (last 2 days)       Date/Time Weight    02/11/25 0135 62.1 (137)    02/11/25 0123 62.1 (137)            Vital Signs (last 3 days)       Date/Time Temp Pulse Resp BP MAP (mmHg) SpO2 O2 Device Patient Position - Orthostatic VS Pain    02/12/25 09:12:09 -- 60 -- 109/61 77 100 % -- -- --    02/12/25 08:00:42 97.7 °F (36.5 °C) 72 16 107/67 80 97 % -- -- --    02/12/25 0800 -- -- -- -- -- -- -- -- No Pain    02/11/25 22:17:25 98.2 °F (36.8 °C) 61 18 132/59 83 99 % -- -- --    02/11/25 2015 -- -- -- -- -- -- None (Room air) -- No Pain    02/11/25 18:38:06 97.9 °F (36.6 °C) 67 -- 122/58 79 99 % -- -- --    02/11/25 18:36:28 97.9 °F (36.6 °C) -- 18 122/58 79 -- -- -- --    02/11/25 15:15:49 97.9 °F (36.6 °C) 68 19 122/59 80 97 % -- -- --    02/11/25 12:18:34 97.8 °F (36.6 °C) 66 18 125/59 81 97 % -- Sitting --    02/11/25 1211 -- 66 -- 125/59 -- -- -- -- --    02/11/25 0925 -- -- -- -- -- -- -- -- No Pain    02/11/25 0135 97.6 °F (36.4 °C) 74 18 138/95 110 -- -- Sitting --    02/11/25 0127 -- -- 18 -- -- -- -- -- No Pain    02/11/25 01:26:25 97.6 °F (36.4 °C) -- 18 -- -- -- -- -- --    02/11/25 0123 97.6 °F (36.4 °C) -- 18 138/95 -- -- -- -- --    02/10/25 2330 -- 65 18 166/77 110 98 % -- -- --    02/10/25 2032 97.8 °F (36.6 °C) 81 20 127/60 -- 98 % None (Room air) Sitting No Pain              Pertinent Labs/Diagnostic Test Results:   Radiology:  No orders to display  "    Cardiology:  No orders to display     GI:  No orders to display           Results from last 7 days   Lab Units 02/12/25  0440 02/11/25  0449 02/10/25  2101   WBC Thousand/uL 6.78 6.22 5.90   HEMOGLOBIN g/dL 10.6* 11.0* 11.4*   HEMATOCRIT % 33.8* 34.8* 35.7*   PLATELETS Thousands/uL 157 168 186   TOTAL NEUT ABS Thousands/µL  --  3.89 4.00         Results from last 7 days   Lab Units 02/12/25  0440 02/11/25  0449 02/10/25  2101   SODIUM mmol/L 139 139 139   POTASSIUM mmol/L 3.8 4.1 4.1   CHLORIDE mmol/L 108 110* 109*   CO2 mmol/L 24 20* 23   ANION GAP mmol/L 7 9 7   BUN mg/dL 21 22 25   CREATININE mg/dL 0.80 0.72 0.86   EGFR ml/min/1.73sq m 81 85 79   CALCIUM mg/dL 8.5 8.4 8.6     Results from last 7 days   Lab Units 02/10/25  2101   AST U/L 20   ALT U/L 13   ALK PHOS U/L 70   TOTAL PROTEIN g/dL 5.8*   ALBUMIN g/dL 3.7   TOTAL BILIRUBIN mg/dL 0.47         Results from last 7 days   Lab Units 02/12/25  0440 02/11/25  0449 02/10/25  2101   GLUCOSE RANDOM mg/dL 86 74 97             No results found for: \"BETA-HYDROXYBUTYRATE\"                           Results from last 7 days   Lab Units 02/10/25  2101   PROTIME seconds 14.3   INR  1.06   PTT seconds 26                                                             Results from last 7 days   Lab Units 02/10/25  2147   CLARITY UA  Cloudy   COLOR UA  Yellow   SPEC GRAV UA  <1.005*   PH UA  6.0   GLUCOSE UA mg/dl Negative   KETONES UA mg/dl Negative   BLOOD UA  Large*   PROTEIN UA mg/dl Trace*   NITRITE UA  Negative   BILIRUBIN UA  Negative   UROBILINOGEN UA (BE) mg/dl <2.0   LEUKOCYTES UA  Negative   WBC UA /hpf 0-1   RBC UA /hpf Innumerable*   BACTERIA UA /hpf Occasional   EPITHELIAL CELLS WET PREP /hpf None Seen                                 Results from last 7 days   Lab Units 02/10/25  2148   URINE CULTURE  50,000-59,000 cfu/ml Enterobacter cloacae*                   Past Medical History:   Diagnosis Date    Arthritis     Asthma     Back pain     BPH (benign " prostatic hypertrophy) with urinary obstruction     COPD (chronic obstructive pulmonary disease) (HCC)     Dizziness     Glaucoma     bilat    Insomnia     RLS (restless legs syndrome)     Sinusitis      Present on Admission:   Gross hematuria   CAD (coronary artery disease)   COPD (chronic obstructive pulmonary disease) (HCC)   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HCC)   Pacemaker      Admitting Diagnosis: Blood in urine [R31.9]  Hematuria [R31.9]  Age/Sex: 84 y.o. male    Network Utilization Review Department  ATTENTION: Please call with any questions or concerns to [phone] and carefully listen to the prompts so that you are directed to the right person. All voicemails are confidential.   For Discharge needs, contact Care Management DC Support Team at [phone] opt. [phone]  Send all requests for admission clinical reviews, approved or denied determinations and any other requests to dedicated fax number below belonging to the campus where the patient is receiving treatment. List of dedicated fax numbers for the Facilities:  FACILITY NAME UR FAX NUMBER   ADMISSION DENIALS (Administrative/Medical Necessity) [phone]   DISCHARGE SUPPORT TEAM (NETWORK) [phone]   PARENT CHILD HEALTH (Maternity/NICU/Pediatrics) [phone]   [facility] [phone]   [facility] [phone]   [facility] [phone]   [facility] [phone]   [facility] [phone]   [facility] [phone]   [facility] [phone]   [facility] [phone]   [facility] [phone]   [facility] [phone]   [facility] [phone]   [facility] [phone]

## 2025-02-11 NOTE — ASSESSMENT & PLAN NOTE
Presents to ED from home with gross hematuria since noon on Monday.  Chart reviewed, previously hospitalized in November 2024 with hematuria secondary to hemorrhagic cystitis.  Initially treated in ED with hand irrigation of large clots, three-way Ledesma was placed and CBI initiated which cleared hematuria; however, once CBI was discontinued hematuria and clots returned.  CBI was then restarted.  On admission CBC with hemoglobin 11.4, no SIRS criteria, CMP unremarkable, UA not convincing of cystitis, however given history of hemorrhagic cystitis will send for urine culture.  Afebrile, vitally stable.  Hemodynamically stable.  Continue CBI  NPO  Hold aspirin and Plavix  Follow-up hemoglobin with CBC in the a.m.  Appreciate urology consultation, patient is known to Dr. Blunt's service.

## 2025-02-11 NOTE — PLAN OF CARE
Problem: PAIN - ADULT  Goal: Verbalizes/displays adequate comfort level or baseline comfort level  Description: Interventions:  - Encourage patient to monitor pain and request assistance  - Assess pain using appropriate pain scale  - Administer analgesics based on type and severity of pain and evaluate response  - Implement non-pharmacological measures as appropriate and evaluate response  - Consider cultural and social influences on pain and pain management  - Notify physician/advanced practitioner if interventions unsuccessful or patient reports new pain  Outcome: Progressing     Problem: INFECTION - ADULT  Goal: Absence or prevention of progression during hospitalization  Description: INTERVENTIONS:  - Assess and monitor for signs and symptoms of infection  - Monitor lab/diagnostic results  - Monitor all insertion sites, i.e. indwelling lines, tubes, and drains  - Monitor endotracheal if appropriate and nasal secretions for changes in amount and color  - [place] appropriate cooling/warming therapies per order  - Administer medications as ordered  - Instruct and encourage patient and family to use good hand hygiene technique  - Identify and instruct in appropriate isolation precautions for identified infection/condition  Outcome: Progressing  Goal: Absence of fever/infection during neutropenic period  Description: INTERVENTIONS:  - Monitor WBC    Outcome: Progressing     Problem: SAFETY ADULT  Goal: Patient will remain free of falls  Description: INTERVENTIONS:  - Educate patient/family on patient safety including physical limitations  - Instruct patient to call for assistance with activity   - Consult OT/PT to assist with strengthening/mobility   - Keep Call bell within reach  - Keep bed low and locked with side rails adjusted as appropriate  - Keep care items and personal belongings within reach  - Initiate and maintain comfort rounds  - Make Fall Risk Sign visible to staff  - Offer Toileting every 2 Hours,  in advance of need  - Initiate/Maintain bed alarm  - Obtain necessary fall risk management equipment: no slip socks   - Apply yellow socks and bracelet for high fall risk patients  - Consider moving patient to room near nurses station  Outcome: Progressing  Goal: Maintain or return to baseline ADL function  Description: INTERVENTIONS:  -  Assess patient's ability to carry out ADLs; assess patient's baseline for ADL function and identify physical deficits which impact ability to perform ADLs (bathing, care of mouth/teeth, toileting, grooming, dressing, etc.)  - Assess/evaluate cause of self-care deficits   - Assess range of motion  - Assess patient's mobility; develop plan if impaired  - Assess patient's need for assistive devices and provide as appropriate  - Encourage maximum independence but intervene and supervise when necessary  - Involve family in performance of ADLs  - Assess for home care needs following discharge   - Consider OT consult to assist with ADL evaluation and planning for discharge  - Provide patient education as appropriate  Outcome: Progressing  Goal: Maintains/Returns to pre admission functional level  Description: INTERVENTIONS:  - Perform AM-PAC 6 Click Basic Mobility/ Daily Activity assessment daily.  - Set and communicate daily mobility goal to care team and patient/family/caregiver.   - Collaborate with rehabilitation services on mobility goals if consulted  -- Ambulate patient 3 times a day  - Out of bed to chair 3 times a day   - Out of bed for meals 3 times a day  - Out of bed for toileting  - Record patient progress and toleration of activity level   Outcome: Progressing     Problem: DISCHARGE PLANNING  Goal: Discharge to home or other facility with appropriate resources  Description: INTERVENTIONS:  - Identify barriers to discharge w/patient and caregiver  - Arrange for needed discharge resources and transportation as appropriate  - Identify discharge learning needs (meds, wound care,  etc.)  - Arrange for interpretive services to assist at discharge as needed  - Refer to Case Management Department for coordinating discharge planning if the patient needs post-hospital services based on physician/advanced practitioner order or complex needs related to functional status, cognitive ability, or social support system  Outcome: Progressing     Problem: Knowledge Deficit  Goal: Patient/family/caregiver demonstrates understanding of disease process, treatment plan, medications, and discharge instructions  Description: Complete learning assessment and assess knowledge base.  Interventions:  - Provide teaching at level of understanding  - Provide teaching via preferred learning methods  Outcome: Progressing

## 2025-02-12 LAB
ANION GAP SERPL CALCULATED.3IONS-SCNC: 7 MMOL/L (ref 4–13)
BUN SERPL-MCNC: 21 MG/DL (ref 5–25)
CALCIUM SERPL-MCNC: 8.5 MG/DL (ref 8.4–10.2)
CHLORIDE SERPL-SCNC: 108 MMOL/L (ref 96–108)
CO2 SERPL-SCNC: 24 MMOL/L (ref 21–32)
CREAT SERPL-MCNC: 0.8 MG/DL (ref 0.6–1.3)
ERYTHROCYTE [DISTWIDTH] IN BLOOD BY AUTOMATED COUNT: 14.3 % (ref 11.6–15.1)
GFR SERPL CREATININE-BSD FRML MDRD: 81 ML/MIN/1.73SQ M
GLUCOSE SERPL-MCNC: 86 MG/DL (ref 65–140)
HCT VFR BLD AUTO: 33.8 % (ref 36.5–49.3)
HGB BLD-MCNC: 10.6 G/DL (ref 12–17)
MCH RBC QN AUTO: 28.6 PG (ref 26.8–34.3)
MCHC RBC AUTO-ENTMCNC: 31.4 G/DL (ref 31.4–37.4)
MCV RBC AUTO: 91 FL (ref 82–98)
PLATELET # BLD AUTO: 157 THOUSANDS/UL (ref 149–390)
PMV BLD AUTO: 10.6 FL (ref 8.9–12.7)
POTASSIUM SERPL-SCNC: 3.8 MMOL/L (ref 3.5–5.3)
RBC # BLD AUTO: 3.71 MILLION/UL (ref 3.88–5.62)
SODIUM SERPL-SCNC: 139 MMOL/L (ref 135–147)
WBC # BLD AUTO: 6.78 THOUSAND/UL (ref 4.31–10.16)

## 2025-02-12 PROCEDURE — 99232 SBSQ HOSP IP/OBS MODERATE 35: CPT | Performed by: INTERNAL MEDICINE

## 2025-02-12 PROCEDURE — 85027 COMPLETE CBC AUTOMATED: CPT | Performed by: INTERNAL MEDICINE

## 2025-02-12 PROCEDURE — 80048 BASIC METABOLIC PNL TOTAL CA: CPT | Performed by: INTERNAL MEDICINE

## 2025-02-12 RX ORDER — LATANOPROST 50 UG/ML
1 SOLUTION/ DROPS OPHTHALMIC
Status: DISCONTINUED | OUTPATIENT
Start: 2025-02-12 | End: 2025-02-14 | Stop reason: HOSPADM

## 2025-02-12 RX ORDER — SENNOSIDES 8.6 MG
2 TABLET ORAL
Status: DISCONTINUED | OUTPATIENT
Start: 2025-02-12 | End: 2025-02-14 | Stop reason: HOSPADM

## 2025-02-12 RX ORDER — POLYETHYLENE GLYCOL 3350 17 G/17G
17 POWDER, FOR SOLUTION ORAL DAILY PRN
Status: DISCONTINUED | OUTPATIENT
Start: 2025-02-12 | End: 2025-02-14 | Stop reason: HOSPADM

## 2025-02-12 RX ADMIN — SENNOSIDES 17.2 MG: 8.6 TABLET ORAL at 21:10

## 2025-02-12 RX ADMIN — LOSARTAN POTASSIUM 25 MG: 25 TABLET, FILM COATED ORAL at 09:15

## 2025-02-12 RX ADMIN — LATANOPROST 1 DROP: 50 SOLUTION/ DROPS OPHTHALMIC at 19:48

## 2025-02-12 RX ADMIN — FLUTICASONE PROPIONATE 1 SPRAY: 50 SPRAY, METERED NASAL at 17:24

## 2025-02-12 RX ADMIN — OXYBUTYNIN CHLORIDE 5 MG: 5 TABLET ORAL at 09:15

## 2025-02-12 RX ADMIN — SENNOSIDES 17.2 MG: 8.6 TABLET ORAL at 00:36

## 2025-02-12 RX ADMIN — TAMSULOSIN HYDROCHLORIDE 0.4 MG: 0.4 CAPSULE ORAL at 17:24

## 2025-02-12 RX ADMIN — ATORVASTATIN CALCIUM 20 MG: 20 TABLET, FILM COATED ORAL at 17:24

## 2025-02-12 RX ADMIN — METOPROLOL SUCCINATE 25 MG: 25 TABLET, FILM COATED, EXTENDED RELEASE ORAL at 09:16

## 2025-02-12 RX ADMIN — OXYBUTYNIN CHLORIDE 5 MG: 5 TABLET ORAL at 17:24

## 2025-02-12 RX ADMIN — FLUTICASONE PROPIONATE 1 SPRAY: 50 SPRAY, METERED NASAL at 09:15

## 2025-02-12 NOTE — PLAN OF CARE
Problem: PAIN - ADULT  Goal: Verbalizes/displays adequate comfort level or baseline comfort level  Description: Interventions:  - Encourage patient to monitor pain and request assistance  - Assess pain using appropriate pain scale  - Administer analgesics based on type and severity of pain and evaluate response  - Implement non-pharmacological measures as appropriate and evaluate response  - Consider cultural and social influences on pain and pain management  - Notify physician/advanced practitioner if interventions unsuccessful or patient reports new pain  Outcome: Progressing     Problem: INFECTION - ADULT  Goal: Absence or prevention of progression during hospitalization  Description: INTERVENTIONS:  - Assess and monitor for signs and symptoms of infection  - Monitor lab/diagnostic results  - Monitor all insertion sites, i.e. indwelling lines, tubes, and drains  - Monitor endotracheal if appropriate and nasal secretions for changes in amount and color  - [place] appropriate cooling/warming therapies per order  - Administer medications as ordered  - Instruct and encourage patient and family to use good hand hygiene technique  - Identify and instruct in appropriate isolation precautions for identified infection/condition  Outcome: Progressing  Goal: Absence of fever/infection during neutropenic period  Description: INTERVENTIONS:  - Monitor WBC    Outcome: Progressing     Problem: SAFETY ADULT  Goal: Patient will remain free of falls  Description: INTERVENTIONS:  - Educate patient/family on patient safety including physical limitations  - Instruct patient to call for assistance with activity   - Consult OT/PT to assist with strengthening/mobility   - Keep Call bell within reach  - Keep bed low and locked with side rails adjusted as appropriate  - Keep care items and personal belongings within reach  - Initiate and maintain comfort rounds  - Make Fall Risk Sign visible to staff  - Offer Toileting every 2 Hours,  in advance of need  - Initiate/Maintain bed alarm  - Obtain necessary fall risk management equipment: fall risk sign on door  - Apply yellow socks and bracelet for high fall risk patients  - Consider moving patient to room near nurses station  Outcome: Progressing  Goal: Maintain or return to baseline ADL function  Description: INTERVENTIONS:  -  Assess patient's ability to carry out ADLs; assess patient's baseline for ADL function and identify physical deficits which impact ability to perform ADLs (bathing, care of mouth/teeth, toileting, grooming, dressing, etc.)  - Assess/evaluate cause of self-care deficits   - Assess range of motion  - Assess patient's mobility; develop plan if impaired  - Assess patient's need for assistive devices and provide as appropriate  - Encourage maximum independence but intervene and supervise when necessary  - Involve family in performance of ADLs  - Assess for home care needs following discharge   - Consider OT consult to assist with ADL evaluation and planning for discharge  - Provide patient education as appropriate  Outcome: Progressing  Goal: Maintains/Returns to pre admission functional level  Description: INTERVENTIONS:  - Perform AM-PAC 6 Click Basic Mobility/ Daily Activity assessment daily.  - Set and communicate daily mobility goal to care team and patient/family/caregiver.   - Collaborate with rehabilitation services on mobility goals if consulted  - Perform Range of Motion 2 times a day.  - Reposition patient every 2 hours.  - Dangle patient 2 times a day  - Stand patient 2 times a day  - Ambulate patient 3 times a day  - Out of bed to chair 3 times a day   - Out of bed for meals 3 times a day  - Out of bed for toileting  - Record patient progress and toleration of activity level   Outcome: Progressing     Problem: DISCHARGE PLANNING  Goal: Discharge to home or other facility with appropriate resources  Description: INTERVENTIONS:  - Identify barriers to discharge  w/patient and caregiver  - Arrange for needed discharge resources and transportation as appropriate  - Identify discharge learning needs (meds, wound care, etc.)  - Arrange for interpretive services to assist at discharge as needed  - Refer to Case Management Department for coordinating discharge planning if the patient needs post-hospital services based on physician/advanced practitioner order or complex needs related to functional status, cognitive ability, or social support system  Outcome: Progressing     Problem: Knowledge Deficit  Goal: Patient/family/caregiver demonstrates understanding of disease process, treatment plan, medications, and discharge instructions  Description: Complete learning assessment and assess knowledge base.  Interventions:  - Provide teaching at level of understanding  - Provide teaching via preferred learning methods  Outcome: Progressing

## 2025-02-12 NOTE — ASSESSMENT & PLAN NOTE
Wt Readings from Last 3 Encounters:   02/11/25 62.1 kg (137 lb)   01/27/25 65.8 kg (145 lb)   12/16/24 67.1 kg (148 lb)     History of combined CHF with LVEF improvement from 25% to 45%  Compensated.

## 2025-02-12 NOTE — PLAN OF CARE
Problem: PAIN - ADULT  Goal: Verbalizes/displays adequate comfort level or baseline comfort level  Description: Interventions:  - Encourage patient to monitor pain and request assistance  - Assess pain using appropriate pain scale  - Administer analgesics based on type and severity of pain and evaluate response  - Implement non-pharmacological measures as appropriate and evaluate response  - Consider cultural and social influences on pain and pain management  - Notify physician/advanced practitioner if interventions unsuccessful or patient reports new pain  Outcome: Progressing     Problem: INFECTION - ADULT  Goal: Absence or prevention of progression during hospitalization  Description: INTERVENTIONS:  - Assess and monitor for signs and symptoms of infection  - Monitor lab/diagnostic results  - Monitor all insertion sites, i.e. indwelling lines, tubes, and drains  - Monitor endotracheal if appropriate and nasal secretions for changes in amount and color  - [place] appropriate cooling/warming therapies per order  - Administer medications as ordered  - Instruct and encourage patient and family to use good hand hygiene technique  - Identify and instruct in appropriate isolation precautions for identified infection/condition  Outcome: Progressing  Goal: Absence of fever/infection during neutropenic period  Description: INTERVENTIONS:  - Monitor WBC    Outcome: Progressing     Problem: SAFETY ADULT  Goal: Patient will remain free of falls  Description: INTERVENTIONS:  - Educate patient/family on patient safety including physical limitations  - Instruct patient to call for assistance with activity   - Consult OT/PT to assist with strengthening/mobility   - Keep Call bell within reach  - Keep bed low and locked with side rails adjusted as appropriate  - Keep care items and personal belongings within reach  - Initiate and maintain comfort rounds  - Make Fall Risk Sign visible to staff  - Offer Toileting every 2 Hours,  in advance of need  - Initiate/Maintain bed alarm  - Obtain necessary fall risk management equipment: walker  - Apply yellow socks and bracelet for high fall risk patients  - Consider moving patient to room near nurses station  Outcome: Progressing  Goal: Maintain or return to baseline ADL function  Description: INTERVENTIONS:  -  Assess patient's ability to carry out ADLs; assess patient's baseline for ADL function and identify physical deficits which impact ability to perform ADLs (bathing, care of mouth/teeth, toileting, grooming, dressing, etc.)  - Assess/evaluate cause of self-care deficits   - Assess range of motion  - Assess patient's mobility; develop plan if impaired  - Assess patient's need for assistive devices and provide as appropriate  - Encourage maximum independence but intervene and supervise when necessary  - Involve family in performance of ADLs  - Assess for home care needs following discharge   - Consider OT consult to assist with ADL evaluation and planning for discharge  - Provide patient education as appropriate  Outcome: Progressing  Goal: Maintains/Returns to pre admission functional level  Description: INTERVENTIONS:  - Perform AM-PAC 6 Click Basic Mobility/ Daily Activity assessment daily.  - Set and communicate daily mobility goal to care team and patient/family/caregiver.   - Collaborate with rehabilitation services on mobility goals if consulted  - Perform Range of Motion 2 times a day.  - Reposition patient every 2 hours.  - Dangle patient 2 times a day  - Stand patient 2 times a day  - Ambulate patient 2 times a day  - Out of bed to chair 2 times a day   - Out of bed for meals 2 times a day  - Out of bed for toileting  - Record patient progress and toleration of activity level   Outcome: Progressing

## 2025-02-12 NOTE — ASSESSMENT & PLAN NOTE
History of CAD, AF status post TAVR, pacemaker, hypertension, and hemorrhagic cystitis who presented to the hospital with gross hematuria  History of hemorrhagic cystitis previously hospitalized November 2024  Since admission has been on CBI with clearance of hematuria.  Clamped today per urology.  Holding DAPT.  Follow-up on urine culture: Currently with growth of enterobacter  N.p.o. after midnight for possible cystoscopy tomorrow

## 2025-02-12 NOTE — PROGRESS NOTES
Progress Note - Hospitalist   Name: Gilberto Vann 84 y.o. male I MRN: [id number]  Unit/Bed#: 4 [address] I Date of Admission: 2/10/2025   Date of Service: 2/12/2025 I Hospital Day: 1    Assessment & Plan  Gross hematuria  History of CAD, AF status post TAVR, pacemaker, hypertension, and hemorrhagic cystitis who presented to the hospital with gross hematuria  History of hemorrhagic cystitis previously hospitalized November 2024  Since admission has been on CBI with clearance of hematuria.  Clamped today per urology.  Holding DAPT.  Follow-up on urine culture: Currently with growth of enterobacter  N.p.o. after midnight for possible cystoscopy tomorrow  CAD (coronary artery disease)  History of syncope 2/2 critical aortic stenosis s/p CPR 2/2024 and subsequent s/p TAVR 3/2024 [facility]  Continue PTA atorvastatin and Toprol-XL 25 mg  Hold Plavix and aspirin secondary to gross hematuria  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HCC)  Wt Readings from Last 3 Encounters:   02/11/25 62.1 kg (137 lb)   01/27/25 65.8 kg (145 lb)   12/16/24 67.1 kg (148 lb)     History of combined CHF with LVEF improvement from 25% to 45%  Compensated.  Pacemaker  Status post Micra pacemaker  COPD (chronic obstructive pulmonary disease) (HCC)  Reported history of COPD not on any maintenance inhalers  Primary hypertension  Continue losartan and metoprolol  Glaucoma  Continue eyedrops    VTE Pharmacologic Prophylaxis: VTE Score: 4 Moderate Risk (Score 3-4) - Pharmacological DVT Prophylaxis Contraindicated. Sequential Compression Devices Ordered.    Mobility:   Basic Mobility Inpatient Raw Score: 22  JH-HLM Goal: 7: Walk 25 feet or more  JH-HLM Achieved: 7: Walk 25 feet or more  JH-HLM Goal achieved. Continue to encourage appropriate mobility.    Patient Centered Rounds: I have performed bedside rounds with nursing staff today.  Discussions with Specialists or Other Care Team Provider: Case management    Education and  Discussions with Family / Patient:     Current Length of Stay: 1 day(s)  Current Patient Status: Inpatient   Certification Statement: The patient will continue to require additional inpatient hospital stay due to possible urologic procedure tomorrow  Discharge Plan: Anticipate discharge in 24-48 hrs to home.    Code Status: Level 1 - Full Code    Subjective   Patient seen and examined.  No new complaints.  CBI was clamped today.    Objective   Vitals:   Temp (24hrs), Av °F (36.7 °C), Min:97.7 °F (36.5 °C), Max:98.2 °F (36.8 °C)    Temp:  [97.7 °F (36.5 °C)-98.2 °F (36.8 °C)] 98.1 °F (36.7 °C)  HR:  [60-73] 73  Resp:  [16-18] 16  BP: (107-132)/(57-67) 111/57  SpO2:  [97 %-100 %] 99 %  Body mass index is 20.23 kg/m².     Input and Output Summary (last 24 hours):     Intake/Output Summary (Last 24 hours) at 2025 1720  Last data filed at 2025 0812  Gross per 24 hour   Intake 120 ml   Output 9400 ml   Net -9280 ml       Physical Exam  Vitals reviewed.   Constitutional:       General: He is not in acute distress.  HENT:      Head: Atraumatic.   Cardiovascular:      Rate and Rhythm: Regular rhythm.   Pulmonary:      Effort: Pulmonary effort is normal.      Breath sounds: Decreased breath sounds present. No wheezing.   Abdominal:      General: Bowel sounds are normal.      Palpations: Abdomen is soft.      Tenderness: There is no abdominal tenderness. There is no rebound.   Musculoskeletal:         General: No swelling.   Skin:     General: Skin is warm and dry.   Neurological:      General: No focal deficit present.      Mental Status: He is alert.      Motor: No weakness.   Psychiatric:         Mood and Affect: Mood normal.       Lines/Drains:  Invasive Devices       Peripheral Intravenous Line  Duration             Peripheral IV 02/10/25 Left;Proximal;Ventral (anterior) Forearm 1 day              Drain  Duration             Urethral Catheter Three way 1 day                  Urinary Catheter:  Goal for  removal:                  Lab Results: I have reviewed the following results:   Results from last 7 days   Lab Units 02/12/25  0440 02/11/25  0449 02/10/25  2101   WBC Thousand/uL 6.78 6.22 5.90   HEMOGLOBIN g/dL 10.6* 11.0* 11.4*   PLATELETS Thousands/uL 157 168 186   MCV fL 91 92 91   INR   --   --  1.06     Results from last 7 days   Lab Units 02/12/25  0440 02/11/25  0449 02/10/25  2101   SODIUM mmol/L 139 139 139   POTASSIUM mmol/L 3.8 4.1 4.1   CHLORIDE mmol/L 108 110* 109*   CO2 mmol/L 24 20* 23   ANION GAP mmol/L 7 9 7   BUN mg/dL 21 22 25   CREATININE mg/dL 0.80 0.72 0.86   CALCIUM mg/dL 8.5 8.4 8.6   ALBUMIN g/dL  --   --  3.7   TOTAL BILIRUBIN mg/dL  --   --  0.47   ALK PHOS U/L  --   --  70   ALT U/L  --   --  13   AST U/L  --   --  20   EGFR ml/min/1.73sq m 81 85 79   GLUCOSE RANDOM mg/dL 86 74 97                                        Recent Cultures (last 7 days):   Results from last 7 days   Lab Units 02/10/25  2148   URINE CULTURE  50,000-59,000 cfu/ml Enterobacter cloacae*       Imaging:  Reviewed radiology reports from this admission including:  No results found.    Last 24 Hours Medication List:     Current Facility-Administered Medications:     acetaminophen (TYLENOL) tablet 650 mg, Q6H PRN    [Held by provider] aspirin chewable tablet 81 mg, Daily    atorvastatin (LIPITOR) tablet 20 mg, Daily With Dinner    [Held by provider] clopidogrel (PLAVIX) tablet 75 mg, Daily    fluticasone (FLONASE) 50 mcg/act nasal spray 1 spray, BID    latanoprost (XALATAN) 0.005 % ophthalmic solution 1 drop, HS    losartan (COZAAR) tablet 25 mg, Daily    metoprolol succinate (TOPROL-XL) 24 hr tablet 25 mg, Daily    oxybutynin (DITROPAN) tablet 5 mg, BID    polyethylene glycol (MIRALAX) packet 17 g, Daily PRN    senna (SENOKOT) tablet 17.2 mg, HS    tamsulosin (FLOMAX) capsule 0.4 mg, Daily With Dinner    Administrative Statements   Today, Patient Was Seen By: Crispin Chang, DO  I have spent a total time of 35 minutes  in caring for this patient on the day of the visit/encounter including Documenting in the medical record, Reviewing / ordering tests, medicine, procedures  , Obtaining or reviewing history  , and Communicating with other healthcare professionals .    **Please Note: This note may have been constructed using a voice recognition system.**

## 2025-02-12 NOTE — PROGRESS NOTES
"Progress Note - Urology      Patient: Gilberto Vann   : 1940 Sex: male   MRN: [id number]     CSN: [id number]  Unit/Bed#: [address] [place] [address]     SUBJECTIVE:   CBI clearing  Previous episode of gross hematuria thought to be related to hemorrhagic cystitis  Now second episode in 3 months aggressive smoking history in the past  Suggest cystoscopy under anesthesia possible biopsy TURBT if tumor is found could put him for tomorrow      Objective   Vitals: /61   Pulse 60   Temp 97.7 °F (36.5 °C)   Resp 16   Ht 5' 9\" (1.753 m)   Wt 62.1 kg (137 lb)   SpO2 100%   BMI 20.23 kg/m²     I/O last 24 hours:  In: 120 [P.O.:120]  Out: 40537 [Urine:13452]      Physical Exam:   General Alert awake   Normocephalic atraumatic PERRLA  Lungs clear bilaterally  Cardiac normal S1 normal S2  Abdomen soft, flank pain  Extremities no edema      Lab Results: CBC:   Lab Results   Component Value Date    WBC 6.78 2025    HGB 10.6 (L) 2025    HCT 33.8 (L) 2025    MCV 91 2025     2025    RBC 3.71 (L) 2025    MCH 28.6 2025    MCHC 31.4 2025    RDW 14.3 2025    MPV 10.6 2025    NRBC 0 2025     CMP:   Lab Results   Component Value Date     2025     2023    CO2 24 2025    CO2 26 2023    BUN 21 2025    BUN 20 2023    CREATININE 0.80 2025    CALCIUM 8.5 2025    AST 20 02/10/2025    AST 17 2023    ALT 13 02/10/2025    ALT 14 2023    ALKPHOS 70 02/10/2025    EGFR 81 2025     Urinalysis:   Lab Results   Component Value Date    COLORU Yellow 02/10/2025    CLARITYU Cloudy 02/10/2025    SPECGRAV <1.005 (L) 02/10/2025    PHUR 6.0 02/10/2025    LEUKOCYTESUR Negative 02/10/2025    NITRITE Negative 02/10/2025    GLUCOSEU Negative 02/10/2025    KETONESU Negative 02/10/2025    BILIRUBINUR Negative 02/10/2025    BLOODU Large (A) 02/10/2025     Urine Culture:   Lab Results   Component Value Date    " URINECX 50,000-59,000 cfu/ml Gram Negative Damaso (A) 02/10/2025     PSA:   Lab Results   Component Value Date    PSA 6.98 (H) 05/22/2023         Assessment/ Plan:  Gross hematuria  Multiple clots  CBI to be held  N.p.o. after midnight  Cystoscopy evacuation of clots retrogrades if hematuria continues tomorrow          James Blunt MD

## 2025-02-13 ENCOUNTER — ANESTHESIA EVENT (INPATIENT)
Dept: PERIOP | Facility: HOSPITAL | Age: 85
DRG: 669 | End: 2025-02-13
Payer: COMMERCIAL

## 2025-02-13 ENCOUNTER — ANESTHESIA (INPATIENT)
Dept: PERIOP | Facility: HOSPITAL | Age: 85
DRG: 669 | End: 2025-02-13
Payer: COMMERCIAL

## 2025-02-13 ENCOUNTER — APPOINTMENT (INPATIENT)
Dept: RADIOLOGY | Facility: HOSPITAL | Age: 85
DRG: 669 | End: 2025-02-13
Payer: COMMERCIAL

## 2025-02-13 PROBLEM — Z95.2 S/P TAVR (TRANSCATHETER AORTIC VALVE REPLACEMENT): Status: ACTIVE | Noted: 2025-02-13

## 2025-02-13 PROBLEM — Z95.0 PACEMAKER: Status: RESOLVED | Noted: 2024-11-15 | Resolved: 2025-02-13

## 2025-02-13 LAB
ANION GAP SERPL CALCULATED.3IONS-SCNC: 7 MMOL/L (ref 4–13)
BACTERIA UR CULT: ABNORMAL
BUN SERPL-MCNC: 20 MG/DL (ref 5–25)
CALCIUM SERPL-MCNC: 8.8 MG/DL (ref 8.4–10.2)
CHLORIDE SERPL-SCNC: 106 MMOL/L (ref 96–108)
CO2 SERPL-SCNC: 25 MMOL/L (ref 21–32)
CREAT SERPL-MCNC: 0.83 MG/DL (ref 0.6–1.3)
ERYTHROCYTE [DISTWIDTH] IN BLOOD BY AUTOMATED COUNT: 14.3 % (ref 11.6–15.1)
GFR SERPL CREATININE-BSD FRML MDRD: 80 ML/MIN/1.73SQ M
GLUCOSE SERPL-MCNC: 92 MG/DL (ref 65–140)
HCT VFR BLD AUTO: 36.8 % (ref 36.5–49.3)
HGB BLD-MCNC: 11.8 G/DL (ref 12–17)
MCH RBC QN AUTO: 28.9 PG (ref 26.8–34.3)
MCHC RBC AUTO-ENTMCNC: 32.1 G/DL (ref 31.4–37.4)
MCV RBC AUTO: 90 FL (ref 82–98)
PLATELET # BLD AUTO: 202 THOUSANDS/UL (ref 149–390)
PMV BLD AUTO: 10.1 FL (ref 8.9–12.7)
POTASSIUM SERPL-SCNC: 3.8 MMOL/L (ref 3.5–5.3)
RBC # BLD AUTO: 4.08 MILLION/UL (ref 3.88–5.62)
SODIUM SERPL-SCNC: 138 MMOL/L (ref 135–147)
WBC # BLD AUTO: 8.59 THOUSAND/UL (ref 4.31–10.16)

## 2025-02-13 PROCEDURE — 80048 BASIC METABOLIC PNL TOTAL CA: CPT | Performed by: INTERNAL MEDICINE

## 2025-02-13 PROCEDURE — 0T5C8ZZ DESTRUCTION OF BLADDER NECK, VIA NATURAL OR ARTIFICIAL OPENING ENDOSCOPIC: ICD-10-PCS | Performed by: SPECIALIST

## 2025-02-13 PROCEDURE — 88305 TISSUE EXAM BY PATHOLOGIST: CPT | Performed by: PATHOLOGY

## 2025-02-13 PROCEDURE — 99232 SBSQ HOSP IP/OBS MODERATE 35: CPT | Performed by: INTERNAL MEDICINE

## 2025-02-13 PROCEDURE — 94760 N-INVAS EAR/PLS OXIMETRY 1: CPT

## 2025-02-13 PROCEDURE — BT141ZZ FLUOROSCOPY OF KIDNEYS, URETERS AND BLADDER USING LOW OSMOLAR CONTRAST: ICD-10-PCS | Performed by: SPECIALIST

## 2025-02-13 PROCEDURE — 0TBC8ZX EXCISION OF BLADDER NECK, VIA NATURAL OR ARTIFICIAL OPENING ENDOSCOPIC, DIAGNOSTIC: ICD-10-PCS | Performed by: SPECIALIST

## 2025-02-13 PROCEDURE — 74420 UROGRAPHY RTRGR +-KUB: CPT

## 2025-02-13 PROCEDURE — 85027 COMPLETE CBC AUTOMATED: CPT | Performed by: INTERNAL MEDICINE

## 2025-02-13 RX ORDER — PROPOFOL 10 MG/ML
INJECTION, EMULSION INTRAVENOUS AS NEEDED
Status: DISCONTINUED | OUTPATIENT
Start: 2025-02-13 | End: 2025-02-13

## 2025-02-13 RX ORDER — FENTANYL CITRATE 50 UG/ML
INJECTION, SOLUTION INTRAMUSCULAR; INTRAVENOUS AS NEEDED
Status: DISCONTINUED | OUTPATIENT
Start: 2025-02-13 | End: 2025-02-13

## 2025-02-13 RX ORDER — ONDANSETRON 2 MG/ML
4 INJECTION INTRAMUSCULAR; INTRAVENOUS EVERY 6 HOURS PRN
Status: DISCONTINUED | OUTPATIENT
Start: 2025-02-13 | End: 2025-02-14 | Stop reason: HOSPADM

## 2025-02-13 RX ORDER — MAGNESIUM HYDROXIDE/ALUMINUM HYDROXICE/SIMETHICONE 120; 1200; 1200 MG/30ML; MG/30ML; MG/30ML
30 SUSPENSION ORAL EVERY 6 HOURS PRN
Status: DISCONTINUED | OUTPATIENT
Start: 2025-02-13 | End: 2025-02-14 | Stop reason: HOSPADM

## 2025-02-13 RX ORDER — DEXAMETHASONE SODIUM PHOSPHATE 10 MG/ML
INJECTION, SOLUTION INTRAMUSCULAR; INTRAVENOUS AS NEEDED
Status: DISCONTINUED | OUTPATIENT
Start: 2025-02-13 | End: 2025-02-13

## 2025-02-13 RX ORDER — CEFAZOLIN SODIUM 2 G/50ML
2000 SOLUTION INTRAVENOUS ONCE
Status: COMPLETED | OUTPATIENT
Start: 2025-02-13 | End: 2025-02-13

## 2025-02-13 RX ORDER — ALBUTEROL SULFATE 0.83 MG/ML
2.5 SOLUTION RESPIRATORY (INHALATION) ONCE AS NEEDED
Status: DISCONTINUED | OUTPATIENT
Start: 2025-02-13 | End: 2025-02-13 | Stop reason: HOSPADM

## 2025-02-13 RX ORDER — SODIUM CHLORIDE, SODIUM LACTATE, POTASSIUM CHLORIDE, CALCIUM CHLORIDE 600; 310; 30; 20 MG/100ML; MG/100ML; MG/100ML; MG/100ML
INJECTION, SOLUTION INTRAVENOUS CONTINUOUS PRN
Status: DISCONTINUED | OUTPATIENT
Start: 2025-02-13 | End: 2025-02-13

## 2025-02-13 RX ORDER — LIDOCAINE HYDROCHLORIDE 10 MG/ML
INJECTION, SOLUTION EPIDURAL; INFILTRATION; INTRACAUDAL; PERINEURAL AS NEEDED
Status: DISCONTINUED | OUTPATIENT
Start: 2025-02-13 | End: 2025-02-13

## 2025-02-13 RX ORDER — ONDANSETRON 2 MG/ML
INJECTION INTRAMUSCULAR; INTRAVENOUS AS NEEDED
Status: DISCONTINUED | OUTPATIENT
Start: 2025-02-13 | End: 2025-02-13

## 2025-02-13 RX ORDER — CEFAZOLIN SODIUM 2 G/50ML
SOLUTION INTRAVENOUS AS NEEDED
Status: DISCONTINUED | OUTPATIENT
Start: 2025-02-13 | End: 2025-02-13

## 2025-02-13 RX ORDER — FENTANYL CITRATE/PF 50 MCG/ML
25 SYRINGE (ML) INJECTION
Status: DISCONTINUED | OUTPATIENT
Start: 2025-02-13 | End: 2025-02-13 | Stop reason: HOSPADM

## 2025-02-13 RX ORDER — GLYCINE 1.5 G/100ML
SOLUTION IRRIGATION AS NEEDED
Status: DISCONTINUED | OUTPATIENT
Start: 2025-02-13 | End: 2025-02-13 | Stop reason: HOSPADM

## 2025-02-13 RX ORDER — EPHEDRINE SULFATE 50 MG/ML
INJECTION INTRAVENOUS AS NEEDED
Status: DISCONTINUED | OUTPATIENT
Start: 2025-02-13 | End: 2025-02-13

## 2025-02-13 RX ADMIN — SODIUM CHLORIDE, SODIUM LACTATE, POTASSIUM CHLORIDE, AND CALCIUM CHLORIDE: .6; .31; .03; .02 INJECTION, SOLUTION INTRAVENOUS at 17:40

## 2025-02-13 RX ADMIN — TAMSULOSIN HYDROCHLORIDE 0.4 MG: 0.4 CAPSULE ORAL at 19:46

## 2025-02-13 RX ADMIN — LATANOPROST 1 DROP: 50 SOLUTION/ DROPS OPHTHALMIC at 19:46

## 2025-02-13 RX ADMIN — ONDANSETRON 4 MG: 2 INJECTION INTRAMUSCULAR; INTRAVENOUS at 17:50

## 2025-02-13 RX ADMIN — METOPROLOL SUCCINATE 25 MG: 25 TABLET, FILM COATED, EXTENDED RELEASE ORAL at 09:03

## 2025-02-13 RX ADMIN — DEXAMETHASONE SODIUM PHOSPHATE 8 MG: 10 INJECTION, SOLUTION INTRAMUSCULAR; INTRAVENOUS at 17:50

## 2025-02-13 RX ADMIN — SENNOSIDES 17.2 MG: 8.6 TABLET ORAL at 21:09

## 2025-02-13 RX ADMIN — PROPOFOL 100 MG: 10 INJECTION, EMULSION INTRAVENOUS at 17:51

## 2025-02-13 RX ADMIN — OXYBUTYNIN CHLORIDE 5 MG: 5 TABLET ORAL at 09:03

## 2025-02-13 RX ADMIN — SODIUM CHLORIDE, SODIUM LACTATE, POTASSIUM CHLORIDE, AND CALCIUM CHLORIDE: .6; .31; .03; .02 INJECTION, SOLUTION INTRAVENOUS at 17:19

## 2025-02-13 RX ADMIN — LIDOCAINE HYDROCHLORIDE 50 MG: 10 INJECTION, SOLUTION EPIDURAL; INFILTRATION; INTRACAUDAL; PERINEURAL at 17:50

## 2025-02-13 RX ADMIN — PHENYLEPHRINE HYDROCHLORIDE 50 MCG: 10 INJECTION INTRAVENOUS at 17:57

## 2025-02-13 RX ADMIN — FENTANYL CITRATE 100 MCG: 50 INJECTION, SOLUTION INTRAMUSCULAR; INTRAVENOUS at 17:50

## 2025-02-13 RX ADMIN — PHENYLEPHRINE HYDROCHLORIDE 20 MCG/MIN: 10 INJECTION INTRAVENOUS at 17:51

## 2025-02-13 RX ADMIN — LOSARTAN POTASSIUM 25 MG: 25 TABLET, FILM COATED ORAL at 09:03

## 2025-02-13 RX ADMIN — PROPOFOL 100 MG: 10 INJECTION, EMULSION INTRAVENOUS at 17:50

## 2025-02-13 RX ADMIN — CEFAZOLIN SODIUM 2000 MG: 2 SOLUTION INTRAVENOUS at 17:44

## 2025-02-13 RX ADMIN — EPHEDRINE SULFATE 7.5 MG: 50 INJECTION, SOLUTION INTRAVENOUS at 17:56

## 2025-02-13 RX ADMIN — ATORVASTATIN CALCIUM 20 MG: 20 TABLET, FILM COATED ORAL at 19:46

## 2025-02-13 RX ADMIN — CEFAZOLIN SODIUM 2000 MG: 2 SOLUTION INTRAVENOUS at 21:09

## 2025-02-13 NOTE — PROGRESS NOTES
"Progress Note - Urology      Patient: Gilberto Vann   : 1940 Sex: male   MRN: [id number]     CSN: [id number]  Unit/Bed#: [address] [place] [address]     SUBJECTIVE:   Ledesma draining minimal hematuric urine  Culture is positive sensitivities pending  Discussed with patient last night n.p.o. for cystoscopy evacuation of clots in light of second episode of hematuria in the last 3 months Long aggressive smoking history      Objective   Vitals: /52   Pulse 63   Temp 97.8 °F (36.6 °C)   Resp 18   Ht 5' 9\" (1.753 m)   Wt 62.1 kg (137 lb)   SpO2 96%   BMI 20.23 kg/m²     I/O last 24 hours:  In: 120 [P.O.:120]  Out: 600 [Urine:600]      Physical Exam:   General Alert awake   Normocephalic atraumatic PERRLA  Lungs clear bilaterally  Cardiac normal S1 normal S2  Abdomen soft, flank pain  Extremities no edema      Lab Results: CBC:   Lab Results   Component Value Date    WBC 8.59 2025    HGB 11.8 (L) 2025    HCT 36.8 2025    MCV 90 2025     2025    RBC 4.08 2025    MCH 28.9 2025    MCHC 32.1 2025    RDW 14.3 2025    MPV 10.1 2025    NRBC 0 2025     CMP:   Lab Results   Component Value Date     2025     2023    CO2 25 2025    CO2 26 2023    BUN 20 2025    BUN 20 2023    CREATININE 0.83 2025    CALCIUM 8.8 2025    AST 20 02/10/2025    AST 17 2023    ALT 13 02/10/2025    ALT 14 2023    ALKPHOS 70 02/10/2025    EGFR 80 2025     Urinalysis:   Lab Results   Component Value Date    COLORU Yellow 02/10/2025    CLARITYU Cloudy 02/10/2025    SPECGRAV <1.005 (L) 02/10/2025    PHUR 6.0 02/10/2025    LEUKOCYTESUR Negative 02/10/2025    NITRITE Negative 02/10/2025    GLUCOSEU Negative 02/10/2025    KETONESU Negative 02/10/2025    BILIRUBINUR Negative 02/10/2025    BLOODU Large (A) 02/10/2025     Urine Culture:   Lab Results   Component Value Date    URINECX 50,000-59,000 cfu/ml " Enterobacter cloacae (A) 02/10/2025     PSA:   Lab Results   Component Value Date    PSA 6.98 (H) 05/22/2023         Assessment/ Plan:  Hemorrhagic cystitis  Gross hematuria now cleared  Second episode of hematuria as discussed with the patient does wish evaluation  N.p.o.  Cystoscopy evacuation of clots retrograde pyelograms possible fulguration          James Blunt MD

## 2025-02-13 NOTE — NURSING NOTE
CBI clamped per urology at 1000. At 1145 patient had disconnected CBI from 3-way catheter. Dr. Chang made aware at bedside. Dr. Blunt made aware by Halie JEFFREY.

## 2025-02-13 NOTE — PLAN OF CARE
Problem: PAIN - ADULT  Goal: Verbalizes/displays adequate comfort level or baseline comfort level  Description: Interventions:  - Encourage patient to monitor pain and request assistance  - Assess pain using appropriate pain scale  - Administer analgesics based on type and severity of pain and evaluate response  - Implement non-pharmacological measures as appropriate and evaluate response  - Consider cultural and social influences on pain and pain management  - Notify physician/advanced practitioner if interventions unsuccessful or patient reports new pain  Outcome: Progressing     Problem: INFECTION - ADULT  Goal: Absence or prevention of progression during hospitalization  Description: INTERVENTIONS:  - Assess and monitor for signs and symptoms of infection  - Monitor lab/diagnostic results  - Monitor all insertion sites, i.e. indwelling lines, tubes, and drains  - Monitor endotracheal if appropriate and nasal secretions for changes in amount and color  - [place] appropriate cooling/warming therapies per order  - Administer medications as ordered  - Instruct and encourage patient and family to use good hand hygiene technique  - Identify and instruct in appropriate isolation precautions for identified infection/condition  Outcome: Progressing  Goal: Absence of fever/infection during neutropenic period  Description: INTERVENTIONS:  - Monitor WBC    Outcome: Progressing     Problem: SAFETY ADULT  Goal: Patient will remain free of falls  Description: INTERVENTIONS:  - Educate patient/family on patient safety including physical limitations  - Instruct patient to call for assistance with activity   - Consult OT/PT to assist with strengthening/mobility   - Keep Call bell within reach  - Keep bed low and locked with side rails adjusted as appropriate  - Keep care items and personal belongings within reach  - Initiate and maintain comfort rounds  - Make Fall Risk Sign visible to staff  - Offer Toileting every 2 Hours,  in advance of need  - Initiate/Maintain bed/chair alarm  - Obtain necessary fall risk management equipment: bracelet/socks   - Apply yellow socks and bracelet for high fall risk patients  - Consider moving patient to room near nurses station  Outcome: Progressing  Goal: Maintain or return to baseline ADL function  Description: INTERVENTIONS:  -  Assess patient's ability to carry out ADLs; assess patient's baseline for ADL function and identify physical deficits which impact ability to perform ADLs (bathing, care of mouth/teeth, toileting, grooming, dressing, etc.)  - Assess/evaluate cause of self-care deficits   - Assess range of motion  - Assess patient's mobility; develop plan if impaired  - Assess patient's need for assistive devices and provide as appropriate  - Encourage maximum independence but intervene and supervise when necessary  - Involve family in performance of ADLs  - Assess for home care needs following discharge   - Consider OT consult to assist with ADL evaluation and planning for discharge  - Provide patient education as appropriate  Outcome: Progressing  Goal: Maintains/Returns to pre admission functional level  Description: INTERVENTIONS:  - Perform AM-PAC 6 Click Basic Mobility/ Daily Activity assessment daily.  - Set and communicate daily mobility goal to care team and patient/family/caregiver.   - Collaborate with rehabilitation services on mobility goals if consulted  - Perform Range of Motion 12 times a day.  - Reposition patient every 2 hours.  - Dangle patient 3 times a day  - Stand patient 3 times a day  - Ambulate patient 3 times a day  - Out of bed to chair 3 times a day   - Out of bed for meals 3 times a day  - Out of bed for toileting  - Record patient progress and toleration of activity level   Outcome: Progressing     Problem: DISCHARGE PLANNING  Goal: Discharge to home or other facility with appropriate resources  Description: INTERVENTIONS:  - Identify barriers to discharge  w/patient and caregiver  - Arrange for needed discharge resources and transportation as appropriate  - Identify discharge learning needs (meds, wound care, etc.)  - Arrange for interpretive services to assist at discharge as needed  - Refer to Case Management Department for coordinating discharge planning if the patient needs post-hospital services based on physician/advanced practitioner order or complex needs related to functional status, cognitive ability, or social support system  Outcome: Progressing     Problem: Knowledge Deficit  Goal: Patient/family/caregiver demonstrates understanding of disease process, treatment plan, medications, and discharge instructions  Description: Complete learning assessment and assess knowledge base.  Interventions:  - Provide teaching at level of understanding  - Provide teaching via preferred learning methods  Outcome: Progressing

## 2025-02-13 NOTE — ANESTHESIA PREPROCEDURE EVALUATION
Procedure:  CYSTOSCOPY EVACUATION OF CLOTS/retrograde/fulgeration (Bilateral: Bladder)    Relevant Problems   CARDIO   (+) CAD (coronary artery disease)   (+) Critical aortic valve stenosis   (+) Primary hypertension      HEMATOLOGY   (+) Anemia      MUSCULOSKELETAL   (+) Primary osteoarthritis involving multiple joints      NEURO/PSYCH   (+) Anxiety      PULMONARY   (+) Asthma   (+) COPD (chronic obstructive pulmonary disease) (HCC)   (+) Centrilobular emphysema (HCC)   (+) Chronic bronchitis (HCC)   (+) Shortness of breath      Surgery/Wound/Pain   (+) S/P TAVR (transcatheter aortic valve replacement)      Cardiovascular/Peripheral Vascular   (+) Ischemic cardiomyopathy   Echo June 2024:    Left Ventricle: Left ventricular cavity size is normal. Wall thickness is mildly increased. There is borderline concentric hypertrophy. The left ventricular ejection fraction is 45%. Systolic function is mildly reduced. Wall motion is normal. Diastolic function is mildly abnormal, consistent with grade I (abnormal) relaxation.  Left atrial filling pressure is elevated.    Right Ventricle: Systolic function is low normal.    Aortic Valve: There is a TAVR bioprosthetic valve. The prosthetic valve appears well-seated and appears to be functioning normally. There is trace paravalvular regurgitation. There is trace transvalvular regurgitation.  Mean gradient is 8 to 10 mmHg.    Mitral Valve: There is mild annular calcification. There is mild regurgitation.    Tricuspid Valve: There is mild regurgitation.  Pulmonary artery pressure around 30 mmHg.    As compared to previous echo few months ago EF is improved to around 45%.  Grade 1 diastolic dysfunction.  Aortic valve which is TAVR bioprosthetic valve is functioning normally with trace regurgitation..  Physical Exam    Airway    Mallampati score: II  TM Distance: >3 FB  Neck ROM: full     Dental   Comment: Denies loose teeth upper dentures    Cardiovascular  Cardiovascular exam  normal    Pulmonary  Pulmonary exam normal     Other Findings  Portions of exam deferred due to low yield and/or unknown COVID status    Chewing tobacco at 10 am      Anesthesia Plan  ASA Score- 3     Anesthesia Type- general with ASA Monitors.         Additional Monitors:     Airway Plan: ETT.    Comment: Plan for RSI with ETT, as it is unclear when patient stopped chewing tobacco today..       Plan Factors-Exercise tolerance (METS): >4 METS.    Chart reviewed.   Existing labs reviewed. Patient summary reviewed.    Patient is not a current smoker.              Induction- intravenous and rapid sequence induction.    Postoperative Plan-     Perioperative Resuscitation Plan - Level 1 - Full Code.       Informed Consent- Anesthetic plan and risks discussed with patient.  I personally reviewed this patient with the CRNA. Discussed and agreed on the Anesthesia Plan with the CRNA..      NPO Status:  No vitals data found for the desired time range.

## 2025-02-13 NOTE — PROGRESS NOTES
Progress Note - Hospitalist   Name: Gilberto Vann 84 y.o. male I MRN: [id number]  Unit/Bed#: 4 [address] I Date of Admission: 2/10/2025   Date of Service: 2/13/2025 I Hospital Day: 2    Assessment & Plan  Gross hematuria  History of CAD, AF status post TAVR, pacemaker, hypertension, and hemorrhagic cystitis who presented to the hospital with gross hematuria  History of hemorrhagic cystitis previously hospitalized November 2024  Since admission has been on CBI with clearance of hematuria.  Clamped today per urology.  Holding DAPT.  Going for cystoscopy today  Pyuria: MDR enterobacter.  Currently not on antibiotics unclear if symptomatic will related to bleeding  S/P TAVR (transcatheter aortic valve replacement)  History of TAVR March 2024 [facility]  Holding DAPT  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HCC)  Wt Readings from Last 3 Encounters:   02/11/25 62.1 kg (137 lb)   01/27/25 65.8 kg (145 lb)   12/16/24 67.1 kg (148 lb)     History of combined CHF with LVEF improvement from 25% to 45%  Compensated.  Pacemaker  Status post Micra pacemaker  COPD (chronic obstructive pulmonary disease) (HCC)  Reported history of COPD not on any maintenance inhalers  Primary hypertension  Continue losartan and metoprolol  Glaucoma  Continue eyedrops    VTE Pharmacologic Prophylaxis: VTE Score: 4 Moderate Risk (Score 3-4) - Pharmacological DVT Prophylaxis Contraindicated. Sequential Compression Devices Ordered.    Mobility:   Basic Mobility Inpatient Raw Score: 22  JH-HLM Goal: 7: Walk 25 feet or more  JH-HLM Achieved: 7: Walk 25 feet or more  JH-HLM Goal achieved. Continue to encourage appropriate mobility.    Patient Centered Rounds: I have performed bedside rounds with nursing staff today.  Discussions with Specialists or Other Care Team Provider: Case management and urology    Education and Discussions with Family / Patient: Updated  (sister) via phone.    Current Length of Stay: 2  day(s)  Current Patient Status: Inpatient   Certification Statement: The patient will continue to require additional inpatient hospital stay due to cystoscopy later today  Discharge Plan: Anticipate discharge tomorrow to home.    Code Status: Level 1 - Full Code    Subjective   Patient seen and examined.  No new complaints.  No further hematuria.    Objective   Vitals:   Temp (24hrs), Av °F (36.7 °C), Min:97.7 °F (36.5 °C), Max:98.8 °F (37.1 °C)    Temp:  [97.7 °F (36.5 °C)-98.8 °F (37.1 °C)] 97.8 °F (36.6 °C)  HR:  [62-75] 63  Resp:  [16-19] 18  BP: (110-142)/(52-66) 110/52  SpO2:  [96 %-99 %] 96 %  Body mass index is 20.23 kg/m².     Input and Output Summary (last 24 hours):     Intake/Output Summary (Last 24 hours) at 2025 1522  Last data filed at 2025 0900  Gross per 24 hour   Intake 0 ml   Output 600 ml   Net -600 ml       Physical Exam  Vitals reviewed.   Constitutional:       General: He is not in acute distress.  HENT:      Head: Atraumatic.   Cardiovascular:      Rate and Rhythm: Regular rhythm.      Heart sounds: Normal heart sounds.   Pulmonary:      Effort: Pulmonary effort is normal.      Breath sounds: Decreased breath sounds present. No wheezing.   Abdominal:      General: Bowel sounds are normal.      Palpations: Abdomen is soft.      Tenderness: There is no abdominal tenderness.   Musculoskeletal:         General: No swelling.   Skin:     General: Skin is warm and dry.   Neurological:      General: No focal deficit present.      Mental Status: He is alert.      Motor: No weakness.   Psychiatric:         Mood and Affect: Mood normal.       Lines/Drains:  Invasive Devices       Peripheral Intravenous Line  Duration             Peripheral IV 02/10/25 Left;Proximal;Ventral (anterior) Forearm 2 days              Drain  Duration             Urethral Catheter Three way 2 days                  Urinary Catheter:  Goal for removal:                  Lab Results: I have reviewed the following  results:   Results from last 7 days   Lab Units 02/13/25 0348 02/12/25  0440 02/11/25  0449 02/10/25  2101   WBC Thousand/uL 8.59 6.78 6.22 5.90   HEMOGLOBIN g/dL 11.8* 10.6* 11.0* 11.4*   PLATELETS Thousands/uL 202 157 168 186   MCV fL 90 91 92 91   INR   --   --   --  1.06     Results from last 7 days   Lab Units 02/13/25  0348 02/12/25  0440 02/11/25  0449 02/10/25  2101   SODIUM mmol/L 138 139 139 139   POTASSIUM mmol/L 3.8 3.8 4.1 4.1   CHLORIDE mmol/L 106 108 110* 109*   CO2 mmol/L 25 24 20* 23   ANION GAP mmol/L 7 7 9 7   BUN mg/dL 20 21 22 25   CREATININE mg/dL 0.83 0.80 0.72 0.86   CALCIUM mg/dL 8.8 8.5 8.4 8.6   ALBUMIN g/dL  --   --   --  3.7   TOTAL BILIRUBIN mg/dL  --   --   --  0.47   ALK PHOS U/L  --   --   --  70   ALT U/L  --   --   --  13   AST U/L  --   --   --  20   EGFR ml/min/1.73sq m 80 81 85 79   GLUCOSE RANDOM mg/dL 92 86 74 97                                        Recent Cultures (last 7 days):   Results from last 7 days   Lab Units 02/10/25  2148   URINE CULTURE  50,000-59,000 cfu/ml Enterobacter cloacae*       Imaging:  Reviewed radiology reports from this admission including:  No results found.    Last 24 Hours Medication List:     Current Facility-Administered Medications:     acetaminophen (TYLENOL) tablet 650 mg, Q6H PRN    [Held by provider] aspirin chewable tablet 81 mg, Daily    atorvastatin (LIPITOR) tablet 20 mg, Daily With Dinner    [Held by provider] clopidogrel (PLAVIX) tablet 75 mg, Daily    fluticasone (FLONASE) 50 mcg/act nasal spray 1 spray, BID    latanoprost (XALATAN) 0.005 % ophthalmic solution 1 drop, HS    losartan (COZAAR) tablet 25 mg, Daily    metoprolol succinate (TOPROL-XL) 24 hr tablet 25 mg, Daily    oxybutynin (DITROPAN) tablet 5 mg, BID    polyethylene glycol (MIRALAX) packet 17 g, Daily PRN    senna (SENOKOT) tablet 17.2 mg, HS    tamsulosin (FLOMAX) capsule 0.4 mg, Daily With Dinner    Administrative Statements   Today, Patient Was Seen By: Crispin Chang,  DO  I have spent a total time of 35 minutes in caring for this patient on the day of the visit/encounter including Patient and family education, Counseling / Coordination of care, Documenting in the medical record, Reviewing / ordering tests, medicine, procedures  , and Communicating with other healthcare professionals .    **Please Note: This note may have been constructed using a voice recognition system.**

## 2025-02-13 NOTE — DISCHARGE INSTR - AVS FIRST PAGE
#1 no heavy straining or lifting above 10 pounds for 2 weeks    #2 call office fevers, chills, or worsening blood in the urine.    #3  Patient to call office for follow-up in 2 weeks      James Blunt M.D. [facility] office  [address] [place] [address].  [place] [address]  [phone]  8:30 AM to 4:30 PM  Monday through Friday    [facility] office  [address]  [address]  [place] [address]  [phone]  1:00 to 5:00 PM  Wednesday

## 2025-02-13 NOTE — OP NOTE
OPERATIVE REPORT  PATIENT NAME: Gilberto Vann    :  1940  MRN: [id number]  Pt Location: [facility] OR ROOM 04    SURGERY DATE: 2025    Surgeons and Role:     * James Blunt MD - Primary    Preop Diagnosis:  Hematuria [R31.9]  Tobacco abuse [Z72.0]    Post-Op Diagnosis Codes:     * Hematuria [R31.9]     * Tobacco abuse [Z72.0]  Hemorrhagic cystitis    Procedure(s):  Bilateral - CYSTOSCOPY. BILATERAL RETROGRADE PYLEOGRAM. EVACUATION OF CLOTS. BLADDER BIOPSY WITH fulgeration    Specimen(s):  ID Type Source Tests Collected by Time Destination   1 : biopsy rt bladder neck Tissue Urinary Bladder TISSUE EXAM James Blunt MD 2025 1808        Estimated Blood Loss:   Minimal    Drains:  Urethral Catheter Latex;Three way 22 Fr. 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    Operative Findings:  Prostatic urethra TUR defect open  Organized clot removed with Ellik  Bilateral retrograde pyelograms normal filling and drainage no upper tract abnormalities  Multiple erythematous areas in the bladder consistent with cystitis  Few papillary lesions right bladder neck inflammatory?  Biopsy taken  Fulguration bladder prostatic urethra  22 three-way Ledesma cath inserted left the bag drainage mild hematuria      Complications:   None    Procedure and Technique:  Patient identified in the holding area wish to proceed with procedure consent signed placed in the OR suite after anesthesia induced placed in lithotomy position draped the prepped standard fashion  timeout performed 22 Indian scope 30 lens passed the urethra into the bladder anterior to the maladies posterior to confirmed open TUR defect with polypoid lesions noted at the bladder neck extending into the prostatic urethra organized clot noted removed with the Ellik  multiple areas of erythema consistent with cystitis noted bilateral fluoroscopic retrograde pyelograms confirm normal filling and drainage the biopsy forceps were placed biopsy of the bladder neck prostatic urethra performed 3 times Bugbee placed and burning of the bladder neck prostatic urethra noted no obvious bleeding scope removed 22 Indian three-way Ledesma cath inserted today drainage awakened taken recovery room in stable condition   I was present for the entire procedure.    Patient Disposition:  PACU              SIGNATURE: James Blunt MD  DATE: February 13, 2025  TIME: 6:23 PM

## 2025-02-13 NOTE — NURSING NOTE
Patient found using chewing tobacco. Patient asked to spit out tobacco. Used chewing tobacco discarded with Rosy JEFFREY.

## 2025-02-13 NOTE — PLAN OF CARE
Problem: PAIN - ADULT  Goal: Verbalizes/displays adequate comfort level or baseline comfort level  Description: Interventions:  - Encourage patient to monitor pain and request assistance  - Assess pain using appropriate pain scale  - Administer analgesics based on type and severity of pain and evaluate response  - Implement non-pharmacological measures as appropriate and evaluate response  - Consider cultural and social influences on pain and pain management  - Notify physician/advanced practitioner if interventions unsuccessful or patient reports new pain  Outcome: Progressing     Problem: INFECTION - ADULT  Goal: Absence or prevention of progression during hospitalization  Description: INTERVENTIONS:  - Assess and monitor for signs and symptoms of infection  - Monitor lab/diagnostic results  - Monitor all insertion sites, i.e. indwelling lines, tubes, and drains  - Monitor endotracheal if appropriate and nasal secretions for changes in amount and color  - [place] appropriate cooling/warming therapies per order  - Administer medications as ordered  - Instruct and encourage patient and family to use good hand hygiene technique  - Identify and instruct in appropriate isolation precautions for identified infection/condition  Outcome: Progressing  Goal: Absence of fever/infection during neutropenic period  Description: INTERVENTIONS:  - Monitor WBC    Outcome: Progressing     Problem: SAFETY ADULT  Goal: Patient will remain free of falls  Description: INTERVENTIONS:  - Educate patient/family on patient safety including physical limitations  - Instruct patient to call for assistance with activity   - Consult OT/PT to assist with strengthening/mobility   - Keep Call bell within reach  - Keep bed low and locked with side rails adjusted as appropriate  - Keep care items and personal belongings within reach  - Initiate and maintain comfort rounds  - Make Fall Risk Sign visible to staff  - Offer Toileting every 2 Hours,  in advance of need  - Initiate/Maintain bed/chair alarm  - Obtain necessary fall risk management equipment: none  - Apply yellow socks and bracelet for high fall risk patients  - Consider moving patient to room near nurses station  Outcome: Progressing  Goal: Maintain or return to baseline ADL function  Description: INTERVENTIONS:  -  Assess patient's ability to carry out ADLs; assess patient's baseline for ADL function and identify physical deficits which impact ability to perform ADLs (bathing, care of mouth/teeth, toileting, grooming, dressing, etc.)  - Assess/evaluate cause of self-care deficits   - Assess range of motion  - Assess patient's mobility; develop plan if impaired  - Assess patient's need for assistive devices and provide as appropriate  - Encourage maximum independence but intervene and supervise when necessary  - Involve family in performance of ADLs  - Assess for home care needs following discharge   - Consider OT consult to assist with ADL evaluation and planning for discharge  - Provide patient education as appropriate  Outcome: Progressing  Goal: Maintains/Returns to pre admission functional level  Description: INTERVENTIONS:  - Perform AM-PAC 6 Click Basic Mobility/ Daily Activity assessment daily.  - Set and communicate daily mobility goal to care team and patient/family/caregiver.   - Collaborate with rehabilitation services on mobility goals if consulted  - Perform Range of Motion 3 times a day.  - Reposition patient every 2 hours.  - Dangle patient 3 times a day  - Stand patient 3 times a day  - Ambulate patient 3 times a day  - Out of bed to chair 3 times a day   - Out of bed for meals 3 times a day  - Out of bed for toileting  - Record patient progress and toleration of activity level   Outcome: Progressing     Problem: DISCHARGE PLANNING  Goal: Discharge to home or other facility with appropriate resources  Description: INTERVENTIONS:  - Identify barriers to discharge w/patient and  caregiver  - Arrange for needed discharge resources and transportation as appropriate  - Identify discharge learning needs (meds, wound care, etc.)  - Arrange for interpretive services to assist at discharge as needed  - Refer to Case Management Department for coordinating discharge planning if the patient needs post-hospital services based on physician/advanced practitioner order or complex needs related to functional status, cognitive ability, or social support system  Outcome: Progressing     Problem: Knowledge Deficit  Goal: Patient/family/caregiver demonstrates understanding of disease process, treatment plan, medications, and discharge instructions  Description: Complete learning assessment and assess knowledge base.  Interventions:  - Provide teaching at level of understanding  - Provide teaching via preferred learning methods  Outcome: Progressing

## 2025-02-13 NOTE — ANESTHESIA POSTPROCEDURE EVALUATION
Post-Op Assessment Note    CV Status:  Stable  Pain Score: 0    Pain management: adequate       Mental Status:  Alert and awake   Hydration Status:  Euvolemic   PONV Controlled:  Controlled   Airway Patency:  Patent  Two or more mitigation strategies used for obstructive sleep apnea   Post Op Vitals Reviewed: Yes    No anethesia notable event occurred.    Staff: CRNA           Last Filed PACU Vitals:  Vitals Value Taken Time   Temp 97.6 °F (36.4 °C) 02/13/25 1825   Pulse 60 02/13/25 1825   /53 02/13/25 1825   Resp 20 02/13/25 1825   SpO2 99 % 02/13/25 1825       Modified Lamont:     Vitals Value Taken Time   Activity 2 02/13/25 1825   Respiration 2 02/13/25 1825   Circulation 2 02/13/25 1825   Consciousness 1 02/13/25 1825   Oxygen Saturation 1 02/13/25 1825     Modified Lamont Score: 8

## 2025-02-13 NOTE — ASSESSMENT & PLAN NOTE
History of CAD, AF status post TAVR, pacemaker, hypertension, and hemorrhagic cystitis who presented to the hospital with gross hematuria  History of hemorrhagic cystitis previously hospitalized November 2024  Since admission has been on CBI with clearance of hematuria.  Clamped today per urology.  Holding DAPT.  Going for cystoscopy today  Pyuria: MDR enterobacter.  Currently not on antibiotics unclear if symptomatic will related to bleeding   Home

## 2025-02-14 ENCOUNTER — HOSPITAL ENCOUNTER (OUTPATIENT)
Dept: RADIOLOGY | Facility: HOSPITAL | Age: 85
Discharge: HOME | End: 2025-02-14

## 2025-02-14 LAB
ALBUMIN SERPL BCG-MCNC: 3.4 G/DL (ref 3.5–5)
ALP SERPL-CCNC: 53 U/L (ref 34–104)
ALT SERPL W P-5'-P-CCNC: 9 U/L (ref 7–52)
ANION GAP SERPL CALCULATED.3IONS-SCNC: 11 MMOL/L (ref 4–13)
AST SERPL W P-5'-P-CCNC: 15 U/L (ref 13–39)
BILIRUB SERPL-MCNC: 0.45 MG/DL (ref 0.2–1)
BUN SERPL-MCNC: 26 MG/DL (ref 5–25)
CALCIUM ALBUM COR SERPL-MCNC: 8.8 MG/DL (ref 8.3–10.1)
CALCIUM SERPL-MCNC: 8.3 MG/DL (ref 8.4–10.2)
CHLORIDE SERPL-SCNC: 105 MMOL/L (ref 96–108)
CO2 SERPL-SCNC: 23 MMOL/L (ref 21–32)
CREAT SERPL-MCNC: 0.84 MG/DL (ref 0.6–1.3)
ERYTHROCYTE [DISTWIDTH] IN BLOOD BY AUTOMATED COUNT: 14.2 % (ref 11.6–15.1)
GFR SERPL CREATININE-BSD FRML MDRD: 79 ML/MIN/1.73SQ M
GLUCOSE SERPL-MCNC: 145 MG/DL (ref 65–140)
HCT VFR BLD AUTO: 34.7 % (ref 36.5–49.3)
HGB BLD-MCNC: 11 G/DL (ref 12–17)
MCH RBC QN AUTO: 28.6 PG (ref 26.8–34.3)
MCHC RBC AUTO-ENTMCNC: 31.7 G/DL (ref 31.4–37.4)
MCV RBC AUTO: 90 FL (ref 82–98)
PLATELET # BLD AUTO: 198 THOUSANDS/UL (ref 149–390)
PMV BLD AUTO: 11.9 FL (ref 8.9–12.7)
POTASSIUM SERPL-SCNC: 4.2 MMOL/L (ref 3.5–5.3)
PROT SERPL-MCNC: 5.3 G/DL (ref 6.4–8.4)
RBC # BLD AUTO: 3.85 MILLION/UL (ref 3.88–5.62)
SODIUM SERPL-SCNC: 139 MMOL/L (ref 135–147)
WBC # BLD AUTO: 6.37 THOUSAND/UL (ref 4.31–10.16)

## 2025-02-14 PROCEDURE — 85027 COMPLETE CBC AUTOMATED: CPT | Performed by: SPECIALIST

## 2025-02-14 PROCEDURE — 80053 COMPREHEN METABOLIC PANEL: CPT | Performed by: SPECIALIST

## 2025-02-14 PROCEDURE — 99239 HOSP IP/OBS DSCHRG MGMT >30: CPT | Performed by: INTERNAL MEDICINE

## 2025-02-14 RX ORDER — CIPROFLOXACIN 250 MG/1
250 TABLET, FILM COATED ORAL EVERY 12 HOURS SCHEDULED
Status: DISCONTINUED | OUTPATIENT
Start: 2025-02-14 | End: 2025-02-14 | Stop reason: HOSPADM

## 2025-02-14 RX ORDER — CIPROFLOXACIN 250 MG/1
250 TABLET, FILM COATED ORAL EVERY 12 HOURS SCHEDULED
Qty: 6 TABLET | Refills: 0 | Status: SHIPPED | OUTPATIENT
Start: 2025-02-14 | End: 2025-02-17

## 2025-02-14 RX ADMIN — OXYBUTYNIN CHLORIDE 5 MG: 5 TABLET ORAL at 17:11

## 2025-02-14 RX ADMIN — FLUTICASONE PROPIONATE 1 SPRAY: 50 SPRAY, METERED NASAL at 10:05

## 2025-02-14 RX ADMIN — TAMSULOSIN HYDROCHLORIDE 0.4 MG: 0.4 CAPSULE ORAL at 17:11

## 2025-02-14 RX ADMIN — ATORVASTATIN CALCIUM 20 MG: 20 TABLET, FILM COATED ORAL at 17:11

## 2025-02-14 RX ADMIN — CIPROFLOXACIN 250 MG: 250 TABLET, COATED ORAL at 17:11

## 2025-02-14 RX ADMIN — FLUTICASONE PROPIONATE 1 SPRAY: 50 SPRAY, METERED NASAL at 17:12

## 2025-02-14 RX ADMIN — OXYBUTYNIN CHLORIDE 5 MG: 5 TABLET ORAL at 10:03

## 2025-02-14 NOTE — DISCHARGE SUMMARY
Discharge Summary - Hospitalist   Name: Gilberto Vann 84 y.o. male I MRN: [id number]  Unit/Bed#: [address] [place] [address] I Date of Admission: 2/10/2025   Date of Service: 2/14/2025 I Hospital Day: 3    Assessment & Plan  Gross hematuria  History of CAD, AF status post TAVR, pacemaker, hypertension, and hemorrhagic cystitis who presented to the hospital with gross hematuria  History of hemorrhagic cystitis previously hospitalized November 2024  Patient was placed on CBI with clearance of hematuria.   Holding DAPT.  Underwent cystoscopy with fulguration of clot and biopsies  Pyuria: MDR enterobacter.  Sensitivities were discharged with 3 days of ciprofloxacin  S/P TAVR (transcatheter aortic valve replacement)  History of TAVR March 2024 [facility]  Holding DAPT  Discussed with patient's primary cardiologist Dr. Grider.  Rick to have 1 antiplatelet and patient will be discharged with instructions to stop aspirin but to continue clopidogrel.  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HCC)  Wt Readings from Last 3 Encounters:   02/11/25 62.1 kg (137 lb)   01/27/25 65.8 kg (145 lb)   12/16/24 67.1 kg (148 lb)     History of combined CHF with LVEF improvement from 25% to 45%  Compensated.  COPD (chronic obstructive pulmonary disease) (HCC)  Reported history of COPD not on any maintenance inhalers  Primary hypertension  Continue losartan and metoprolol  Glaucoma  Continue eyedrops      Medical Problems       Resolved Problems  Date Reviewed: 2/13/2025          Resolved    Pacemaker 2/13/2025     Resolved by  Ju Fagan MD         Discharging Physician / Practitioner: Crispin Chang DO  PCP: Dima Lloyd MD  Admission Date:   Admission Orders (From admission, onward)       Ordered        02/11/25 1648  INPATIENT ADMISSION  Once            02/10/25 2343  Place in Observation  Once                        Discharge Date: 02/14/25    Consultations During Hospital Stay:  IP CONSULT TO UROLOGY      Procedures Performed:   Procedure(s) (LRB):  CYSTOSCOPY, BILATERAL RETROGRADE PYLEOGRAM, EVACUATION OF CLOTS, BLADDER BIOPSY WITH fulgeration (Bilateral)     Images:   No results found.    Lab Results: I have reviewed the following results:  Results from last 7 days   Lab Units 02/14/25  0457 02/13/25  0348 02/12/25  0440 02/11/25  0449 02/10/25  2101   WBC Thousand/uL 6.37 8.59 6.78 6.22 5.90   HEMOGLOBIN g/dL 11.0* 11.8* 10.6* 11.0* 11.4*   HEMATOCRIT % 34.7* 36.8 33.8* 34.8* 35.7*   MCV fL 90 90 91 92 91   PLATELETS Thousands/uL 198 202 157 168 186   INR   --   --   --   --  1.06     Results from last 7 days   Lab Units 02/14/25  0457 02/13/25  0348 02/12/25  0440 02/11/25  0449 02/10/25  2101   SODIUM mmol/L 139 138 139 139 139   POTASSIUM mmol/L 4.2 3.8 3.8 4.1 4.1   CHLORIDE mmol/L 105 106 108 110* 109*   CO2 mmol/L 23 25 24 20* 23   BUN mg/dL 26* 20 21 22 25   CREATININE mg/dL 0.84 0.83 0.80 0.72 0.86   CALCIUM mg/dL 8.3* 8.8 8.5 8.4 8.6   ALBUMIN g/dL 3.4*  --   --   --  3.7   TOTAL BILIRUBIN mg/dL 0.45  --   --   --  0.47   ALK PHOS U/L 53  --   --   --  70   ALT U/L 9  --   --   --  13   AST U/L 15  --   --   --  20   EGFR ml/min/1.73sq m 79 80 81 85 79   GLUCOSE RANDOM mg/dL 145* 92 86 74 97             Results from last 7 days   Lab Units 02/10/25  2147   COLOR UA  Yellow   CLARITY UA  Cloudy   SPEC GRAV UA  <1.005*   PH UA  6.0   LEUKOCYTES UA  Negative   NITRITE UA  Negative   GLUCOSE UA mg/dl Negative   KETONES UA mg/dl Negative   BLOOD UA  Large*      Results from last 7 days   Lab Units 02/10/25  2147   RBC UA /hpf Innumerable*   WBC UA /hpf 0-1   EPITHELIAL CELLS WET PREP /hpf None Seen   BACTERIA UA /hpf Occasional      Results from last 7 days   Lab Units 02/10/25  6573   URINE CULTURE  50,000-59,000 cfu/ml Enterobacter cloacae*           Incidental Findings:      Test Results Pending at Discharge (will require follow up):   Pending Labs       Order Current Status    Tissue Exam In process  "          Reason for Admission:   Blood in Urine (Started with hematuria  a couple of days ago. Last few times extremely bloody, no pain slight burning, here in Nov for similar  thing)    Hospital Course:   Gilberto Vann is a 84 y.o. male patient who originally presented to the hospital on 2/10/2025 due to bloody urine.  She was hospitalized here in November for similar.  He underwent CBI.  He was seen by urology underwent cystoscopy with evacuation of clot and biopsy.  He did not have any further bleeding.  Urine culture with multidrug-resistant Enterobacter.  He will be discharged with ciprofloxacin for 3 days.  I did discuss with the patient's cardiologist regarding his current use of DAPT and bladder bleeding.  The patient is okay to be on 1 antiplatelet and will be discharged with instructions to stop his aspirin.    Please see above list of diagnoses and related plan for additional information.     Condition at Discharge: stable     Discharge Day Visit / Exam:   Subjective: Patient seen and examined.  No further bleeding.    Vitals: Blood Pressure: 120/57 (02/14/25 1521)  Pulse: 67 (02/14/25 1521)  Temperature: 97.7 °F (36.5 °C) (02/14/25 1011)  Temp Source: Axillary (02/13/25 2235)  Respirations: 20 (02/14/25 1521)  Height: 5' 9\" (175.3 cm) (02/11/25 0135)  Weight - Scale: 62.1 kg (137 lb) (02/11/25 0135)  SpO2: 100 % (02/14/25 1521)    Exam:   Physical Exam  Vitals reviewed.   Constitutional:       General: He is not in acute distress.  HENT:      Head: Atraumatic.   Cardiovascular:      Rate and Rhythm: Regular rhythm.   Pulmonary:      Effort: Pulmonary effort is normal.      Breath sounds: Decreased breath sounds present. No wheezing.   Abdominal:      General: Bowel sounds are normal.      Palpations: Abdomen is soft.      Tenderness: There is no abdominal tenderness.   Musculoskeletal:         General: No swelling.   Skin:     General: Skin is warm and dry.   Neurological:      General: No focal " deficit present.      Mental Status: He is alert.      Motor: No weakness.   Psychiatric:         Mood and Affect: Mood normal.       Discussion with Family: Sister on telephone    Discharge instructions/Information to patient and family:   See after visit summary for information provided to patient and family.      Provisions for Follow-Up Care:  See after visit summary for information related to follow-up care and any pertinent home health orders.      Mobility at time of Discharge:  Basic Mobility Inpatient Raw Score: 22  JH-HLM Goal: 7: Walk 25 feet or more  JH-HLM Achieved: 7: Walk 25 feet or more  JH-HLM Goal achieved. Continue to encourage appropriate mobility.    Disposition:   Home    Planned Readmission: No     Discharge Medications:  See after visit summary for reconciled discharge medications provided to patient and family.      Administrative Statements   I spent 35 minutes discharging the patient. This time was spent on the day of discharge. I had direct contact with the patient on the day of discharge. Greater than 50% of the total time was spent examining patient, answering all patient questions, arranging and discussing plan of care with patient as well as directly providing post-discharge instructions.  Additional time then spent on discharge activities.    **Please Note: This note may have been constructed using a voice recognition system**

## 2025-02-14 NOTE — PROGRESS NOTES
"Progress Note - Urology      Patient: Gilberto Vann   : 1940 Sex: male   MRN: [id number]     CSN: [id number]  Unit/Bed#: [address] [place] [address]     SUBJECTIVE:   Vs stable  Urine clear  Voiding troial      Objective   Vitals: /50   Pulse 62   Temp 97.7 °F (36.5 °C)   Resp 18   Ht 5' 9\" (1.753 m)   Wt 62.1 kg (137 lb)   SpO2 97%   BMI 20.23 kg/m²     I/O last 24 hours:  In: 710 [P.O.:200; I.V.:510]  Out: 1000 [Urine:1000]      Physical Exam:   General Alert awake   Normocephalic atraumatic PERRLA  Lungs clear bilaterally  Cardiac normal S1 normal S2  Abdomen soft, flank pain  Extremities no edema      Lab Results: CBC:   Lab Results   Component Value Date    WBC 6.37 2025    HGB 11.0 (L) 2025    HCT 34.7 (L) 2025    MCV 90 2025     2025    RBC 3.85 (L) 2025    MCH 28.6 2025    MCHC 31.7 2025    RDW 14.2 2025    MPV 11.9 2025    NRBC 0 2025     CMP:   Lab Results   Component Value Date     2025     2023    CO2 23 2025    CO2 26 2023    BUN 26 (H) 2025    BUN 20 2023    CREATININE 0.84 2025    CALCIUM 8.3 (L) 2025    AST 15 2025    AST 17 2023    ALT 9 2025    ALT 14 2023    ALKPHOS 53 2025    EGFR 79 2025     Urinalysis:   Lab Results   Component Value Date    COLORU Yellow 02/10/2025    CLARITYU Cloudy 02/10/2025    SPECGRAV <1.005 (L) 02/10/2025    PHUR 6.0 02/10/2025    LEUKOCYTESUR Negative 02/10/2025    NITRITE Negative 02/10/2025    GLUCOSEU Negative 02/10/2025    KETONESU Negative 02/10/2025    BILIRUBINUR Negative 02/10/2025    BLOODU Large (A) 02/10/2025     Urine Culture:   Lab Results   Component Value Date    URINECX 50,000-59,000 cfu/ml Enterobacter cloacae (A) 02/10/2025     PSA:   Lab Results   Component Value Date    PSA 6.98 (H) 2023         Assessment/ Plan:  Hemorrhagic cystitis/ biopy taken  D/c " hickman  Voiding trial  [place] home today        James Blunt MD

## 2025-02-14 NOTE — ASSESSMENT & PLAN NOTE
History of TAVR March 2024 [facility]  Holding DAPT  Discussed with patient's primary cardiologist Dr. Grider.  Okay to have 1 antiplatelet and patient will be discharged with instructions to stop aspirin but to continue clopidogrel.

## 2025-02-14 NOTE — ANESTHESIA POSTPROCEDURE EVALUATION
Post-Op Assessment Note    Last Filed PACU Vitals:  Vitals Value Taken Time   Temp 97.6 °F (36.4 °C) 02/13/25 1825   Pulse 60 02/13/25 1840   /59 02/13/25 1840   Resp 20 02/13/25 1840   SpO2 98 % 02/13/25 1840       Modified Lamont:     Vitals Value Taken Time   Activity 2 02/13/25 1825   Respiration 2 02/13/25 1825   Circulation 2 02/13/25 1825   Consciousness 1 02/13/25 1825   Oxygen Saturation 1 02/13/25 1825     Modified Lamont Score: 8

## 2025-02-14 NOTE — PROGRESS NOTES
Patient alert and awake, dressing clean, dry and intact, vital signs within normal limits. Patient transported via bed to [address] [place] [address] with Torrie. Bedside report given to RACHELE Bran. Bed in lowest position and locked, side rails up, bed alarm on, and call bell within reach.

## 2025-02-14 NOTE — ASSESSMENT & PLAN NOTE
History of CAD, AF status post TAVR, pacemaker, hypertension, and hemorrhagic cystitis who presented to the hospital with gross hematuria  History of hemorrhagic cystitis previously hospitalized November 2024  Patient was placed on CBI with clearance of hematuria.   Holding DAPT.  Underwent cystoscopy with fulguration of clot and biopsies  Pyuria: MDR enterobacter.  Sensitivities were discharged with 3 days of ciprofloxacin

## 2025-02-15 VITALS
WEIGHT: 137 LBS | HEIGHT: 69 IN | BODY MASS INDEX: 20.29 KG/M2 | DIASTOLIC BLOOD PRESSURE: 57 MMHG | SYSTOLIC BLOOD PRESSURE: 120 MMHG | HEART RATE: 67 BPM | OXYGEN SATURATION: 100 % | TEMPERATURE: 97.7 F | RESPIRATION RATE: 20 BRPM

## 2025-02-16 ENCOUNTER — NURSE TRIAGE (OUTPATIENT)
Dept: OTHER | Facility: OTHER | Age: 85
End: 2025-02-16

## 2025-02-16 NOTE — TELEPHONE ENCOUNTER
"Patient calling with constipation for 4 days and congestion with strong cough. Appointment booked for 5:30pm 2/17/25. Patient verbalized understanding of home care advice and reasons for call back.         Reason for Disposition   Last bowel movement (BM) > 4 days ago    Answer Assessment - Initial Assessment Questions  1. STOOL PATTERN OR FREQUENCY: \"How often do you have a bowel movement (BM)?\"  (Normal range: 3 times a day to every 3 days)  \"When was your last BM?\"          Sometime around 2/12 in hospital, normally he goes once a day    2. STRAINING: \"Do you have to strain to have a BM?\"         Yes the last time he had a bm on 2/12    3. ONSET: \"When did the constipation begin?\"    During his hospital stay        5. RECTAL PAIN: \"Does your rectum hurt when the stool comes out?\" If Yes, ask: \"Do you have hemorrhoids? How bad is the pain?\"  (Scale 1-10; or mild, moderate, severe)        Denies    6. BM COMPOSITION: \"Are the stools hard?\"         Yes and it was hard to go to the bathroom    7. BLOOD ON STOOLS: \"Has there been any blood on the toilet tissue or on the surface of the BM?\" If Yes, ask: \"When was the last time?\"        Denies    8. CHANGES IN DIET OR HYDRATION: \"Have there been any recent changes in your diet?\" \"How much fluids are you drinking on a daily basis?\"  \"How much have you had to drink today?\"        Patient states he has been drinking well with the medications he is prescribed.     9. MEDICINES: \"Have you been taking any new medicines?\" \"Are you taking any narcotic pain medicines?\" (e.g., Dilaudid, morphine, Percocet, Vicodin)        Patient states he was given laxative while in the hospital.    10. LAXATIVES: \"Have you been using any stool softeners, laxatives, or enemas?\"  If Yes, ask \"What, how often, and when was the last time?\"          Yes, docusate sodium twice a day    12. CAUSE: \"What do you think is causing the constipation?\"           Unsure but patient states this has happened " "before while he stayed in the hospital.     14. OTHER SYMPTOMS: \"Do you have any other symptoms?\" (e.g., abdomen pain, bloating, fever, vomiting)          Congestion and cold symptoms, patient's cough is strong    Protocols used: Constipation-Adult-AH    "

## 2025-02-16 NOTE — TELEPHONE ENCOUNTER
"Regarding: Constipation  ----- Message from Nancy MCCLOUD sent at 2/16/2025 11:39 AM EST -----  Pt stated, \" I have not been to go to the bathroom, I took the Laxatives  and they are not working.\"    "

## 2025-02-17 ENCOUNTER — TRANSITIONAL CARE MANAGEMENT (OUTPATIENT)
Dept: INTERNAL MEDICINE CLINIC | Facility: CLINIC | Age: 85
End: 2025-02-17

## 2025-02-17 ENCOUNTER — OFFICE VISIT (OUTPATIENT)
Dept: INTERNAL MEDICINE CLINIC | Facility: CLINIC | Age: 85
End: 2025-02-17
Payer: COMMERCIAL

## 2025-02-17 VITALS
HEART RATE: 70 BPM | SYSTOLIC BLOOD PRESSURE: 110 MMHG | TEMPERATURE: 97.6 F | HEIGHT: 69 IN | WEIGHT: 140 LBS | OXYGEN SATURATION: 100 % | DIASTOLIC BLOOD PRESSURE: 52 MMHG | RESPIRATION RATE: 16 BRPM | BODY MASS INDEX: 20.73 KG/M2

## 2025-02-17 DIAGNOSIS — Z95.2 S/P TAVR (TRANSCATHETER AORTIC VALVE REPLACEMENT): ICD-10-CM

## 2025-02-17 DIAGNOSIS — I50.42 CHRONIC COMBINED SYSTOLIC AND DIASTOLIC HEART FAILURE (HCC): ICD-10-CM

## 2025-02-17 DIAGNOSIS — H40.9 GLAUCOMA, UNSPECIFIED GLAUCOMA TYPE, UNSPECIFIED LATERALITY: ICD-10-CM

## 2025-02-17 DIAGNOSIS — J11.1 INFLUENZA: ICD-10-CM

## 2025-02-17 DIAGNOSIS — I10 PRIMARY HYPERTENSION: ICD-10-CM

## 2025-02-17 DIAGNOSIS — R31.0 GROSS HEMATURIA: Primary | ICD-10-CM

## 2025-02-17 DIAGNOSIS — N30.01 ACUTE CYSTITIS WITH HEMATURIA: ICD-10-CM

## 2025-02-17 DIAGNOSIS — R05.1 ACUTE COUGH: ICD-10-CM

## 2025-02-17 DIAGNOSIS — J44.1 COPD WITH ACUTE EXACERBATION (HCC): ICD-10-CM

## 2025-02-17 PROCEDURE — 87811 SARS-COV-2 COVID19 W/OPTIC: CPT

## 2025-02-17 PROCEDURE — 87804 INFLUENZA ASSAY W/OPTIC: CPT

## 2025-02-17 PROCEDURE — 99496 TRANSJ CARE MGMT HIGH F2F 7D: CPT

## 2025-02-17 RX ORDER — PREDNISONE 20 MG/1
40 TABLET ORAL DAILY
Qty: 10 TABLET | Refills: 0 | Status: SHIPPED | OUTPATIENT
Start: 2025-02-17 | End: 2025-02-22

## 2025-02-17 RX ORDER — GUAIFENESIN/DEXTROMETHORPHAN 100-10MG/5
5 SYRUP ORAL 3 TIMES DAILY PRN
Qty: 118 ML | Refills: 0 | Status: SHIPPED | OUTPATIENT
Start: 2025-02-17

## 2025-02-17 RX ORDER — OSELTAMIVIR PHOSPHATE 30 MG/1
30 CAPSULE ORAL 2 TIMES DAILY
Qty: 10 CAPSULE | Refills: 0 | Status: SHIPPED | OUTPATIENT
Start: 2025-02-17 | End: 2025-02-22

## 2025-02-17 NOTE — PROGRESS NOTES
Transition of Care Visit  Name: Gilberto Vann      : 1940      MRN: [id number]  Encounter Provider: Leilani Caro MD  Encounter Date: 2025   Encounter department: [facility] INTERNAL MEDICINE    Assessment & Plan  Gross hematuria  Resolved  Evaluated by Urology during hospitalization-- CBI/Cystoscopy w/ evacuation of clots + bpx  D/C aspirin       Acute cystitis with hematuria  Urine Cx + MDR Enterobacter  Completed 3d course of Ciprofloxacin       S/P TAVR (transcatheter aortic valve replacement)  Was on DAPT w/ Plavix + ASA  D/C ASA  OK to continue only Plavix-- discussed w/ Cardiologist       Chronic combined systolic and diastolic heart failure (HCC)  Wt Readings from Last 3 Encounters:   25 63.5 kg (140 lb)   25 62.1 kg (137 lb)   25 65.8 kg (145 lb)     Euvolemic  History of combined CHF with LVEF improvement from 25% to 45%  Compensated       COPD with acute exacerbation (HCC)  Reported history of COPD not on any maintenance inhalers   Flu + with URI symptoms--- cough and exertional SOB  Will treat for COPD exacerbation in setting of viral URI  Orders:    predniSONE 20 mg tablet; Take 2 tablets (40 mg total) by mouth daily for 5 days    Primary hypertension  Continue losartan and metoprolol        Glaucoma, unspecified glaucoma type, unspecified laterality  Continue eyedrops        Influenza  Start trial of antiviral medication  Renal dosing  Continue supportive measures for symptom management  Orders:    oseltamivir (TAMIFLU) 30 MG capsule; Take 1 capsule (30 mg total) by mouth 2 (two) times a day for 5 days    predniSONE 20 mg tablet; Take 2 tablets (40 mg total) by mouth daily for 5 days    dextromethorphan-guaiFENesin (ROBITUSSIN DM)  mg/5 mL syrup; Take 5 mL by mouth 3 (three) times a day as needed for cough    Acute cough  Positive for Flu A  Negative for Covid  Orders:    POCT Rapid Covid Ag    POCT rapid flu A and B    predniSONE 20 mg tablet;  Take 2 tablets (40 mg total) by mouth daily for 5 days    dextromethorphan-guaiFENesin (ROBITUSSIN DM)  mg/5 mL syrup; Take 5 mL by mouth 3 (three) times a day as needed for cough     Return for Next scheduled follow up.    History of Present Illness     Transitional Care Management Review:   Gilberto Vann is a 84 y.o. male here for TCM follow up.     During the TCM phone call patient stated:  TCM Call       Date and time call was made  2/17/2025  8:49 AM    Hospital care reviewed  Records reviewed    Patient was hospitialized at  [facility]    Date of Admission  02/10/25    Date of discharge  02/14/25    Diagnosis  Gross hematuria  COPD (chronic obstructive pulmonary disease) (HCC)  Glaucoma  Primary hypertension  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HCC)  S/P TAVR (transcatheter aortic valve replacement)  Pacemaker    Disposition  Home    Were the patients medications reviewed and updated  Yes    Current Symptoms  Constipation          TCM Call       Post hospital issues  None    Should patient be enrolled in anticoag monitoring?  No    Scheduled for follow up?  Yes    Did you obtain your prescribed medications  Yes    Do you need help managing your prescriptions or medications  No    Is transportation to your appointment needed  No    I have advised the patient to call PCP with any new or worsening symptoms  SERENE Sanchez    Living Arrangements  Alone    Support System  Family    The type of support provided  Emotional    Do you have social support  Yes, as much as I need    Are you recieving any outpatient services  No    Are you recieving home care services  No    Are you using any community resources  No    Current waiver services  No    Have you fallen in the last 12 months  No    Interperter language line needed  No    Counseling  Patient    Counseling topics  Importance of RX compliance; Risk factor reduction          HPI  84 year old male presents for a TCM visit  "following recent hospitalization at [facility] from 2/10 - 2/14 for hematuria and cystitis. Treated with CBI and cystoscopy w/ evacuation of blood clots and biopsy. Also treated for acute cystitis with 3d course of Cipro for MDR Enterobacter. Instructed to d/c aspirin from his DAPT regimen and only continue Plavix.      Today, the patient reports no recurrence of urologic symptoms. Had some constipation following discharge which has now resolved.  He now has new URI symptoms of productive  cough and congestion.       Review of Systems   Constitutional:  Negative for chills and fever.   HENT:  Positive for congestion.    Eyes:  Negative for visual disturbance.   Respiratory:  Positive for cough. Negative for shortness of breath.    Cardiovascular:  Negative for chest pain.   Gastrointestinal:  Negative for abdominal pain and constipation.   Genitourinary:  Negative for hematuria.   Musculoskeletal:  Positive for myalgias.   Neurological:  Negative for headaches.   Psychiatric/Behavioral:  Negative for confusion.      Objective   /52   Pulse 70   Temp 97.6 °F (36.4 °C)   Resp 16   Ht 5' 9\" (1.753 m)   Wt 63.5 kg (140 lb)   SpO2 100%   BMI 20.67 kg/m²     Physical Exam  Vitals and nursing note reviewed.   Constitutional:       General: He is not in acute distress.     Appearance: Normal appearance. He is normal weight. He is not ill-appearing or toxic-appearing.   HENT:      Head: Atraumatic.   Eyes:      General:         Right eye: No discharge.         Left eye: No discharge.      Extraocular Movements: Extraocular movements intact.      Conjunctiva/sclera: Conjunctivae normal.   Cardiovascular:      Rate and Rhythm: Normal rate and regular rhythm.      Pulses: Normal pulses.      Heart sounds: Normal heart sounds.   Pulmonary:      Effort: Pulmonary effort is normal. No respiratory distress.      Breath sounds: Normal breath sounds. No wheezing, rhonchi or rales.   Musculoskeletal:         General: " Normal range of motion.      Cervical back: Normal range of motion.   Skin:     General: Skin is warm and dry.   Neurological:      Mental Status: He is alert and oriented to person, place, and time.   Psychiatric:         Mood and Affect: Mood normal.         Behavior: Behavior normal.       Medications have been reviewed by provider in current encounter

## 2025-02-18 LAB
SARS-COV-2 AG UPPER RESP QL IA: NEGATIVE
SL AMB POCT RAPID FLU A: ABNORMAL
SL AMB POCT RAPID FLU B: ABNORMAL
VALID CONTROL: NORMAL

## 2025-02-18 PROCEDURE — 88305 TISSUE EXAM BY PATHOLOGIST: CPT | Performed by: PATHOLOGY

## 2025-02-20 NOTE — ASSESSMENT & PLAN NOTE
Wt Readings from Last 3 Encounters:   02/17/25 63.5 kg (140 lb)   02/11/25 62.1 kg (137 lb)   01/27/25 65.8 kg (145 lb)     Euvolemic  History of combined CHF with LVEF improvement from 25% to 45%  Compensated

## 2025-02-20 NOTE — ASSESSMENT & PLAN NOTE
Resolved  Evaluated by Urology during hospitalization-- CBI/Cystoscopy w/ evacuation of clots + bpx  D/C aspirin

## 2025-02-22 DIAGNOSIS — I42.9 CARDIOMYOPATHY, UNSPECIFIED TYPE (HCC): ICD-10-CM

## 2025-02-23 RX ORDER — LOSARTAN POTASSIUM 25 MG/1
25 TABLET ORAL DAILY
Qty: 90 TABLET | Refills: 1 | Status: SHIPPED | OUTPATIENT
Start: 2025-02-23

## 2025-03-03 ENCOUNTER — OFFICE VISIT (OUTPATIENT)
Dept: INTERNAL MEDICINE CLINIC | Facility: CLINIC | Age: 85
End: 2025-03-03
Payer: COMMERCIAL

## 2025-03-03 VITALS
HEIGHT: 69 IN | HEART RATE: 84 BPM | BODY MASS INDEX: 20.14 KG/M2 | OXYGEN SATURATION: 96 % | SYSTOLIC BLOOD PRESSURE: 156 MMHG | TEMPERATURE: 97.9 F | DIASTOLIC BLOOD PRESSURE: 80 MMHG | WEIGHT: 136 LBS

## 2025-03-03 DIAGNOSIS — J44.1 CHRONIC OBSTRUCTIVE PULMONARY DISEASE WITH ACUTE EXACERBATION (HCC): Primary | ICD-10-CM

## 2025-03-03 DIAGNOSIS — R09.89 CHEST CONGESTION: ICD-10-CM

## 2025-03-03 DIAGNOSIS — R05.1 ACUTE COUGH: ICD-10-CM

## 2025-03-03 PROCEDURE — 99213 OFFICE O/P EST LOW 20 MIN: CPT

## 2025-03-03 PROCEDURE — G2211 COMPLEX E/M VISIT ADD ON: HCPCS

## 2025-03-03 RX ORDER — AZITHROMYCIN 250 MG/1
TABLET, FILM COATED ORAL
Qty: 6 TABLET | Refills: 0 | Status: SHIPPED | OUTPATIENT
Start: 2025-03-03 | End: 2025-03-08

## 2025-03-03 NOTE — PROGRESS NOTES
Name: Gilberto Vann      : 1940      MRN: [id number]  Encounter Provider: Leilani Caro MD  Encounter Date: 3/3/2025   Encounter department: [facility] INTERNAL MEDICINE  :  Assessment & Plan  Chronic obstructive pulmonary disease with acute exacerbation (HCC)  Start Z-Pratik  Patient has not been using rescue/maintenance inhalers  Recommend starting nebulizer treatments  Continue Mucinex       Acute cough  Plan noted above  Orders:  •  azithromycin (Zithromax) 250 mg tablet; Take 2 tablets (500 mg total) by mouth daily for 1 day, THEN 1 tablet (250 mg total) daily for 4 days.    Chest congestion  Plan noted above  Orders:  •  azithromycin (Zithromax) 250 mg tablet; Take 2 tablets (500 mg total) by mouth daily for 1 day, THEN 1 tablet (250 mg total) daily for 4 days.    Return if symptoms worsen or fail to improve, for Next scheduled follow up.       History of Present Illness     URI   This is a new problem. The current episode started 1 to 4 weeks ago. The problem has been gradually improving. There has been no fever. Associated symptoms include congestion and coughing. Pertinent negatives include no abdominal pain, chest pain, headaches, sore throat or wheezing. Treatments tried: Tamiflu, Robitussin, steroids. The treatment provided mild relief.   Had the flu a few weeks ago not any better rattling in chest can't sleep Medicine helped but did not make it go completely away taking mucinex     Review of Systems   Constitutional:  Negative for chills and fever.   HENT:  Positive for congestion. Negative for sore throat.    Eyes:  Negative for visual disturbance.   Respiratory:  Positive for cough and chest tightness. Negative for shortness of breath and wheezing.    Cardiovascular:  Negative for chest pain and palpitations.   Gastrointestinal:  Negative for abdominal pain.   Musculoskeletal:  Negative for myalgias.   Neurological:  Negative for dizziness, weakness, light-headedness and  "headaches.   Psychiatric/Behavioral:  Negative for confusion.        Objective   /80   Pulse 84   Temp 97.9 °F (36.6 °C)   Ht 5' 9\" (1.753 m)   Wt 61.7 kg (136 lb)   SpO2 96%   BMI 20.08 kg/m²      Physical Exam  Vitals and nursing note reviewed.   Constitutional:       General: He is not in acute distress.     Appearance: Normal appearance. He is normal weight. He is not ill-appearing.   HENT:      Head: Normocephalic.   Eyes:      General:         Right eye: No discharge.         Left eye: No discharge.      Extraocular Movements: Extraocular movements intact.      Conjunctiva/sclera: Conjunctivae normal.   Cardiovascular:      Rate and Rhythm: Normal rate and regular rhythm.      Pulses: Normal pulses.      Heart sounds: Normal heart sounds.   Pulmonary:      Effort: Pulmonary effort is normal. No respiratory distress.      Breath sounds: Rhonchi present. No wheezing or rales.   Musculoskeletal:         General: Normal range of motion.      Cervical back: Normal range of motion.   Skin:     General: Skin is warm and dry.   Neurological:      Mental Status: He is alert and oriented to person, place, and time.   Psychiatric:         Behavior: Behavior normal.         "

## 2025-03-03 NOTE — ASSESSMENT & PLAN NOTE
Start Z-Pratik  Patient has not been using rescue/maintenance inhalers  Recommend starting nebulizer treatments  Continue Mucinex

## 2025-03-13 ENCOUNTER — OFFICE VISIT (OUTPATIENT)
Dept: INTERNAL MEDICINE CLINIC | Facility: CLINIC | Age: 85
End: 2025-03-13
Payer: COMMERCIAL

## 2025-03-13 VITALS
SYSTOLIC BLOOD PRESSURE: 98 MMHG | WEIGHT: 138 LBS | BODY MASS INDEX: 20.44 KG/M2 | HEART RATE: 69 BPM | DIASTOLIC BLOOD PRESSURE: 58 MMHG | TEMPERATURE: 97.6 F | RESPIRATION RATE: 16 BRPM | OXYGEN SATURATION: 97 % | HEIGHT: 69 IN

## 2025-03-13 DIAGNOSIS — I25.10 CORONARY ARTERY DISEASE INVOLVING NATIVE HEART WITHOUT ANGINA PECTORIS, UNSPECIFIED VESSEL OR LESION TYPE: ICD-10-CM

## 2025-03-13 DIAGNOSIS — D50.8 IRON DEFICIENCY ANEMIA SECONDARY TO INADEQUATE DIETARY IRON INTAKE: ICD-10-CM

## 2025-03-13 DIAGNOSIS — I50.42 CHRONIC COMBINED SYSTOLIC AND DIASTOLIC HEART FAILURE (HCC): Primary | ICD-10-CM

## 2025-03-13 PROCEDURE — G2211 COMPLEX E/M VISIT ADD ON: HCPCS | Performed by: INTERNAL MEDICINE

## 2025-03-13 PROCEDURE — 99213 OFFICE O/P EST LOW 20 MIN: CPT | Performed by: INTERNAL MEDICINE

## 2025-03-13 RX ORDER — QUINIDINE GLUCONATE 324 MG
240 TABLET, EXTENDED RELEASE ORAL DAILY
Qty: 90 TABLET | Refills: 1 | Status: SHIPPED | OUTPATIENT
Start: 2025-03-13

## 2025-03-13 NOTE — ASSESSMENT & PLAN NOTE
CBC reviewed shows anemia suspected due to the iron deficiencyThe in the dietary intake instructed with a high iron diet  Orders:  •  ferrous gluconate (FERGON) 240 (27 FE) MG tablet; Take 1 tablet (240 mg total) by mouth daily

## 2025-03-13 NOTE — PROGRESS NOTES
Name: Gilberto Vann      : 1940      MRN: [id number]  Encounter Provider: Dima Lloyd MD  Encounter Date: 3/13/2025   Encounter department: [facility] INTERNAL MEDICINE  :  Assessment & Plan  Coronary artery disease involving native heart without angina pectoris, unspecified vessel or lesion type  History of syncope 2/2 critical aortic stenosis s/p CPR 2024 and subsequent s/p TAVR 3/2024 [facility]  Continue PTA atorvastatin and Toprol-XL 25 mg  Hold Plavix and aspirin secondary to gross hematuria    The Plavix explained still on hold due to the risk of hematuria patient reluctant to take any of the anticoag until seen by cardiology  Awaiting to be seen by cardiology       Chronic combined systolic and diastolic heart failure (HCC)  Wt Readings from Last 3 Encounters:   25 62.6 kg (138 lb)   25 61.7 kg (136 lb)   25 63.5 kg (140 lb)     Patient euvolemic CHF compensated continue low-salt diet fluid restriction Daily weight monitoring patient's status post TAVR stable awaiting to be seen by cardiology asymptomatic  Send dyspnea on exertion at baseline due to the COPD and chronic CHF             Iron deficiency anemia secondary to inadequate dietary iron intake  CBC reviewed shows anemia suspected due to the iron deficiencyThe in the dietary intake instructed with a high iron diet  Orders:  •  ferrous gluconate (FERGON) 240 (27 FE) MG tablet; Take 1 tablet (240 mg total) by mouth daily           History of Present Illness   Came in for follow-up chronic medical: Patient dyspnea on exertion baseline seen by cardiology status post TAVR CHF compensated occasionally feels weak tired fatigue previous labs records reviewed denies any new complaint or new events no new symptoms for details refer to assessment plan visit diagnosis previous labs reviewed      Review of Systems   Constitutional:  Positive for activity change. Negative for appetite change, chills,  "diaphoresis, fatigue, fever and unexpected weight change.   HENT:  Negative for dental problem, drooling, ear discharge, ear pain, facial swelling, hearing loss, mouth sores, nosebleeds, postnasal drip, sinus pressure, sneezing, sore throat, tinnitus, trouble swallowing and voice change.    Eyes:  Negative for photophobia, pain, discharge, redness, itching and visual disturbance.   Respiratory:  Negative for apnea, choking, chest tightness and stridor.    Cardiovascular:  Negative for chest pain, palpitations and leg swelling.   Gastrointestinal:  Negative for abdominal distention, abdominal pain, anal bleeding, blood in stool, constipation, diarrhea, nausea, rectal pain and vomiting.   Endocrine: Negative for cold intolerance, heat intolerance, polydipsia, polyphagia and polyuria.   Genitourinary:  Negative for decreased urine volume, difficulty urinating, dysuria, enuresis, flank pain, frequency, genital sores, hematuria and urgency.   Musculoskeletal:  Positive for arthralgias, back pain, gait problem, joint swelling and myalgias. Negative for neck pain and neck stiffness.   Skin:  Negative for color change, pallor, rash and wound.   Allergic/Immunologic: Negative.  Negative for environmental allergies, food allergies and immunocompromised state.   Neurological:  Negative for dizziness, tremors, seizures, syncope, facial asymmetry, speech difficulty, weakness, light-headedness, numbness and headaches.   Psychiatric/Behavioral:  Negative for agitation, behavioral problems, confusion, decreased concentration, dysphoric mood, hallucinations, self-injury, sleep disturbance and suicidal ideas. The patient is not nervous/anxious and is not hyperactive.        Objective   BP 98/58   Pulse 69   Temp 97.6 °F (36.4 °C)   Resp 16   Ht 5' 9\" (1.753 m)   Wt 62.6 kg (138 lb)   SpO2 97%   BMI 20.38 kg/m²      Physical Exam  Vitals and nursing note reviewed.   Constitutional:       General: He is not in acute distress.    "  Appearance: He is well-developed.   HENT:      Head: Normocephalic and atraumatic.   Eyes:      Conjunctiva/sclera: Conjunctivae normal.   Cardiovascular:      Rate and Rhythm: Normal rate and regular rhythm.      Heart sounds: Murmur heard.   Pulmonary:      Effort: Pulmonary effort is normal. No respiratory distress.      Breath sounds: Normal breath sounds.   Abdominal:      Palpations: Abdomen is soft.      Tenderness: There is no abdominal tenderness.   Musculoskeletal:         General: No swelling.      Cervical back: Neck supple.   Skin:     General: Skin is warm and dry.      Capillary Refill: Capillary refill takes less than 2 seconds.   Neurological:      General: No focal deficit present.      Mental Status: He is alert and oriented to person, place, and time.      Gait: Gait abnormal.   Psychiatric:         Mood and Affect: Mood normal.

## 2025-03-13 NOTE — PATIENT INSTRUCTIONS
"Patient Education     Good food sources of iron   The Basics   Written by the doctors and editors at [facility]   What is iron? -- Iron is a mineral that your body needs to make \"hemoglobin.\" Hemoglobin is a protein in the blood. It helps red blood cells carry oxygen to all parts of the body.  The amount of iron that you need in your diet depends on your age and sex. Your overall health also plays a role in how much iron you need each day.  Some people do not have enough iron. This is called \"iron deficiency.\" Getting plenty of iron through your diet can help prevent iron deficiency. But if you already have too little iron, eating foods with iron will not be enough to treat it. In this case, your doctor will prescribe extra iron to correct your levels.  What foods are good sources of iron? -- It depends on age:   Babies - It's important to make sure that babies get enough iron, especially if they drink breast milk.   Babies who drink breast milk need extra iron by the time they are 4 months old. This could come from an iron supplement, or solid foods that are high in iron (such as meats or iron-fortified baby cereal).   Babies who do not drink breast milk should drink an \"iron-fortified\" formula. (Formulas labeled \"low-iron\" will not provide enough iron.) Cow's milk and other types of milk do not have the right amount of iron or other nutrients for babies younger than 1 year old.   When a baby starts eating solid foods, include good sources of iron. Examples include meats or iron-fortified baby cereal.   Wait until your baby is at least 1 year old before switching from breast milk or formula to cow's milk or another type of milk.   Children and adults - Eating a healthy, balanced diet will give most people enough iron. Meat is a common source of iron. But if you don't eat meat, you should eat plenty of other foods that are rich in iron. Below are some examples of foods that are considered \"high\" or \"moderate\" in " iron.   High-iron grains - Whole-wheat breads, cereals, and bagels. Flour tortillas, biscuits, English or bran muffins, iron-fortified bran, pretzels, frozen waffles. Hot cereals like oatmeal, cream of wheat, or grits.   Moderate-iron grains - Dariana bread, egg noodles, whole-wheat pasta, hamburger and hot dog buns.   Moderate-iron fruits - Dried apricots, dried figs, prune juice.   High-iron vegetables - Spinach, soybeans, canned pumpkin.   Moderate-iron vegetables - Asparagus, Kimball sprouts, mushrooms, green peas, white or sweet potato with the skin, tomato sauce, beets, beans such as garbanzo or lima. Greens such as rita, turnip, kale, beet, or Swiss chard.   High-iron meats and other proteins - Beef, veal, lamb, pork, beef or chicken liver, clams, sardines, oysters, shrimp, tofu, baked beans with pork, lentils, tahini, tempeh. Beans such as kidney, lima, navy, or white.   Moderate-iron meats and other proteins - Chicken breast, turkey, eggs, fresh or canned tuna or mackerel.   Other high-iron foods - Pumpkin seeds.   Other moderate-iron foods - Molasses, soy milk, seeds such as sesame or sunflower. Nuts such as almonds, pistachios, cashews, and walnuts.  What else should I know? -- Some medicines and foods can change how much iron you absorb from your food. Talk to your doctor, nurse, or dietitian if you have questions.  All topics are updated as new evidence becomes available and our peer review process is complete.  This topic retrieved from [place] on: Mar 20, 2024.  Topic 163382 Version 2.0  Release: 32.2.4 - C32.78  © 2024 UpToDate, Inc. and/or its affiliates. All rights reserved.  Consumer Information Use and Disclaimer   Disclaimer: This generalized information is a limited summary of diagnosis, treatment, and/or medication information. It is not meant to be comprehensive and should be used as a tool to help the user understand and/or assess potential diagnostic and treatment options. It does NOT  include all information about conditions, treatments, medications, side effects, or risks that may apply to a specific patient. It is not intended to be medical advice or a substitute for the medical advice, diagnosis, or treatment of a health care provider based on the health care provider's examination and assessment of a patient's specific and unique circumstances. Patients must speak with a health care provider for complete information about their health, medical questions, and treatment options, including any risks or benefits regarding use of medications. This information does not endorse any treatments or medications as safe, effective, or approved for treating a specific patient. UpToDate, Inc. and its affiliates disclaim any warranty or liability relating to this information or the use thereof.The use of this information is governed by the Terms of Use, available at https://www.[employer] Technologiesuwer.com/en/know/clinical-effectiveness-terms. 2024© UpToDate, Inc. and its affiliates and/or licensors. All rights reserved.  Copyright   © 2024 UpToDate, Inc. and/or its affiliates. All rights reserved.

## 2025-03-13 NOTE — ASSESSMENT & PLAN NOTE
History of syncope 2/2 critical aortic stenosis s/p CPR 2/2024 and subsequent s/p TAVR 3/2024 [facility]  Continue PTA atorvastatin and Toprol-XL 25 mg  Hold Plavix and aspirin secondary to gross hematuria    The Plavix explained still on hold due to the risk of hematuria patient reluctant to take any of the anticoag until seen by cardiology  Awaiting to be seen by cardiology

## 2025-03-13 NOTE — ASSESSMENT & PLAN NOTE
Wt Readings from Last 3 Encounters:   03/13/25 62.6 kg (138 lb)   03/03/25 61.7 kg (136 lb)   02/17/25 63.5 kg (140 lb)     Patient euvolemic CHF compensated continue low-salt diet fluid restriction Daily weight monitoring patient's status post TAVR stable awaiting to be seen by cardiology asymptomatic  Send dyspnea on exertion at baseline due to the COPD and chronic CHF

## 2025-03-24 DIAGNOSIS — D50.8 IRON DEFICIENCY ANEMIA SECONDARY TO INADEQUATE DIETARY IRON INTAKE: Primary | ICD-10-CM

## 2025-03-24 DIAGNOSIS — D50.8 IRON DEFICIENCY ANEMIA SECONDARY TO INADEQUATE DIETARY IRON INTAKE: ICD-10-CM

## 2025-03-25 RX ORDER — IRON/C/FOLIC ACD/MV CMB11/CALC 151-200-1
1 TABLET ORAL DAILY
Refills: 1 | OUTPATIENT
Start: 2025-03-25

## 2025-04-07 NOTE — PROGRESS NOTES
Progress Note - Cardiology Office  [facility]    Gilberto Vann 84 y.o. male MRN: [id number]  : 1940  Encounter: [id number]      Assessment & Plan  Syncope, unspecified syncope type    Hyperglycemia    Critical aortic valve stenosis    Cardiomyopathy, unspecified type (HCC)    Coronary artery disease, unspecified vessel or lesion type, unspecified whether angina present, unspecified whether native or transplanted heart    Primary hypertension    Hypertensive heart failure (HCC)  Wt Readings from Last 3 Encounters:   25 64.4 kg (142 lb)   25 62.6 kg (138 lb)   25 61.7 kg (136 lb)             Combined systolic and diastolic heart failure, unspecified HF chronicity (HCC)  Wt Readings from Last 3 Encounters:   25 64.4 kg (142 lb)   25 62.6 kg (138 lb)   25 61.7 kg (136 lb)             Ischemic cardiomyopathy    S/P TAVR (transcatheter aortic valve replacement)       ASSESSMENT:  S/p MIRCA pacemaker implantation for Mobitz type II AV block and LBBB     S/p TAVR, 2024 at [facility] with 29MM CE BOVINE PERICARDIAL TISSUE TRANSCATHETER HEART VALVE, for Critical aortic stenosis  Follows with [facility] [place]     History of syncope, probably due to critical aortic stenosis  S/p CPR with rib and sternal fracture  24 CT chest without contrast: Acute minimally displaced fractures of multiple bilateral anterior ribs due to CPR. Nondisplaced fracture of the anterior cortex of the superior body of the sternum      CAD involving circumflex 70% and LAD 60% stenosis     Chronic combined systolic and diastolic heart failure  EF 25%, now improved to 45%     TTE, 2024:  EF 45%, borderline LVH, G1 DD  TAVR, trace paravalvular leak, mean gradient 8-10 mmHg  Mild MR and TR, RSVP 30 mmHg     24 TTE:   LVEF 25%. There is severe global hypokinesis with regional variation. Diastolic function is moderately abnormal, consistent  with grade II (pseudonormal) relaxation.  Left atrial filling pressure is elevated. Right Ventricle: Systolic function is low normal. Left Atrium: The atrium is severely dilated ( 60 mL/m2). Right Atrium: The atrium is moderately dilated. Aortic Valve: The aortic valve is trileaflet. The leaflets are moderately thickened. The leaflets are moderately calcified. There is severely reduced mobility. There is mild to moderate regurgitation. There is critical stenosis.  Peak gradient 108 mmHg.  Mean gradient 63.  LVOT velocity is measured to be 0.58.  LVOT diameter 2.2 and aortic valve area 0.5 cm 2 and dimensionless index 0.11.  Mitral Valve: There is mild to moderate regurgitation. Tricuspid Valve: There is mild to moderate regurgitation.  Pulmonary artery pressure around 50 mmHg. IVC/SVC: The inferior vena cava is mildly dilated.  It has less than 50% collapse with inspiration consistent with elevated right atrial pressure about 10 to 15 mmHg. Pericardium: There is a trivial pericardial effusion.      Hypertension,   BP is 130/60 mmHg with heart rate of 83/mean     COPD    Prediabetes     RECOMMENDATIONS:  Continue current cardiac medications Plavix, losartan and atorvastatin and Toprol XL 25 mg  Low-salt and low-cholesterol diet  Regular cardiovascular exercise as tolerated  Consider orthopedic evaluation for back and right leg pain      Please call [phone] if any questions.    HPI :     Gilberto Vann is a 84 y.o. year old male who came for follow up.  He complains of right lower extremity and back pain and states that he has arthritis which has progressed.  He denies any cardiac symptoms.  His blood pressure is well-controlled and his EKG shows that paced rhythm    REVIEW OF SYSTEMS:  Review of Systems   Musculoskeletal:  Positive for arthralgias, back pain and gait problem.   All other systems reviewed and are negative.        Historical Information   Past Medical History:   Diagnosis Date    Arthritis      Asthma     Back pain     BPH (benign prostatic hypertrophy) with urinary obstruction     COPD (chronic obstructive pulmonary disease) (HCC)     Dizziness     Glaucoma     bilat    Insomnia     RLS (restless legs syndrome)     Sinusitis      Past Surgical History:   Procedure Laterality Date    CARDIAC CATHETERIZATION N/A 2/19/2024    Procedure: Cardiac catheterization;  Surgeon: Boston Bergman MD;  Location: [facility];  Service: Cardiology    CARDIAC CATHETERIZATION N/A 2/19/2024    Procedure: Cardiac Coronary Angiogram;  Surgeon: Boston Bergman MD;  Location: [facility] CARDIAC CATH [facility];  Service: Cardiology    CATARACT EXTRACTION Left     EAR SURGERY      to wart    EPIDIDYMAL CYST EXCISION Right 05/04/2000    FL RETROGRADE PYELOGRAM  2/13/2025    HEMORROIDECTOMY      HI CYSTO W/IRRIG & EVAC MULTPLE OBSTRUCTING CLOTS Bilateral 2/13/2025    Procedure: CYSTOSCOPY, BILATERAL RETROGRADE PYLEOGRAM, EVACUATION OF CLOTS, BLADDER BIOPSY WITH fulgeration;  Surgeon: James Blunt MD;  Location: [facility];  Service: Urology    HI EXCISION HYDROCELE UNILATERAL Left 9/14/2023    Procedure: HYDROCELECTOMY, sPERMATOCELECTOMY;  Surgeon: James Blunt MD;  Location: [facility];  Service: Urology    HI TRURL ELECTROSURG RESCJ PROSTATE BLEED COMPLETE N/A 04/16/2020    Procedure: CYSTOSCOPY, TRANSURETHRAL RESECTION OF PROSTATE (TURP);  Surgeon: Reji Meneses MD;  Location: [facility];  Service: Urology    PROSTATE BIOPSY Bilateral 12/11/2003    BPH with mild acute and chronic inflammation    PROSTATE BIOPSY Bilateral 06/21/2005    BPH with marked artropy and chronic inflammation     Social History     Substance and Sexual Activity   Alcohol Use Not Currently     Social History     Substance and Sexual Activity   Drug Use Never     Social History     Tobacco Use   Smoking Status Former    Current packs/day: 0.00    Average packs/day: 1 pack/day for 15.0 years (15.0 ttl pk-yrs)    Types: Cigarettes    Start date: 1940    Quit  "date:     Years since quittin.3    Passive exposure: Never   Smokeless Tobacco Current    Types: Chew   Tobacco Comments    Chews cigars     Family History:   Family History   Problem Relation Age of Onset    Asthma Father        Meds/Allergies     Allergies   Allergen Reactions    Shellfish-Derived Products - Food Allergy Other (See Comments)     Listed by PCP patient denies    Iodine - Food Allergy Other (See Comments)     Pt denies allergies       Current Outpatient Medications:     alfuzosin (UROXATRAL) 10 mg 24 hr tablet, Take 1 tablet by mouth in the morning, Disp: , Rfl:     atorvastatin (LIPITOR) 20 mg tablet, Take 1 tablet (20 mg total) by mouth daily with dinner, Disp: 90 tablet, Rfl: 1    clopidogrel (PLAVIX) 75 mg tablet, take 1 tablet by mouth once daily, Disp: 100 tablet, Rfl: 1    Ferrous Sulfate 28 MG TABS, Take 1 tablet (28 mg total) by mouth daily (Patient taking differently: Take 27 mg by mouth daily), Disp: 90 tablet, Rfl: 1    finasteride (PROSCAR) 5 mg tablet, Take 5 mg by mouth daily, Disp: , Rfl:     fluticasone (FLONASE) 50 mcg/act nasal spray, 1 spray into each nostril 2 (two) times a day, Disp: 15.8 mL, Rfl: 3    latanoprost (XALATAN) 0.005 % ophthalmic solution, Administer 1 drop to both eyes daily, Disp: , Rfl:     losartan (COZAAR) 25 mg tablet, take 1 tablet by mouth once daily, Disp: 90 tablet, Rfl: 1    metoprolol succinate (TOPROL-XL) 25 mg 24 hr tablet, Take 1 tablet (25 mg total) by mouth daily, Disp: 90 tablet, Rfl: 1    ferrous gluconate (FERGON) 240 (27 FE) MG tablet, Take 1 tablet (240 mg total) by mouth daily, Disp: 90 tablet, Rfl: 1    oxybutynin (DITROPAN) 5 mg tablet, Take 1 tablet (5 mg total) by mouth 2 (two) times a day (Patient not taking: Reported on 2025), Disp: 60 tablet, Rfl: 0    Vitals: Blood pressure 130/60, pulse 69, height 5' 9\" (1.753 m), weight 64.4 kg (142 lb), SpO2 98%.    Body mass index is 20.97 kg/m².  Vitals:    25 1406   Weight: " 64.4 kg (142 lb)     BP Readings from Last 3 Encounters:   04/09/25 130/60   03/13/25 98/58   03/03/25 156/80       Physical Exam:  Physical Exam    Neurologic:  Alert & oriented x 3, no new focal deficits, Not in any acute distress,  Constitutional:  Well developed, well nourished, non-toxic appearance   Eyes:  Pupil equal and reacting to light, conjunctiva normal,   HENT:  Atraumatic, oropharynx moist, Neck- normal range of motion, no tenderness,  Neck supple, No JVP, No LNP   Respiratory:  Bilateral air entry, mostly clear to auscultation  Cardiovascular: S1-S2 regular with a I/VI systolic murmur   GI:  Soft, nondistended, normal bowel sounds, nontender, no hepatosplenomegaly appreciated.  Musculoskeletal: Back and right leg pain  Skin:  Well hydrated, no rash   Lymphatic:  No lymphadenopathy noted   Extremities:  No edema and distal pulses are present        Diagnostic Studies Review Cardio:      EKG: Paced ventricular rhythm at 69/min    Cardiac testing:     Results for orders placed during the hospital encounter of 06/17/24    Echo complete w/ contrast if indicated    Interpretation Summary    Left Ventricle: Left ventricular cavity size is normal. Wall thickness is mildly increased. There is borderline concentric hypertrophy. The left ventricular ejection fraction is 45%. Systolic function is mildly reduced. Wall motion is normal. Diastolic function is mildly abnormal, consistent with grade I (abnormal) relaxation.  Left atrial filling pressure is elevated.    Right Ventricle: Systolic function is low normal.    Aortic Valve: There is a TAVR bioprosthetic valve. The prosthetic valve appears well-seated and appears to be functioning normally. There is trace paravalvular regurgitation. There is trace transvalvular regurgitation.  Mean gradient is 8 to 10 mmHg.    Mitral Valve: There is mild annular calcification. There is mild regurgitation.    Tricuspid Valve: There is mild regurgitation.  Pulmonary artery  "pressure around 30 mmHg.    As compared to previous echo few months ago EF is improved to around 45%.  Grade 1 diastolic dysfunction.  Aortic valve which is TAVR bioprosthetic valve is functioning normally with trace regurgitation..        Imaging:  Chest X-Ray:   No Chest XR results available for this patient.    CT-scan of the chest:     No CTA results available for this patient.  Lab Review   Lab Results   Component Value Date    WBC 6.37 02/14/2025    HGB 11.0 (L) 02/14/2025    HCT 34.7 (L) 02/14/2025    MCV 90 02/14/2025    RDW 14.2 02/14/2025     02/14/2025     BMP:  Lab Results   Component Value Date    SODIUM 139 02/14/2025    K 4.2 02/14/2025     02/14/2025    CO2 23 02/14/2025    BUN 26 (H) 02/14/2025    CREATININE 0.84 02/14/2025    GLUC 145 (H) 02/14/2025    GLUF 74 02/11/2025    CALCIUM 8.3 (L) 02/14/2025    CORRECTEDCA 8.8 02/14/2025    EGFR 79 02/14/2025    MG 1.9 02/22/2024     LFT:  Lab Results   Component Value Date    AST 15 02/14/2025    ALT 9 02/14/2025    ALKPHOS 53 02/14/2025    TP 5.3 (L) 02/14/2025    ALB 3.4 (L) 02/14/2025      No components found for: \"TSH3\"  Lab Results   Component Value Date    UWX3RBRUYIQF 2.691 02/17/2024     Lab Results   Component Value Date    HGBA1C 5.7 (H) 02/16/2024     Lipid Profile:   Lab Results   Component Value Date    CHOLESTEROL 144 02/17/2024    HDL 53 02/17/2024    LDLCALC 82 02/17/2024    TRIG 44 02/17/2024     Lab Results   Component Value Date    CHOLESTEROL 144 02/17/2024     No results found for: \"CKTOTAL\", \"CKMB\", \"CKMBINDEX\", \"TROPONINI\"  No results found for: \"NTBNP\"   No results found for this or any previous visit (from the past 4 weeks).          Dr. Betty Grider MD, FACC      \"This note has been constructed using a voice recognition system.Therefore there may be syntax, spelling, and/or grammatical errors. Please call if you have any questions. \"  "

## 2025-04-09 ENCOUNTER — OFFICE VISIT (OUTPATIENT)
Dept: CARDIOLOGY CLINIC | Facility: CLINIC | Age: 85
End: 2025-04-09
Payer: COMMERCIAL

## 2025-04-09 VITALS
DIASTOLIC BLOOD PRESSURE: 60 MMHG | WEIGHT: 142 LBS | HEIGHT: 69 IN | BODY MASS INDEX: 21.03 KG/M2 | HEART RATE: 69 BPM | SYSTOLIC BLOOD PRESSURE: 130 MMHG | OXYGEN SATURATION: 98 %

## 2025-04-09 DIAGNOSIS — I50.40 COMBINED SYSTOLIC AND DIASTOLIC HEART FAILURE, UNSPECIFIED HF CHRONICITY (HCC): Primary | ICD-10-CM

## 2025-04-09 DIAGNOSIS — R73.9 HYPERGLYCEMIA: ICD-10-CM

## 2025-04-09 DIAGNOSIS — I50.41 ACUTE COMBINED SYSTOLIC (CONGESTIVE) AND DIASTOLIC (CONGESTIVE) HEART FAILURE (HCC): ICD-10-CM

## 2025-04-09 DIAGNOSIS — I10 PRIMARY HYPERTENSION: ICD-10-CM

## 2025-04-09 DIAGNOSIS — Z95.2 S/P TAVR (TRANSCATHETER AORTIC VALVE REPLACEMENT): ICD-10-CM

## 2025-04-09 DIAGNOSIS — I42.9 CARDIOMYOPATHY, UNSPECIFIED TYPE (HCC): ICD-10-CM

## 2025-04-09 DIAGNOSIS — I25.5 ISCHEMIC CARDIOMYOPATHY: ICD-10-CM

## 2025-04-09 DIAGNOSIS — R55 SYNCOPE, UNSPECIFIED SYNCOPE TYPE: ICD-10-CM

## 2025-04-09 DIAGNOSIS — I11.0 HYPERTENSIVE HEART FAILURE (HCC): ICD-10-CM

## 2025-04-09 DIAGNOSIS — I35.0 CRITICAL AORTIC VALVE STENOSIS: ICD-10-CM

## 2025-04-09 DIAGNOSIS — R79.89 ELEVATED TROPONIN: ICD-10-CM

## 2025-04-09 DIAGNOSIS — I25.10 CORONARY ARTERY DISEASE, UNSPECIFIED VESSEL OR LESION TYPE, UNSPECIFIED WHETHER ANGINA PRESENT, UNSPECIFIED WHETHER NATIVE OR TRANSPLANTED HEART: ICD-10-CM

## 2025-04-09 PROCEDURE — 99214 OFFICE O/P EST MOD 30 MIN: CPT | Performed by: INTERNAL MEDICINE

## 2025-04-09 PROCEDURE — 93000 ELECTROCARDIOGRAM COMPLETE: CPT | Performed by: INTERNAL MEDICINE

## 2025-04-09 RX ORDER — FINASTERIDE 5 MG/1
5 TABLET, FILM COATED ORAL DAILY
COMMUNITY

## 2025-04-09 NOTE — ASSESSMENT & PLAN NOTE
Wt Readings from Last 3 Encounters:   04/09/25 64.4 kg (142 lb)   03/13/25 62.6 kg (138 lb)   03/03/25 61.7 kg (136 lb)

## 2025-04-10 DIAGNOSIS — M47.16 LUMBAR SPONDYLOSIS WITH MYELOPATHY: ICD-10-CM

## 2025-04-10 DIAGNOSIS — M19.012 OSTEOARTHRITIS OF BILATERAL GLENOHUMERAL JOINTS: Primary | ICD-10-CM

## 2025-04-10 DIAGNOSIS — M19.011 OSTEOARTHRITIS OF BILATERAL GLENOHUMERAL JOINTS: Primary | ICD-10-CM

## 2025-04-21 ENCOUNTER — APPOINTMENT (OUTPATIENT)
Dept: RADIOLOGY | Facility: CLINIC | Age: 85
End: 2025-04-21
Attending: ORTHOPAEDIC SURGERY
Payer: COMMERCIAL

## 2025-04-21 ENCOUNTER — OFFICE VISIT (OUTPATIENT)
Dept: OBGYN CLINIC | Facility: CLINIC | Age: 85
End: 2025-04-21
Attending: INTERNAL MEDICINE
Payer: COMMERCIAL

## 2025-04-21 ENCOUNTER — TELEPHONE (OUTPATIENT)
Facility: MEDICAL CENTER | Age: 85
End: 2025-04-21

## 2025-04-21 VITALS — WEIGHT: 144 LBS | HEIGHT: 69 IN | BODY MASS INDEX: 21.33 KG/M2

## 2025-04-21 DIAGNOSIS — M25.561 RIGHT KNEE PAIN, UNSPECIFIED CHRONICITY: ICD-10-CM

## 2025-04-21 DIAGNOSIS — Z01.89 ENCOUNTER FOR LOWER EXTREMITY COMPARISON IMAGING STUDY: ICD-10-CM

## 2025-04-21 DIAGNOSIS — M54.16 LUMBAR RADICULOPATHY: Primary | ICD-10-CM

## 2025-04-21 DIAGNOSIS — M54.50 LOW BACK PAIN, UNSPECIFIED BACK PAIN LATERALITY, UNSPECIFIED CHRONICITY, UNSPECIFIED WHETHER SCIATICA PRESENT: ICD-10-CM

## 2025-04-21 PROCEDURE — 73562 X-RAY EXAM OF KNEE 3: CPT

## 2025-04-21 PROCEDURE — 73564 X-RAY EXAM KNEE 4 OR MORE: CPT

## 2025-04-21 PROCEDURE — 99213 OFFICE O/P EST LOW 20 MIN: CPT | Performed by: ORTHOPAEDIC SURGERY

## 2025-04-21 NOTE — ASSESSMENT & PLAN NOTE
We discussed the root of his pain is likely his low back. I do not believe his right knee is the source of his pain as he reports the majority of symptoms radiating from more proximally, he has no focal tenderness about the knee, no effusion, no instability, and only very mild findings of degenerative joint disease on x-ray. I encouraged him to obtain the MRI of his lumbar spine ordered by Dr. Mike Fisher previously. He should follow-up with Dr. Mike Fisher for further evaluation and treatment after obtaining the MRI, as he did not experience relief from physical therapy. He expressed his understanding of this.  Orders:    Ambulatory Referral to Orthopedic Surgery    XR knee 4+ vw right injury; Future    XR spine lumbar 2 or 3 views injury; Future

## 2025-04-21 NOTE — PROGRESS NOTES
Patient Name: Gilberto Vann      : 1940       MRN: [id number]   Encounter Provider: Ted Fisher MD   Encounter Date: 25  Encounter department: [facility]         Assessment & Plan  Lumbar radiculopathy  We discussed the root of his pain is likely his low back. I do not believe his right knee is the source of his pain as he reports the majority of symptoms radiating from more proximally, he has no focal tenderness about the knee, no effusion, no instability, and only very mild findings of degenerative joint disease on x-ray. I encouraged him to obtain the MRI of his lumbar spine ordered by Dr. Mike Fisher previously. He should follow-up with Dr. Mike Fisher for further evaluation and treatment after obtaining the MRI, as he did not experience relief from physical therapy. He expressed his understanding of this.  Orders:    Ambulatory Referral to Orthopedic Surgery    XR knee 4+ vw right injury; Future    XR spine lumbar 2 or 3 views injury; Future    Encounter for lower extremity comparison imaging study    Orders:    XR knee 3 vw left non injury; Future       _____________________________________________________  CHIEF COMPLAINT:  Chief Complaint   Patient presents with    Right Knee - Pain     Pain radiates from lower back to knees. Pain across the lower back.         SUBJECTIVE:  Gilberto Vann is a 84 y.o. male who presents for consultation of low back pain and right knee pain. He states his pain began months ago following a heart procedure. He indicates his pain is located across his low back and radiates down the right leg toward the knee. The pain never radiates past the knee. He has seen Dr. Mike Fisher, who recommended physical therapy. He feels the physical therapy was not significantly beneficial. Dr. Fisher ordered an MRI of the lumbar spine for further evaluation but it has not been completed as of yet.      PAST MEDICAL  HISTORY:  Past Medical History:   Diagnosis Date    Arthritis     Asthma     Back pain     BPH (benign prostatic hypertrophy) with urinary obstruction     COPD (chronic obstructive pulmonary disease) (HCC)     Dizziness     Glaucoma     bilat    Insomnia     RLS (restless legs syndrome)     Sinusitis        PAST SURGICAL HISTORY:  Past Surgical History:   Procedure Laterality Date    CARDIAC CATHETERIZATION N/A 2/19/2024    Procedure: Cardiac catheterization;  Surgeon: Boston Bergman MD;  Location: [facility];  Service: Cardiology    CARDIAC CATHETERIZATION N/A 2/19/2024    Procedure: Cardiac Coronary Angiogram;  Surgeon: Boston Bergman MD;  Location: [facility];  Service: Cardiology    CATARACT EXTRACTION Left     EAR SURGERY      to wart    EPIDIDYMAL CYST EXCISION Right 05/04/2000    FL RETROGRADE PYELOGRAM  2/13/2025    HEMORROIDECTOMY      NM CYSTO W/IRRIG & EVAC MULTPLE OBSTRUCTING CLOTS Bilateral 2/13/2025    Procedure: CYSTOSCOPY, BILATERAL RETROGRADE PYLEOGRAM, EVACUATION OF CLOTS, BLADDER BIOPSY WITH fulgeration;  Surgeon: James Blunt MD;  Location: [facility] OR;  Service: Urology    NM EXCISION HYDROCELE UNILATERAL Left 9/14/2023    Procedure: HYDROCELECTOMY, sPERMATOCELECTOMY;  Surgeon: James Blunt MD;  Location: [facility];  Service: Urology    NM TRURL ELECTROSURG RESCJ PROSTATE BLEED COMPLETE N/A 04/16/2020    Procedure: CYSTOSCOPY, TRANSURETHRAL RESECTION OF PROSTATE (TURP);  Surgeon: Reji Meneses MD;  Location: [facility];  Service: Urology    PROSTATE BIOPSY Bilateral 12/11/2003    BPH with mild acute and chronic inflammation    PROSTATE BIOPSY Bilateral 06/21/2005    BPH with marked artropy and chronic inflammation       FAMILY HISTORY:  Family History   Problem Relation Age of Onset    Asthma Father        SOCIAL HISTORY:  Social History     Tobacco Use    Smoking status: Former     Current packs/day: 0.00     Average packs/day: 1 pack/day for 15.0 years (15.0 ttl  pk-yrs)     Types: Cigarettes     Start date:      Quit date:      Years since quittin.3     Passive exposure: Never    Smokeless tobacco: Current     Types: Chew    Tobacco comments:     Chews cigars   Vaping Use    Vaping status: Never Used   Substance Use Topics    Alcohol use: Not Currently    Drug use: Never       MEDICATIONS:    Current Outpatient Medications:     alfuzosin (UROXATRAL) 10 mg 24 hr tablet, Take 1 tablet by mouth in the morning, Disp: , Rfl:     atorvastatin (LIPITOR) 20 mg tablet, Take 1 tablet (20 mg total) by mouth daily with dinner, Disp: 90 tablet, Rfl: 1    clopidogrel (PLAVIX) 75 mg tablet, take 1 tablet by mouth once daily, Disp: 100 tablet, Rfl: 1    Ferrous Sulfate 28 MG TABS, Take 1 tablet (28 mg total) by mouth daily (Patient taking differently: Take 27 mg by mouth daily), Disp: 90 tablet, Rfl: 1    fluticasone (FLONASE) 50 mcg/act nasal spray, 1 spray into each nostril 2 (two) times a day, Disp: 15.8 mL, Rfl: 3    latanoprost (XALATAN) 0.005 % ophthalmic solution, Administer 1 drop to both eyes daily, Disp: , Rfl:     losartan (COZAAR) 25 mg tablet, take 1 tablet by mouth once daily, Disp: 90 tablet, Rfl: 1    oxybutynin (DITROPAN) 5 mg tablet, Take 1 tablet (5 mg total) by mouth 2 (two) times a day, Disp: 60 tablet, Rfl: 0    ferrous gluconate (FERGON) 240 (27 FE) MG tablet, Take 1 tablet (240 mg total) by mouth daily, Disp: 90 tablet, Rfl: 1    finasteride (PROSCAR) 5 mg tablet, Take 5 mg by mouth daily, Disp: , Rfl:     metoprolol succinate (TOPROL-XL) 25 mg 24 hr tablet, Take 1 tablet (25 mg total) by mouth daily, Disp: 90 tablet, Rfl: 1    ALLERGIES:  Allergies   Allergen Reactions    Shellfish-Derived Products - Food Allergy Other (See Comments)     Listed by PCP patient denies    Iodine - Food Allergy Other (See Comments)     Pt denies allergies       LABS:  HgA1c:   Lab Results   Component Value Date    HGBA1C 5.7 (H) 2024     BMP:   Lab Results  "  Component Value Date    CALCIUM 8.3 (L) 02/14/2025    K 4.2 02/14/2025    CO2 23 02/14/2025     02/14/2025    BUN 26 (H) 02/14/2025    CREATININE 0.84 02/14/2025     CBC: No components found for: \"CBC\"    _____________________________________________________  Review of systems: ROS is negative other than that noted in the HPI.  Constitutional: Negative for fatigue and fever.   HENT: Negative for sore throat.    Respiratory: Negative for shortness of breath.    Cardiovascular: Negative for chest pain.   Gastrointestinal: Negative for abdominal pain.   Endocrine: Negative for cold intolerance and heat intolerance.   Genitourinary: Negative for flank pain.   Musculoskeletal: Positive for back pain.   Skin: Negative for rash.   Allergic/Immunologic: Negative for immunocompromised state.   Neurological: Negative for dizziness.   Psychiatric/Behavioral: Negative for agitation.     Knee Exam:   No significant skin lesions or deformity  Range of motion from 0° to 120°  No joint line tenderness   Knee is stable to varus stress, valgus stress, Lachman, and posterior drawer.    Patella tracks centrally without palpable crepitus  Calf compartments are soft and supple  2+ DP and PT pulses with brisk capillary refill to the toes  Sural, saphenous, tibial, superficial, and deep peroneal motor and sensory distributions intact  Sensation light touch intact distally      Back Exam:  No significant skin lesions or deformity  Palpation: diffuse right lumbosacral tenderness  Range of motion: well-maintained  Myotomes: 5/5 throughout  Calf compartments are soft and supple  2+ DP and PT pulses with brisk capillary refill to the toes  Sural, saphenous, tibial, superficial, and deep peroneal motor and sensory distributions intact  Sensation light touch intact distally     Positive Slump    Physical exam:  General/Constitutional: NAD, well developed, well nourished  HENT: Normocephalic, atraumatic  CV: Intact distal pulses, regular " rate  Resp: No respiratory distress or labored breathing  Abdomen: soft, nondistended   Lymphatic: No lymphadenopathy palpated  Neuro: Alert and Oriented x 3, no focal deficits  Psych: Normal mood, normal affect  Skin: Warm, dry, no rashes, no erythema  _____________________________________________________  STUDIES REVIEWED:  X-rays of the lumbar spine obtained on 11/18/2024 were reviewed and interpreted today. Multilevel disc space degeneration. These show mild anterolisthesis of L5-S1.    X-rays of the right knee obtained in the office were reviewed and interpreted today. These show mild tricompartmental degenerative changes. No acute fractures or dislocations.    PROCEDURES PERFORMED:  No Procedures performed today    Scribe Attestation      I,:  Karis Nix am acting as a scribe while in the presence of the attending physician.:       I,:  Ted Fisher MD personally performed the services described in this documentation    as scribed in my presence.:

## 2025-04-21 NOTE — TELEPHONE ENCOUNTER
Note in chart after leaving a message for someone to schedule there MRI appointment:  I left a message today to call me at [phone] to schedule their MRI appointment.

## 2025-04-22 ENCOUNTER — TELEPHONE (OUTPATIENT)
Facility: MEDICAL CENTER | Age: 85
End: 2025-04-22

## 2025-04-22 NOTE — QUICK NOTE
Note for Medtronic pacer/ICD implant:  Device investigated:   Medtronic pacer/icd and leads            Method investigated (surgical report, imaging report, vendor contacted, website used etc):  MRI Verify website    Time spent investigating in minutes: 15    Investigation findings:  MRI Conditional implant    Risk vs Benefit performed.  If so, list physician and outcome:  No

## 2025-04-22 NOTE — TELEPHONE ENCOUNTER
4-29-25 AT 1230PM AT [facility]                    Note in chart after scheduling pacemaker:  Topics covered in our conversation:  MRI scheduled on above date and time.  Reminded to get chest x-ray follow-up prior to MRI appt.

## 2025-04-23 ENCOUNTER — HOSPITAL ENCOUNTER (OUTPATIENT)
Dept: RADIOLOGY | Facility: HOSPITAL | Age: 85
Discharge: HOME | End: 2025-04-23

## 2025-04-23 DIAGNOSIS — M54.16 LUMBAR RADICULOPATHY: ICD-10-CM

## 2025-04-29 ENCOUNTER — HOSPITAL ENCOUNTER (OUTPATIENT)
Dept: RADIOLOGY | Facility: HOSPITAL | Age: 85
Discharge: HOME | End: 2025-04-29
Payer: COMMERCIAL

## 2025-04-29 VITALS — HEART RATE: 80 BPM | OXYGEN SATURATION: 98 %

## 2025-04-29 DIAGNOSIS — M54.16 LUMBAR RADICULOPATHY: ICD-10-CM

## 2025-04-29 PROCEDURE — 72148 MRI LUMBAR SPINE W/O DYE: CPT

## 2025-04-29 NOTE — PROGRESS NOTES
Patient arrived to MRI appointment. He is being escorted by MRI Staff to radiology on the 1st floor to complete Chest x-ray to verify Cardiac Implant Integrity/ no abandoned leads.    Chest x-ray completed, and imaging cleared by radiologist Dr Palacios. MRI imaging to be started momentarily.    Patient is being evaluated on today's exam for MRI of Lumbar Spine.  Vanessa Perez PA-C from Cardiology department has signed general consent for imaging and care interventions as related to today's study 04/29/2025. Patient does have a Medtronic Micra pacemaker in place, and as such is being monitored for the duration of today's imaging study.     Remote device evaluation for Medtronic Pacemaker was completed pre-procedure by myself Ashlie Meza RN with assistance of Medtronic Tablet using pre-programmed cardiac agorithm software.  Device settings adjusted for MRI safe compatibility/ MRI Sure-Scan mode. Baseline settings of VDD (low threshold 50 bpm, high threshold 105 bpm) changed to VOO with programmed rate of 80 bpm.     Patient tolerated imaging study without incidence. (Please see procedural section of patient's chart to view vital signs which were continuously monitored & documented at q5min intervals for duration of patient's imaging appointment). Device again remotely interrogated by myself Ashlie Meza RN with assistance of Medtronic Tablet pre-programmed cardiac algorithm software. MRI Sure Scan mode turned off, and patient's device returned to baseline settings of VDD (low threshold 55 bpm, and high threshold 105 bpm) at completion of the MRI imaging study. Normal device function confirmed prior to discharge from the MRI suite.

## 2025-05-01 ENCOUNTER — RESULTS FOLLOW-UP (OUTPATIENT)
Dept: PAIN MEDICINE | Facility: CLINIC | Age: 85
End: 2025-05-01

## 2025-05-02 NOTE — TELEPHONE ENCOUNTER
----- Message from Mike Fisher MD sent at 5/1/2025  9:20 AM EDT -----  Patient with multilevel DDD with both central and foraminal narrowing at multiple levels worse at L3-L4. Would need re-evaluation prior to scheduling any procedure.

## 2025-05-02 NOTE — TELEPHONE ENCOUNTER
S/w pt and advised of results. Pt would like a sooner appt if one opens up.  Pt aware nurse to discuss with clerical and have pt added to cancellation list. Pt verbalized understanding and appreciative of call.

## 2025-05-02 NOTE — TELEPHONE ENCOUNTER
Caller: Gilberto    Doctor/Office: Dr Johnson     Call regarding :  MRI results      Call was transferred to: nurse

## 2025-05-19 ENCOUNTER — OFFICE VISIT (OUTPATIENT)
Dept: PAIN MEDICINE | Facility: CLINIC | Age: 85
End: 2025-05-19
Payer: COMMERCIAL

## 2025-05-19 ENCOUNTER — TELEPHONE (OUTPATIENT)
Dept: PAIN MEDICINE | Facility: CLINIC | Age: 85
End: 2025-05-19

## 2025-05-19 VITALS — WEIGHT: 144 LBS | BODY MASS INDEX: 21.27 KG/M2

## 2025-05-19 DIAGNOSIS — M47.816 LUMBAR SPONDYLOSIS: ICD-10-CM

## 2025-05-19 DIAGNOSIS — I25.10 CORONARY ARTERY DISEASE INVOLVING NATIVE HEART WITHOUT ANGINA PECTORIS, UNSPECIFIED VESSEL OR LESION TYPE: ICD-10-CM

## 2025-05-19 DIAGNOSIS — Z95.2 S/P TAVR (TRANSCATHETER AORTIC VALVE REPLACEMENT): ICD-10-CM

## 2025-05-19 DIAGNOSIS — J44.1 CHRONIC OBSTRUCTIVE PULMONARY DISEASE WITH ACUTE EXACERBATION (HCC): ICD-10-CM

## 2025-05-19 DIAGNOSIS — G89.4 CHRONIC PAIN SYNDROME: ICD-10-CM

## 2025-05-19 DIAGNOSIS — M54.16 LUMBAR RADICULOPATHY: Primary | ICD-10-CM

## 2025-05-19 PROCEDURE — G2211 COMPLEX E/M VISIT ADD ON: HCPCS | Performed by: STUDENT IN AN ORGANIZED HEALTH CARE EDUCATION/TRAINING PROGRAM

## 2025-05-19 PROCEDURE — 99214 OFFICE O/P EST MOD 30 MIN: CPT | Performed by: STUDENT IN AN ORGANIZED HEALTH CARE EDUCATION/TRAINING PROGRAM

## 2025-05-19 RX ORDER — GABAPENTIN 100 MG/1
100 CAPSULE ORAL 2 TIMES DAILY
Qty: 60 CAPSULE | Refills: 0 | Status: SHIPPED | OUTPATIENT
Start: 2025-05-19 | End: 2025-06-18

## 2025-05-19 NOTE — PROGRESS NOTES
How Severe Is It?: mild Pain Medicine Follow-Up Note    Assessment:  1. Lumbar radiculopathy    2. Lumbar spondylosis    3. Chronic pain syndrome    4. S/P TAVR (transcatheter aortic valve replacement)    5. Chronic obstructive pulmonary disease with acute exacerbation (HCC)    6. Coronary artery disease involving native heart without angina pectoris, unspecified vessel or lesion type      Patient is a pleasant 84-year-old male who presents as a follow-up visit after last being seen January 2025 for symptoms of lumbar radiculopathy. After that time patient got an MRI of the lumbar spine which did demonstrate multilevel degenerative disc disease with both central and foraminal narrowing at multiple levels, worse at L3-L4.  Patient states he was called to schedule an injection but for some reason undergoing the schedule.  He states symptoms are worse rating his pain 6 out of 10) scale.  The pain is constant and the quality pain is a dull aching, sharp, throbbing, cramping, shooting sensation primarily in his lower back rating down his right leg into the knee.  The pain does interfere with his daily activities and he has not been doing conservative treatment.    MRI independent reviewed and discussed with patient.  Patient with lumbar radiculopathy symptoms consistent with his MRI findings.  Given this think is reasonable to schedule patient for right L3, L4 transforaminal epidural steroid injection for scopic guidance.  Patient counseled risk and benefits of injection therapy elected proceed.  For symptomatic relief we will start gabapentin 100 mg twice daily.  Titration schedule provided.  Plan:  Schedule for right L3, L4 TFESI  Start gabapentin 100 mg nightly for 5 nights and then increase to twice daily  Can continue apap 975 mg TID prn   No orders of the defined types were placed in this encounter.      New Medications Ordered This Visit   Medications   • gabapentin (NEURONTIN) 100 mg capsule     Sig: Take 1 capsule (100 mg total) by  Is This A New Presentation, Or A Follow-Up?: Follow Up Acne mouth 2 (two) times a day Start with one tablet nightly for 5 nights and then increase to twice daily.     Dispense:  60 capsule     Refill:  0       My impressions and treatment recommendations were discussed in detail with the patient who verbalized understanding and had no further questions.        Complete risks and benefits including bleeding, infection, tissue reaction, nerve injury and allergic reaction were discussed. The approach was demonstrated using models and literature was provided. Verbal and written consent was obtained.      Follow-up is planned in four weeks time or sooner as warranted.  Discharge instructions were provided. I personally saw and examined the patient and I agree with the above discussed plan of care.    History of Present Illness:    Gilberto Vann is a 84 y.o. male who presents to [facility] for interval re-evaluation of the above stated pain complaints. The patient has a past medical and chronic pain history as outlined in the assessment section. He was last seen on January 2025.    Patient is a pleasant 84-year-old male who presents as a follow-up visit after last being seen January 2025 for symptoms of lumbar radiculopathy. After that time patient got an MRI of the lumbar spine which did demonstrate multilevel degenerative disc disease with both central and foraminal narrowing at multiple levels, worse at L3-L4.  Patient states he was called to schedule an injection but for some reason undergoing the schedule.  He states symptoms are worse rating his pain 6 out of 10) scale.  The pain is constant and the quality pain is a dull aching, sharp, throbbing, cramping, shooting sensation primarily in his lower back rating down his right leg into the knee.  The pain does interfere with his daily activities and he has not been doing conservative treatment.      Other than as stated above, the patient denies any interval changes in medications, medical  condition, mental condition, symptoms, or allergies since the last office visit.         Review of Systems:    Review of Systems   Constitutional:  Negative for unexpected weight change.   HENT:  Negative for ear pain.    Eyes:  Negative for visual disturbance.   Respiratory:  Negative for shortness of breath and wheezing.    Gastrointestinal:  Negative for abdominal pain.   Musculoskeletal:  Positive for back pain and gait problem.        Decreased ROM, joint and muscle pain   Neurological:  Positive for weakness and headaches. Negative for numbness.   Psychiatric/Behavioral:  Positive for dysphoric mood and sleep disturbance. Negative for decreased concentration. The patient is nervous/anxious.          Past Medical History:   Diagnosis Date   • Arthritis    • Asthma    • Back pain    • BPH (benign prostatic hypertrophy) with urinary obstruction    • COPD (chronic obstructive pulmonary disease) (HCC)    • Dizziness    • Glaucoma     bilat   • Insomnia    • RLS (restless legs syndrome)    • Sinusitis        Past Surgical History:   Procedure Laterality Date   • CARDIAC CATHETERIZATION N/A 2/19/2024    Procedure: Cardiac catheterization;  Surgeon: Boston Bergman MD;  Location: [facility];  Service: Cardiology   • CARDIAC CATHETERIZATION N/A 2/19/2024    Procedure: Cardiac Coronary Angiogram;  Surgeon: Boston Bergman MD;  Location: [facility];  Service: Cardiology   • CATARACT EXTRACTION Left    • EAR SURGERY      to wart   • EPIDIDYMAL CYST EXCISION Right 05/04/2000   • FL RETROGRADE PYELOGRAM  2/13/2025   • HEMORROIDECTOMY     • IL CYSTO W/IRRIG & EVAC MULTPLE OBSTRUCTING CLOTS Bilateral 2/13/2025    Procedure: CYSTOSCOPY, BILATERAL RETROGRADE PYLEOGRAM, EVACUATION OF CLOTS, BLADDER BIOPSY WITH fulgeration;  Surgeon: James Blunt MD;  Location: [facility];  Service: Urology   • IL EXCISION HYDROCELE UNILATERAL Left 9/14/2023    Procedure: HYDROCELECTOMY, sPERMATOCELECTOMY;  Surgeon: James Blunt  MD;  Location: [facility];  Service: Urology   • NC TRURL ELECTROSURG RESCJ PROSTATE BLEED COMPLETE N/A 2020    Procedure: CYSTOSCOPY, TRANSURETHRAL RESECTION OF PROSTATE (TURP);  Surgeon: Reji Meneses MD;  Location: [facility];  Service: Urology   • PROSTATE BIOPSY Bilateral 2003    BPH with mild acute and chronic inflammation   • PROSTATE BIOPSY Bilateral 2005    BPH with marked artropy and chronic inflammation       Family History   Problem Relation Age of Onset   • Asthma Father        Social History     Occupational History   • Not on file   Tobacco Use   • Smoking status: Former     Current packs/day: 0.00     Average packs/day: 1 pack/day for 15.0 years (15.0 ttl pk-yrs)     Types: Cigarettes     Start date:      Quit date:      Years since quittin.4     Passive exposure: Never   • Smokeless tobacco: Current     Types: Chew   • Tobacco comments:     Chews cigars   Vaping Use   • Vaping status: Never Used   Substance and Sexual Activity   • Alcohol use: Not Currently   • Drug use: Never   • Sexual activity: Not Currently       Current Medications[1]    Allergies[2]    Physical Exam:    Wt 65.3 kg (144 lb)   BMI 21.27 kg/m²     Constitutional:normal, well developed, well nourished, alert, in no distress and non-toxic and no overt pain behavior.  Eyes:anicteric  HEENT:grossly intact  Neck:supple, symmetric, trachea midline and no masses   Pulmonary:even and unlabored  Cardiovascular:No edema or pitting edema present  Skin:Normal without rashes or lesions and well hydrated  Psychiatric:Mood and affect appropriate  Neurologic:Cranial Nerves II-XII grossly intact  Musculoskeletal:normal gait. Paresthesias in right lower extremity. Normal strength. +modified SLR.       Imaging  MRI LUMBAR SPINE WITHOUT CONTRAST     INDICATION: M54.16: Radiculopathy, lumbar region.     Lumbar radiculopathy with failure of six weeks of conservative management        COMPARISON: CT renal stone  study abdomen pelvis without contrast 11/26/2024. Lumbar spine radiograph 11/18/2024.     TECHNIQUE:  Multiplanar, multisequence imaging of the lumbar spine was performed.     .        IMAGE QUALITY:  Diagnostic     FINDINGS:     VERTEBRAL BODIES:  There are 5 lumbar type vertebral bodies. Mild S-shaped scoliosis of thoracolumbar spine, unchanged. Grade 1 retrolisthesis L1-L2, L2-L3, and L3-L4 are unchanged.     Mixed type I/II Modic endplate change L1-L2, L2-L3, and L3-L4. Otherwise, normal bone marrow signal.     SACRUM:  Normal signal within the sacrum. No evidence of insufficiency or stress fracture.     DISTAL CORD AND CONUS:  Normal size and signal within the distal cord and conus.     PARASPINAL SOFT TISSUES:  Paraspinal soft tissues are unremarkable.     LOWER THORACIC DISC SPACES:  Mild noncompressive lower thoracic degenerative change.     LUMBAR DISC SPACES: Multilevel endplate osteophytes, small intervertebral herniations, disc height loss, and facet arthropathy.     L1-L2: Disc bulge, narrowed left subarticular zone. Facet arthropathy. Mild canal stenosis. Mild left foraminal narrowing.     L2-L3: Disc bulge, narrowed bilateral subarticular zones. Facet arthropathy, ligamentum flavum thickening. Mild canal stenosis. Mild left foraminal narrowing.     L3-L4: Disc bulge, narrowed bilateral subarticular zones. Hypertrophic facet arthropathy, ligamentum flavum thickening, small left central effusion. Mild canal stenosis. Moderate-to-severe right foraminal narrowing.     L4-L5: Mild disc bulge. Facet arthropathy. Mild canal stenosis. Mild bilateral foraminal narrowing.     L5-S1: Disc bulge. Facet arthropathy, ligamentum flavum thickening. No significant canal stenosis. Mild-to-moderate bilateral foraminal narrowing.     OTHER FINDINGS: Partially imaged colonic diverticulosis.     IMPRESSION:     Multilevel degenerative changes of lumbar spine with varying degrees of canal stenosis (multilevel mild) and  foraminal narrowing (moderate-to-severe right L3-L4), as detailed above.     Additional chronic/incidental findings as detailed above.  No orders to display         No orders of the defined types were placed in this encounter.           [1]    Current Outpatient Medications:   •  alfuzosin (UROXATRAL) 10 mg 24 hr tablet, Take 1 tablet by mouth in the morning, Disp: , Rfl:   •  atorvastatin (LIPITOR) 20 mg tablet, Take 1 tablet (20 mg total) by mouth daily with dinner, Disp: 90 tablet, Rfl: 1  •  clopidogrel (PLAVIX) 75 mg tablet, take 1 tablet by mouth once daily, Disp: 100 tablet, Rfl: 1  •  ferrous gluconate (FERGON) 240 (27 FE) MG tablet, Take 1 tablet (240 mg total) by mouth daily, Disp: 90 tablet, Rfl: 1  •  Ferrous Sulfate 28 MG TABS, Take 1 tablet (28 mg total) by mouth daily, Disp: 90 tablet, Rfl: 1  •  finasteride (PROSCAR) 5 mg tablet, Take 5 mg by mouth in the morning., Disp: , Rfl:   •  fluticasone (FLONASE) 50 mcg/act nasal spray, 1 spray into each nostril 2 (two) times a day, Disp: 15.8 mL, Rfl: 3  •  gabapentin (NEURONTIN) 100 mg capsule, Take 1 capsule (100 mg total) by mouth 2 (two) times a day Start with one tablet nightly for 5 nights and then increase to twice daily., Disp: 60 capsule, Rfl: 0  •  latanoprost (XALATAN) 0.005 % ophthalmic solution, Administer 1 drop to both eyes in the morning., Disp: , Rfl:   •  losartan (COZAAR) 25 mg tablet, take 1 tablet by mouth once daily, Disp: 90 tablet, Rfl: 1  •  metoprolol succinate (TOPROL-XL) 25 mg 24 hr tablet, Take 1 tablet (25 mg total) by mouth daily, Disp: 90 tablet, Rfl: 1  •  oxybutynin (DITROPAN) 5 mg tablet, Take 1 tablet (5 mg total) by mouth 2 (two) times a day, Disp: 60 tablet, Rfl: 0[2]  No Active Allergies

## 2025-05-19 NOTE — H&P (VIEW-ONLY)
Pain Medicine Follow-Up Note    Assessment:  1. Lumbar radiculopathy    2. Lumbar spondylosis    3. Chronic pain syndrome    4. S/P TAVR (transcatheter aortic valve replacement)    5. Chronic obstructive pulmonary disease with acute exacerbation (HCC)    6. Coronary artery disease involving native heart without angina pectoris, unspecified vessel or lesion type      Patient is a pleasant 84-year-old male who presents as a follow-up visit after last being seen January 2025 for symptoms of lumbar radiculopathy. After that time patient got an MRI of the lumbar spine which did demonstrate multilevel degenerative disc disease with both central and foraminal narrowing at multiple levels, worse at L3-L4.  Patient states he was called to schedule an injection but for some reason undergoing the schedule.  He states symptoms are worse rating his pain 6 out of 10) scale.  The pain is constant and the quality pain is a dull aching, sharp, throbbing, cramping, shooting sensation primarily in his lower back rating down his right leg into the knee.  The pain does interfere with his daily activities and he has not been doing conservative treatment.    MRI independent reviewed and discussed with patient.  Patient with lumbar radiculopathy symptoms consistent with his MRI findings.  Given this think is reasonable to schedule patient for right L3, L4 transforaminal epidural steroid injection for scopic guidance.  Patient counseled risk and benefits of injection therapy elected proceed.  For symptomatic relief we will start gabapentin 100 mg twice daily.  Titration schedule provided.  Plan:  Schedule for right L3, L4 TFESI  Start gabapentin 100 mg nightly for 5 nights and then increase to twice daily  Can continue apap 975 mg TID prn   No orders of the defined types were placed in this encounter.      New Medications Ordered This Visit   Medications   • gabapentin (NEURONTIN) 100 mg capsule     Sig: Take 1 capsule (100 mg total) by  mouth 2 (two) times a day Start with one tablet nightly for 5 nights and then increase to twice daily.     Dispense:  60 capsule     Refill:  0       My impressions and treatment recommendations were discussed in detail with the patient who verbalized understanding and had no further questions.        Complete risks and benefits including bleeding, infection, tissue reaction, nerve injury and allergic reaction were discussed. The approach was demonstrated using models and literature was provided. Verbal and written consent was obtained.      Follow-up is planned in four weeks time or sooner as warranted.  Discharge instructions were provided. I personally saw and examined the patient and I agree with the above discussed plan of care.    History of Present Illness:    Gilberto Vann is a 84 y.o. male who presents to [facility] for interval re-evaluation of the above stated pain complaints. The patient has a past medical and chronic pain history as outlined in the assessment section. He was last seen on January 2025.    Patient is a pleasant 84-year-old male who presents as a follow-up visit after last being seen January 2025 for symptoms of lumbar radiculopathy. After that time patient got an MRI of the lumbar spine which did demonstrate multilevel degenerative disc disease with both central and foraminal narrowing at multiple levels, worse at L3-L4.  Patient states he was called to schedule an injection but for some reason undergoing the schedule.  He states symptoms are worse rating his pain 6 out of 10) scale.  The pain is constant and the quality pain is a dull aching, sharp, throbbing, cramping, shooting sensation primarily in his lower back rating down his right leg into the knee.  The pain does interfere with his daily activities and he has not been doing conservative treatment.      Other than as stated above, the patient denies any interval changes in medications, medical  condition, mental condition, symptoms, or allergies since the last office visit.         Review of Systems:    Review of Systems   Constitutional:  Negative for unexpected weight change.   HENT:  Negative for ear pain.    Eyes:  Negative for visual disturbance.   Respiratory:  Negative for shortness of breath and wheezing.    Gastrointestinal:  Negative for abdominal pain.   Musculoskeletal:  Positive for back pain and gait problem.        Decreased ROM, joint and muscle pain   Neurological:  Positive for weakness and headaches. Negative for numbness.   Psychiatric/Behavioral:  Positive for dysphoric mood and sleep disturbance. Negative for decreased concentration. The patient is nervous/anxious.          Past Medical History:   Diagnosis Date   • Arthritis    • Asthma    • Back pain    • BPH (benign prostatic hypertrophy) with urinary obstruction    • COPD (chronic obstructive pulmonary disease) (HCC)    • Dizziness    • Glaucoma     bilat   • Insomnia    • RLS (restless legs syndrome)    • Sinusitis        Past Surgical History:   Procedure Laterality Date   • CARDIAC CATHETERIZATION N/A 2/19/2024    Procedure: Cardiac catheterization;  Surgeon: Boston Bergman MD;  Location: [facility];  Service: Cardiology   • CARDIAC CATHETERIZATION N/A 2/19/2024    Procedure: Cardiac Coronary Angiogram;  Surgeon: Boston Bergman MD;  Location: [facility];  Service: Cardiology   • CATARACT EXTRACTION Left    • EAR SURGERY      to wart   • EPIDIDYMAL CYST EXCISION Right 05/04/2000   • FL RETROGRADE PYELOGRAM  2/13/2025   • HEMORROIDECTOMY     • PA CYSTO W/IRRIG & EVAC MULTPLE OBSTRUCTING CLOTS Bilateral 2/13/2025    Procedure: CYSTOSCOPY, BILATERAL RETROGRADE PYLEOGRAM, EVACUATION OF CLOTS, BLADDER BIOPSY WITH fulgeration;  Surgeon: James Blunt MD;  Location: [facility];  Service: Urology   • PA EXCISION HYDROCELE UNILATERAL Left 9/14/2023    Procedure: HYDROCELECTOMY, sPERMATOCELECTOMY;  Surgeon: James Blunt  MD;  Location: [facility];  Service: Urology   • GA TRURL ELECTROSURG RESCJ PROSTATE BLEED COMPLETE N/A 2020    Procedure: CYSTOSCOPY, TRANSURETHRAL RESECTION OF PROSTATE (TURP);  Surgeon: Reji Meneses MD;  Location: [facility];  Service: Urology   • PROSTATE BIOPSY Bilateral 2003    BPH with mild acute and chronic inflammation   • PROSTATE BIOPSY Bilateral 2005    BPH with marked artropy and chronic inflammation       Family History   Problem Relation Age of Onset   • Asthma Father        Social History     Occupational History   • Not on file   Tobacco Use   • Smoking status: Former     Current packs/day: 0.00     Average packs/day: 1 pack/day for 15.0 years (15.0 ttl pk-yrs)     Types: Cigarettes     Start date:      Quit date:      Years since quittin.4     Passive exposure: Never   • Smokeless tobacco: Current     Types: Chew   • Tobacco comments:     Chews cigars   Vaping Use   • Vaping status: Never Used   Substance and Sexual Activity   • Alcohol use: Not Currently   • Drug use: Never   • Sexual activity: Not Currently       Current Medications[1]    Allergies[2]    Physical Exam:    Wt 65.3 kg (144 lb)   BMI 21.27 kg/m²     Constitutional:normal, well developed, well nourished, alert, in no distress and non-toxic and no overt pain behavior.  Eyes:anicteric  HEENT:grossly intact  Neck:supple, symmetric, trachea midline and no masses   Pulmonary:even and unlabored  Cardiovascular:No edema or pitting edema present  Skin:Normal without rashes or lesions and well hydrated  Psychiatric:Mood and affect appropriate  Neurologic:Cranial Nerves II-XII grossly intact  Musculoskeletal:normal gait. Paresthesias in right lower extremity. Normal strength. +modified SLR.       Imaging  MRI LUMBAR SPINE WITHOUT CONTRAST     INDICATION: M54.16: Radiculopathy, lumbar region.     Lumbar radiculopathy with failure of six weeks of conservative management        COMPARISON: CT renal stone  study abdomen pelvis without contrast 11/26/2024. Lumbar spine radiograph 11/18/2024.     TECHNIQUE:  Multiplanar, multisequence imaging of the lumbar spine was performed.     .        IMAGE QUALITY:  Diagnostic     FINDINGS:     VERTEBRAL BODIES:  There are 5 lumbar type vertebral bodies. Mild S-shaped scoliosis of thoracolumbar spine, unchanged. Grade 1 retrolisthesis L1-L2, L2-L3, and L3-L4 are unchanged.     Mixed type I/II Modic endplate change L1-L2, L2-L3, and L3-L4. Otherwise, normal bone marrow signal.     SACRUM:  Normal signal within the sacrum. No evidence of insufficiency or stress fracture.     DISTAL CORD AND CONUS:  Normal size and signal within the distal cord and conus.     PARASPINAL SOFT TISSUES:  Paraspinal soft tissues are unremarkable.     LOWER THORACIC DISC SPACES:  Mild noncompressive lower thoracic degenerative change.     LUMBAR DISC SPACES: Multilevel endplate osteophytes, small intervertebral herniations, disc height loss, and facet arthropathy.     L1-L2: Disc bulge, narrowed left subarticular zone. Facet arthropathy. Mild canal stenosis. Mild left foraminal narrowing.     L2-L3: Disc bulge, narrowed bilateral subarticular zones. Facet arthropathy, ligamentum flavum thickening. Mild canal stenosis. Mild left foraminal narrowing.     L3-L4: Disc bulge, narrowed bilateral subarticular zones. Hypertrophic facet arthropathy, ligamentum flavum thickening, small left central effusion. Mild canal stenosis. Moderate-to-severe right foraminal narrowing.     L4-L5: Mild disc bulge. Facet arthropathy. Mild canal stenosis. Mild bilateral foraminal narrowing.     L5-S1: Disc bulge. Facet arthropathy, ligamentum flavum thickening. No significant canal stenosis. Mild-to-moderate bilateral foraminal narrowing.     OTHER FINDINGS: Partially imaged colonic diverticulosis.     IMPRESSION:     Multilevel degenerative changes of lumbar spine with varying degrees of canal stenosis (multilevel mild) and  foraminal narrowing (moderate-to-severe right L3-L4), as detailed above.     Additional chronic/incidental findings as detailed above.  No orders to display         No orders of the defined types were placed in this encounter.           [1]    Current Outpatient Medications:   •  alfuzosin (UROXATRAL) 10 mg 24 hr tablet, Take 1 tablet by mouth in the morning, Disp: , Rfl:   •  atorvastatin (LIPITOR) 20 mg tablet, Take 1 tablet (20 mg total) by mouth daily with dinner, Disp: 90 tablet, Rfl: 1  •  clopidogrel (PLAVIX) 75 mg tablet, take 1 tablet by mouth once daily, Disp: 100 tablet, Rfl: 1  •  ferrous gluconate (FERGON) 240 (27 FE) MG tablet, Take 1 tablet (240 mg total) by mouth daily, Disp: 90 tablet, Rfl: 1  •  Ferrous Sulfate 28 MG TABS, Take 1 tablet (28 mg total) by mouth daily, Disp: 90 tablet, Rfl: 1  •  finasteride (PROSCAR) 5 mg tablet, Take 5 mg by mouth in the morning., Disp: , Rfl:   •  fluticasone (FLONASE) 50 mcg/act nasal spray, 1 spray into each nostril 2 (two) times a day, Disp: 15.8 mL, Rfl: 3  •  gabapentin (NEURONTIN) 100 mg capsule, Take 1 capsule (100 mg total) by mouth 2 (two) times a day Start with one tablet nightly for 5 nights and then increase to twice daily., Disp: 60 capsule, Rfl: 0  •  latanoprost (XALATAN) 0.005 % ophthalmic solution, Administer 1 drop to both eyes in the morning., Disp: , Rfl:   •  losartan (COZAAR) 25 mg tablet, take 1 tablet by mouth once daily, Disp: 90 tablet, Rfl: 1  •  metoprolol succinate (TOPROL-XL) 25 mg 24 hr tablet, Take 1 tablet (25 mg total) by mouth daily, Disp: 90 tablet, Rfl: 1  •  oxybutynin (DITROPAN) 5 mg tablet, Take 1 tablet (5 mg total) by mouth 2 (two) times a day, Disp: 60 tablet, Rfl: 0[2]  No Active Allergies

## 2025-05-19 NOTE — TELEPHONE ENCOUNTER
Dr. Lloyd ,    Patient has been scheduled for an epidural steroid injection with Dr. Fisher. The patient is currently taking Plavix which needs to be held 7 days prior to procedure. Is the patient okay to hold medication for required number of days prior to procedure?

## 2025-05-23 NOTE — TELEPHONE ENCOUNTER
Attempted to reach pt, LMOM with CB# and OH.    LD of plavix to be 6/5. Hold 6/6-6/13 for procedure date of 6/13.

## 2025-05-27 ENCOUNTER — TELEPHONE (OUTPATIENT)
Dept: INTERNAL MEDICINE CLINIC | Facility: CLINIC | Age: 85
End: 2025-05-27

## 2025-06-13 ENCOUNTER — APPOINTMENT (OUTPATIENT)
Dept: RADIOLOGY | Facility: HOSPITAL | Age: 85
End: 2025-06-13
Payer: COMMERCIAL

## 2025-06-13 ENCOUNTER — HOSPITAL ENCOUNTER (OUTPATIENT)
Facility: AMBULARY SURGERY CENTER | Age: 85
Setting detail: OUTPATIENT SURGERY
Discharge: HOME | End: 2025-06-13
Attending: STUDENT IN AN ORGANIZED HEALTH CARE EDUCATION/TRAINING PROGRAM | Admitting: STUDENT IN AN ORGANIZED HEALTH CARE EDUCATION/TRAINING PROGRAM
Payer: COMMERCIAL

## 2025-06-13 VITALS
SYSTOLIC BLOOD PRESSURE: 151 MMHG | DIASTOLIC BLOOD PRESSURE: 68 MMHG | TEMPERATURE: 98.3 F | HEART RATE: 65 BPM | RESPIRATION RATE: 18 BRPM | OXYGEN SATURATION: 98 %

## 2025-06-13 PROCEDURE — 64484 NJX AA&/STRD TFRM EPI L/S EA: CPT | Performed by: STUDENT IN AN ORGANIZED HEALTH CARE EDUCATION/TRAINING PROGRAM

## 2025-06-13 PROCEDURE — 64483 NJX AA&/STRD TFRM EPI L/S 1: CPT | Performed by: STUDENT IN AN ORGANIZED HEALTH CARE EDUCATION/TRAINING PROGRAM

## 2025-06-13 RX ORDER — BUPIVACAINE HYDROCHLORIDE 2.5 MG/ML
INJECTION, SOLUTION EPIDURAL; INFILTRATION; INTRACAUDAL; PERINEURAL AS NEEDED
Status: DISCONTINUED | OUTPATIENT
Start: 2025-06-13 | End: 2025-06-13 | Stop reason: HOSPADM

## 2025-06-13 RX ORDER — METHYLPREDNISOLONE ACETATE 80 MG/ML
INJECTION, SUSPENSION INTRA-ARTICULAR; INTRALESIONAL; INTRAMUSCULAR; SOFT TISSUE AS NEEDED
Status: DISCONTINUED | OUTPATIENT
Start: 2025-06-13 | End: 2025-06-13 | Stop reason: HOSPADM

## 2025-06-13 RX ORDER — LIDOCAINE HYDROCHLORIDE 10 MG/ML
INJECTION, SOLUTION EPIDURAL; INFILTRATION; INTRACAUDAL; PERINEURAL AS NEEDED
Status: DISCONTINUED | OUTPATIENT
Start: 2025-06-13 | End: 2025-06-13 | Stop reason: HOSPADM

## 2025-06-13 NOTE — OP NOTE
Pre-procedure Diagnosis: Lumbar radiculopathy  Post-procedure Diagnosis: Lumbar radiculopathy  Procedure Title(s):  right L3, L4 TRANSFORAMINAL EPIDURAL STEROID INJECTION]  Attending Surgeon:   Mike Fisher MD  Anesthesia:   Local     Indications: The patient is a 84 y.o. year-old male with a diagnosis of lumbar radiculopathy. The patient's history and physical exam were reviewed. The risks, benefits and alternatives to the procedure were discussed, and all questions were answered to the patient's satisfaction. The patient agreed to proceed, and written informed consent was obtained.    Procedure in Detail: The patient was brought into the procedure room and placed in the prone position on the fluoroscopy table. The area of the lumbar spine was prepped with chloraprep solution  then draped in a sterile manner.    The L3 vertebral body was identified with AP fluoroscopy. An oblique view to the  right was obtained to better visualize the inferior junction of the pedicle and transverse process. The 6 o'clock position of the pedicle was marked and identified. The skin and subcutaneous tissues in the area were anesthetized with 1% lidocaine. A 22-gauge, 5 inch needle was directed toward the targeted point under fluoroscopy until bone was contacted. The needle was then walked inferiorly until the neural foramen was entered. A lateral fluoroscopic view was then used to place the needle tip at the 10 o'clock position of the foramen.     This sequence was repeated at the L4 level.       Negative aspiration was confirmed, and 1 ml Omnipaque 240 was injected at each level. Appropriate neurograms were observed under AP fluoroscopy. Then, after negative aspiration, a solution consisting of [1-mL] 0.25% bupivacaine and [0.5-mL] depomedrol (80 mg/ml) was easily injected at each level. The needles were removed with a 1% lidocaine flush. The patient's back was cleaned and a bandage was placed over the needle insertion  points.    Disposition: The patient tolerated the procedure well, and there were no apparent complications. The patient was taken to the recovery area where written discharge instructions for the procedure were given.     Estimated Blood Loss: None  Specimens Obtained: N/A

## 2025-06-13 NOTE — DISCHARGE INSTRUCTIONS
Epidural Steroid Injection   WHAT YOU NEED TO KNOW:   An epidural steroid injection (JESUS) is a procedure to inject steroid medicine into the epidural space. The epidural space is between your spinal cord and vertebrae. Steroids reduce inflammation and fluid buildup in your spine that may be causing pain. You may be given pain medicine along with the steroids.          ACTIVITY  Do not drive or operate machinery today.  No strenuous activity today - bending, lifting, etc.  You may resume normal activites starting tomorrow - start slowly and as tolerated.  You may shower today, but no tub baths or hot tubs.  You may have numbness for several hours from the local anesthetic. Please use caution and common sense, especially with weight-bearing activities.    CARE OF THE INJECTION SITE  If you have soreness or pain, apply ice to the area today (20 minutes on/20 minutes off).  Starting tomorrow, you may use warm, moist heat or ice if needed.  You may have an increase or change in your discomfort for 36-48 hours after your treatment.  Apply ice and continue with any pain medication you have been prescribed.  Notify the [facility] if you have any of the following: redness, drainage, swelling, headache, stiff neck or fever above 100°F.    SPECIAL INSTRUCTIONS  Our office will contact you in approximately 14 days for a progress report.    MEDICATIONS  Continue to take all routine medications.  Our office may have instructed you to hold some medications.    As no general anesthesia was used in today's procedure, you should not experience any side effects related to anesthesia.     If you are diabetic, the steroids used in today's injection may temporarily increase your blood sugar levels after the first few days after your injection. Please keep a close eye on your sugars and alert the doctor who manages your diabetes if your sugars are significantly high from your baseline or you are symptomatic.     If you have a  problem specifically related to your procedure, please call our office at [phone].  Problems not related to your procedure should be directed to your primary care physician.

## 2025-06-16 DIAGNOSIS — I42.9 CARDIOMYOPATHY, UNSPECIFIED TYPE (HCC): ICD-10-CM

## 2025-06-16 DIAGNOSIS — I10 PRIMARY HYPERTENSION: ICD-10-CM

## 2025-06-16 RX ORDER — METOPROLOL SUCCINATE 25 MG/1
25 TABLET, EXTENDED RELEASE ORAL DAILY
Qty: 90 TABLET | Refills: 1 | Status: SHIPPED | OUTPATIENT
Start: 2025-06-16

## 2025-06-17 ENCOUNTER — OFFICE VISIT (OUTPATIENT)
Dept: INTERNAL MEDICINE CLINIC | Facility: CLINIC | Age: 85
End: 2025-06-17
Payer: COMMERCIAL

## 2025-06-17 VITALS
HEART RATE: 64 BPM | WEIGHT: 141 LBS | BODY MASS INDEX: 20.88 KG/M2 | DIASTOLIC BLOOD PRESSURE: 76 MMHG | OXYGEN SATURATION: 98 % | SYSTOLIC BLOOD PRESSURE: 120 MMHG | HEIGHT: 69 IN

## 2025-06-17 DIAGNOSIS — M54.16 LUMBAR RADICULOPATHY: ICD-10-CM

## 2025-06-17 DIAGNOSIS — I25.5 ISCHEMIC CARDIOMYOPATHY: Primary | ICD-10-CM

## 2025-06-17 DIAGNOSIS — Z00.00 ENCOUNTER FOR SUBSEQUENT ANNUAL WELLNESS VISIT (AWV) IN MEDICARE PATIENT: ICD-10-CM

## 2025-06-17 DIAGNOSIS — J30.1 ALLERGIC RHINITIS DUE TO POLLEN, UNSPECIFIED SEASONALITY: ICD-10-CM

## 2025-06-17 DIAGNOSIS — J41.0 SIMPLE CHRONIC BRONCHITIS (HCC): ICD-10-CM

## 2025-06-17 PROCEDURE — G0439 PPPS, SUBSEQ VISIT: HCPCS | Performed by: INTERNAL MEDICINE

## 2025-06-17 PROCEDURE — G2211 COMPLEX E/M VISIT ADD ON: HCPCS | Performed by: INTERNAL MEDICINE

## 2025-06-17 PROCEDURE — 99213 OFFICE O/P EST LOW 20 MIN: CPT | Performed by: INTERNAL MEDICINE

## 2025-06-17 RX ORDER — CETIRIZINE HYDROCHLORIDE 10 MG/1
TABLET ORAL
Qty: 30 TABLET | Refills: 4 | Status: SHIPPED | OUTPATIENT
Start: 2025-06-17

## 2025-06-17 RX ORDER — GABAPENTIN 100 MG/1
CAPSULE ORAL
Qty: 60 CAPSULE | Refills: 2 | Status: SHIPPED | OUTPATIENT
Start: 2025-06-17 | End: 2025-06-17

## 2025-06-17 RX ORDER — GABAPENTIN 100 MG/1
CAPSULE ORAL
Qty: 60 CAPSULE | Refills: 2 | Status: SHIPPED | OUTPATIENT
Start: 2025-06-17

## 2025-06-17 NOTE — PROGRESS NOTES
Name: Gilberto Vann      : 1940      MRN: [id number]  Encounter Provider: Dima Lloyd MD  Encounter Date: 2025   [facility]  :  Assessment & Plan  Ischemic cardiomyopathy  Echo showed EF of 25% with severe global hypokinesis with regional variations, grade 2 diastolic dysfunction with severely dilated left atrium with aortic valve area of 0.5 cm 2  Etiology not clear at the current time  Continue medical management with Toprol-XL 25 mg daily, Aldactone, losartan 25 mg daily  Cardiology follow-up    Patient euvolemic CHF compensated above reviewed followed by cardiology    Status post TAVR and pacemaker    Will continue main medication as follow    Continue Plavix 75 mg daily    Aspirin 81 mg daily follow the cardiology        Lumbar radiculopathy  Lower back pain rating right lower extremity using Tylenol once a day some improvement    Cannot take give any NSAID patient is on Plavix    Recommended physical therapy patient reluctant    Patient followed by pain underwent epidural injection more improvement and management as needed     gabapentin at bedtime was recommended in the past as she has not started not able to sleep at nighttime  Orders:  •  gabapentin (NEURONTIN) 100 mg capsule; Take 2 pills at bedtime     Allergic rhinitis due to pollen, unspecified seasonality  Patient complaining of persistent nasal congestion postnasal drip causing difficulty breathing through the nose    Will start Zyrtec 10 mg at bedtime  Orders:  •  cetirizine (ZyrTEC) 10 mg tablet; Take 1 pill at bedtime    Encounter for subsequent annual wellness visit (AWV) in Medicare patient            Preventive health issues were discussed with patient, and age appropriate screening tests were ordered as noted in patient's After Visit Summary. Personalized health advice and appropriate referrals for health education or preventive services given if needed, as noted in  patient's After Visit Summary.    History of Present Illness     Came in follow-up chronic medical condition list visit diagnoses mainly nasal congestion postnasal drip breathing through the nose due to the congestion followed by pain management for chronic lower back pain seems to be improving denies any chest pain dyspnea on exertion at baseline followed by cardiology previous labs reviewed    Complete annual wellness exam done for counseling screening follow-up recommendations see attached encounter age-appropriate screening done preventive measure discussed       Patient Care Team:  Dima Lloyd MD as PCP - General (Internal Medicine)  Shaun Hernandez DO    Review of Systems   Constitutional:  Positive for activity change. Negative for appetite change, chills, diaphoresis, fatigue, fever and unexpected weight change.   HENT:  Positive for congestion and rhinorrhea. Negative for dental problem, drooling, ear discharge, ear pain, facial swelling, hearing loss, mouth sores, nosebleeds, postnasal drip, sinus pressure, sneezing, sore throat, tinnitus, trouble swallowing and voice change.    Eyes:  Negative for photophobia, pain, discharge, redness, itching and visual disturbance.   Respiratory:  Positive for shortness of breath. Negative for apnea, choking, chest tightness and stridor.    Cardiovascular:  Negative for chest pain, palpitations and leg swelling.   Gastrointestinal:  Negative for abdominal distention, abdominal pain, anal bleeding, blood in stool, constipation, diarrhea, nausea, rectal pain and vomiting.   Endocrine: Negative for cold intolerance, heat intolerance, polydipsia, polyphagia and polyuria.   Genitourinary:  Negative for decreased urine volume, difficulty urinating, dysuria, enuresis, flank pain, frequency, genital sores, hematuria and urgency.   Musculoskeletal:  Positive for arthralgias, back pain, gait problem and joint swelling. Negative for neck pain and neck stiffness.   Skin:   Negative for color change, pallor, rash and wound.   Allergic/Immunologic: Negative.  Negative for environmental allergies, food allergies and immunocompromised state.   Neurological:  Negative for dizziness, tremors, seizures, syncope, facial asymmetry, speech difficulty, weakness, light-headedness, numbness and headaches.   Psychiatric/Behavioral:  Negative for agitation, behavioral problems, confusion, decreased concentration, dysphoric mood, hallucinations, self-injury, sleep disturbance and suicidal ideas. The patient is not nervous/anxious and is not hyperactive.      Medical History Reviewed by provider this encounter:  Tobacco  Allergies  Meds  Problems  Med Hx  Surg Hx  Fam Hx       Annual Wellness Visit Questionnaire   Gilberto is here for his Subsequent Wellness visit.     Health Risk Assessment:   Patient rates overall health as fair. Patient feels that their physical health rating is same. Patient is satisfied with their life. Eyesight was rated as same. Hearing was rated as same. Patient feels that their emotional and mental health rating is same. Patients states they are sometimes angry. Patient states they are often unusually tired/fatigued. Pain experienced in the last 7 days has been a lot. Patient's pain rating has been 6/10. Patient states that he has experienced no weight loss or gain in last 6 months. Lower back pain ongoing just had an injection.    Depression Screening:   PHQ-2 Score: 2      Fall Risk Screening:   In the past year, patient has experienced: no history of falling in past year      Home Safety:  Patient does not have trouble with stairs inside or outside of their home. Patient has working smoke alarms and has working carbon monoxide detector. Home safety hazards include: none. He is careful on the stairs. Lives alone.    Nutrition:   Current diet is Regular. Eats a regular diet. Proteins and vegetables    Medications:   Patient is currently taking over-the-counter  supplements. OTC medications include: see medication list. Patient is able to manage medications. He has a friend that helps him. Sister helps as well.    Activities of Daily Living (ADLs)/Instrumental Activities of Daily Living (IADLs):   Walk and transfer into and out of bed and chair?: Yes  Dress and groom yourself?: Yes    Bathe or shower yourself?: Yes    Feed yourself? Yes  Do your laundry/housekeeping?: Yes  Manage your money, pay your bills and track your expenses?: Yes  Make your own meals?: Yes    Do your own shopping?: Yes    ADL comments: He is independent.    Previous Hospitalizations:   Any hospitalizations or ED visits within the last 12 months?: Yes    How many hospitalizations have you had in the last year?: 1-2    Hospitalization Comments: Silent heart attack, blood in urine.    Advance Care Planning:   Living will: Yes    Durable POA for healthcare: Yes    Advanced directive: Yes    Advanced directive counseling given: Yes    ACP document given: Yes    Patient declined ACP directive: Yes    End of Life Decisions reviewed with patient: Yes    Provider agrees with end of life decisions: Yes      Comments: He will bring docs next visit.    Cognitive Screening:   Provider or family/friend/caregiver concerned regarding cognition?: No    Preventive Screenings      Cardiovascular Screening:    General: Screening Current    Due for: Lipid Panel      Diabetes Screening:     General: Screening Current    Due for: Blood Glucose      Colorectal Cancer Screening:     General: Screening Not Indicated      Prostate Cancer Screening:    General: Screening Not Indicated      Osteoporosis Screening:    General: Patient Declines      Abdominal Aortic Aneurysm (AAA) Screening:    Risk factors include: tobacco use        General: Patient Declines      Lung Cancer Screening:     General: Screening Not Indicated      Hepatitis C Screening:    General: Patient Declines      Preventive Screening Comments: He has no risk  for HEP C.    Immunizations:  - Immunizations due: Zoster (Shingrix)    Screening, Brief Intervention, and Referral to Treatment (SBIRT)     Screening  Typical number of drinks in a day: 0  Typical number of drinks in a week: 0  Interpretation: Low risk drinking behavior.    AUDIT-C Screenin) How often did you have a drink containing alcohol in the past year? never  2) How many drinks did you have on a typical day when you were drinking in the past year? 0  3) How often did you have 6 or more drinks on one occasion in the past year? never    AUDIT-C Score: 0  Interpretation: Score 0-3 (male): Negative screen for alcohol misuse    Single Item Drug Screening:  How often have you used an illegal drug (including marijuana) or a prescription medication for non-medical reasons in the past year? never    Single Item Drug Screen Score: 0  Interpretation: Negative screen for possible drug use disorder    Brief Intervention  Alcohol & drug use screenings were reviewed. No concerns regarding substance use disorder identified.     Other Counseling Topics:   Skin self-exam and sunscreen.     Social Drivers of Health     Financial Resource Strain: Patient Declined (1/10/2023)    Overall Financial Resource Strain (CARDIA)    • Difficulty of Paying Living Expenses: Patient declined   Food Insecurity: Food Insecurity Present (2025)    Nursing - Inadequate Food Risk Classification    • Worried About Running Out of Food in the Last Year: Never true    • Ran Out of Food in the Last Year: Never true    • Ran Out of Food in the Last Year: Sometimes true   Transportation Needs: No Transportation Needs (2025)    PRAPARE - Transportation    • Lack of Transportation (Medical): No    • Lack of Transportation (Non-Medical): No   Housing Stability: Unknown (2025)    Housing Stability Vital Sign    • Unable to Pay for Housing in the Last Year: Patient declined    • Number of Times Moved in the Last Year: 0    • Homeless in  "the Last Year: No   Utilities: Not At Risk (6/17/2025)    [facility] Utilities    • Threatened with loss of utilities: No     No results found.    Objective   /76   Pulse 64   Ht 5' 9\" (1.753 m)   Wt 64 kg (141 lb)   SpO2 98%   BMI 20.82 kg/m²     Physical Exam  Vitals and nursing note reviewed.   Constitutional:       General: He is not in acute distress.     Appearance: He is well-developed. He is not ill-appearing, toxic-appearing or diaphoretic.   HENT:      Head: Normocephalic and atraumatic.      Right Ear: External ear normal.      Left Ear: External ear normal.      Nose: Nose normal.      Mouth/Throat:      Pharynx: No oropharyngeal exudate.     Eyes:      General: Lids are normal. Lids are everted, no foreign bodies appreciated. No scleral icterus.        Right eye: No discharge.         Left eye: No discharge.      Conjunctiva/sclera: Conjunctivae normal.      Pupils: Pupils are equal, round, and reactive to light.     Neck:      Thyroid: No thyromegaly.      Vascular: Normal carotid pulses. No carotid bruit, hepatojugular reflux or JVD.      Trachea: No tracheal tenderness or tracheal deviation.     Cardiovascular:      Rate and Rhythm: Normal rate and regular rhythm.      Pulses: Normal pulses.      Heart sounds: Murmur heard.      No friction rub. No gallop.   Pulmonary:      Effort: Pulmonary effort is normal. No respiratory distress.      Breath sounds: Normal breath sounds. No stridor. No wheezing or rales.      Comments: Decreased air entry bilateral  Chest:      Chest wall: No tenderness.   Abdominal:      General: Bowel sounds are normal. There is no distension.      Palpations: Abdomen is soft. There is no mass.      Tenderness: There is no abdominal tenderness. There is no guarding or rebound.     Musculoskeletal:         General: No tenderness or deformity. Normal range of motion.      Cervical back: Normal range of motion and neck supple. No edema, erythema or rigidity. No spinous process " tenderness or muscular tenderness. Normal range of motion.   Lymphadenopathy:      Head:      Right side of head: No submental, submandibular, tonsillar, preauricular or posterior auricular adenopathy.      Left side of head: No submental, submandibular, tonsillar, preauricular, posterior auricular or occipital adenopathy.      Cervical: No cervical adenopathy.      Right cervical: No superficial, deep or posterior cervical adenopathy.     Left cervical: No superficial, deep or posterior cervical adenopathy.      Upper Body:      Right upper body: No pectoral adenopathy.      Left upper body: No pectoral adenopathy.     Skin:     General: Skin is warm and dry.      Coloration: Skin is not pale.      Findings: No erythema or rash.     Neurological:      Mental Status: He is alert and oriented to person, place, and time.      Cranial Nerves: No cranial nerve deficit.      Sensory: No sensory deficit.      Motor: No tremor, abnormal muscle tone or seizure activity.      Coordination: Coordination normal.      Gait: Gait abnormal.      Deep Tendon Reflexes: Reflexes are normal and symmetric. Reflexes normal.     Psychiatric:         Behavior: Behavior normal.         Thought Content: Thought content normal.         Judgment: Judgment normal.

## 2025-06-17 NOTE — ASSESSMENT & PLAN NOTE
Echo showed EF of 25% with severe global hypokinesis with regional variations, grade 2 diastolic dysfunction with severely dilated left atrium with aortic valve area of 0.5 cm 2  Etiology not clear at the current time  Continue medical management with Toprol-XL 25 mg daily, Aldactone, losartan 25 mg daily  Cardiology follow-up    Patient euvolemic CHF compensated above reviewed followed by cardiology    Status post TAVR and pacemaker    Will continue main medication as follow    Continue Plavix 75 mg daily    Aspirin 81 mg daily follow the cardiology

## 2025-06-17 NOTE — PATIENT INSTRUCTIONS
Medicare Preventive Visit Patient Instructions  Thank you for completing your Welcome to Medicare Visit or Medicare Annual Wellness Visit today. Your next wellness visit will be due in one year (6/18/2026).  The screening/preventive services that you may require over the next 5-10 years are detailed below. Some tests may not apply to you based off risk factors and/or age. Screening tests ordered at today's visit but not completed yet may show as past due. Also, please note that scanned in results may not display below.  Preventive Screenings:  Service Recommendations Previous Testing/Comments   Colorectal Cancer Screening  Colonoscopy    Fecal Occult Blood Test (FOBT)/Fecal Immunochemical Test (FIT)  Fecal DNA/Cologuard Test  Flexible Sigmoidoscopy Age: 45-75 years old   Colonoscopy: every 10 years (May be performed more frequently if at higher risk)  OR  FOBT/FIT: every 1 year  OR  Cologuard: every 3 years  OR  Sigmoidoscopy: every 5 years  Screening may be recommended earlier than age 45 if at higher risk for colorectal cancer. Also, an individualized decision between you and your healthcare provider will decide whether screening between the ages of 76-85 would be appropriate. Colonoscopy: Not on file  FOBT/FIT: Not on file  Cologuard: Not on file  Sigmoidoscopy: Not on file          Prostate Cancer Screening Individualized decision between patient and health care provider in men between ages of 55-69   Medicare will cover every 12 months beginning on the day after your 50th birthday PSA: 6.98 ng/mL           Hepatitis C Screening Once for adults born between 1945 and 1965  More frequently in patients at high risk for Hepatitis C Hep C Antibody: Not on file        Diabetes Screening 1-2 times per year if you're at risk for diabetes or have pre-diabetes Fasting glucose: 74 mg/dL (2/11/2025)  A1C: 5.7 % (2/16/2024)      Cholesterol Screening Once every 5 years if you don't have a lipid disorder. May order more often  based on risk factors. Lipid panel: 02/17/2024         Other Preventive Screenings Covered by Medicare:  Abdominal Aortic Aneurysm (AAA) Screening: covered once if your at risk. You're considered to be at risk if you have a family history of AAA or a male between the age of 65-75 who smoking at least 100 cigarettes in your lifetime.  Lung Cancer Screening: covers low dose CT scan once per year if you meet all of the following conditions: (1) Age 55-77; (2) No signs or symptoms of lung cancer; (3) Current smoker or have quit smoking within the last 15 years; (4) You have a tobacco smoking history of at least 20 pack years (packs per day x number of years you smoked); (5) You get a written order from a healthcare provider.  Glaucoma Screening: covered annually if you're considered high risk: (1) You have diabetes OR (2) Family history of glaucoma OR (3)  aged 50 and older OR (4)  American aged 65 and older  Osteoporosis Screening: covered every 2 years if you meet one of the following conditions: (1) Have a vertebral abnormality; (2) On glucocorticoid therapy for more than 3 months; (3) Have primary hyperparathyroidism; (4) On osteoporosis medications and need to assess response to drug therapy.  HIV Screening: covered annually if you're between the age of 15-65. Also covered annually if you are younger than 15 and older than 65 with risk factors for HIV infection. For pregnant patients, it is covered up to 3 times per pregnancy.    Immunizations:  Immunization Recommendations   Influenza Vaccine Annual influenza vaccination during flu season is recommended for all persons aged >= 6 months who do not have contraindications   Pneumococcal Vaccine   * Pneumococcal conjugate vaccine = PCV13 (Prevnar 13), PCV15 (Vaxneuvance), PCV20 (Prevnar 20)  * Pneumococcal polysaccharide vaccine = PPSV23 (Pneumovax) Adults 19-65 yo with certain risk factors or if 65+ yo  If never received any pneumonia vaccine:  recommend Prevnar 20 (PCV20)  Give PCV20 if previously received 1 dose of PCV13 or PPSV23   Hepatitis B Vaccine 3 dose series if at intermediate or high risk (ex: diabetes, end stage renal disease, liver disease)   Respiratory syncytial virus (RSV) Vaccine - COVERED BY MEDICARE PART D  * RSVPreF3 (Arexvy) CDC recommends that adults 60 years of age and older may receive a single dose of RSV vaccine using shared clinical decision-making (SCDM)   Tetanus (Td) Vaccine - COST NOT COVERED BY MEDICARE PART B Following completion of primary series, a booster dose should be given every 10 years to maintain immunity against tetanus. Td may also be given as tetanus wound prophylaxis.   Tdap Vaccine - COST NOT COVERED BY MEDICARE PART B Recommended at least once for all adults. For pregnant patients, recommended with each pregnancy.   Shingles Vaccine (Shingrix) - COST NOT COVERED BY MEDICARE PART B  2 shot series recommended in those 19 years and older who have or will have weakened immune systems or those 50 years and older     Health Maintenance Due:  There are no preventive care reminders to display for this patient.  Immunizations Due:      Topic Date Due   • COVID-19 Vaccine (3 - 2024-25 season) 09/01/2024     Advance Directives   What are advance directives?  Advance directives are legal documents that state your wishes and plans for medical care. These plans are made ahead of time in case you lose your ability to make decisions for yourself. Advance directives can apply to any medical decision, such as the treatments you want, and if you want to donate organs.   What are the types of advance directives?  There are many types of advance directives, and each state has rules about how to use them. You may choose a combination of any of the following:  Living will:  This is a written record of the treatment you want. You can also choose which treatments you do not want, which to limit, and which to stop at a certain time.  This includes surgery, medicine, IV fluid, and tube feedings.   Durable power of  for healthcare (DPAHC):  This is a written record that states who you want to make healthcare choices for you when you are unable to make them for yourself. This person, called a proxy, is usually a family member or a friend. You may choose more than 1 proxy.  Do not resuscitate (DNR) order:  A DNR order is used in case your heart stops beating or you stop breathing. It is a request not to have certain forms of treatment, such as CPR. A DNR order may be included in other types of advance directives.  Medical directive:  This covers the care that you want if you are in a coma, near death, or unable to make decisions for yourself. You can list the treatments you want for each condition. Treatment may include pain medicine, surgery, blood transfusions, dialysis, IV or tube feedings, and a ventilator (breathing machine).  Values history:  This document has questions about your views, beliefs, and how you feel and think about life. This information can help others choose the care that you would choose.  Why are advance directives important?  An advance directive helps you control your care. Although spoken wishes may be used, it is better to have your wishes written down. Spoken wishes can be misunderstood, or not followed. Treatments may be given even if you do not want them. An advance directive may make it easier for your family to make difficult choices about your care.   How to Quit Using Smokeless Tobacco   Why it is important to stop using smokeless tobacco:  Smokeless tobacco comes in many forms. Examples include chew, snuff, dip, dissolvable tobacco, and snus. All smokeless tobacco products contain nicotine and may contain as much nicotine as 3 cigarettes. You may be physically dependent on nicotine. You may also be emotionally addicted to it. The cravings can be strong, but it is important to quit using smokeless tobacco.  You will improve your health and decrease your cancer, stroke, and heart attack risk. Mouth sores and tooth problems will also improve when you quit. You can benefit from quitting no matter how long you have used smokeless tobacco.   Prepare to stop using smokeless tobacco:  Nicotine is a highly addictive drug. Withdrawal symptoms can happen when you stop and make it hard to quit. The following can help keep you on track:  Set a quit date.    Tell friends, family, and coworkers that you plan to quit.    Remove all smokeless tobacco products from your home, car, and workplace.    Manage weight gain after you quit:  Nicotine can affect your metabolism. You may gain a few pounds after you quit. The following can help you control your weight:  Eat healthy foods.    Drink water before, during, and between meals.    Exercise as directed.       © Copyright [employer] 2018 Information is for End User's use only and may not be sold, redistributed or otherwise used for commercial purposes. All illustrations and images included in CareNotes® are the copyrighted property of [employer].D[employer]M., Inc[employer] or [employer]

## 2025-06-17 NOTE — ASSESSMENT & PLAN NOTE
Lower back pain rating right lower extremity using Tylenol once a day some improvement    Cannot take give any NSAID patient is on Plavix    Recommended physical therapy patient reluctant    Patient followed by pain underwent epidural injection more improvement and management as needed     gabapentin at bedtime was recommended in the past as she has not started not able to sleep at nighttime  Orders:  •  gabapentin (NEURONTIN) 100 mg capsule; Take 2 pills at bedtime

## 2025-06-26 DIAGNOSIS — D50.8 IRON DEFICIENCY ANEMIA SECONDARY TO INADEQUATE DIETARY IRON INTAKE: ICD-10-CM

## 2025-06-27 ENCOUNTER — TELEPHONE (OUTPATIENT)
Dept: RADIOLOGY | Facility: MEDICAL CENTER | Age: 85
End: 2025-06-27

## 2025-06-27 RX ORDER — QUINIDINE GLUCONATE 324 MG
240 TABLET, EXTENDED RELEASE ORAL DAILY
Qty: 90 TABLET | Refills: 1 | Status: SHIPPED | OUTPATIENT
Start: 2025-06-27

## 2025-07-16 ENCOUNTER — OFFICE VISIT (OUTPATIENT)
Dept: PAIN MEDICINE | Facility: CLINIC | Age: 85
End: 2025-07-16
Payer: COMMERCIAL

## 2025-07-16 ENCOUNTER — PREP FOR PROCEDURE (OUTPATIENT)
Dept: OBGYN CLINIC | Facility: CLINIC | Age: 85
End: 2025-07-16

## 2025-07-16 DIAGNOSIS — M47.816 LUMBAR SPONDYLOSIS: Primary | ICD-10-CM

## 2025-07-16 DIAGNOSIS — G89.4 CHRONIC PAIN SYNDROME: ICD-10-CM

## 2025-07-16 DIAGNOSIS — M54.16 LUMBAR RADICULOPATHY: ICD-10-CM

## 2025-07-16 PROCEDURE — G2211 COMPLEX E/M VISIT ADD ON: HCPCS | Performed by: STUDENT IN AN ORGANIZED HEALTH CARE EDUCATION/TRAINING PROGRAM

## 2025-07-16 PROCEDURE — 99214 OFFICE O/P EST MOD 30 MIN: CPT | Performed by: STUDENT IN AN ORGANIZED HEALTH CARE EDUCATION/TRAINING PROGRAM

## 2025-07-16 NOTE — PROGRESS NOTES
Name: Gilberto Vann      : 1940      MRN: [id number]  Encounter Provider: Mike Fisher MD  Encounter Date: 2025   Encounter department: [facility]  :  Assessment & Plan  Lumbar spondylosis       -Schedule for bilateral L4-S1 MBB to RFA pathway   Lumbar radiculopathy         Chronic pain syndrome         Patient is a pleasant 84-year-old male who presents as a follow-up visit after last being seen On 2025 where he underwent a right L3, L4 transforaminal epidural.  Patient dates he is doing better in regards to his right sided symptoms but is interested in potentially getting an injection on his left side as this was bothering him now.  He rates pain as a 6 out of 10 numeric rating scale and the pain is intermittent.  The quality pain is a sharp, throbbing, numbing, pins-and-needles like sensation.  The pain does interfere with his daily activities.    On further discussion with patient he states his right sided low back pain has improved greatly after the epidural but now he is having more left-sided pain and general more axial pain compared to the leg pain.  Discussed with patient that I did not believe repeating the epidural on the other side would be of most benefit but he would likely benefit more from lumbar MBB to RFA pathway given his facet arthropathy on imaging.  Patient amenable to this plan.    Complete risks and benefits including bleeding, infection, tissue reaction, nerve injury and allergic reaction were discussed. The approach was demonstrated using models and literature was provided. Verbal and written consent was obtained.    My impressions and treatment recommendations were discussed in detail with the patient who verbalized understanding and had no further questions.  Discharge instructions were provided. I personally saw and examined the patient and I agree with the above discussed plan of care.    History of Present Illness     Gilberto Vann  is a 84 y.o. male who presents for a follow up office visit in regards to No chief complaint on file..   Patient is a pleasant 84-year-old male who presents as a follow-up visit after last being seen On 6/13/2025 where he underwent a right L3, L4 transforaminal epidural.  Patient dates he is doing better in regards to his right sided symptoms but is interested in potentially getting an injection on his left side as this was bothering him now.  He rates pain as a 6 out of 10 numeric rating scale and the pain is intermittent.  The quality pain is a sharp, throbbing, numbing, pins-and-needles like sensation.  The pain does interfere with his daily activities.      Opioid contract date    Last UDS Date: No results in last 5 years                    last taken on    Review of Systems   Constitutional:  Negative for unexpected weight change.   HENT:  Negative for ear pain.    Eyes:  Negative for visual disturbance.   Respiratory:  Negative for shortness of breath and wheezing.    Gastrointestinal:  Negative for abdominal pain.   Musculoskeletal:  Positive for back pain and gait problem.        Decreased ROM, joint and muscle pain   Neurological:  Positive for weakness and numbness.   Psychiatric/Behavioral:  Positive for sleep disturbance. Negative for decreased concentration. The patient is nervous/anxious.        Medical History Reviewed by provider this encounter:  Tobacco  Allergies  Meds  Problems  Med Hx  Surg Hx  Fam Hx     .       Objective   There were no vitals taken for this visit.     Pain Score:   6  Physical Exam  Constitutional: normal, well developed, well nourished, alert, in no distress and non-toxic and no overt pain behavior.  Eyes: anicteric  HEENT: grossly intact  Neck: supple, symmetric, trachea midline and no masses   Pulmonary: even and unlabored  Cardiovascular: No edema or pitting edema present  Skin: Normal without rashes or lesions and well hydrated  Psychiatric: Mood and affect  appropriate  Neurologic: Cranial Nerves II-XII grossly intact  Musculoskeletal: antalgic gait. Positive lumbar facet loading.         Administrative Statements   I have spent a total time of 31 minutes in caring for this patient on the day of the visit/encounter including Diagnostic results, Prognosis, Risks and benefits of tx options, Instructions for management, Patient and family education, Impressions, Documenting in the medical record, Reviewing/placing orders in the medical record (including tests, medications, and/or procedures), Obtaining or reviewing history  , and Communicating with other healthcare professionals .

## 2025-07-17 PROBLEM — Z00.00 ENCOUNTER FOR SUBSEQUENT ANNUAL WELLNESS VISIT (AWV) IN MEDICARE PATIENT: Status: RESOLVED | Noted: 2025-06-17 | Resolved: 2025-07-17

## 2025-07-21 DIAGNOSIS — I25.10 CORONARY ARTERY DISEASE INVOLVING NATIVE CORONARY ARTERY OF NATIVE HEART WITHOUT ANGINA PECTORIS: ICD-10-CM

## 2025-07-22 RX ORDER — CLOPIDOGREL BISULFATE 75 MG/1
75 TABLET ORAL DAILY
Qty: 100 TABLET | Refills: 1 | Status: SHIPPED | OUTPATIENT
Start: 2025-07-22

## 2025-08-01 ENCOUNTER — HOSPITAL ENCOUNTER (OUTPATIENT)
Facility: AMBULARY SURGERY CENTER | Age: 85
Setting detail: OUTPATIENT SURGERY
Discharge: HOME | End: 2025-08-01
Attending: STUDENT IN AN ORGANIZED HEALTH CARE EDUCATION/TRAINING PROGRAM | Admitting: STUDENT IN AN ORGANIZED HEALTH CARE EDUCATION/TRAINING PROGRAM
Payer: COMMERCIAL

## 2025-08-01 ENCOUNTER — APPOINTMENT (OUTPATIENT)
Dept: RADIOLOGY | Facility: HOSPITAL | Age: 85
End: 2025-08-01
Payer: COMMERCIAL

## 2025-08-01 VITALS
DIASTOLIC BLOOD PRESSURE: 68 MMHG | RESPIRATION RATE: 18 BRPM | OXYGEN SATURATION: 98 % | TEMPERATURE: 98.1 F | SYSTOLIC BLOOD PRESSURE: 151 MMHG | HEART RATE: 71 BPM

## 2025-08-01 PROBLEM — M47.816 LUMBAR SPONDYLOSIS: Status: ACTIVE | Noted: 2025-08-01

## 2025-08-01 PROCEDURE — 64494 INJ PARAVERT F JNT L/S 2 LEV: CPT | Performed by: STUDENT IN AN ORGANIZED HEALTH CARE EDUCATION/TRAINING PROGRAM

## 2025-08-01 PROCEDURE — 64493 INJ PARAVERT F JNT L/S 1 LEV: CPT | Performed by: STUDENT IN AN ORGANIZED HEALTH CARE EDUCATION/TRAINING PROGRAM

## 2025-08-01 RX ORDER — METHYLPREDNISOLONE ACETATE 40 MG/ML
INJECTION, SUSPENSION INTRA-ARTICULAR; INTRALESIONAL; INTRAMUSCULAR; SOFT TISSUE AS NEEDED
Status: DISCONTINUED | OUTPATIENT
Start: 2025-08-01 | End: 2025-08-01 | Stop reason: HOSPADM

## 2025-08-01 RX ORDER — BUPIVACAINE HYDROCHLORIDE 2.5 MG/ML
INJECTION, SOLUTION EPIDURAL; INFILTRATION; INTRACAUDAL; PERINEURAL AS NEEDED
Status: DISCONTINUED | OUTPATIENT
Start: 2025-08-01 | End: 2025-08-01 | Stop reason: HOSPADM

## 2025-08-01 RX ORDER — LIDOCAINE HYDROCHLORIDE 10 MG/ML
INJECTION, SOLUTION EPIDURAL; INFILTRATION; INTRACAUDAL; PERINEURAL AS NEEDED
Status: DISCONTINUED | OUTPATIENT
Start: 2025-08-01 | End: 2025-08-01 | Stop reason: HOSPADM

## 2025-08-04 ENCOUNTER — TELEPHONE (OUTPATIENT)
Dept: PAIN MEDICINE | Facility: CLINIC | Age: 85
End: 2025-08-04

## 2025-08-05 ENCOUNTER — PREP FOR PROCEDURE (OUTPATIENT)
Dept: PAIN MEDICINE | Facility: CLINIC | Age: 85
End: 2025-08-05

## 2025-08-05 ENCOUNTER — TELEPHONE (OUTPATIENT)
Dept: PAIN MEDICINE | Facility: CLINIC | Age: 85
End: 2025-08-05

## 2025-08-05 DIAGNOSIS — M47.816 LUMBAR SPONDYLOSIS: Primary | ICD-10-CM

## 2025-08-19 ENCOUNTER — APPOINTMENT (OUTPATIENT)
Dept: RADIOLOGY | Facility: HOSPITAL | Age: 85
End: 2025-08-19
Payer: COMMERCIAL

## 2025-08-19 ENCOUNTER — HOSPITAL ENCOUNTER (OUTPATIENT)
Facility: AMBULARY SURGERY CENTER | Age: 85
Setting detail: OUTPATIENT SURGERY
Discharge: HOME | End: 2025-08-19
Attending: STUDENT IN AN ORGANIZED HEALTH CARE EDUCATION/TRAINING PROGRAM | Admitting: STUDENT IN AN ORGANIZED HEALTH CARE EDUCATION/TRAINING PROGRAM
Payer: COMMERCIAL

## 2025-08-19 VITALS
SYSTOLIC BLOOD PRESSURE: 152 MMHG | OXYGEN SATURATION: 97 % | DIASTOLIC BLOOD PRESSURE: 75 MMHG | RESPIRATION RATE: 18 BRPM | HEART RATE: 78 BPM | TEMPERATURE: 97.6 F

## 2025-08-19 PROCEDURE — 64494 INJ PARAVERT F JNT L/S 2 LEV: CPT | Performed by: STUDENT IN AN ORGANIZED HEALTH CARE EDUCATION/TRAINING PROGRAM

## 2025-08-19 PROCEDURE — 64493 INJ PARAVERT F JNT L/S 1 LEV: CPT | Performed by: STUDENT IN AN ORGANIZED HEALTH CARE EDUCATION/TRAINING PROGRAM

## 2025-08-19 RX ORDER — LIDOCAINE HYDROCHLORIDE 10 MG/ML
INJECTION, SOLUTION EPIDURAL; INFILTRATION; INTRACAUDAL; PERINEURAL AS NEEDED
Status: DISCONTINUED | OUTPATIENT
Start: 2025-08-19 | End: 2025-08-19 | Stop reason: HOSPADM

## 2025-08-19 RX ORDER — BUPIVACAINE HYDROCHLORIDE 5 MG/ML
INJECTION, SOLUTION EPIDURAL; INTRACAUDAL; PERINEURAL AS NEEDED
Status: DISCONTINUED | OUTPATIENT
Start: 2025-08-19 | End: 2025-08-19 | Stop reason: HOSPADM

## 2025-08-19 RX ORDER — METHYLPREDNISOLONE ACETATE 40 MG/ML
INJECTION, SUSPENSION INTRA-ARTICULAR; INTRALESIONAL; INTRAMUSCULAR; SOFT TISSUE AS NEEDED
Status: DISCONTINUED | OUTPATIENT
Start: 2025-08-19 | End: 2025-08-19 | Stop reason: HOSPADM

## 2025-08-20 ENCOUNTER — OFFICE VISIT (OUTPATIENT)
Age: 85
End: 2025-08-20
Payer: COMMERCIAL

## 2025-08-20 VITALS
OXYGEN SATURATION: 100 % | TEMPERATURE: 98 F | SYSTOLIC BLOOD PRESSURE: 108 MMHG | HEIGHT: 69 IN | BODY MASS INDEX: 20.59 KG/M2 | RESPIRATION RATE: 16 BRPM | DIASTOLIC BLOOD PRESSURE: 50 MMHG | HEART RATE: 64 BPM | WEIGHT: 139 LBS

## 2025-08-20 DIAGNOSIS — J20.9 ACUTE INFECTIVE TRACHEOBRONCHITIS: Primary | ICD-10-CM

## 2025-08-20 DIAGNOSIS — I42.9 CARDIOMYOPATHY, UNSPECIFIED TYPE (HCC): ICD-10-CM

## 2025-08-20 DIAGNOSIS — J30.1 ALLERGIC RHINITIS DUE TO POLLEN, UNSPECIFIED SEASONALITY: ICD-10-CM

## 2025-08-20 DIAGNOSIS — H40.9 GLAUCOMA, UNSPECIFIED GLAUCOMA TYPE, UNSPECIFIED LATERALITY: ICD-10-CM

## 2025-08-20 DIAGNOSIS — I25.10 CORONARY ARTERY DISEASE INVOLVING NATIVE CORONARY ARTERY OF NATIVE HEART WITHOUT ANGINA PECTORIS: ICD-10-CM

## 2025-08-20 PROCEDURE — G2211 COMPLEX E/M VISIT ADD ON: HCPCS | Performed by: INTERNAL MEDICINE

## 2025-08-20 PROCEDURE — 99214 OFFICE O/P EST MOD 30 MIN: CPT | Performed by: INTERNAL MEDICINE

## 2025-08-20 RX ORDER — ATORVASTATIN CALCIUM 20 MG/1
20 TABLET, FILM COATED ORAL
Qty: 90 TABLET | Refills: 1 | Status: SHIPPED | OUTPATIENT
Start: 2025-08-20

## 2025-08-20 RX ORDER — AZITHROMYCIN 250 MG/1
TABLET, FILM COATED ORAL
Qty: 6 TABLET | Refills: 0 | Status: SHIPPED | OUTPATIENT
Start: 2025-08-20 | End: 2025-08-25

## 2025-08-20 RX ORDER — METHYLPREDNISOLONE 4 MG/1
TABLET ORAL
Qty: 21 EACH | Refills: 0 | Status: SHIPPED | OUTPATIENT
Start: 2025-08-20

## 2025-08-20 RX ORDER — FLUTICASONE PROPIONATE 50 MCG
1 SPRAY, SUSPENSION (ML) NASAL 2 TIMES DAILY
Qty: 15.8 ML | Refills: 3 | Status: SHIPPED | OUTPATIENT
Start: 2025-08-20

## 2025-08-20 RX ORDER — LATANOPROST 50 UG/ML
1 SOLUTION/ DROPS OPHTHALMIC DAILY
Qty: 7.5 ML | Refills: 1 | Status: SHIPPED | OUTPATIENT
Start: 2025-08-20

## 2025-08-20 RX ORDER — LOSARTAN POTASSIUM 25 MG/1
25 TABLET ORAL DAILY
Qty: 90 TABLET | Refills: 1 | Status: SHIPPED | OUTPATIENT
Start: 2025-08-20

## (undated) DEVICE — DRAPE LAPAROTOMY W/POUCHES

## (undated) DEVICE — Device

## (undated) DEVICE — GLOVE SRG BIOGEL 8

## (undated) DEVICE — TRAY PAIN SUPPORT

## (undated) DEVICE — CYSTOSCOPY PACK: Brand: CONVERTORS

## (undated) DEVICE — PACK GENERAL LF

## (undated) DEVICE — RADIOLOGY STERILE LABELS: Brand: CENTURION

## (undated) DEVICE — GLOVE SRG BIOGEL 7.5

## (undated) DEVICE — NEEDLE SPINAL 22G X 3.5 IN PLST HUB

## (undated) DEVICE — MANIFOLD KIT NETWORK - CCL

## (undated) DEVICE — SUT SILK 0 30 IN SA86G

## (undated) DEVICE — SUT CHROMIC 2-0 SH 27 IN G123H

## (undated) DEVICE — STANDARD SURGICAL GOWN, L: Brand: CONVERTORS

## (undated) DEVICE — POUCH UR CATCHER STERILE

## (undated) DEVICE — BAG URINE DRAINAGE 4000ML CONTINUOUS IRR

## (undated) DEVICE — KERLIX BANDAGE ROLL: Brand: KERLIX

## (undated) DEVICE — GROUNDING PAD UNIVERSAL SLW

## (undated) DEVICE — SYRINGE 5ML LL

## (undated) DEVICE — CATH DIAG 5FR IMPULSE 100CM WR

## (undated) DEVICE — SPECIMEN CONTAINER STERILE PEEL PACK

## (undated) DEVICE — PACK CUSTOM C V DRAPE SCV11CDSLA

## (undated) DEVICE — SYRINGE 10ML LL

## (undated) DEVICE — STERILE DOUBLE BASIN SET PACK: Brand: CARDINAL HEALTH

## (undated) DEVICE — BASIC DOUBLE BASIN 2-LF: Brand: MEDLINE INDUSTRIES, INC.

## (undated) DEVICE — SYRINGE 3ML LL

## (undated) DEVICE — SPONGE STICK WITH PVP-I: Brand: KENDALL

## (undated) DEVICE — SUT CHROMIC 3-0 PS-2 27 1638H

## (undated) DEVICE — SUT SILK 0 FSL 18 IN 678G

## (undated) DEVICE — GUIDEWIRE WHOLEY .035 145 CM FLOP STR TIP

## (undated) DEVICE — ANTIBACTERIAL UNDYED BRAIDED (POLYGLACTIN 910), SYNTHETIC ABSORBABLE SUTURE: Brand: COATED VICRYL

## (undated) DEVICE — TR BAND RADIAL ARTERY COMPRESSION DEVICE: Brand: TR BAND

## (undated) DEVICE — WIPES BABY PAMPERS SENSITIVE 36/PK

## (undated) DEVICE — SUT CHROMIC 3-0 54 IN L112G

## (undated) DEVICE — CATH DIAG 5FR IMPULSE 100CM FR4

## (undated) DEVICE — TOWEL SURG XR DETECT GREEN STRL RFD

## (undated) DEVICE — WET SKIN PREP TRAY: Brand: MEDLINE INDUSTRIES, INC.

## (undated) DEVICE — LUBRICANT SURGILUBE TUBE 4 OZ  FLIP TOP

## (undated) DEVICE — SUPER SPONGES,MEDIUM: Brand: KERLIX

## (undated) DEVICE — STRL PENROSE DRAIN 18" X 1/4": Brand: CARDINAL HEALTH

## (undated) DEVICE — POUCH URO CATCHER II STERILE

## (undated) DEVICE — NEEDLE PERCUTANEOUS 21G X 4CM

## (undated) DEVICE — STORZ LOOP ELECTRODE 24 FR

## (undated) DEVICE — NEPTUNE E-SEP SMOKE EVACUATION PENCIL, COATED, 70MM BLADE, PUSH BUTTON SWITCH: Brand: NEPTUNE E-SEP

## (undated) DEVICE — EVACUATOR BLADDER ELLIK DISP STRL

## (undated) DEVICE — ASTOUND STANDARD SURGICAL GOWN, XL: Brand: CONVERTORS

## (undated) DEVICE — SKIN MARKER DUAL TIP WITH RULER CAP, FLEXIBLE RULER AND LABELS: Brand: DEVON

## (undated) DEVICE — SYRINGE 20ML LL

## (undated) DEVICE — RADIFOCUS OPTITORQUE ANGIOGRAPHIC CATHETER: Brand: OPTITORQUE

## (undated) DEVICE — SPONGE: SPECIALTY K-DISS XR  100/CS: Brand: MEDICAL ACTION INDUSTRIES

## (undated) DEVICE — DRAPE UTILITY

## (undated) DEVICE — CYSTO TUBING TUR Y IRRIGATION

## (undated) DEVICE — LUBRICANT JELLY SURGILUBE TUBE 4OZ FLIP TOP

## (undated) DEVICE — STRAP FOLEY CATH LEG 1545 9

## (undated) DEVICE — TOWEL SET X-RAY

## (undated) DEVICE — GLOVE SRG LF STRL BGL SKNSNS 8 PF

## (undated) DEVICE — CATH DIAG 5FR IMPULSE 100CM FL4

## (undated) DEVICE — POV-IOD SOLUTION 4OZ BT

## (undated) DEVICE — LIGACLIP MCA MULTIPLE CLIP APPLIERS, 20 LARGE CLIPS: Brand: LIGACLIP

## (undated) DEVICE — GLIDESHEATH SLENDER STAINLESS STEEL KIT: Brand: GLIDESHEATH SLENDER

## (undated) DEVICE — GUIDEWIRE STRGHT TIP 0.038 IN SOLO PLUS

## (undated) DEVICE — ELECTRODE NEEDLE E-Z CLEAN 2.75IN 7CM -0013

## (undated) DEVICE — PLASTIC ADHESIVE BANDAGE: Brand: CURITY

## (undated) RX ORDER — PREDNISOLONE ACETATE 10 MG/ML: 1 SUSPENSION/ DROPS OPHTHALMIC

## (undated) RX ORDER — OFLOXACIN 3 MG/ML: 1 SOLUTION OPHTHALMIC